# Patient Record
Sex: MALE | Race: BLACK OR AFRICAN AMERICAN | Employment: OTHER | URBAN - METROPOLITAN AREA
[De-identification: names, ages, dates, MRNs, and addresses within clinical notes are randomized per-mention and may not be internally consistent; named-entity substitution may affect disease eponyms.]

---

## 2018-11-06 ENCOUNTER — ED HISTORICAL/CONVERTED ENCOUNTER (OUTPATIENT)
Dept: OTHER | Age: 53
End: 2018-11-06

## 2019-01-16 ENCOUNTER — OP HISTORICAL/CONVERTED ENCOUNTER (OUTPATIENT)
Dept: OTHER | Age: 54
End: 2019-01-16

## 2019-07-22 ENCOUNTER — IP HISTORICAL/CONVERTED ENCOUNTER (OUTPATIENT)
Dept: OTHER | Age: 54
End: 2019-07-22

## 2019-09-30 NOTE — PERIOP NOTES
Unable to reach patient, no message machine available. Called Dr. Yogi Kuhn office - no other numbers available. Requested to let Anil [place] know that we are having issues getting hold of patient. Spoke with patient at dialysis center. Gave patient available PAT H&P times. Patient will call back when he speaks to ride for availability.

## 2019-09-30 NOTE — PERIOP NOTES
Received a return call from Adrian at Dr. Ellis Wetzel office stating that the patient is very hard to reach via the phone. The patient goes to dialysis M-W-F so the dialysis center is the best place to reach the patient. The number is [phone]. Will probably need to ask for the nurse taking care of him.   DOS: 10/10/2019

## 2019-10-03 ENCOUNTER — HOSPITAL ENCOUNTER (OUTPATIENT)
Dept: GENERAL RADIOLOGY | Age: 54
Discharge: HOME | End: 2019-10-03
Payer: MEDICARE

## 2019-10-03 ENCOUNTER — HOSPITAL ENCOUNTER (OUTPATIENT)
Dept: PREADMISSION TESTING | Age: 54
Discharge: HOME | End: 2019-10-03
Payer: MEDICARE

## 2019-10-03 VITALS
TEMPERATURE: 98.2 F | BODY MASS INDEX: 24.45 KG/M2 | HEART RATE: 84 BPM | OXYGEN SATURATION: 100 % | HEIGHT: 72 IN | SYSTOLIC BLOOD PRESSURE: 110 MMHG | DIASTOLIC BLOOD PRESSURE: 72 MMHG | RESPIRATION RATE: 18 BRPM | WEIGHT: 180.5 LBS

## 2019-10-03 LAB
ALBUMIN SERPL-MCNC: 3.5 G/DL (ref 3.5–5)
ALBUMIN/GLOB SERPL: 0.8 {RATIO} (ref 1.1–2.2)
ALP SERPL-CCNC: 80 U/L (ref 45–117)
ALT SERPL-CCNC: 8 U/L (ref 12–78)
ANION GAP SERPL CALC-SCNC: 5 MMOL/L (ref 5–15)
APTT PPP: 25.5 SEC (ref 22.1–32)
AST SERPL-CCNC: 22 U/L (ref 15–37)
BASOPHILS # BLD: 0 K/UL (ref 0–0.1)
BASOPHILS NFR BLD: 1 % (ref 0–1)
BILIRUB SERPL-MCNC: 2.4 MG/DL (ref 0.2–1)
BUN SERPL-MCNC: 21 MG/DL (ref 6–20)
BUN/CREAT SERPL: 3 (ref 12–20)
CALCIUM SERPL-MCNC: 9.1 MG/DL (ref 8.5–10.1)
CHLORIDE SERPL-SCNC: 103 MMOL/L (ref 97–108)
CO2 SERPL-SCNC: 30 MMOL/L (ref 21–32)
CREAT SERPL-MCNC: 7.01 MG/DL (ref 0.7–1.3)
DIFFERENTIAL METHOD BLD: ABNORMAL
EOSINOPHIL # BLD: 0.1 K/UL (ref 0–0.4)
EOSINOPHIL NFR BLD: 3 % (ref 0–7)
ERYTHROCYTE [DISTWIDTH] IN BLOOD BY AUTOMATED COUNT: 13.9 % (ref 11.5–14.5)
GLOBULIN SER CALC-MCNC: 4.4 G/DL (ref 2–4)
GLUCOSE SERPL-MCNC: 93 MG/DL (ref 65–100)
HCT VFR BLD AUTO: 32.9 % (ref 36.6–50.3)
HGB BLD-MCNC: 10.2 G/DL (ref 12.1–17)
IMM GRANULOCYTES # BLD AUTO: 0 K/UL (ref 0–0.04)
IMM GRANULOCYTES NFR BLD AUTO: 0 % (ref 0–0.5)
INR PPP: 1 (ref 0.9–1.1)
LYMPHOCYTES # BLD: 0.7 K/UL (ref 0.8–3.5)
LYMPHOCYTES NFR BLD: 21 % (ref 12–49)
MCH RBC QN AUTO: 30.5 PG (ref 26–34)
MCHC RBC AUTO-ENTMCNC: 31 G/DL (ref 30–36.5)
MCV RBC AUTO: 98.5 FL (ref 80–99)
MONOCYTES # BLD: 0.5 K/UL (ref 0–1)
MONOCYTES NFR BLD: 14 % (ref 5–13)
NEUTS SEG # BLD: 2.2 K/UL (ref 1.8–8)
NEUTS SEG NFR BLD: 61 % (ref 32–75)
NRBC # BLD: 0 K/UL (ref 0–0.01)
NRBC BLD-RTO: 0 PER 100 WBC
PLATELET # BLD AUTO: 139 K/UL (ref 150–400)
PMV BLD AUTO: 9.4 FL (ref 8.9–12.9)
POTASSIUM SERPL-SCNC: 4.8 MMOL/L (ref 3.5–5.1)
PROT SERPL-MCNC: 7.9 G/DL (ref 6.4–8.2)
PROTHROMBIN TIME: 10 SEC (ref 9–11.1)
RBC # BLD AUTO: 3.34 M/UL (ref 4.1–5.7)
RBC MORPH BLD: ABNORMAL
SODIUM SERPL-SCNC: 138 MMOL/L (ref 136–145)
THERAPEUTIC RANGE,PTTT: NORMAL SECS (ref 58–77)
WBC # BLD AUTO: 3.5 K/UL (ref 4.1–11.1)

## 2019-10-03 PROCEDURE — 36415 COLL VENOUS BLD VENIPUNCTURE: CPT

## 2019-10-03 PROCEDURE — 85025 COMPLETE CBC W/AUTO DIFF WBC: CPT

## 2019-10-03 PROCEDURE — 85730 THROMBOPLASTIN TIME PARTIAL: CPT

## 2019-10-03 PROCEDURE — 93005 ELECTROCARDIOGRAM TRACING: CPT

## 2019-10-03 PROCEDURE — 71046 X-RAY EXAM CHEST 2 VIEWS: CPT

## 2019-10-03 PROCEDURE — 85610 PROTHROMBIN TIME: CPT

## 2019-10-03 PROCEDURE — 80053 COMPREHEN METABOLIC PANEL: CPT

## 2019-10-03 RX ORDER — RITONAVIR 100 MG/1
100 TABLET ORAL DAILY
COMMUNITY
End: 2021-02-09 | Stop reason: SDUPTHER

## 2019-10-03 RX ORDER — ATORVASTATIN CALCIUM 40 MG/1
40 TABLET, FILM COATED ORAL DAILY
COMMUNITY
End: 2020-09-16

## 2019-10-03 RX ORDER — LEVETIRACETAM 750 MG/1
750 TABLET ORAL 2 TIMES DAILY
COMMUNITY
End: 2020-09-16 | Stop reason: SDUPTHER

## 2019-10-03 RX ORDER — ASPIRIN 81 MG/1
81 TABLET ORAL DAILY
COMMUNITY
End: 2020-09-16 | Stop reason: SDUPTHER

## 2019-10-03 RX ORDER — SEVELAMER CARBONATE 800 MG/1
1600 TABLET, FILM COATED ORAL 2 TIMES DAILY
COMMUNITY

## 2019-10-03 RX ORDER — ATAZANAVIR 300 MG/1
300 CAPSULE ORAL DAILY
COMMUNITY
End: 2020-08-10 | Stop reason: SDUPTHER

## 2019-10-03 RX ORDER — METOPROLOL TARTRATE 50 MG/1
50 TABLET ORAL 2 TIMES DAILY
COMMUNITY
End: 2022-04-18 | Stop reason: SDUPTHER

## 2019-10-03 RX ORDER — CALCIUM CARBONATE 200(500)MG
1 TABLET,CHEWABLE ORAL AS NEEDED
COMMUNITY

## 2019-10-03 RX ORDER — HYDROCODONE BITARTRATE AND ACETAMINOPHEN 7.5; 325 MG/1; MG/1
1 TABLET ORAL
COMMUNITY
End: 2019-11-14

## 2019-10-03 NOTE — PERIOP NOTES
[address] [address] [facility]   MAIN OR                                  [phone]   MAIN PRE OP                          [phone]                                                                                AMBULATORY PRE OP          [phone]  PRE-ADMISSION TESTING    [phone]   Surgery Date:   Thursday 10/10/19        Is surgery arrival time given by surgeon? NO  If [facility] staff will call you Wednesday 10/9/19 between 3 and 7pm the day before your surgery with your arrival time. (If your surgery is on a Monday, we will call you the Friday before.)    Call [phone] after 7pm Monday-Friday if you did not receive your arrival time. INSTRUCTIONS BEFORE YOUR SURGERY   When You  Arrive Arrive at the [address] [facility] on the day of your surgery  Have your insurance card, photo ID, and any copayment (if needed)   Food   and   Drink NO food or drink after midnight the night before surgery    This means NO water, gum, mints, coffee, juice, etc.  No alcohol (beer, wine, liquor) 24 hours before and after surgery   Medications to   TAKE   Morning of Surgery MEDICATIONS TO TAKE THE MORNING OF SURGERY WITH A SIP OF WATER:    Hydralazine, metoprolol, ritonovir, vemlidy, amlodipine, levetiracetam, atazanovir, percocoet if needed   Medications  To  STOP      7 days before surgery  Non-Steroidal anti-inflammatory Drugs (NSAID's): for example, Ibuprofen (Advil, Motrin), Naproxen (Aleve)   Aspirin, if taking for pain    Herbal supplements, vitamins, and fish oil     Blood  Thinners  If you take  Aspirin, Plavix, Coumadin, or any blood-thinning or anti-blood clot medicine, talk to the doctor who prescribed the medications for pre-operative instructions.    Bathing Clothing  Jewelry  Valuables       If you shower the morning of surgery, please do not apply anything to your skin (lotions, powders, deodorant, or makeup, especially lisha)   Follow all special bath instructions    Do not shave or trim anywhere 24 hours before surgery   Wear your hair loose or down; no pony-tails, buns, or metal hair clips   Wear loose, comfortable, clean clothes   Wear glasses instead of contacts   Leave money, valuables, and jewelry, including body piercings, at home   Going Home - or Spending the Night  SAME-DAY SURGERY: You must have a responsible adult drive you home and stay with you 24 hours after surgery   ADMITS: If your doctor is keeping you in the hospital after surgery, leave personal belongings/luggage in your car until you have a hospital room number. Hospital discharge time is 12 noon  Drivers must be here before 12 noon unless you are told differently   Special Instructions   Special Instructions:  · Use Chlorhexidine Care Fusion wash and sponges 3 days prior to surgery as instructed. · Incentive spirometer given with instructions to practice at home and bring back to the hospital on the day of surgery. · [facility] will contact you if your Hemoglobin A1C is greater than 7.5. · Pain pamphlet and Call Don't Fall reminder reviewed with patient. ·  parking is complimentary Monday - Friday 7 am - 5 pm  · Bring PTA Medication list day of surgery with the last doses taken documented     Follow all instructions so your surgery wont be cancelled. Please, be on time. If a situation occurs and you are delayed the day of surgery, call [phone] or [phone]. If your physical condition changes (like a fever, cold, flu, etc.) call your surgeon. Home medication(s) reviewed and verified (list) during PAT appointment. The patient was contacted  in person. The patient verbalizes understanding of all instructions and does not  need reinforcement.

## 2019-10-04 LAB
ATRIAL RATE: 78 BPM
CALCULATED P AXIS, ECG09: 19 DEGREES
CALCULATED R AXIS, ECG10: -8 DEGREES
CALCULATED T AXIS, ECG11: 7 DEGREES
DIAGNOSIS, 93000: NORMAL
P-R INTERVAL, ECG05: 172 MS
Q-T INTERVAL, ECG07: 390 MS
QRS DURATION, ECG06: 90 MS
QTC CALCULATION (BEZET), ECG08: 444 MS
VENTRICULAR RATE, ECG03: 78 BPM

## 2019-10-04 NOTE — H&P
Preoperative Evaluation                     History and Physical with Surgical Risk Stratification     10/3/2019    CC: Blocked left arm fistula   Surgery: Revision of fistuala     HPI:   Ysabel Zamorano is a 48 y.o. male referred for pre-operative evaluation by Dr. Scarlett Gonzalez for surgery on 10/10/19. Mr. 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 The patient was evaluated in the surgeon's office and it was determined that the most appropriate plan of care is to proceed with surgical intervention. Patient's PCP Other, MD Hortencia    Review of Systems     Constitutional: Negative for chills and fever  HENT: Negative for congestion and sore throat  Eyes: negative for blurred vision and double vision  Respiratory: Negative for cough, shortness of breath and wheezing  Mouth: Negative for loose, broken or chipped teeth. Negative for dentures  Cardiovascular: Negative for chest pain and palpitations  Gastrointestinal: Negative for abdominal pain, constipation, diarrhea and nausea  Genitourinary: Negative for dysuria and hematuria  Musculoskeletal: Negative for joint pain  Skin: Negative for rash, open wounds. Negative for bruises easily  Neurological: Negative for dizziness, tremors and headaches  Psychiatric: Negative for depression. The patient is not nervous/anxious.     Inherent Risk of Surgery     Surgical risk:  Low  Low:  EIntermediate:   High:    Patient Cardiac Risk Assessment     Revised Cardiac Risk Index (RCRI)    Rate if cardiac death, nonfatal MI, nonfatal cardiac arrest by number of risk factor- 0.4%    WADE/AHA 2007 Guidelines:   1) Surgery Emergency, Non-cardiac -> to surgery  2) If not, look at clinical predictors    Major Intermediate Minor     ESRD  Blood Thinner: None    METS      EQUAL TO 4 Care for self Walk indoors around house Walk 2-3 blocks on level ground (2-3 mph) Light work around house (dust, [employer])     Other Risk Factors:   Screening for ETOH use:  Done and low risk  Smoking status:   None    Personal or FH of bleeding problems:  No  Personal or FH of blood clots:  No  Personal or FH of anesthesia problems:   No    Pulmonary Risk:  Asthma or COPD:  No  Body mass index is 24.48 kg/m². Known CINTHIA:  No  Albumin normal, BUN normal    Past Medical, Surgical, Social History     Allergies: No Known Allergies    Current Outpatient Medications   Medication Sig    HYDROcodone-acetaminophen (NORCO) 7.5-325 mg per tablet Take 1 Tab by mouth every eight (8) hours as needed.  hydralazine HCl (APRESOLINE PO) Take 75 mg by mouth three (3) times daily.  sevelamer carbonate (RENVELA) 800 mg tab tab Take 1,600 mg by mouth two (2) times a day.  metoprolol tartrate (LOPRESSOR) 50 mg tablet Take 50 mg by mouth two (2) times a day.  ritonavir (NORVIR) 100 mg tablet Take 100 mg by mouth daily.  aspirin delayed-release 81 mg tablet Take 81 mg by mouth daily.  tenofovir ALAFENAMIDE FUMARATE (VEMLIDY) 25 mg tab Take 25 mg by mouth daily.  atorvastatin (LIPITOR) 40 mg tablet Take 40 mg by mouth daily.  levETIRAcetam (KEPPRA) 750 mg tablet Take 750 mg by mouth two (2) times a day.  atazanavir (REYATAZ) 300 mg capsule Take 300 mg by mouth daily.  calcium carbonate (TUMS) 200 mg calcium (500 mg) chew Take 1 Tab by mouth as needed.  amlodipine besylate (AMLODIPINE PO) Take 10 mg by mouth daily. No current facility-administered medications for this encounter. Past Medical History:   Diagnosis Date    Anemia     Chronic abdominal pain     Chronic kidney disease     dialysis pt. - M. W.F    Complication of AV dialysis fistula 2019    2 large pseudo-aneurysm    [facility]     GERD (gastroesophageal reflux disease)     Gout     History of blood transfusion     at least 8 units over the years    HIV (human immunodeficiency virus infection) ([facility] 2011  Hypertension     Other ill-defined conditions(799.89)     SMALL VEIN IN HEART BEING MONITORED    Pancreatitis     Seizure ([facility] 2012    one time and none sense    Sepsis ([facility] 2011 & 8/2019     Past Surgical History:   Procedure Laterality Date    HX ARTERIOVENOUS FISTULA Left 2012    HX CHOLECYSTECTOMY  2011     Social History     Tobacco Use    Smoking status: Never Smoker    Smokeless tobacco: Never Used   Substance Use Topics    Alcohol use: Not Currently    Drug use: No     Family History   Problem Relation Age of Onset    Cancer Brother         colon    Heart Disease Brother     Heart Disease Mother        Objective     Vitals:    10/03/19 1359   BP: 110/72   Pulse: 84   Resp: 18   Temp: 98.2 °F (36.8 °C)   SpO2: 100%   Weight: 81.9 kg (180 lb 8 oz)   Height: 6' (1.829 m)       Constitutional:  Appears well,  No Acute Distress, Vitals noted  Psychiatric:   Affect normal, Alert and Oriented to person/place/time    Eyes:   Pupils equally round and reactive, EOMI, conjunctiva clear, eyelids normal  ENT:   External ears and nose normal/lips, teeth normal, gums normal, TMs and Orophyarynx normal  Neck:   general inspection and Thyroid normal.  No abnormal cervical or supraclavicular nodes    Lungs:   clear to auscultation, good respiratory effort  Heart: Ausculation normal.  Regular rhythm. No cardiac murmurs.   No carotid bruits or palpable thrills  Chest wall normal  Musculoskeletal: Normal gait  Extremities:   without edema, good peripheral pulses  Skin:   Warm to palpation, without rashes, bruising, or suspicious lesions     Recent Results (from the past 72 hour(s))   CBC WITH AUTOMATED DIFF    Collection Time: 10/03/19  2:55 PM   Result Value Ref Range    WBC 3.5 (L) 4.1 - 11.1 K/uL    RBC 3.34 (L) 4.10 - 5.70 M/uL    HGB 10.2 (L) 12.1 - 17.0 g/dL    HCT 32.9 (L) 36.6 - 50.3 %    MCV 98.5 80.0 - 99.0 FL    MCH 30.5 26.0 - 34.0 PG    MCHC 31.0 30.0 - 36.5 g/dL    RDW 13.9 11.5 - 14.5 % PLATELET 708 (L) 794 - 400 K/uL    MPV 9.4 8.9 - 12.9 FL    NRBC 0.0 0  WBC    ABSOLUTE NRBC 0.00 0.00 - 0.01 K/uL    NEUTROPHILS 61 32 - 75 %    LYMPHOCYTES 21 12 - 49 %    MONOCYTES 14 (H) 5 - 13 %    EOSINOPHILS 3 0 - 7 %    BASOPHILS 1 0 - 1 %    IMMATURE GRANULOCYTES 0 0.0 - 0.5 %    ABS. NEUTROPHILS 2.2 1.8 - 8.0 K/UL    ABS. LYMPHOCYTES 0.7 (L) 0.8 - 3.5 K/UL    ABS. MONOCYTES 0.5 0.0 - 1.0 K/UL    ABS. EOSINOPHILS 0.1 0.0 - 0.4 K/UL    ABS. BASOPHILS 0.0 0.0 - 0.1 K/UL    ABS. IMM. GRANS. 0.0 0.00 - 0.04 K/UL    DF SMEAR SCANNED      RBC COMMENTS OVALOCYTES  PRESENT       METABOLIC PANEL, COMPREHENSIVE    Collection Time: 10/03/19  2:55 PM   Result Value Ref Range    Sodium 138 136 - 145 mmol/L    Potassium 4.8 3.5 - 5.1 mmol/L    Chloride 103 97 - 108 mmol/L    CO2 30 21 - 32 mmol/L    Anion gap 5 5 - 15 mmol/L    Glucose 93 65 - 100 mg/dL    BUN 21 (H) 6 - 20 MG/DL    Creatinine 7.01 (H) 0.70 - 1.30 MG/DL    BUN/Creatinine ratio 3 (L) 12 - 20      GFR est AA 10 (L) >60 ml/min/1.73m2    GFR est non-AA 8 (L) >60 ml/min/1.73m2    Calcium 9.1 8.5 - 10.1 MG/DL    Bilirubin, total 2.4 (H) 0.2 - 1.0 MG/DL    ALT (SGPT) 8 (L) 12 - 78 U/L    AST (SGOT) 22 15 - 37 U/L    Alk.  phosphatase 80 45 - 117 U/L    Protein, total 7.9 6.4 - 8.2 g/dL    Albumin 3.5 3.5 - 5.0 g/dL    Globulin 4.4 (H) 2.0 - 4.0 g/dL    A-G Ratio 0.8 (L) 1.1 - 2.2     PROTHROMBIN TIME + INR    Collection Time: 10/03/19  2:55 PM   Result Value Ref Range    INR 1.0 0.9 - 1.1      Prothrombin time 10.0 9.0 - 11.1 sec   PTT    Collection Time: 10/03/19  2:55 PM   Result Value Ref Range    aPTT 25.5 22.1 - 32.0 sec    aPTT, therapeutic range     58.0 - 77.0 SECS   EKG, 12 LEAD, INITIAL    Collection Time: 10/03/19  3:10 PM   Result Value Ref Range    Ventricular Rate 78 BPM    Atrial Rate 78 BPM    P-R Interval 172 ms    QRS Duration 90 ms    Q-T Interval 390 ms    QTC Calculation (Bezet) 444 ms    Calculated P Axis 19 degrees    Calculated R Axis -8 degrees    Calculated T Axis 7 degrees    Diagnosis       Normal sinus rhythm  Voltage criteria for left ventricular hypertrophy  Abnormal ECG  When compared with ECG of 22-DEC-2011 13:11,  MANUAL COMPARISON REQUIRED, DATA IS UNCONFIRMED         Assessment and Plan     Assessment/Plan:   1) Blocked fistual  2) Pre-Operative Evaluation    Labs, CXR and EKG reviewed    CBC- anemia it the setting of dialysis    Preoperative Clearance  Per RCRI, the patient has a 0.4% risk of cardiac death, nonfatal MI, nonfatal cardiac arrest based on no risk factors. Per ACC/AHA guidelines, patient is low risk for a(n) low risk surgery and may proceed to planned surgery with the above noted risk.     Dennie Liner, PRETTY

## 2019-10-09 ENCOUNTER — ANESTHESIA EVENT (OUTPATIENT)
Dept: SURGERY | Age: 54
End: 2019-10-09
Payer: MEDICARE

## 2019-10-10 ENCOUNTER — HOSPITAL ENCOUNTER (OUTPATIENT)
Age: 54
Setting detail: OUTPATIENT SURGERY
Discharge: HOME | End: 2019-10-10
Payer: MEDICARE

## 2019-10-10 ENCOUNTER — ANESTHESIA (OUTPATIENT)
Dept: SURGERY | Age: 54
End: 2019-10-10
Payer: MEDICARE

## 2019-10-10 VITALS
SYSTOLIC BLOOD PRESSURE: 106 MMHG | DIASTOLIC BLOOD PRESSURE: 74 MMHG | OXYGEN SATURATION: 95 % | TEMPERATURE: 98.2 F | HEART RATE: 72 BPM | RESPIRATION RATE: 16 BRPM | BODY MASS INDEX: 27.2 KG/M2 | HEIGHT: 72 IN | WEIGHT: 200.84 LBS

## 2019-10-10 DIAGNOSIS — Z99.2 STAGE 5 CHRONIC KIDNEY DISEASE ON CHRONIC DIALYSIS (HCC): Primary | ICD-10-CM

## 2019-10-10 DIAGNOSIS — N18.6 STAGE 5 CHRONIC KIDNEY DISEASE ON CHRONIC DIALYSIS (HCC): Primary | ICD-10-CM

## 2019-10-10 LAB
ANION GAP SERPL CALC-SCNC: 8 MMOL/L (ref 5–15)
BUN SERPL-MCNC: 25 MG/DL (ref 6–20)
BUN/CREAT SERPL: 3 (ref 12–20)
CALCIUM SERPL-MCNC: 9.6 MG/DL (ref 8.5–10.1)
CHLORIDE SERPL-SCNC: 100 MMOL/L (ref 97–108)
CO2 SERPL-SCNC: 27 MMOL/L (ref 21–32)
CREAT SERPL-MCNC: 7.58 MG/DL (ref 0.7–1.3)
GLUCOSE SERPL-MCNC: 82 MG/DL (ref 65–100)
POTASSIUM SERPL-SCNC: 5.3 MMOL/L (ref 3.5–5.1)
SODIUM SERPL-SCNC: 135 MMOL/L (ref 136–145)

## 2019-10-10 PROCEDURE — 77030002996 HC SUT SLK J&J -A

## 2019-10-10 PROCEDURE — 36415 COLL VENOUS BLD VENIPUNCTURE: CPT

## 2019-10-10 PROCEDURE — 77030002987 HC SUT PROL J&J -B

## 2019-10-10 PROCEDURE — 74011000250 HC RX REV CODE- 250

## 2019-10-10 PROCEDURE — 77030003601 HC NDL NRV BLK BBMI -A

## 2019-10-10 PROCEDURE — 77030002916 HC SUT ETHLN J&J -A

## 2019-10-10 PROCEDURE — 77030018836 HC SOL IRR NACL ICUM -A

## 2019-10-10 PROCEDURE — 77030002933 HC SUT MCRYL J&J -A

## 2019-10-10 PROCEDURE — 74011250636 HC RX REV CODE- 250/636

## 2019-10-10 PROCEDURE — 77030031139 HC SUT VCRL2 J&J -A

## 2019-10-10 PROCEDURE — 74011250637 HC RX REV CODE- 250/637: Performed by: ANESTHESIOLOGY

## 2019-10-10 PROCEDURE — 76060000033 HC ANESTHESIA 1 TO 1.5 HR

## 2019-10-10 PROCEDURE — 77030011640 HC PAD GRND REM COVD -A

## 2019-10-10 PROCEDURE — 77030040361 HC SLV COMPR DVT MDII -B

## 2019-10-10 PROCEDURE — 77030039266 HC ADH SKN EXOFIN S2SG -A

## 2019-10-10 PROCEDURE — 80048 BASIC METABOLIC PNL TOTAL CA: CPT

## 2019-10-10 PROCEDURE — 74011250636 HC RX REV CODE- 250/636: Performed by: ANESTHESIOLOGY

## 2019-10-10 PROCEDURE — C1768 GRAFT, VASCULAR: HCPCS

## 2019-10-10 PROCEDURE — 77030040922 HC BLNKT HYPOTHRM STRY -A

## 2019-10-10 PROCEDURE — 77030002924 HC SUT GORTX WLGO -B

## 2019-10-10 PROCEDURE — 64415 NJX AA&/STRD BRCH PLXS IMG: CPT

## 2019-10-10 PROCEDURE — 76010000149 HC OR TIME 1 TO 1.5 HR

## 2019-10-10 PROCEDURE — 74011000250 HC RX REV CODE- 250: Performed by: ANESTHESIOLOGY

## 2019-10-10 PROCEDURE — 76210000026 HC REC RM PH II 1 TO 1.5 HR

## 2019-10-10 DEVICE — VG THIN WALL 7MM X 40CM LINED
Type: IMPLANTABLE DEVICE | Site: ARM | Status: FUNCTIONAL
Brand: GORE-TEX   VASCULAR GRAFT

## 2019-10-10 RX ORDER — EPHEDRINE SULFATE/0.9% NACL/PF 50 MG/5 ML
SYRINGE (ML) INTRAVENOUS AS NEEDED
Status: DISCONTINUED | OUTPATIENT
Start: 2019-10-10 | End: 2019-10-10 | Stop reason: HOSPADM

## 2019-10-10 RX ORDER — MIDAZOLAM HYDROCHLORIDE 1 MG/ML
INJECTION, SOLUTION INTRAMUSCULAR; INTRAVENOUS AS NEEDED
Status: DISCONTINUED | OUTPATIENT
Start: 2019-10-10 | End: 2019-10-10 | Stop reason: HOSPADM

## 2019-10-10 RX ORDER — HYDROMORPHONE HYDROCHLORIDE 1 MG/ML
.25-1 INJECTION, SOLUTION INTRAMUSCULAR; INTRAVENOUS; SUBCUTANEOUS
Status: DISCONTINUED | OUTPATIENT
Start: 2019-10-10 | End: 2019-10-10 | Stop reason: HOSPADM

## 2019-10-10 RX ORDER — FLUMAZENIL 0.1 MG/ML
0.2 INJECTION INTRAVENOUS
Status: DISCONTINUED | OUTPATIENT
Start: 2019-10-10 | End: 2019-10-10 | Stop reason: HOSPADM

## 2019-10-10 RX ORDER — SODIUM CHLORIDE, SODIUM LACTATE, POTASSIUM CHLORIDE, CALCIUM CHLORIDE 600; 310; 30; 20 MG/100ML; MG/100ML; MG/100ML; MG/100ML
125 INJECTION, SOLUTION INTRAVENOUS CONTINUOUS
Status: DISCONTINUED | OUTPATIENT
Start: 2019-10-10 | End: 2019-10-10 | Stop reason: HOSPADM

## 2019-10-10 RX ORDER — LIDOCAINE HYDROCHLORIDE 10 MG/ML
0.1 INJECTION, SOLUTION EPIDURAL; INFILTRATION; INTRACAUDAL; PERINEURAL AS NEEDED
Status: DISCONTINUED | OUTPATIENT
Start: 2019-10-10 | End: 2019-10-10 | Stop reason: HOSPADM

## 2019-10-10 RX ORDER — NALOXONE HYDROCHLORIDE 0.4 MG/ML
0.2 INJECTION, SOLUTION INTRAMUSCULAR; INTRAVENOUS; SUBCUTANEOUS
Status: DISCONTINUED | OUTPATIENT
Start: 2019-10-10 | End: 2019-10-10 | Stop reason: HOSPADM

## 2019-10-10 RX ORDER — HYDROCODONE BITARTRATE AND ACETAMINOPHEN 5; 325 MG/1; MG/1
2 TABLET ORAL
Qty: 30 TAB | Refills: 0 | Status: SHIPPED | OUTPATIENT
Start: 2019-10-10 | End: 2019-10-13

## 2019-10-10 RX ORDER — DIPHENHYDRAMINE HYDROCHLORIDE 50 MG/ML
12.5 INJECTION, SOLUTION INTRAMUSCULAR; INTRAVENOUS AS NEEDED
Status: DISCONTINUED | OUTPATIENT
Start: 2019-10-10 | End: 2019-10-10 | Stop reason: HOSPADM

## 2019-10-10 RX ORDER — SODIUM CHLORIDE 9 MG/ML
INJECTION, SOLUTION INTRAVENOUS
Status: DISCONTINUED | OUTPATIENT
Start: 2019-10-10 | End: 2019-10-10 | Stop reason: HOSPADM

## 2019-10-10 RX ORDER — ACETAMINOPHEN 325 MG/1
650 TABLET ORAL
Status: COMPLETED | OUTPATIENT
Start: 2019-10-10 | End: 2019-10-10

## 2019-10-10 RX ORDER — ACETAMINOPHEN 325 MG/1
650 TABLET ORAL
Status: DISCONTINUED | OUTPATIENT
Start: 2019-10-10 | End: 2019-10-10

## 2019-10-10 RX ORDER — SODIUM CHLORIDE 9 MG/ML
25 INJECTION, SOLUTION INTRAVENOUS CONTINUOUS
Status: DISCONTINUED | OUTPATIENT
Start: 2019-10-10 | End: 2019-10-10 | Stop reason: HOSPADM

## 2019-10-10 RX ORDER — MIDAZOLAM HYDROCHLORIDE 1 MG/ML
2 INJECTION, SOLUTION INTRAMUSCULAR; INTRAVENOUS
Status: DISCONTINUED | OUTPATIENT
Start: 2019-10-10 | End: 2019-10-10 | Stop reason: HOSPADM

## 2019-10-10 RX ORDER — FENTANYL CITRATE 50 UG/ML
INJECTION, SOLUTION INTRAMUSCULAR; INTRAVENOUS AS NEEDED
Status: DISCONTINUED | OUTPATIENT
Start: 2019-10-10 | End: 2019-10-10 | Stop reason: HOSPADM

## 2019-10-10 RX ORDER — PROPOFOL 10 MG/ML
INJECTION, EMULSION INTRAVENOUS
Status: DISCONTINUED | OUTPATIENT
Start: 2019-10-10 | End: 2019-10-10 | Stop reason: HOSPADM

## 2019-10-10 RX ADMIN — ACETAMINOPHEN 650 MG: 325 TABLET ORAL at 14:56

## 2019-10-10 RX ADMIN — SODIUM CHLORIDE, POTASSIUM CHLORIDE, SODIUM LACTATE AND CALCIUM CHLORIDE: 600; 310; 30; 20 INJECTION, SOLUTION INTRAVENOUS at 13:04

## 2019-10-10 RX ADMIN — SODIUM CHLORIDE: 9 INJECTION, SOLUTION INTRAVENOUS at 13:04

## 2019-10-10 RX ADMIN — PROPOFOL 50 MCG/KG/MIN: 10 INJECTION, EMULSION INTRAVENOUS at 13:11

## 2019-10-10 RX ADMIN — FENTANYL CITRATE 50 MCG: 50 INJECTION, SOLUTION INTRAMUSCULAR; INTRAVENOUS at 12:44

## 2019-10-10 RX ADMIN — SODIUM CHLORIDE 25 ML/HR: 900 INJECTION, SOLUTION INTRAVENOUS at 11:58

## 2019-10-10 RX ADMIN — SODIUM CHLORIDE 100 MCG: 9 INJECTION INTRAMUSCULAR; INTRAVENOUS; SUBCUTANEOUS at 13:39

## 2019-10-10 RX ADMIN — FENTANYL CITRATE 50 MCG: 50 INJECTION, SOLUTION INTRAMUSCULAR; INTRAVENOUS at 12:41

## 2019-10-10 RX ADMIN — MIDAZOLAM 2 MG: 1 INJECTION INTRAMUSCULAR; INTRAVENOUS at 12:41

## 2019-10-10 RX ADMIN — MIDAZOLAM 2 MG: 1 INJECTION INTRAMUSCULAR; INTRAVENOUS at 13:08

## 2019-10-10 RX ADMIN — MEPIVACAINE HYDROCHLORIDE 30 ML: 20 INJECTION, SOLUTION EPIDURAL; INFILTRATION at 12:48

## 2019-10-10 RX ADMIN — Medication 10 MG: at 13:32

## 2019-10-10 RX ADMIN — MIDAZOLAM 3 MG: 1 INJECTION INTRAMUSCULAR; INTRAVENOUS at 13:11

## 2019-10-10 RX ADMIN — WATER 2 G: 1 INJECTION INTRAMUSCULAR; INTRAVENOUS; SUBCUTANEOUS at 13:08

## 2019-10-10 NOTE — DISCHARGE INSTRUCTIONS
Patient Discharge Instructions    Nate Davies / [id number] : 1965    Admitted 10/10/2019 Discharged: 10/10/2019     Take Home Medications            · It is important that you take the medication exactly as they are prescribed. · Keep your medication in the bottles provided by the pharmacist and keep a list of the medication names, dosages, and times to be taken in your wallet. · Do not take other medications without consulting your doctor. What to do at Home    Recommended diet: Renal Diet,     Recommended activity: Activity as tolerated,      Follow-up with Dr Ximena Stallings in 2 weeks at access center    Follow up at access center before next dialysis for catheter placement. Information obtained by :  I understand that if any problems occur once I am at home I am to contact my physician. I understand and acknowledge receipt of the instructions indicated above. [de-identified] or R.N.'s Signature                                                                  Date/Time                                                                                                                                              Patient or Representative Signature                                                          Date/Time    DISCHARGE SUMMARY from your Nurse    The following personal items collected during your admission are returned to you:   Dental Appliance: Dental Appliances: None  Vision: Visual Aid: Glasses  Hearing Aid:    Jewelry: Jewelry: None  Clothing: Clothing: Other (comment)(street clothes to locker with glassess)  Other Valuables:  Other Valuables: None(glassess and clothes to locker)  Valuables sent to safe:      PATIENT INSTRUCTIONS:    After general anesthesia or intravenous sedation, for 24 hours or while taking prescription Narcotics:  · Limit your activities  · Do not drive and operate hazardous machinery  · Do not make important personal or business decisions  · Do  not drink alcoholic beverages  · If you have not urinated within 8 hours after discharge, please contact your surgeon on call. Report the following to your surgeon:  · Excessive pain, swelling, redness or odor of or around the surgical area  · Temperature over 100.5  · Nausea and vomiting lasting longer than 4 hours or if unable to take medications  · Any signs of decreased circulation or nerve impairment to extremity: change in color, persistent  numbness, tingling, coldness or increase pain  · Any questions    COUGH AND DEEP BREATHE    Breathing deep and coughing are very important exercises to do after surgery. Deep breathing and coughing open the little air tubes and air sacks in your lungs. You take deep breaths every day. You may not even notice - it is just something you do when you sigh or yawn. It is a natural exercise you do to keep these air passages open. After surgery, take deep breaths and cough, on purpose. Coughing and deep breathing help prevent bronchitis and pneumonia after surgery. If you had chest or belly surgery, use a pillow as a \"hug buddy\" and hold it tightly to your chest or belly when you cough. DIRECTIONS:  6. Take 10 to 15 slow deep breaths every hour while awake. 7. Breathe in deeply, and hold it for 2 seconds. 8. Exhale slowly through puckered lips, like blowing up a balloon. 9. After every 4th or 5th deep breath, hug your pillow to your chest or belly and give a hard, deep cough. Yes, it will probably hurt. But doing this exercise is very important part of healing after surgery. Take your pain medicine to help you do this exercise without too much pain. IF YOU HAVE BEEN DIAGNOSED WITH SLEEP APNEA, PLEASE USE YOUR SLEEP APNEA DEVICE OR CPAP MACHINE WHEN YOU INTEND TO NAP AFTER TAKING PAIN MEDICATION.     Ankle Pumps    Ankle pumps increase the circulation of oxygenated blood to your lower extremities and decrease your risk for circulation problems such as blood clots. They also stretch the muscles, tendons and ligaments in your foot and ankle, and prevent joint contracture in the ankle and foot, especially after surgeries on the legs. It is important to do ankle pump exercises regularly after surgery because immobility increases your risk for developing a blood clot. Your doctor may also have you take an Aspirin for the next few days as well. If your doctor did not ask you to take an Aspirin, consult with him before starting Aspirin therapy on your own. Slowly point your foot forward, feeling the muscles on the top of your lower leg stretch, and hold this position for 5 seconds. Next, pull your foot back toward you as far as possible, stretching the calf muscles, and hold that position for 5 seconds. Repeat with the other foot. Perform 10 repetitions every hour while awake for both ankles if possible (down and then up with the foot once is one repetition). You should feel gentle stretching of the muscles in your lower leg when doing this exercise. If you feel pain, or your range of motion is limited, don't  Push too hard. Only go the limit your joint and muscles will let you go. If you have increasing pain, progressively worsening leg warmth or swelling, STOP the exercise and call your doctor. Below is information about the medications your doctor is prescribing after your visit:    Other information in your discharge envelope:  []     PRESCRIPTIONS  []     PHYSICAL THERAPY PRESCRIPTION  []     APPOINTMENT CARDS  []     Regional Anesthesia Pamphlet for block or block with On-Q Catheter from Anesthesia Service  []     Medical device information sheets/pamphlets from their    []     School/work excuse note. []     /parent work excuse note.       These are general instructions for a healthy lifestyle:    *  Please give a list of your current medications to your Primary Care Provider. *  Please update this list whenever your medications are discontinued, doses are      changed, or new medications (including over-the-counter products) are added. *  Please carry medication information at all times in case of emergency situations. About Smoking  No smoking / No tobacco products / Avoid exposure to second hand smoke    Surgeon General's Warning:  Quitting smoking now greatly reduces serious risk to your health. Obesity, smoking, and sedentary lifestyle greatly increases your risk for illness and disease. A healthy diet, regular physical exercise & weight monitoring are important for maintaining a healthy lifestyle. Congestive Heart Failure  You may be retaining fluid if you have a history of heart failure or if you experience any of the following symptoms:  Weight gain of 3 pounds or more overnight or 5 pounds in a week, increased swelling in our hands or feet or shortness of breath while lying flat in bed. Please call your doctor as soon as you notice any of these symptoms; do not wait until your next office visit. Recognize signs and symptoms of STROKE:  F - face looks uneven  A - arms unable to move or move even  S - speech slurred or non-existent  T - time-call 911 as soon as signs and symptoms begin-DO NOT go         Back to bed or wait to see if you get better-TIME IS BRAIN. Warning signs of HEART ATTACK  Call 911 if you have these symptoms    · Chest discomfort. Most heart attacks involve discomfort in the center of the chest that lasts more than a few minutes, or that goes away and comes back. It can feel like uncomfortable pressure, squeezing, fullness, or pain. · Discomfort in other areas of the upper body. Symptoms can include pain or discomfort in one or both        Arms, the back, neck, jaw, or stomach.   ·  Shortness of breath with or without chest discomfort. · Other signs may include breaking out in a cold sweat, nausea, or lightheadedness    Don't wait more than five minutes to call 911 - MINUTES MATTER! Fast action can save your life. Calling 911 is almost always the fastest way to get lifesaving treatment. Emergency Medical Services staff can begin treatment when they arrive - up to an hour sooner than if someone gets to the hospital by car. [facility] MEDICATION AND SIDE EFFECT GUIDE    The [facility] MEDICATION AND SIDE EFFECT GUIDE was provided to the PATIENT AND CARE PROVIDER.   Information provided includes instruction about drug purpose and common side effects for the following medications:     HYDROcodone-acetaminophen 5-325 mg per tablet (NORCO)

## 2019-10-10 NOTE — ANESTHESIA PREPROCEDURE EVALUATION
Relevant Problems   No relevant active problems       Anesthetic History   No history of anesthetic complications            Review of Systems / Medical History  Patient summary reviewed and nursing notes reviewed    Pulmonary                   Neuro/Psych     seizures: well controlled        Comments: Last seizure 2012 Cardiovascular    Hypertension              Exercise tolerance: <4 METS     GI/Hepatic/Renal     GERD: well controlled    Renal disease: dialysis      Comments: Pancreatitis Endo/Other        Cancer and anemia     Other Findings   Comments: +HIV           Physical Exam    Airway  Mallampati: III    Neck ROM: normal range of motion   Mouth opening: Normal     Cardiovascular    Rhythm: regular  Rate: normal         Dental         Pulmonary  Breath sounds clear to auscultation               Abdominal         Other Findings            Anesthetic Plan    ASA: 3  Anesthesia type: regional - supraclavicular block            Anesthetic plan and risks discussed with: Patient      Informed consent obtained.

## 2019-10-10 NOTE — PERIOP NOTES
FLOSEAL 10mL WAS GIVEN TO THE STERILE FIELD TO BE USED BY MD   REF: [id number]   LOT: [id number]   EXP: 2-2-2021

## 2019-10-10 NOTE — ANESTHESIA PROCEDURE NOTES
Peripheral Block    Start time: 10/10/2019 12:40 PM  End time: 10/10/2019 12:48 PM  Performed by: Chavez Gutierrez MD  Authorized by: Chavez Gutierrez MD       Pre-procedure: Indications: at surgeon's request and primary anesthetic    Preanesthetic Checklist: patient identified, risks and benefits discussed, site marked, timeout performed, anesthesia consent given and patient being monitored    Timeout Time: 12:40          Block Type:   Block Type:   Interscalene (low interscalene/high supraclavicular)  Laterality:  Left  Monitoring:  Continuous pulse ox, frequent vital sign checks, heart rate, responsive to questions and oxygen  Injection Technique:  Single shot  Procedures: ultrasound guided and nerve stimulator    Patient Position: supine  Prep: chlorhexidine    Location:  Supraclavicular  Needle Type:  Stimuplex  Needle Gauge:  22 G  Needle Localization:  Anatomical landmarks and ultrasound guidance    Assessment:  Number of attempts:  1  Injection Assessment:  Incremental injection every 5 mL, local visualized surrounding nerve on ultrasound, negative aspiration for blood, no paresthesia and no intravascular symptoms  Patient tolerance:  Patient tolerated the procedure well with no immediate complications

## 2019-10-10 NOTE — BRIEF OP NOTE
BRIEF OPERATIVE NOTE    Date of Procedure: 10/10/2019   Preoperative Diagnosis: ESRD  Postoperative Diagnosis: ESRD    Procedure(s):  LEFT ARM REVISION ARTERIO VENOUS FISTULA  Surgeon(s) and Role:     Lydia Puri MD - Primary         Surgical Assistant: 0    Surgical Staff:  Circ-1: Qing Molina RN  Scrub Tech-1: Claudine Hamilton  Surg Asst-1: Debra Alcala RN  Event Time In Time Out   Incision Start 1325    Incision Close       Anesthesia: Regional   Estimated Blood Loss: 20  Specimens: * No specimens in log *   Findings: 0   Complications: 0  Implants:   Implant Name Type Inv.  Item Serial No.  Lot No. LRB No. Used Action   (D)(D)GRAFT VASC TW [id number] -- DISC BY [facility] - [id number]  (D)(D)GRAFT VASC TW [id number] -- DISC BY [facility] [id number]  [employer] NA Left 1 Implanted

## 2019-10-10 NOTE — ANESTHESIA POSTPROCEDURE EVALUATION
Procedure(s):  LEFT ARM REVISION ARTERIO VENOUS FISTULA.    regional    Anesthesia Post Evaluation      Multimodal analgesia: multimodal analgesia not used between 6 hours prior to anesthesia start to PACU discharge  Patient location during evaluation: PACU  Patient participation: complete - patient participated  Level of consciousness: awake and alert  Pain score: 3  Pain management: adequate  Airway patency: patent  Anesthetic complications: no  Cardiovascular status: hemodynamically stable and acceptable  Respiratory status: acceptable  Hydration status: acceptable  Comments: Patient seen and evaluated; no concerns. Post anesthesia nausea and vomiting:  none      Vitals Value Taken Time   /73 10/10/2019  2:45 PM   Temp     Pulse 70 10/10/2019  2:49 PM   Resp 14 10/10/2019  2:49 PM   SpO2 100 % 10/10/2019  2:49 PM   Vitals shown include unvalidated device data.

## 2019-10-12 NOTE — OP NOTES
[facility]  OPERATIVE REPORT    Name:  Shay Lizama  MR#:  [id number]  :  1965  ACCOUNT #:  [de-identified]  DATE OF SERVICE:  10/10/2019    PREOPERATIVE DIAGNOSIS:  End-stage renal disease. POSTOPERATIVE DIAGNOSIS:  End-stage renal disease. PROCEDURE PERFORMED:  Revision of left arm AV fistula. SURGEON:  Gilmer Charles MD    ASSISTANT:  None. ANESTHESIA:  Regional anesthesia. COMPLICATIONS:  None. SPECIMENS REMOVED:  None. IMPLANTS:  Graft. ESTIMATED BLOOD LOSS:  Minimal.    INDICATIONS FOR PROCEDURE:  The patient is a middle-aged male with end-stage renal disease and AV fistula of left upper arm. He has two pseudoaneurysms with thinning skin. A decision was made to take him to the operating room for revision and placement of a new graft. TECHNICAL DETAILS:  The patient was taken to the operating room once suitable level of anesthesia was induced, prepped and draped in typical sterile fashion. A transverse incision was made proximally and distally to the affected areas and dissection carried out down to the fistula which was dissected free of its soft tissue attachments. The patient was systemically heparinized. 7-mm [place]Hany graft was sewed end-to-end to the proximal AV fistula. This was then tunneled subcutaneously lateral to the AV fistula and then sewed end-to-end to the venous limb of the fistula. Flow restored to the graft. There was excellent signals throughout. The wound was irrigated thoroughly. Hemostasis was controlled. Wounds were closed in multiple layers. He tolerated the procedure well. Sponge and instrument counts were correct x2. The patient was awakened and transferred to the recovery room in good condition.         Avinash Harmon MD      [id number]  D:  10/11/2019 9:27  T:  10/11/2019 20:54  JOB #:  [id number]

## 2019-11-13 ENCOUNTER — ANESTHESIA EVENT (OUTPATIENT)
Dept: SURGERY | Age: 54
End: 2019-11-13
Payer: MEDICARE

## 2019-11-14 ENCOUNTER — ANESTHESIA (OUTPATIENT)
Dept: SURGERY | Age: 54
End: 2019-11-14
Payer: MEDICARE

## 2019-11-14 ENCOUNTER — HOSPITAL ENCOUNTER (OUTPATIENT)
Age: 54
Setting detail: OUTPATIENT SURGERY
Discharge: HOME | End: 2019-11-14
Payer: MEDICARE

## 2019-11-14 VITALS
SYSTOLIC BLOOD PRESSURE: 114 MMHG | BODY MASS INDEX: 24.63 KG/M2 | HEART RATE: 66 BPM | DIASTOLIC BLOOD PRESSURE: 82 MMHG | TEMPERATURE: 98 F | HEIGHT: 72 IN | OXYGEN SATURATION: 97 % | WEIGHT: 181.88 LBS | RESPIRATION RATE: 12 BRPM

## 2019-11-14 DIAGNOSIS — N18.6 ESRD (END STAGE RENAL DISEASE) (HCC): Primary | ICD-10-CM

## 2019-11-14 LAB
ANION GAP BLD CALC-SCNC: 16 MMOL/L (ref 10–20)
BUN BLD-MCNC: 22 MG/DL (ref 9–20)
CA-I BLD-MCNC: 1.04 MMOL/L (ref 1.12–1.32)
CHLORIDE BLD-SCNC: 102 MMOL/L (ref 98–107)
CO2 BLD-SCNC: 25 MMOL/L (ref 21–32)
CREAT BLD-MCNC: 8.6 MG/DL (ref 0.6–1.3)
GLUCOSE BLD-MCNC: 80 MG/DL (ref 65–100)
HCT VFR BLD CALC: 35 % (ref 36.6–50.3)
POTASSIUM BLD-SCNC: 5.8 MMOL/L (ref 3.5–5.1)
SERVICE CMNT-IMP: ABNORMAL
SODIUM BLD-SCNC: 136 MMOL/L (ref 136–145)

## 2019-11-14 PROCEDURE — 76210000017 HC OR PH I REC 1.5 TO 2 HR

## 2019-11-14 PROCEDURE — 76060000032 HC ANESTHESIA 0.5 TO 1 HR

## 2019-11-14 PROCEDURE — 77030003601 HC NDL NRV BLK BBMI -A

## 2019-11-14 PROCEDURE — 77030031139 HC SUT VCRL2 J&J -A

## 2019-11-14 PROCEDURE — 77030008462 HC STPLR SKN PROX J&J -A

## 2019-11-14 PROCEDURE — 76210000021 HC REC RM PH II 0.5 TO 1 HR

## 2019-11-14 PROCEDURE — 74011250636 HC RX REV CODE- 250/636

## 2019-11-14 PROCEDURE — 74011250636 HC RX REV CODE- 250/636: Performed by: ANESTHESIOLOGY

## 2019-11-14 PROCEDURE — 77030018846 HC SOL IRR STRL H20 ICUM -A

## 2019-11-14 PROCEDURE — 74011000250 HC RX REV CODE- 250: Performed by: NURSE ANESTHETIST, CERTIFIED REGISTERED

## 2019-11-14 PROCEDURE — 74011250637 HC RX REV CODE- 250/637

## 2019-11-14 PROCEDURE — 76010000138 HC OR TIME 0.5 TO 1 HR

## 2019-11-14 PROCEDURE — A4565 SLINGS: HCPCS

## 2019-11-14 PROCEDURE — 77030002996 HC SUT SLK J&J -A

## 2019-11-14 PROCEDURE — 77030040361 HC SLV COMPR DVT MDII -B

## 2019-11-14 PROCEDURE — 77030011640 HC PAD GRND REM COVD -A

## 2019-11-14 PROCEDURE — 80047 BASIC METABLC PNL IONIZED CA: CPT

## 2019-11-14 PROCEDURE — 77030040922 HC BLNKT HYPOTHRM STRY -A

## 2019-11-14 PROCEDURE — 74011250636 HC RX REV CODE- 250/636: Performed by: NURSE ANESTHETIST, CERTIFIED REGISTERED

## 2019-11-14 PROCEDURE — 77030018836 HC SOL IRR NACL ICUM -A

## 2019-11-14 RX ORDER — HYDROCODONE BITARTRATE AND ACETAMINOPHEN 7.5; 325 MG/1; MG/1
1 TABLET ORAL
Qty: 30 TAB | Refills: 0 | Status: SHIPPED | OUTPATIENT
Start: 2019-11-14 | End: 2019-11-21

## 2019-11-14 RX ORDER — MIDAZOLAM HYDROCHLORIDE 1 MG/ML
1 INJECTION, SOLUTION INTRAMUSCULAR; INTRAVENOUS AS NEEDED
Status: COMPLETED | OUTPATIENT
Start: 2019-11-14 | End: 2019-11-14

## 2019-11-14 RX ORDER — HYDROCODONE BITARTRATE AND ACETAMINOPHEN 7.5; 325 MG/1; MG/1
1 TABLET ORAL ONCE
Status: COMPLETED | OUTPATIENT
Start: 2019-11-14 | End: 2019-11-14

## 2019-11-14 RX ORDER — SODIUM CHLORIDE 0.9 % (FLUSH) 0.9 %
5-40 SYRINGE (ML) INJECTION AS NEEDED
Status: DISCONTINUED | OUTPATIENT
Start: 2019-11-14 | End: 2019-11-14 | Stop reason: HOSPADM

## 2019-11-14 RX ORDER — CEFAZOLIN SODIUM/WATER 2 G/20 ML
2 SYRINGE (ML) INTRAVENOUS ONCE
Status: COMPLETED | OUTPATIENT
Start: 2019-11-14 | End: 2019-11-14

## 2019-11-14 RX ORDER — ONDANSETRON 2 MG/ML
4 INJECTION INTRAMUSCULAR; INTRAVENOUS AS NEEDED
Status: DISCONTINUED | OUTPATIENT
Start: 2019-11-14 | End: 2019-11-14 | Stop reason: HOSPADM

## 2019-11-14 RX ORDER — LIDOCAINE HYDROCHLORIDE 20 MG/ML
INJECTION, SOLUTION EPIDURAL; INFILTRATION; INTRACAUDAL; PERINEURAL AS NEEDED
Status: DISCONTINUED | OUTPATIENT
Start: 2019-11-14 | End: 2019-11-14 | Stop reason: HOSPADM

## 2019-11-14 RX ORDER — SODIUM CHLORIDE 0.9 % (FLUSH) 0.9 %
5-40 SYRINGE (ML) INJECTION EVERY 8 HOURS
Status: DISCONTINUED | OUTPATIENT
Start: 2019-11-14 | End: 2019-11-14 | Stop reason: HOSPADM

## 2019-11-14 RX ORDER — PHENYLEPHRINE HCL IN 0.9% NACL 0.4MG/10ML
SYRINGE (ML) INTRAVENOUS AS NEEDED
Status: DISCONTINUED | OUTPATIENT
Start: 2019-11-14 | End: 2019-11-14 | Stop reason: HOSPADM

## 2019-11-14 RX ORDER — FENTANYL CITRATE 50 UG/ML
INJECTION, SOLUTION INTRAMUSCULAR; INTRAVENOUS
Status: COMPLETED
Start: 2019-11-14 | End: 2019-11-14

## 2019-11-14 RX ORDER — LIDOCAINE HYDROCHLORIDE 10 MG/ML
0.1 INJECTION, SOLUTION EPIDURAL; INFILTRATION; INTRACAUDAL; PERINEURAL AS NEEDED
Status: DISCONTINUED | OUTPATIENT
Start: 2019-11-14 | End: 2019-11-14 | Stop reason: HOSPADM

## 2019-11-14 RX ORDER — SODIUM CHLORIDE, SODIUM LACTATE, POTASSIUM CHLORIDE, CALCIUM CHLORIDE 600; 310; 30; 20 MG/100ML; MG/100ML; MG/100ML; MG/100ML
50 INJECTION, SOLUTION INTRAVENOUS CONTINUOUS
Status: DISCONTINUED | OUTPATIENT
Start: 2019-11-14 | End: 2019-11-14 | Stop reason: HOSPADM

## 2019-11-14 RX ORDER — SODIUM CHLORIDE 9 MG/ML
INJECTION, SOLUTION INTRAVENOUS
Status: DISCONTINUED | OUTPATIENT
Start: 2019-11-14 | End: 2019-11-14 | Stop reason: HOSPADM

## 2019-11-14 RX ORDER — HYDROMORPHONE HYDROCHLORIDE 1 MG/ML
INJECTION, SOLUTION INTRAMUSCULAR; INTRAVENOUS; SUBCUTANEOUS AS NEEDED
Status: DISCONTINUED | OUTPATIENT
Start: 2019-11-14 | End: 2019-11-14 | Stop reason: HOSPADM

## 2019-11-14 RX ORDER — FENTANYL CITRATE 50 UG/ML
25 INJECTION, SOLUTION INTRAMUSCULAR; INTRAVENOUS
Status: DISCONTINUED | OUTPATIENT
Start: 2019-11-14 | End: 2019-11-14 | Stop reason: HOSPADM

## 2019-11-14 RX ORDER — HYDROMORPHONE HYDROCHLORIDE 1 MG/ML
0.2 INJECTION, SOLUTION INTRAMUSCULAR; INTRAVENOUS; SUBCUTANEOUS
Status: DISCONTINUED | OUTPATIENT
Start: 2019-11-14 | End: 2019-11-14 | Stop reason: HOSPADM

## 2019-11-14 RX ORDER — PROPOFOL 10 MG/ML
INJECTION, EMULSION INTRAVENOUS AS NEEDED
Status: DISCONTINUED | OUTPATIENT
Start: 2019-11-14 | End: 2019-11-14 | Stop reason: HOSPADM

## 2019-11-14 RX ORDER — FENTANYL CITRATE 50 UG/ML
50 INJECTION, SOLUTION INTRAMUSCULAR; INTRAVENOUS AS NEEDED
Status: DISCONTINUED | OUTPATIENT
Start: 2019-11-14 | End: 2019-11-14 | Stop reason: HOSPADM

## 2019-11-14 RX ORDER — PROPOFOL 10 MG/ML
INJECTION, EMULSION INTRAVENOUS
Status: DISCONTINUED | OUTPATIENT
Start: 2019-11-14 | End: 2019-11-14 | Stop reason: HOSPADM

## 2019-11-14 RX ADMIN — FENTANYL CITRATE 25 MCG: 50 INJECTION INTRAMUSCULAR; INTRAVENOUS at 11:42

## 2019-11-14 RX ADMIN — MIDAZOLAM 2 MG: 1 INJECTION INTRAMUSCULAR; INTRAVENOUS at 09:55

## 2019-11-14 RX ADMIN — MIDAZOLAM 2 MG: 1 INJECTION INTRAMUSCULAR; INTRAVENOUS at 09:56

## 2019-11-14 RX ADMIN — PROPOFOL 50 MCG/KG/MIN: 10 INJECTION, EMULSION INTRAVENOUS at 10:33

## 2019-11-14 RX ADMIN — MEPIVACAINE HYDROCHLORIDE 30 ML: 20 INJECTION, SOLUTION EPIDURAL; INFILTRATION at 10:17

## 2019-11-14 RX ADMIN — LIDOCAINE HYDROCHLORIDE 60 MG: 20 INJECTION, SOLUTION EPIDURAL; INFILTRATION; INTRACAUDAL; PERINEURAL at 10:32

## 2019-11-14 RX ADMIN — FENTANYL CITRATE 25 MCG: 50 INJECTION INTRAMUSCULAR; INTRAVENOUS at 11:48

## 2019-11-14 RX ADMIN — Medication 2 G: at 10:32

## 2019-11-14 RX ADMIN — PROPOFOL 50 MG: 10 INJECTION, EMULSION INTRAVENOUS at 10:32

## 2019-11-14 RX ADMIN — SODIUM CHLORIDE: 9 INJECTION, SOLUTION INTRAVENOUS at 10:29

## 2019-11-14 RX ADMIN — Medication 80 MCG: at 10:59

## 2019-11-14 RX ADMIN — HYDROCODONE BITARTRATE AND ACETAMINOPHEN 1 TABLET: 7.5; 325 TABLET ORAL at 12:08

## 2019-11-14 RX ADMIN — FENTANYL CITRATE 100 MCG: 50 INJECTION, SOLUTION INTRAMUSCULAR; INTRAVENOUS at 09:55

## 2019-11-14 RX ADMIN — HYDROMORPHONE HYDROCHLORIDE 0.5 MG: 1 INJECTION, SOLUTION INTRAMUSCULAR; INTRAVENOUS; SUBCUTANEOUS at 11:19

## 2019-11-14 NOTE — ANESTHESIA PROCEDURE NOTES
Peripheral Block    Start time: 11/14/2019 9:55 AM  End time: 11/14/2019 10:00 AM  Performed by: Pierre Marcus MD  Authorized by: Pierre Marcus MD       Pre-procedure: Indications: at surgeon's request and post-op pain management    Preanesthetic Checklist: risks and benefits discussed, site marked and timeout performed    Timeout Time: 10:00          Block Type:   Block Type:   Interscalene  Laterality:  Left  Monitoring:  Standard ASA monitoring, continuous pulse ox, frequent vital sign checks, heart rate, responsive to questions and oxygen  Injection Technique:  Single shot  Procedures: ultrasound guided and nerve stimulator    Patient Position: supine  Prep: betadine and povidone-iodine 7.5% surgical scrub    Location:  Interscalene  Needle Type:  Stimuplex  Needle Gauge:  22 G  Needle Localization:  Nerve stimulator and ultrasound guidance  Motor Response: minimal motor response >0.4 mA      Assessment:  Number of attempts:  1  Injection Assessment:  Incremental injection every 5 mL, local visualized surrounding nerve on ultrasound, negative aspiration for blood, no paresthesia, negative aspiration for CSF and ultrasound image on chart  Patient tolerance:  Patient tolerated the procedure well with no immediate complications

## 2019-11-14 NOTE — PERIOP NOTES
D/c instructions and rx reviewed with and given to pt and his family who all verbalize understanding.

## 2019-11-14 NOTE — ANESTHESIA POSTPROCEDURE EVALUATION
Procedure(s):  EXCISION DIALYSIS FISTULA ANEURYSM.    regional    Anesthesia Post Evaluation        Patient location during evaluation: PACU  Patient participation: complete - patient participated  Level of consciousness: awake and alert  Pain management: adequate  Airway patency: patent  Anesthetic complications: no  Cardiovascular status: acceptable  Respiratory status: acceptable  Hydration status: acceptable  Comments: I have seen and evaluated the patient and is ready for discharge. Rosa Smart MD    Post anesthesia nausea and vomiting:  none      Vitals Value Taken Time   /82 11/14/2019 12:00 PM   Temp 36.7 °C (98 °F) 11/14/2019 11:22 AM   Pulse 65 11/14/2019 12:10 PM   Resp 11 11/14/2019 12:10 PM   SpO2 96 % 11/14/2019 12:10 PM   Vitals shown include unvalidated device data.

## 2019-11-14 NOTE — OP NOTES
[address]  OPERATIVE REPORT    Name:  Audelia Alicia  MR#:  [id number]  :  1965  ACCOUNT #:  [de-identified]  DATE OF SERVICE:  2019      PREOPERATIVE DIAGNOSIS:  Thrombosed aneurysmal left upper arm dialysis fistula. POSTOPERATIVE DIAGNOSIS:  Thrombosed aneurysmal left upper arm dialysis fistula. PROCEDURE PERFORMED:  Excision aneurysmal left upper arm dialysis fistula. SURGEON:  Az June MD    ASSISTANT:  Meryl Pate. ANESTHESIA:  Regional block. COMPLICATIONS:  None. SPECIMENS REMOVED:  None. IMPLANTS:  None. ESTIMATED BLOOD LOSS:  25 mL. INDICATIONS:  The patient is a 61-year-old male who had a left upper arm basilic vein dialysis fistula placed 7 years ago. The fistula became aneurysmal with threatened skin and the fistula was replaced with an upper arm graft lateral to the fistula. The fistula thrombosed, but the patient has residual large aneurysms with breakdown of the overlying skin. This will be excised. PROCEDURE:  The patient's left arm was prepped and draped. There were two areas of aneurysmal dilatation. A longitudinal incision was made over the more distal of the two aneurysms. The fistula was entered and clot was evacuated. The wall of the fistula was thickened and was dissected free from the surrounding skin and subcutaneous tissue. The aneurysmal fistula measuring 3-4 cm in diameter was excised. A similar longitudinal incision was then made in the more proximal upper arm aneurysm. The fistula was entered and clot was evacuated. The wall of the fistula was then excised as was done previously. Hemostasis was obtained. The dilated, thinned skin was excised. The incisions were then closed with running Vicryl subcutaneous suture and skin staples. Dressings were applied and the patient was returned to the recovery room in stable condition.       Annie Perez MD      [id number]  D:  2019 11:15  T: 11/14/2019 13:07  JOB #:  [id number]

## 2019-11-14 NOTE — BRIEF OP NOTE
BRIEF OPERATIVE NOTE    Date of Procedure: 11/14/2019   Preoperative Diagnosis: ESRD, COMPLICATION OF DIALYSIS FISTULA  Postoperative Diagnosis: ESRD, COMPLICATION OF DIALYSIS FISTULA    Procedure(s):  EXCISION DIALYSIS FISTULA ANEURYSM  Surgeon(s) and Role:     * Kassy Randle MD - Primary         Surgical Assistant: Larissa Painter    Surgical Staff:  Circ-1: Maximino Tyler RN  Circ-2: Bonita Torrez RN  Scrub Tech-1: Yamilet Leal  Surg Asst-1: Stephani Ceron  Event Time In Time Out   Incision Start 1039    Incision Close 1108      Anesthesia: Regional   Estimated Blood Loss: 25 ml  Specimens: * No specimens in log *   Findings: none   Complications: none  Implants: * No implants in log *

## 2019-11-14 NOTE — ROUTINE PROCESS
Patient: Dunia Angelo MRN: [id number]  SSN: [id number] YOB: 1965  Age: 48 y.o. Sex: male Patient is status post Procedure(s): EXCISION DIALYSIS FISTULA ANEURYSM. Surgeon(s) and Role: 
   Eddie Mireles MD - Primary Local/Dose/Irrigation:  N/A Peripheral IV 11/14/19 Anterior;Right Hand (Active) Site Assessment Clean, dry, & intact 11/14/2019  9:37 AM  
Phlebitis Assessment 0 11/14/2019  9:37 AM  
Infiltration Assessment 0 11/14/2019  9:37 AM  
Dressing Status Clean, dry, & intact 11/14/2019  9:37 AM  
Dressing Type Transparent 11/14/2019  9:37 AM  
Hub Color/Line Status Pink; Infusing 11/14/2019  9:37 AM  
                 
 
 
 
Dressing/Packing:  Wound Arm Left 2 incision sites-Dressing Type: Sutures;4 x 4;Staples;ABD pad(ace bandage) (11/14/19 1101) Splint/Cast:  ] Other:  scd

## 2019-11-14 NOTE — DISCHARGE INSTRUCTIONS
Patient Discharge Instructions    Josse Baron / [id number] : 1965    Admitted 2019 Discharged: 2019     Take Home Medications     . It is important that you take the medication exactly as they are prescribed. · Keep your medication in the bottles provided by the pharmacist and keep a list of the medication names, dosages, and times to be taken in your wallet. · Do not take other medications without consulting your doctor. What to do at Home    Recommended diet: Renal Diet,     Recommended activity: Activity as tolerated,     Additional Instructions: remove dressing left arm at dialysis on Fri or on Sat and leave open    Follow-up with Dr Augusto Rhoades in 2 weeks, call [phone] for appt        Information obtained by :  I understand that if any problems occur once I am at home I am to contact my physician. I understand and acknowledge receipt of the instructions indicated above. Physician's or R.N.'s Signature                                                                  Date/Time                                                                                                                                              Patient or Representative Signature                                                          Date/Time    ______________________________________________________________________    Anesthesia Discharge Instructions    After general anesthesia or intervenous sedation, for 24 hours or while taking prescription Narcotics:  · Limit your activities  · Do not drive or operate hazardous machinery  · If you have not urinated within 8 hours after discharge, please contact your surgeon on call.   · Do not make important personal or business decisions  · Do not drink alcoholic beverages    Report the following to your surgeon:  · Excessive pain, swelling, redness or odor of or around the surgical area  · Temperature over 100.5 degrees  · Nausea and vomiting lasting longer than 4 hours or if unable to take medication  · Any signs of decreased circulation or nerve impairment to extremity:  Change in color, persistent numbness, tingling, coldness or increased pain.   · Any questions      **1 NORCO GIVEN AT 12:08PM

## 2020-08-10 ENCOUNTER — TELEPHONE (OUTPATIENT)
Dept: INFECTIOUS DISEASES | Age: 55
End: 2020-08-10

## 2020-08-10 DIAGNOSIS — B20 SYMPTOMATIC HIV INFECTION (HCC): Primary | ICD-10-CM

## 2020-08-10 RX ORDER — ATAZANAVIR 300 MG/1
300 CAPSULE ORAL DAILY
Qty: 90 CAP | Refills: 1 | Status: SHIPPED | OUTPATIENT
Start: 2020-08-10 | End: 2021-02-09

## 2020-08-10 NOTE — TELEPHONE ENCOUNTER
Pt called to move his appointment so  could call in bloodwork for pt he also said he needs refills on the medications  called in for him previously he will be out thursday

## 2020-08-27 ENCOUNTER — OFFICE VISIT (OUTPATIENT)
Dept: INFECTIOUS DISEASES | Age: 55
End: 2020-08-27
Payer: MEDICARE

## 2020-08-27 VITALS
HEART RATE: 78 BPM | HEIGHT: 72 IN | WEIGHT: 217 LBS | OXYGEN SATURATION: 98 % | BODY MASS INDEX: 29.39 KG/M2 | TEMPERATURE: 99.5 F | DIASTOLIC BLOOD PRESSURE: 78 MMHG | SYSTOLIC BLOOD PRESSURE: 130 MMHG

## 2020-08-27 DIAGNOSIS — B20 SYMPTOMATIC HIV INFECTION (HCC): Primary | ICD-10-CM

## 2020-08-27 PROCEDURE — 99214 OFFICE O/P EST MOD 30 MIN: CPT | Performed by: INTERNAL MEDICINE

## 2020-08-27 RX ORDER — TENOFOVIR ALAFENAMIDE 25 MG/1
25 TABLET ORAL DAILY
COMMUNITY
End: 2021-02-09 | Stop reason: SDUPTHER

## 2020-08-27 RX ORDER — HYDROCODONE BITARTRATE AND ACETAMINOPHEN 7.5; 325 MG/1; MG/1
TABLET ORAL
COMMUNITY
End: 2020-10-28 | Stop reason: ALTCHOICE

## 2020-08-27 RX ORDER — LEVETIRACETAM 500 MG/1
500 TABLET ORAL 2 TIMES DAILY
COMMUNITY
End: 2020-09-16

## 2020-08-27 RX ORDER — GUAIFENESIN 100 MG/5ML
LIQUID (ML) ORAL
COMMUNITY

## 2020-08-27 RX ORDER — LIDOCAINE HCL 4 G/100G
CREAM TOPICAL
COMMUNITY

## 2020-08-27 RX ORDER — AMLODIPINE BESYLATE 10 MG/1
10 TABLET ORAL DAILY
COMMUNITY
End: 2021-01-23

## 2020-08-27 NOTE — PROGRESS NOTES
Angélica Baron is a 48 y.o. male. HPI   Patient with ESRD on hemodialysis, with long standing HIV infection, found to have CD4 count of only 4/mm3 and VL of 51,700 while reportedly on Reyataz/Lamivudine/ Ziagen. He was switched to [facility] [place] In 1/2020, his viral load was undetectable but no CD4 was done. Last seen in 4/2020 with CD4 of 52 but detectable viral load of 40 copies/ml. He was continued on same regimen and returns for routine follow-up at this time. Interim period reportedly uneventful. States that he is tolerating anti-retrovirals well with no missed doses. Repeat staging labs were done on 8/21/20 showing viral load of <20 copies/ml and a CD4 of 90/mm3 (6.9%). Blood chemistries remarkable for chronic renal failure. Review of Systems   Constitutional: Negative. Negative for chills, diaphoresis, fever, malaise/fatigue and weight loss. Eyes: Negative for blurred vision. Respiratory: Positive for shortness of breath. Negative for cough. Cardiovascular: Negative for leg swelling. Musculoskeletal: Negative for joint pain and myalgias. Skin: Negative. Negative for rash. Neurological: Positive for tingling. Negative for dizziness and headaches. Psychiatric/Behavioral: The patient does not have insomnia. Objective  Physical Exam  Vitals signs (low grade temperature) and nursing note reviewed. Constitutional:       General: He is not in acute distress. Appearance: Normal appearance. He is normal weight. He is not ill-appearing, toxic-appearing or diaphoretic. HENT:      Head: Normocephalic. Nose: Nose normal.   Eyes:      Extraocular Movements: Extraocular movements intact. Conjunctiva/sclera: Conjunctivae normal.      Pupils: Pupils are equal, round, and reactive to light. Neck:      Musculoskeletal: Neck supple. Cardiovascular:      Rate and Rhythm: Normal rate and regular rhythm. Heart sounds: No murmur. Pulmonary:      Effort: Pulmonary effort is normal.      Breath sounds: Normal breath sounds. Abdominal:      General: There is no distension. Tenderness: There is no abdominal tenderness. There is no right CVA tenderness or left CVA tenderness. Musculoskeletal:         General: No swelling or tenderness. Lymphadenopathy:      Cervical: No cervical adenopathy. Skin:     Findings: No rash. Neurological:      General: No focal deficit present. Mental Status: He is alert and oriented to person, place, and time. Psychiatric:         Mood and Affect: Mood normal.         Behavior: Behavior normal.         Thought Content: Thought content normal.         Judgment: Judgment normal.          Assessment & Plan  1. HIV-1 infection, CDC Class A3, with CD4 count 90/mm3 and viral load of <20 copies/ml, clinically stable on Tivicay, boosted Reyataz and Vemlidy, excellent viral suppression. 2. ESRD on hemodialysis     Comment: Patient continues to do well clinically and responding very well to current regimen. Still a ways to go with his CD4 count and it may max out well below normal, but it has improved steadily. 1. Continue Tivicay 50 mg daily, Reyataz 300 mg/ritonavir 100 mg daily, and Vemlidy (Tenofovir AF) 25 mg daily  2. Routine follow-up in 6 months or sooner if needed   3. Repeat staging labs with CBC, CMP, CD4 and HIV viral load two weeks prior to next visit.    4. Alll dwayne answered    Melba Mata MD

## 2020-08-28 LAB
ALBUMIN SERPL-MCNC: 5.2 G/DL (ref 3.8–4.9)
ALBUMIN/GLOB SERPL: 1.5 {RATIO} (ref 1.2–2.2)
ALP SERPL-CCNC: 101 IU/L (ref 39–117)
ALT SERPL-CCNC: 3 IU/L (ref 0–44)
AST SERPL-CCNC: 17 IU/L (ref 0–40)
BASOPHILS # BLD AUTO: 0 X10E3/UL (ref 0–0.2)
BASOPHILS NFR BLD AUTO: 0 %
BILIRUB SERPL-MCNC: 0.9 MG/DL (ref 0–1.2)
BUN SERPL-MCNC: 17 MG/DL (ref 6–24)
BUN/CREAT SERPL: 3 (ref 9–20)
CALCIUM SERPL-MCNC: 9.3 MG/DL (ref 8.7–10.2)
CD3+CD4+ CELLS # BLD: 90 /UL (ref 359–1519)
CD3+CD4+ CELLS NFR BLD: 6.9 % (ref 30.8–58.5)
CHLORIDE SERPL-SCNC: 93 MMOL/L (ref 96–106)
CO2 SERPL-SCNC: 32 MMOL/L (ref 20–29)
CREAT SERPL-MCNC: 5.53 MG/DL (ref 0.76–1.27)
EOSINOPHIL # BLD AUTO: 0.2 X10E3/UL (ref 0–0.4)
EOSINOPHIL NFR BLD AUTO: 3 %
ERYTHROCYTE [DISTWIDTH] IN BLOOD BY AUTOMATED COUNT: 13.2 % (ref 11.6–15.4)
GLOBULIN SER CALC-MCNC: 3.4 G/DL (ref 1.5–4.5)
GLUCOSE SERPL-MCNC: 85 MG/DL (ref 65–99)
HCT VFR BLD AUTO: 33.1 % (ref 37.5–51)
HGB BLD-MCNC: 10.6 G/DL (ref 13–17.7)
HIV1 RNA # SERPL NAA+PROBE: <20 COPIES/ML
HIV1 RNA SERPL NAA+PROBE-LOG#: NORMAL LOG10COPY/ML
IMM GRANULOCYTES # BLD AUTO: 0 X10E3/UL (ref 0–0.1)
IMM GRANULOCYTES NFR BLD AUTO: 0 %
LYMPHOCYTES # BLD AUTO: 1.3 X10E3/UL (ref 0.7–3.1)
LYMPHOCYTES NFR BLD AUTO: 22 %
MCH RBC QN AUTO: 30.1 PG (ref 26.6–33)
MCHC RBC AUTO-ENTMCNC: 32 G/DL (ref 31.5–35.7)
MCV RBC AUTO: 94 FL (ref 79–97)
MONOCYTES # BLD AUTO: 0.4 X10E3/UL (ref 0.1–0.9)
MONOCYTES NFR BLD AUTO: 7 %
NEUTROPHILS # BLD AUTO: 3.9 X10E3/UL (ref 1.4–7)
NEUTROPHILS NFR BLD AUTO: 68 %
PLATELET # BLD AUTO: 200 X10E3/UL (ref 150–450)
POTASSIUM SERPL-SCNC: 3.8 MMOL/L (ref 3.5–5.2)
PROT SERPL-MCNC: 8.6 G/DL (ref 6–8.5)
RBC # BLD AUTO: 3.52 X10E6/UL (ref 4.14–5.8)
SODIUM SERPL-SCNC: 144 MMOL/L (ref 134–144)
WBC # BLD AUTO: 5.8 X10E3/UL (ref 3.4–10.8)

## 2020-09-16 PROBLEM — G40.909 SEIZURE DISORDER (HCC): Status: ACTIVE | Noted: 2017-04-27

## 2020-09-16 PROBLEM — N52.9 IMPOTENCE OF ORGANIC ORIGIN: Status: ACTIVE | Noted: 2017-04-27

## 2020-09-16 PROBLEM — M10.9 GOUT: Status: ACTIVE | Noted: 2017-04-27

## 2020-09-16 RX ORDER — LEVETIRACETAM 500 MG/1
TABLET ORAL
Qty: 180 TAB | Refills: 0 | Status: SHIPPED | OUTPATIENT
Start: 2020-09-16 | End: 2020-12-18

## 2020-09-16 RX ORDER — ATORVASTATIN CALCIUM 40 MG/1
TABLET, FILM COATED ORAL
Qty: 90 TAB | Refills: 0 | Status: SHIPPED | OUTPATIENT
Start: 2020-09-16 | End: 2020-12-18

## 2020-09-29 ENCOUNTER — TELEPHONE (OUTPATIENT)
Dept: PRIMARY CARE CLINIC | Age: 55
End: 2020-09-29

## 2020-10-28 ENCOUNTER — OFFICE VISIT (OUTPATIENT)
Dept: PRIMARY CARE CLINIC | Age: 55
End: 2020-10-28
Payer: MEDICARE

## 2020-10-28 VITALS
RESPIRATION RATE: 20 BRPM | SYSTOLIC BLOOD PRESSURE: 99 MMHG | TEMPERATURE: 98.3 F | HEIGHT: 72 IN | BODY MASS INDEX: 29.26 KG/M2 | OXYGEN SATURATION: 97 % | WEIGHT: 216 LBS | HEART RATE: 84 BPM | DIASTOLIC BLOOD PRESSURE: 77 MMHG

## 2020-10-28 DIAGNOSIS — E78.2 MIXED HYPERLIPIDEMIA: ICD-10-CM

## 2020-10-28 DIAGNOSIS — N18.6 ESRD (END STAGE RENAL DISEASE) (HCC): ICD-10-CM

## 2020-10-28 DIAGNOSIS — Z12.5 SCREENING FOR MALIGNANT NEOPLASM OF PROSTATE: ICD-10-CM

## 2020-10-28 DIAGNOSIS — R10.9 CHRONIC ABDOMINAL PAIN: ICD-10-CM

## 2020-10-28 DIAGNOSIS — Z12.11 ENCOUNTER FOR SCREENING FOR MALIGNANT NEOPLASM OF COLON: ICD-10-CM

## 2020-10-28 DIAGNOSIS — I10 ESSENTIAL HYPERTENSION: ICD-10-CM

## 2020-10-28 DIAGNOSIS — G40.909 SEIZURE DISORDER (HCC): ICD-10-CM

## 2020-10-28 DIAGNOSIS — I71.20 THORACIC AORTIC ANEURYSM WITHOUT RUPTURE: ICD-10-CM

## 2020-10-28 DIAGNOSIS — G89.29 CHRONIC ABDOMINAL PAIN: ICD-10-CM

## 2020-10-28 DIAGNOSIS — Z21 ASYMPTOMATIC HIV INFECTION (HCC): Primary | ICD-10-CM

## 2020-10-28 PROCEDURE — 99214 OFFICE O/P EST MOD 30 MIN: CPT | Performed by: NURSE PRACTITIONER

## 2020-10-29 ENCOUNTER — TRANSCRIBE ORDER (OUTPATIENT)
Dept: REGISTRATION | Age: 55
End: 2020-10-29

## 2020-10-29 ENCOUNTER — HOSPITAL ENCOUNTER (OUTPATIENT)
Dept: CT IMAGING | Age: 55
Discharge: HOME | End: 2020-10-29
Attending: NURSE PRACTITIONER
Payer: MEDICARE

## 2020-10-29 ENCOUNTER — TELEPHONE (OUTPATIENT)
Dept: PRIMARY CARE CLINIC | Age: 55
End: 2020-10-29

## 2020-10-29 ENCOUNTER — HOSPITAL ENCOUNTER (OUTPATIENT)
Dept: LAB | Age: 55
Discharge: HOME | End: 2020-10-29
Attending: NURSE PRACTITIONER
Payer: MEDICARE

## 2020-10-29 DIAGNOSIS — I71.20 THORACIC AORTIC ANEURYSM WITHOUT RUPTURE: ICD-10-CM

## 2020-10-29 DIAGNOSIS — I71.20 ANEURYSM, AORTA, THORACIC: Primary | ICD-10-CM

## 2020-10-29 DIAGNOSIS — I71.20 ANEURYSM, AORTA, THORACIC: ICD-10-CM

## 2020-10-29 PROCEDURE — 71275 CT ANGIOGRAPHY CHEST: CPT

## 2020-10-29 PROCEDURE — 74011000636 HC RX REV CODE- 636: Performed by: NURSE PRACTITIONER

## 2020-10-29 RX ADMIN — IOPAMIDOL 100 ML: 755 INJECTION, SOLUTION INTRAVENOUS at 13:56

## 2020-10-29 NOTE — PROGRESS NOTES
Joslyn Payton is a 50 y.o. male who presents to the office today for the following:    Chief Complaint   Patient presents with    Follow-up    Hypertension    Cholesterol Problem    Chronic Kidney Disease       Past Medical History:   Diagnosis Date    Anemia     Chronic abdominal pain     Chronic kidney disease     dialysis pt. - [facility]    Complication of AV dialysis fistula 2019    2 large pseudo-aneurysm    ESRD on dialysis ([address]     GERD (gastroesophageal reflux disease)     Gout     History of blood transfusion     at least 8 units over the years    HIV (human immunodeficiency virus infection) ([address] 2011    Hypercholesterolemia     Hypertension     Other ill-defined conditions(799.89)     SMALL VEIN IN HEART BEING MONITORED    Pancreatitis     Seizure ([address] 2012    one time and none sense    Seizures ([address]     Sepsis ([address] 2011 & 8/2019       Past Surgical History:   Procedure Laterality Date    HX ARTERIOVENOUS FISTULA Left 2012    HX CHOLECYSTECTOMY  2011    HX COLONOSCOPY          Family History   Problem Relation Age of Onset    Cancer Brother         colon    Heart Disease Brother     Heart Disease Mother         Social History     Tobacco Use    Smoking status: Never Smoker    Smokeless tobacco: Never Used   Substance Use Topics    Alcohol use: Not Currently    Drug use: No        HPI  Patient here for routine follow up. Has h/o HIV, ESRD, hypertension, hyperlipidemia and seizure disorder that is controlled with medication. Also history of chronic abdominal pain. States that he is no longer in pain management due to he did not got for 3 months during pandemic and was using the medication very sparingly. Has been having pain still in abdomen which is worse after he has had dialysis. Wants to still be able to get pain medication but not have to take it everyday as he was required to do by his prior pain management doctor.  Reports he has had this pain since his gallbladder was removed and told nothing could be done to relieve it. Is requesting a referral to GI as he is due for colonoscopy and also wants his yearly PSA done. Is following with his nephrologist as well as infectious disease doctor. Current Outpatient Medications on File Prior to Visit   Medication Sig    levETIRAcetam (KEPPRA) 500 mg tablet take 1 tablet by mouth twice a day    atorvastatin (LIPITOR) 40 mg tablet take 1 tablet by mouth once daily    dolutegravir (Tivicay) 50 mg tab tablet Tivicay 50 mg tablet    lidocaine (XYLOCAINE) 4 % topical cream lidocaine HCl 4 % topical cream   ONUR A SML AMT TO AFFECTED SKIN PRN P    amLODIPine (NORVASC) 10 mg tablet Take 10 mg by mouth daily.  aspirin 81 mg chewable tablet aspirin 81 mg chewable tablet   chew and swallow 1 tablet by mouth once daily    tenofovir ALAFENAMIDE FUMARATE (Vemlidy) 25 mg tab Take 25 mg by mouth daily.  atazanavir (REYATAZ) 300 mg capsule Take 1 Cap by mouth daily for 180 days.  sevelamer carbonate (RENVELA) 800 mg tab tab Take 1,600 mg by mouth two (2) times a day.  metoprolol tartrate (LOPRESSOR) 50 mg tablet Take 50 mg by mouth two (2) times a day.  ritonavir (NORVIR) 100 mg tablet Take 100 mg by mouth daily.  calcium carbonate (TUMS) 200 mg calcium (500 mg) chew Take 1 Tab by mouth as needed. No current facility-administered medications on file prior to visit. No orders of the defined types were placed in this encounter. Review of Systems   Constitutional: Negative. Respiratory: Negative. Cardiovascular: Negative. Gastrointestinal: Positive for abdominal pain. Negative for blood in stool, constipation, diarrhea, heartburn, nausea and vomiting. Genitourinary: Negative. Does not urinate   Musculoskeletal: Negative. Neurological: Negative.            Visit Vitals  BP 99/77 (BP 1 Location: Left arm, BP Patient Position: Sitting)   Pulse 84   Temp 98.3 °F (36.8 °C) (Tympanic)   Resp 20 Ht 6' (1.829 m)   Wt 216 lb (98 kg)   SpO2 97%   BMI 29.29 kg/m²       Physical Exam  Vitals signs and nursing note reviewed. Constitutional:       Appearance: Normal appearance. Cardiovascular:      Rate and Rhythm: Normal rate and regular rhythm. Pulses: Normal pulses. Heart sounds: Normal heart sounds. Arteriovenous access: left arteriovenous access is present. Pulmonary:      Effort: Pulmonary effort is normal.      Breath sounds: Normal breath sounds. Abdominal:      General: Bowel sounds are normal.      Palpations: Abdomen is soft. There is no mass. Tenderness: There is abdominal tenderness (mid-abdomen). There is no right CVA tenderness, left CVA tenderness or guarding. Musculoskeletal: Normal range of motion. Skin:     General: Skin is warm and dry. Neurological:      Mental Status: He is alert. Mental status is at baseline. 1. Asymptomatic HIV infection ([facility]  This has been stable and patient reports he follows with infectious disease (Dr. Jodi Liu)    2. Chronic abdominal pain  Patient previously on narcotic pain medication for chronic abdominal pain that began after gallbladder removed  and w/ dialysis  Made decision to leave pain management as he did not want to take daily but does feel he still needs intermittently  He will decide if he wants to be set back up with pain management     3. Essential hypertension  Blood pressure is controlled and continue medication as directed    4. Screening for malignant neoplasm of prostate  Check PSA  Does not urinate due to ESRD  - PSA W/ REFLX FREE PSA    5. Seizure disorder ([facility]  Reports no seizures in 9 years but they are not sure as he had syncopal episode in 2019  Check Keppra level as he has not seen his  recently  Continue medication as directed  - LEVETIRACETAM (KEPPRA)    6. ESRD (end stage renal disease) [facility])  He is Shae Rey, Friday dialysis   Follows with Dr. Merlyn Oh    7.  Encounter for screening for malignant neoplasm of colon  Due for repeat colonoscopy and referral sent  - REFERRAL TO GASTROENTEROLOGY    8. Thoracic aortic aneurysm without rupture ([facility]  On review of his chart, noted a documented diagnosis of thoracic aortic aneurysm   CTA from 9/26/2011 shows ascending aorta measures approximately 4.4 cm in diameter    He does not recall being told this but had a lot of tests during that time and do not have documented history seeing I have been seeing. Spoke with his nephrologist who also does not have record of this  Going to order CTA of chest to evaluate and will contact nephrologist after results available to discuss next steps. 9. Hyperlipidemia  On atorvastatin as directed        Patient verbalizes understanding of plan of care as discussed above    Follow-up and Dispositions    · Return in about 3 months (around 1/28/2021) for or sooner for worsening symptoms.

## 2020-10-29 NOTE — PROGRESS NOTES
Discussed results with patient and . Referring to Dr. Kennedy Perea for further evaluation.    If you make sure patient gets appointment before closing as we dont want delay in referral.

## 2020-10-30 DIAGNOSIS — G89.29 CHRONIC ABDOMINAL PAIN: Primary | ICD-10-CM

## 2020-10-30 DIAGNOSIS — R10.9 CHRONIC ABDOMINAL PAIN: Primary | ICD-10-CM

## 2020-10-30 LAB
LEVETIRACETAM SERPL-MCNC: 5.6 UG/ML (ref 10–40)
PSA SERPL-MCNC: 1.2 NG/ML (ref 0–4)
REFLEX CRITERIA: NORMAL

## 2020-10-30 RX ORDER — HYDROCODONE BITARTRATE AND ACETAMINOPHEN 5; 325 MG/1; MG/1
1 TABLET ORAL
Qty: 12 TAB | Refills: 0 | Status: SHIPPED | OUTPATIENT
Start: 2020-10-30 | End: 2020-11-02

## 2020-11-04 ENCOUNTER — TELEPHONE (OUTPATIENT)
Dept: PRIMARY CARE CLINIC | Age: 55
End: 2020-11-04

## 2020-12-18 RX ORDER — LEVETIRACETAM 500 MG/1
TABLET ORAL
Qty: 180 TAB | Refills: 0 | Status: SHIPPED | OUTPATIENT
Start: 2020-12-18 | End: 2021-04-01

## 2020-12-18 RX ORDER — ATORVASTATIN CALCIUM 40 MG/1
TABLET, FILM COATED ORAL
Qty: 90 TAB | Refills: 0 | Status: SHIPPED | OUTPATIENT
Start: 2020-12-18 | End: 2021-04-01

## 2021-01-23 RX ORDER — AMLODIPINE BESYLATE 10 MG/1
TABLET ORAL
Qty: 90 TAB | Refills: 0 | Status: SHIPPED | OUTPATIENT
Start: 2021-01-23 | End: 2021-04-01

## 2021-02-09 ENCOUNTER — TELEPHONE (OUTPATIENT)
Dept: INFECTIOUS DISEASES | Age: 56
End: 2021-02-09

## 2021-02-09 DIAGNOSIS — B20 HIV DISEASE (HCC): Primary | ICD-10-CM

## 2021-02-09 RX ORDER — TENOFOVIR ALAFENAMIDE 25 MG/1
25 TABLET ORAL DAILY
Qty: 90 TAB | Refills: 2 | Status: ON HOLD | OUTPATIENT
Start: 2021-02-09 | End: 2022-08-14

## 2021-02-09 RX ORDER — ATAZANAVIR 300 MG/1
300 CAPSULE ORAL
Qty: 90 CAP | Refills: 0 | Status: SHIPPED | OUTPATIENT
Start: 2021-02-09 | End: 2022-06-02 | Stop reason: SDUPTHER

## 2021-02-09 RX ORDER — RITONAVIR 100 MG/1
TABLET ORAL
Qty: 90 TAB | Refills: 2 | Status: SHIPPED | OUTPATIENT
Start: 2021-02-09 | End: 2023-04-07

## 2021-02-09 RX ORDER — TENOFOVIR ALAFENAMIDE 25 MG/1
TABLET ORAL
Qty: 90 TAB | Refills: 2 | Status: SHIPPED | OUTPATIENT
Start: 2021-02-09 | End: 2022-06-02 | Stop reason: ALTCHOICE

## 2021-02-09 RX ORDER — DOLUTEGRAVIR SODIUM 50 MG/1
TABLET, FILM COATED ORAL
Qty: 90 TAB | Refills: 2 | Status: SHIPPED | OUTPATIENT
Start: 2021-02-09 | End: 2021-05-10

## 2021-02-09 RX ORDER — ATAZANAVIR 300 MG/1
CAPSULE ORAL
Qty: 90 CAP | Refills: 1 | Status: SHIPPED | OUTPATIENT
Start: 2021-02-09 | End: 2021-08-27

## 2021-02-09 RX ORDER — RITONAVIR 100 MG/1
100 TABLET ORAL DAILY
Qty: 90 TAB | Refills: 0 | Status: SHIPPED | OUTPATIENT
Start: 2021-02-09 | End: 2022-06-02 | Stop reason: SDUPTHER

## 2021-03-01 ENCOUNTER — TELEPHONE (OUTPATIENT)
Dept: PRIMARY CARE CLINIC | Age: 56
End: 2021-03-01

## 2021-03-05 LAB
ALBUMIN SERPL-MCNC: 4.9 G/DL (ref 3.8–4.9)
ALBUMIN/GLOB SERPL: 1.3 {RATIO} (ref 1.2–2.2)
ALP SERPL-CCNC: 68 IU/L (ref 39–117)
ALT SERPL-CCNC: 7 IU/L (ref 0–44)
AST SERPL-CCNC: 16 IU/L (ref 0–40)
BASOPHILS # BLD AUTO: 0 X10E3/UL (ref 0–0.2)
BASOPHILS NFR BLD AUTO: 1 %
BILIRUB SERPL-MCNC: 1.6 MG/DL (ref 0–1.2)
BUN SERPL-MCNC: 19 MG/DL (ref 6–24)
BUN/CREAT SERPL: 3 (ref 9–20)
CALCIUM SERPL-MCNC: 9.9 MG/DL (ref 8.7–10.2)
CD3+CD4+ CELLS # BLD: 176 /UL (ref 359–1519)
CD3+CD4+ CELLS NFR BLD: 12.6 % (ref 30.8–58.5)
CHLORIDE SERPL-SCNC: 95 MMOL/L (ref 96–106)
CO2 SERPL-SCNC: 30 MMOL/L (ref 20–29)
CREAT SERPL-MCNC: 6.36 MG/DL (ref 0.76–1.27)
EOSINOPHIL # BLD AUTO: 0.1 X10E3/UL (ref 0–0.4)
EOSINOPHIL NFR BLD AUTO: 2 %
ERYTHROCYTE [DISTWIDTH] IN BLOOD BY AUTOMATED COUNT: 12.2 % (ref 11.6–15.4)
GLOBULIN SER CALC-MCNC: 3.7 G/DL (ref 1.5–4.5)
GLUCOSE SERPL-MCNC: 65 MG/DL (ref 65–99)
HCT VFR BLD AUTO: 33.4 % (ref 37.5–51)
HGB BLD-MCNC: 11.1 G/DL (ref 13–17.7)
HIV GENOSURE PRIME(SM): NORMAL
HIV1 RNA # SERPL NAA+PROBE: <20 COPIES/ML
HIV1 RNA # SERPL NAA+PROBE: <20 COPIES/ML
HIV1 RNA SERPL NAA+PROBE-LOG#: NORMAL LOG10COPY/ML
HIV1 RNA SERPL NAA+PROBE-LOG#: NORMAL LOG10COPY/ML
IMM GRANULOCYTES # BLD AUTO: 0 X10E3/UL (ref 0–0.1)
IMM GRANULOCYTES NFR BLD AUTO: 0 %
LYMPHOCYTES # BLD AUTO: 1.4 X10E3/UL (ref 0.7–3.1)
LYMPHOCYTES NFR BLD AUTO: 22 %
MCH RBC QN AUTO: 30.5 PG (ref 26.6–33)
MCHC RBC AUTO-ENTMCNC: 33.2 G/DL (ref 31.5–35.7)
MCV RBC AUTO: 92 FL (ref 79–97)
MONOCYTES # BLD AUTO: 0.5 X10E3/UL (ref 0.1–0.9)
MONOCYTES NFR BLD AUTO: 8 %
NEUTROPHILS # BLD AUTO: 4.1 X10E3/UL (ref 1.4–7)
NEUTROPHILS NFR BLD AUTO: 67 %
PLATELET # BLD AUTO: 195 X10E3/UL (ref 150–450)
POTASSIUM SERPL-SCNC: 3.9 MMOL/L (ref 3.5–5.2)
PROT SERPL-MCNC: 8.6 G/DL (ref 6–8.5)
RBC # BLD AUTO: 3.64 X10E6/UL (ref 4.14–5.8)
SODIUM SERPL-SCNC: 145 MMOL/L (ref 134–144)
WBC # BLD AUTO: 6.1 X10E3/UL (ref 3.4–10.8)

## 2021-03-11 ENCOUNTER — OFFICE VISIT (OUTPATIENT)
Dept: INFECTIOUS DISEASES | Age: 56
End: 2021-03-11
Payer: MEDICARE

## 2021-03-11 VITALS
WEIGHT: 222 LBS | OXYGEN SATURATION: 97 % | RESPIRATION RATE: 16 BRPM | HEART RATE: 72 BPM | SYSTOLIC BLOOD PRESSURE: 131 MMHG | TEMPERATURE: 98.7 F | DIASTOLIC BLOOD PRESSURE: 81 MMHG | BODY MASS INDEX: 30.07 KG/M2 | HEIGHT: 72 IN

## 2021-03-11 DIAGNOSIS — B20 HIV DISEASE (HCC): Primary | ICD-10-CM

## 2021-03-11 PROCEDURE — G8427 DOCREV CUR MEDS BY ELIG CLIN: HCPCS | Performed by: INTERNAL MEDICINE

## 2021-03-11 PROCEDURE — G9231 DOC ESRD DIA TRANS PREG: HCPCS | Performed by: INTERNAL MEDICINE

## 2021-03-11 PROCEDURE — 3017F COLORECTAL CA SCREEN DOC REV: CPT | Performed by: INTERNAL MEDICINE

## 2021-03-11 PROCEDURE — G8417 CALC BMI ABV UP PARAM F/U: HCPCS | Performed by: INTERNAL MEDICINE

## 2021-03-11 PROCEDURE — G8510 SCR DEP NEG, NO PLAN REQD: HCPCS | Performed by: INTERNAL MEDICINE

## 2021-03-11 PROCEDURE — 99214 OFFICE O/P EST MOD 30 MIN: CPT | Performed by: INTERNAL MEDICINE

## 2021-03-11 NOTE — PROGRESS NOTES
Subjective  Surendra Jain is a 50 y.o. male. HPI   Patient with ESRD on hemodialysis, with long standing HIV infection, found to have CD4 count of only 4/mm3 and VL of 51,700 while on Reyataz/Lamivudine/ Ziagen. He was switched to Aracelyelia April and [place] Φ[place]. Last seen in 8/2020 with CD4 of 90 and undetectable viral load. He returns today for routine follow-up. Interim period reported uneventful. No complaints offered at this time. Repeat staging labs were done 2/2021 and showed CD4 count of 176/mm3 (12.6%) and again undetectable viral load <20 copies/.  viral load of <20 copies/ml and a CD4 of 90/mm3 (6.9%). Blood chemistries remarkable for chronic kidney disease and mildly elevated total bilirubin with normal ALT/AST/AP. Review of Systems   Constitutional: Negative for diaphoresis, fever, malaise/fatigue and weight loss. Eyes: Negative for blurred vision. Respiratory: Negative for cough and shortness of breath. Gastrointestinal: Negative for abdominal pain and diarrhea. Skin: Negative for itching and rash. Neurological: Negative for tingling and headaches. Past Medical History:   Diagnosis Date    Anemia     Chronic abdominal pain     Chronic kidney disease     dialysis pt. - [facility]    Complication of AV dialysis fistula 2019    2 large pseudo-aneurysm    Dependence on renal dialysis ([address]     ESRD on dialysis ([address]     GERD (gastroesophageal reflux disease)     Gout     History of blood transfusion     at least 8 units over the years    HIV (human immunodeficiency virus infection) ([address] 2011    Hypercholesterolemia     Hypertension     Other ill-defined conditions(799.89)     SMALL VEIN IN HEART BEING MONITORED    Pancreatitis     Renal insufficiency     Seizure ([address] 2012    one time and none sense    Seizures ([address]     Sepsis ([address] 2011 & 8/2019     Past Surgical History:   Procedure Laterality Date    HX ARTERIOVENOUS FISTULA Left 2012    HX CHOLECYSTECTOMY 2011    HX COLONOSCOPY       Objective  Physical Exam  Vitals signs and nursing note reviewed. Constitutional:       Appearance: He is not ill-appearing. HENT:      Head: Normocephalic and atraumatic. Nose: Nose normal.      Mouth/Throat:      Pharynx: Oropharynx is clear. Eyes:      Pupils: Pupils are equal, round, and reactive to light. Neck:      Musculoskeletal: Neck supple. Cardiovascular:      Rate and Rhythm: Normal rate and regular rhythm. Heart sounds: No murmur. Pulmonary:      Breath sounds: No rhonchi or rales. Chest:      Chest wall: No tenderness. Abdominal:      General: There is no distension. Palpations: Abdomen is soft. Tenderness: There is no abdominal tenderness. Musculoskeletal:      Right lower leg: No edema. Left lower leg: No edema. Skin:     Findings: No rash. Neurological:      General: No focal deficit present. Mental Status: He is alert and oriented to person, place, and time. Psychiatric:         Mood and Affect: Mood normal.         Behavior: Behavior normal.         Thought Content: Thought content normal.         Judgment: Judgment normal.          Assessment & Plan  1. HIV-1 infection, CDC Class A2, with CD4 count 176/mm3 and viral load of <20 copies/ml, clinically stable on Tivicay, boosted Reyataz and Vemlidy, excellent viral suppression. 2. ESRD on hemodialysis   3. Isolated elevated total bilirubin, etiology unclear, other LFTs normal     Comment: Patient continues to do well clinically and responding very well to current regimen. CD4 count doubled since last visit. 1. Continue Tivicay 50 mg daily, Reyataz 300 mg/ritonavir 100 mg daily, and Vemlidy (Tenofovir AF) 25 mg daily  2. Routine follow-up in 6 months or sooner if needed   3. Repeat staging labs with CBC, CMP, CD4 and HIV viral load two weeks prior to next visit.    4. All questoins answered      Caroline Meyers MD Detail Level: Generalized Include Location In Plan?: No

## 2021-04-01 RX ORDER — AMLODIPINE BESYLATE 10 MG/1
TABLET ORAL
Qty: 90 TAB | Refills: 0 | Status: SHIPPED | OUTPATIENT
Start: 2021-04-01 | End: 2022-04-15

## 2021-04-01 RX ORDER — ATORVASTATIN CALCIUM 40 MG/1
TABLET, FILM COATED ORAL
Qty: 90 TAB | Refills: 0 | Status: SHIPPED | OUTPATIENT
Start: 2021-04-01 | End: 2021-07-12

## 2021-04-01 RX ORDER — LEVETIRACETAM 500 MG/1
TABLET ORAL
Qty: 180 TAB | Refills: 0 | Status: SHIPPED | OUTPATIENT
Start: 2021-04-01 | End: 2021-07-12

## 2021-07-12 RX ORDER — ATORVASTATIN CALCIUM 40 MG/1
TABLET, FILM COATED ORAL
Qty: 90 TABLET | Refills: 0 | Status: SHIPPED | OUTPATIENT
Start: 2021-07-12 | End: 2021-09-20

## 2021-07-12 RX ORDER — LEVETIRACETAM 500 MG/1
TABLET ORAL
Qty: 180 TABLET | Refills: 0 | Status: SHIPPED | OUTPATIENT
Start: 2021-07-12 | End: 2021-10-17

## 2021-08-26 ENCOUNTER — TELEPHONE (OUTPATIENT)
Dept: INFECTIOUS DISEASES | Age: 56
End: 2021-08-26

## 2021-08-26 DIAGNOSIS — B20 HIV INFECTION, UNSPECIFIED SYMPTOM STATUS (HCC): Primary | ICD-10-CM

## 2021-08-26 NOTE — TELEPHONE ENCOUNTER
Patient called in stated that he needs you to put in his lab orders so he can get it done before he comes in for his appointment

## 2021-08-27 RX ORDER — ATAZANAVIR 300 MG/1
CAPSULE ORAL
Qty: 90 CAPSULE | Refills: 1 | Status: SHIPPED | OUTPATIENT
Start: 2021-08-27 | End: 2022-06-02 | Stop reason: SDUPTHER

## 2021-09-20 RX ORDER — ATORVASTATIN CALCIUM 40 MG/1
TABLET, FILM COATED ORAL
Qty: 90 TABLET | Refills: 0 | Status: SHIPPED | OUTPATIENT
Start: 2021-09-20 | End: 2022-01-30

## 2021-09-22 LAB
ALBUMIN SERPL-MCNC: 5.4 G/DL (ref 3.8–4.9)
ALBUMIN/GLOB SERPL: 1.5 {RATIO} (ref 1.2–2.2)
ALP SERPL-CCNC: 113 IU/L (ref 44–121)
ALT SERPL-CCNC: 5 IU/L (ref 0–44)
AST SERPL-CCNC: 15 IU/L (ref 0–40)
BASOPHILS # BLD AUTO: 0 X10E3/UL (ref 0–0.2)
BASOPHILS NFR BLD AUTO: 1 %
BILIRUB SERPL-MCNC: 0.6 MG/DL (ref 0–1.2)
BUN SERPL-MCNC: 21 MG/DL (ref 6–24)
BUN/CREAT SERPL: 3 (ref 9–20)
CALCIUM SERPL-MCNC: 8.9 MG/DL (ref 8.7–10.2)
CD3+CD4+ CELLS # BLD: 164 /UL (ref 359–1519)
CD3+CD4+ CELLS NFR BLD: 11.7 % (ref 30.8–58.5)
CHLORIDE SERPL-SCNC: 92 MMOL/L (ref 96–106)
CO2 SERPL-SCNC: 30 MMOL/L (ref 20–29)
CREAT SERPL-MCNC: 6.84 MG/DL (ref 0.76–1.27)
EOSINOPHIL # BLD AUTO: 0.2 X10E3/UL (ref 0–0.4)
EOSINOPHIL NFR BLD AUTO: 2 %
ERYTHROCYTE [DISTWIDTH] IN BLOOD BY AUTOMATED COUNT: 13.7 % (ref 11.6–15.4)
GLOBULIN SER CALC-MCNC: 3.7 G/DL (ref 1.5–4.5)
GLUCOSE SERPL-MCNC: 65 MG/DL (ref 65–99)
HCT VFR BLD AUTO: 31 % (ref 37.5–51)
HGB BLD-MCNC: 10.5 G/DL (ref 13–17.7)
HIV1 RNA # SERPL NAA+PROBE: <20 COPIES/ML
HIV1 RNA SERPL NAA+PROBE-LOG#: NORMAL LOG10COPY/ML
IMM GRANULOCYTES # BLD AUTO: 0 X10E3/UL (ref 0–0.1)
IMM GRANULOCYTES NFR BLD AUTO: 0 %
LYMPHOCYTES # BLD AUTO: 1.4 X10E3/UL (ref 0.7–3.1)
LYMPHOCYTES NFR BLD AUTO: 22 %
MCH RBC QN AUTO: 31.1 PG (ref 26.6–33)
MCHC RBC AUTO-ENTMCNC: 33.9 G/DL (ref 31.5–35.7)
MCV RBC AUTO: 92 FL (ref 79–97)
MONOCYTES # BLD AUTO: 0.4 X10E3/UL (ref 0.1–0.9)
MONOCYTES NFR BLD AUTO: 6 %
NEUTROPHILS # BLD AUTO: 4.5 X10E3/UL (ref 1.4–7)
NEUTROPHILS NFR BLD AUTO: 69 %
PLATELET # BLD AUTO: 271 X10E3/UL (ref 150–450)
POTASSIUM SERPL-SCNC: 4 MMOL/L (ref 3.5–5.2)
PROT SERPL-MCNC: 9.1 G/DL (ref 6–8.5)
RBC # BLD AUTO: 3.38 X10E6/UL (ref 4.14–5.8)
SODIUM SERPL-SCNC: 141 MMOL/L (ref 134–144)
WBC # BLD AUTO: 6.5 X10E3/UL (ref 3.4–10.8)

## 2021-09-23 ENCOUNTER — OFFICE VISIT (OUTPATIENT)
Dept: INFECTIOUS DISEASES | Age: 56
End: 2021-09-23
Payer: MEDICARE

## 2021-09-23 VITALS
DIASTOLIC BLOOD PRESSURE: 93 MMHG | TEMPERATURE: 98.4 F | OXYGEN SATURATION: 98 % | BODY MASS INDEX: 31.6 KG/M2 | WEIGHT: 233 LBS | HEART RATE: 67 BPM | SYSTOLIC BLOOD PRESSURE: 135 MMHG | RESPIRATION RATE: 15 BRPM

## 2021-09-23 DIAGNOSIS — B20 SYMPTOMATIC HIV INFECTION (HCC): Primary | ICD-10-CM

## 2021-09-23 PROCEDURE — G8417 CALC BMI ABV UP PARAM F/U: HCPCS | Performed by: INTERNAL MEDICINE

## 2021-09-23 PROCEDURE — G9231 DOC ESRD DIA TRANS PREG: HCPCS | Performed by: INTERNAL MEDICINE

## 2021-09-23 PROCEDURE — 3017F COLORECTAL CA SCREEN DOC REV: CPT | Performed by: INTERNAL MEDICINE

## 2021-09-23 PROCEDURE — 99214 OFFICE O/P EST MOD 30 MIN: CPT | Performed by: INTERNAL MEDICINE

## 2021-09-23 PROCEDURE — G8510 SCR DEP NEG, NO PLAN REQD: HCPCS | Performed by: INTERNAL MEDICINE

## 2021-09-23 PROCEDURE — G8427 DOCREV CUR MEDS BY ELIG CLIN: HCPCS | Performed by: INTERNAL MEDICINE

## 2021-09-23 RX ORDER — RITONAVIR 100 MG/1
100 TABLET ORAL DAILY
Qty: 90 TABLET | Refills: 2 | Status: SHIPPED | OUTPATIENT
Start: 2021-09-23 | End: 2022-06-02 | Stop reason: SDUPTHER

## 2021-09-23 RX ORDER — ATAZANAVIR 300 MG/1
300 CAPSULE ORAL
Qty: 90 CAPSULE | Refills: 2 | Status: SHIPPED | OUTPATIENT
Start: 2021-09-23 | End: 2022-08-14

## 2021-09-23 NOTE — PROGRESS NOTES
Angélica Espinal is a 50 y.o. male. HPI  Patient with ESRD on hemodialysis, with long standing HIV infection, found to have CD4 count of only 4/mm3 and VL of 51,700 while on Reyataz/Lamivudine/ Ziagen. He was switched to Leeta Deis and Α[place] ΦΩ[place]ΟΣ. Last seen in March 2021 with CD4 of 176/mm3 (6.9%) and undetectable viral load. He returns today for routine follow-up. Interim period reported uneventful. No complaints offered at this time. Repeat staging labs were done 8/2021 and showed CD4 count of 164/mm3 (11.7%) and again undetectable viral load <20 copies/. Blood chemistries remarkable for chronic kidney disease and LFTs were normal this time. Patient offers no complaints at this time except for intermittent numbness and tingling in his fingers. Review of Systems   Constitutional: Negative for chills, diaphoresis, fever, malaise/fatigue and weight loss. HENT: Negative. Eyes: Negative for blurred vision. Respiratory: Negative for cough and shortness of breath. Gastrointestinal: Negative for diarrhea. Musculoskeletal: Negative for joint pain and myalgias. Skin: Negative. Neurological: Positive for tingling. Psychiatric/Behavioral: Negative. Past Medical History:   Diagnosis Date    Anemia     Chronic abdominal pain     Chronic kidney disease     dialysis pt. - [facility]    Complication of AV dialysis fistula 2019    2 large pseudo-aneurysm    Dependence on renal dialysis ([address]     ESRD on dialysis ([address]     GERD (gastroesophageal reflux disease)     Gout     History of blood transfusion     at least 8 units over the years    HIV (human immunodeficiency virus infection) ([address] 2011    Hypercholesterolemia     Hypertension     Other ill-defined conditions(799.89)     SMALL VEIN IN HEART BEING MONITORED    Pancreatitis     Renal insufficiency     Seizure ([address] 2012    one time and none sense    Seizures ([address]     Sepsis ([address] 2011 & 8/2019     Past Surgical History:   Procedure Laterality Date    HX ARTERIOVENOUS FISTULA Left 2012    HX CHOLECYSTECTOMY  2011    HX COLONOSCOPY         Objective  Physical Exam  Vitals and nursing note reviewed. Constitutional:       Appearance: He is not ill-appearing. HENT:      Head: Normocephalic and atraumatic. Right Ear: External ear normal.      Left Ear: External ear normal.      Nose: Nose normal.      Mouth/Throat:      Pharynx: Oropharynx is clear. Eyes:      Pupils: Pupils are equal, round, and reactive to light. Cardiovascular:      Rate and Rhythm: Normal rate and regular rhythm. Heart sounds: No murmur heard. Pulmonary:      Effort: Pulmonary effort is normal.      Breath sounds: Normal breath sounds. Abdominal:      General: Bowel sounds are normal.      Palpations: Abdomen is soft. Tenderness: There is no abdominal tenderness. Genitourinary:     Comments: No Nova  Musculoskeletal:      Cervical back: Neck supple. Right lower leg: No edema. Left lower leg: No edema. Skin:     Findings: No rash. Neurological:      General: No focal deficit present. Mental Status: He is alert and oriented to person, place, and time. Psychiatric:         Mood and Affect: Mood normal.         Behavior: Behavior normal.         Thought Content: Thought content normal.         Judgment: Judgment normal.          Assessment & Plan  1. HIV-1 infection, CDC Class A2, with CD4 count 164/mm3 and viral load of <20 copies/ml, clinically stable on Tivicay, boosted Reyataz and Vemlidy, excellent viral suppression. 2. ESRD on hemodialysis   3. Normal LFTs    Comment: Patient continues to do well clinically and responding very well to current regimen. CD4 count stable; may have maxed out.       1. Continue Tivicay 50 mg daily, Reyataz 300 mg/ritonavir 100 mg daily, and Vemlidy (Tenofovir AF) 25 mg daily  2.  Routine follow-up in 6 months; if still stable at next visit will discuss stretching visits out for 1 year  3. Repeat staging labs with CBC, CMP, CD4 and HIV viral load two weeks prior to next visit.       Randy Florentino MD

## 2021-10-12 ENCOUNTER — TELEPHONE (OUTPATIENT)
Dept: PRIMARY CARE CLINIC | Age: 56
End: 2021-10-12

## 2021-10-12 DIAGNOSIS — Z12.11 ENCOUNTER FOR COLONOSCOPY DUE TO HISTORY OF COLON CANCER: ICD-10-CM

## 2021-10-12 DIAGNOSIS — Z12.5 SCREENING FOR MALIGNANT NEOPLASM OF PROSTATE: Primary | ICD-10-CM

## 2021-10-12 DIAGNOSIS — Z85.038 ENCOUNTER FOR COLONOSCOPY DUE TO HISTORY OF COLON CANCER: ICD-10-CM

## 2021-10-12 NOTE — TELEPHONE ENCOUNTER
[address] can put order in and have tiffany find out who the GI is that can do procedure at one of those hospitals.

## 2021-10-14 ENCOUNTER — OFFICE VISIT (OUTPATIENT)
Dept: PRIMARY CARE CLINIC | Age: 56
End: 2021-10-14
Payer: MEDICARE

## 2021-10-14 VITALS
WEIGHT: 233 LBS | HEART RATE: 73 BPM | SYSTOLIC BLOOD PRESSURE: 121 MMHG | OXYGEN SATURATION: 98 % | RESPIRATION RATE: 18 BRPM | DIASTOLIC BLOOD PRESSURE: 84 MMHG | TEMPERATURE: 97.7 F | BODY MASS INDEX: 31.6 KG/M2

## 2021-10-14 DIAGNOSIS — Z87.898 HISTORY OF SEIZURES: ICD-10-CM

## 2021-10-14 DIAGNOSIS — Z21 ASYMPTOMATIC HIV INFECTION (HCC): Primary | ICD-10-CM

## 2021-10-14 DIAGNOSIS — E78.2 MIXED HYPERLIPIDEMIA: ICD-10-CM

## 2021-10-14 DIAGNOSIS — R10.9 CHRONIC ABDOMINAL PAIN: ICD-10-CM

## 2021-10-14 DIAGNOSIS — I10 ESSENTIAL HYPERTENSION: ICD-10-CM

## 2021-10-14 DIAGNOSIS — G89.29 CHRONIC ABDOMINAL PAIN: ICD-10-CM

## 2021-10-14 DIAGNOSIS — I71.20 THORACIC AORTIC ANEURYSM WITHOUT RUPTURE: ICD-10-CM

## 2021-10-14 DIAGNOSIS — N18.6 ESRD (END STAGE RENAL DISEASE) (HCC): ICD-10-CM

## 2021-10-14 DIAGNOSIS — Z12.5 SCREENING FOR MALIGNANT NEOPLASM OF PROSTATE: ICD-10-CM

## 2021-10-14 PROCEDURE — G8432 DEP SCR NOT DOC, RNG: HCPCS | Performed by: NURSE PRACTITIONER

## 2021-10-14 PROCEDURE — G9231 DOC ESRD DIA TRANS PREG: HCPCS | Performed by: NURSE PRACTITIONER

## 2021-10-14 PROCEDURE — 3017F COLORECTAL CA SCREEN DOC REV: CPT | Performed by: NURSE PRACTITIONER

## 2021-10-14 PROCEDURE — G8427 DOCREV CUR MEDS BY ELIG CLIN: HCPCS | Performed by: NURSE PRACTITIONER

## 2021-10-14 PROCEDURE — 99214 OFFICE O/P EST MOD 30 MIN: CPT | Performed by: NURSE PRACTITIONER

## 2021-10-14 PROCEDURE — G8417 CALC BMI ABV UP PARAM F/U: HCPCS | Performed by: NURSE PRACTITIONER

## 2021-10-14 RX ORDER — LIDOCAINE AND PRILOCAINE 25; 25 MG/G; MG/G
CREAM TOPICAL
COMMUNITY
Start: 2021-09-21

## 2021-10-14 NOTE — PROGRESS NOTES
Richar Issa is a 49 y.o. male who presents to the office today for the following:    Chief Complaint   Patient presents with    Hypertension       Past Medical History:   Diagnosis Date    Anemia     Chronic abdominal pain     Chronic kidney disease     dialysis pt. - [facility]    Complication of AV dialysis fistula 2019    2 large pseudo-aneurysm    Dependence on renal dialysis ([address]     ESRD on dialysis ([address]     GERD (gastroesophageal reflux disease)     Gout     History of blood transfusion     at least 8 units over the years    HIV (human immunodeficiency virus infection) ([address] 2011    Hypercholesterolemia     Hypertension     Other ill-defined conditions(799.89)     SMALL VEIN IN HEART BEING MONITORED    Pancreatitis     Renal insufficiency     Seizure ([address] 2012    one time and none sense    Seizures ([address]     Sepsis ([address] 2011 & 8/2019       Past Surgical History:   Procedure Laterality Date    HX ARTERIOVENOUS FISTULA Left 2012    HX CHOLECYSTECTOMY  2011    HX COLONOSCOPY          Family History   Problem Relation Age of Onset    Cancer Brother         colon    Heart Disease Brother     Heart Disease Mother         Social History     Tobacco Use    Smoking status: Never Smoker    Smokeless tobacco: Never Used   Vaping Use    Vaping Use: Never used   Substance Use Topics    Alcohol use: Not Currently    Drug use: No        HPI  Patient here for routine follow up with PMH of  HIV, ESRD, hypertension, hyperlipidemia , thoracic aneurysm , chronic abdominal pain and seizure disorder. Taking medications as directed. Follows with CT surgery for aneurysm, nephrology for ESRD and neurology for seizures. Doing well today with no specific concerns. Did request new referral to GI as he did not go to appointment last year when referred for colonoscopy.     Current Outpatient Medications on File Prior to Visit   Medication Sig    lidocaine-prilocaine (EMLA) topical cream APPLY SMALL AMOUNT TO ACCESS SITE (AVF) 1 HOUR BEFORE DIALYSIS. COVER WITH OCCLUSIVE DRESSING (SARAN WRAP)    dolutegravir (Tivicay) 50 mg tab tablet Take 1 Tablet by mouth daily for 270 days.  tenofovir ALAFENAMIDE FUMARATE (VEMLIDY) 25 mg tablet Take 1 Tablet by mouth daily for 270 days.  atazanavir (REYATAZ) 300 mg capsule Take 1 Capsule by mouth Daily (before breakfast) for 270 days.  ritonavir (NORVIR) 100 mg tablet Take 1 Tablet by mouth daily for 270 days.  atorvastatin (LIPITOR) 40 mg tablet take 1 tablet by mouth once daily    atazanavir (REYATAZ) 300 mg capsule TAKE 1 CAPSULE BY MOUTH DAILY    levETIRAcetam (KEPPRA) 500 mg tablet take 1 tablet by mouth twice a day    amLODIPine (NORVASC) 10 mg tablet take 1 tablet by mouth once daily    Vemlidy 25 mg tablet TAKE 1 TABLET BY MOUTH EVERY DAY    lidocaine (XYLOCAINE) 4 % topical cream lidocaine HCl 4 % topical cream   ONUR A SML AMT TO AFFECTED SKIN PRN P    aspirin 81 mg chewable tablet aspirin 81 mg chewable tablet   chew and swallow 1 tablet by mouth once daily    sevelamer carbonate (RENVELA) 800 mg tab tab Take 1,600 mg by mouth two (2) times a day.  metoprolol tartrate (LOPRESSOR) 50 mg tablet Take 50 mg by mouth two (2) times a day.  calcium carbonate (TUMS) 200 mg calcium (500 mg) chew Take 1 Tab by mouth as needed. No current facility-administered medications on file prior to visit. No orders of the defined types were placed in this encounter. Review of Systems   Constitutional: Negative. Respiratory: Negative. Cardiovascular: Negative. Gastrointestinal: Positive for abdominal pain. Negative for blood in stool, constipation, diarrhea, heartburn, nausea and vomiting. Genitourinary: Negative. Does not urinate   Musculoskeletal: Negative. Neurological: Negative.            Visit Vitals  /84 (BP 1 Location: Right arm, BP Patient Position: Sitting, BP Cuff Size: Adult)   Pulse 73   Temp 97.7 °F (36.5 °C) (Temporal)   Resp 18   Wt 233 lb (105.7 kg)   SpO2 98%   BMI 31.60 kg/m²       Physical Exam  Vitals and nursing note reviewed. Constitutional:       Appearance: Normal appearance. Cardiovascular:      Rate and Rhythm: Normal rate and regular rhythm. Pulses: Normal pulses. Heart sounds: Normal heart sounds. Arteriovenous access: left arteriovenous access is present. Pulmonary:      Effort: Pulmonary effort is normal.      Breath sounds: Normal breath sounds. Abdominal:      General: Bowel sounds are normal.      Palpations: Abdomen is soft. There is no mass. Tenderness: There is abdominal tenderness (mid-abdomen). There is no right CVA tenderness, left CVA tenderness or guarding. Musculoskeletal:         General: Normal range of motion. Skin:     General: Skin is warm and dry. Neurological:      Mental Status: He is alert. Mental status is at baseline. 1. Asymptomatic HIV infection ([facility]  This has been stable and patient reports he follows with infectious disease (Dr. Dwayne Barry)    2. Chronic abdominal pain  Patient previously on narcotic pain medication for chronic abdominal pain that began after gallbladder removed  and w/ dialysis  Made decision to leave pain management but seems to be doing ok at this point with managing     3. Essential hypertension  Blood pressure is controlled and continue medication as directed    4. Screening for malignant neoplasm of prostate  Check PSA when he has labs done again for infectious disease  - PSA W/ REFLX FREE PSA    5. History of Seizure disorder ([facility]  Reports no seizures in 9 years but they are not sure as he had syncopal episode in 2019  On keppra as directed  Following with nuerologist    6. ESRD (end stage renal disease) [facility])  He is Nay[facility] Patten, Friday dialysis   Follows with Dr. Clydene Osler    7.  Thoracic aortic aneurysm without rupture [facility])  Reviewed most recent notes from CT surgery on 6/3/21 and continuing monitoring every 6 months with CT and echo    8. Hyperlipidemia  On atorvastatin as directed        Patient verbalizes understanding of plan of care as discussed above    Follow-up and Dispositions    · Return in about 6 months (around 4/14/2022), or if symptoms worsen or fail to improve.

## 2021-10-17 RX ORDER — LEVETIRACETAM 500 MG/1
TABLET ORAL
Qty: 180 TABLET | Refills: 0 | Status: SHIPPED | OUTPATIENT
Start: 2021-10-17 | End: 2021-10-25

## 2021-10-25 RX ORDER — LEVETIRACETAM 500 MG/1
TABLET ORAL
Qty: 180 TABLET | Refills: 0 | Status: SHIPPED | OUTPATIENT
Start: 2021-10-25 | End: 2022-04-28

## 2021-11-30 ENCOUNTER — TELEPHONE (OUTPATIENT)
Dept: PRIMARY CARE CLINIC | Age: 56
End: 2021-11-30

## 2021-11-30 NOTE — TELEPHONE ENCOUNTER
----- Message from Gagan Grey sent at 11/30/2021 12:30 PM EST -----  Subject: Message to Provider    QUESTIONS  Information for Provider? pt needs to speak to Dr. Yordy Bolanos nurse regarding   blood work and scheduling a nurse appt.   ---------------------------------------------------------------------------  --------------  [address]  What is the best way for the office to contact you? OK to leave message on   voicemail  Preferred Call Back Phone Number? [phone]  ---------------------------------------------------------------------------  --------------  SCRIPT ANSWERS  Relationship to Patient?  Self

## 2021-12-02 ENCOUNTER — OFFICE VISIT (OUTPATIENT)
Dept: PRIMARY CARE CLINIC | Age: 56
End: 2021-12-02
Payer: MEDICARE

## 2021-12-02 VITALS
RESPIRATION RATE: 20 BRPM | OXYGEN SATURATION: 99 % | SYSTOLIC BLOOD PRESSURE: 109 MMHG | DIASTOLIC BLOOD PRESSURE: 74 MMHG | TEMPERATURE: 97.6 F | WEIGHT: 236.6 LBS | BODY MASS INDEX: 32.09 KG/M2 | HEART RATE: 66 BPM

## 2021-12-02 DIAGNOSIS — Z71.89 ACP (ADVANCE CARE PLANNING): ICD-10-CM

## 2021-12-02 DIAGNOSIS — N18.6 ESRD (END STAGE RENAL DISEASE) (HCC): ICD-10-CM

## 2021-12-02 DIAGNOSIS — Z21 ASYMPTOMATIC HIV INFECTION (HCC): ICD-10-CM

## 2021-12-02 DIAGNOSIS — G89.29 CHRONIC ABDOMINAL PAIN: ICD-10-CM

## 2021-12-02 DIAGNOSIS — E78.2 MIXED HYPERLIPIDEMIA: ICD-10-CM

## 2021-12-02 DIAGNOSIS — Z00.00 MEDICARE ANNUAL WELLNESS VISIT, SUBSEQUENT: ICD-10-CM

## 2021-12-02 DIAGNOSIS — Z12.5 SCREENING FOR MALIGNANT NEOPLASM OF PROSTATE: ICD-10-CM

## 2021-12-02 DIAGNOSIS — Z87.898 HISTORY OF SEIZURES: Primary | ICD-10-CM

## 2021-12-02 DIAGNOSIS — R10.9 CHRONIC ABDOMINAL PAIN: ICD-10-CM

## 2021-12-02 DIAGNOSIS — I10 ESSENTIAL HYPERTENSION: ICD-10-CM

## 2021-12-02 DIAGNOSIS — Z02.4 ENCOUNTER FOR DRIVER'S LICENSE HISTORY AND PHYSICAL: ICD-10-CM

## 2021-12-02 DIAGNOSIS — I71.20 THORACIC AORTIC ANEURYSM WITHOUT RUPTURE: ICD-10-CM

## 2021-12-02 PROCEDURE — G8417 CALC BMI ABV UP PARAM F/U: HCPCS | Performed by: NURSE PRACTITIONER

## 2021-12-02 PROCEDURE — G8427 DOCREV CUR MEDS BY ELIG CLIN: HCPCS | Performed by: NURSE PRACTITIONER

## 2021-12-02 PROCEDURE — 99214 OFFICE O/P EST MOD 30 MIN: CPT | Performed by: NURSE PRACTITIONER

## 2021-12-02 PROCEDURE — G8432 DEP SCR NOT DOC, RNG: HCPCS | Performed by: NURSE PRACTITIONER

## 2021-12-02 PROCEDURE — G9231 DOC ESRD DIA TRANS PREG: HCPCS | Performed by: NURSE PRACTITIONER

## 2021-12-02 PROCEDURE — 3017F COLORECTAL CA SCREEN DOC REV: CPT | Performed by: NURSE PRACTITIONER

## 2021-12-02 PROCEDURE — G0439 PPPS, SUBSEQ VISIT: HCPCS | Performed by: NURSE PRACTITIONER

## 2021-12-02 NOTE — PROGRESS NOTES
Aleksandr López is a 49 y.o. male who presents to the office today for the following:    Chief Complaint   Patient presents with    Annual Wellness Visit    Hypertension    Seizure       Past Medical History:   Diagnosis Date    Anemia     Chronic abdominal pain     Chronic kidney disease     dialysis pt. - M. [facility]    Complication of AV dialysis fistula 2019    2 large pseudo-aneurysm    Dependence on renal dialysis ([address]     ESRD on dialysis ([address]     GERD (gastroesophageal reflux disease)     Gout     History of blood transfusion     at least 8 units over the years    HIV (human immunodeficiency virus infection) ([address] 2011    Hypercholesterolemia     Hypertension     Other ill-defined conditions(799.89)     SMALL VEIN IN HEART BEING MONITORED    Pancreatitis     Renal insufficiency     Seizure ([address] 2012    one time and none sense    Seizures ([address]     Sepsis ([address] 2011 & 8/2019       Past Surgical History:   Procedure Laterality Date    HX ARTERIOVENOUS FISTULA Left 2012    HX CHOLECYSTECTOMY  2011    HX COLONOSCOPY          Family History   Problem Relation Age of Onset    Cancer Brother         colon    Heart Disease Brother     Heart Disease Mother         Social History     Tobacco Use    Smoking status: Never Smoker    Smokeless tobacco: Never Used   Vaping Use    Vaping Use: Never used   Substance Use Topics    Alcohol use: Not Currently    Drug use: No        HPI  Patient here for routine follow up and 's license certification for seizure disorder with PMH of  HIV, ESRD, hypertension, hyperlipidemia , thoracic aneurysm , chronic abdominal pain and seizure disorder. Taking medications as directed. Follows with CT surgery for aneurysm, nephrology for ESRD and neurology for seizures. Doing well today with no specific concerns other than having upper abdominal pain which is chronic. Did see GI and started on protonix since last visit.  . Is set up for his screening colonoscopy  And EGD on 12/14/21. Current Outpatient Medications on File Prior to Visit   Medication Sig    levETIRAcetam (KEPPRA) 500 mg tablet take 1 tablet by mouth twice a day    lidocaine-prilocaine (EMLA) topical cream APPLY SMALL AMOUNT TO ACCESS SITE (AVF) 1 HOUR BEFORE DIALYSIS. COVER WITH OCCLUSIVE DRESSING (SARAN WRAP)    dolutegravir (Tivicay) 50 mg tab tablet Take 1 Tablet by mouth daily for 270 days.  tenofovir ALAFENAMIDE FUMARATE (VEMLIDY) 25 mg tablet Take 1 Tablet by mouth daily for 270 days.  atazanavir (REYATAZ) 300 mg capsule Take 1 Capsule by mouth Daily (before breakfast) for 270 days.  ritonavir (NORVIR) 100 mg tablet Take 1 Tablet by mouth daily for 270 days.  atorvastatin (LIPITOR) 40 mg tablet take 1 tablet by mouth once daily    atazanavir (REYATAZ) 300 mg capsule TAKE 1 CAPSULE BY MOUTH DAILY    amLODIPine (NORVASC) 10 mg tablet take 1 tablet by mouth once daily    Vemlidy 25 mg tablet TAKE 1 TABLET BY MOUTH EVERY DAY    lidocaine (XYLOCAINE) 4 % topical cream lidocaine HCl 4 % topical cream   ONUR A SML AMT TO AFFECTED SKIN PRN P    aspirin 81 mg chewable tablet aspirin 81 mg chewable tablet   chew and swallow 1 tablet by mouth once daily    sevelamer carbonate (RENVELA) 800 mg tab tab Take 1,600 mg by mouth two (2) times a day.  metoprolol tartrate (LOPRESSOR) 50 mg tablet Take 50 mg by mouth two (2) times a day.  calcium carbonate (TUMS) 200 mg calcium (500 mg) chew Take 1 Tab by mouth as needed. No current facility-administered medications on file prior to visit. No orders of the defined types were placed in this encounter. Review of Systems   Constitutional: Negative. Respiratory: Negative. Cardiovascular: Negative. Gastrointestinal: Positive for abdominal pain. Negative for blood in stool, constipation, diarrhea, heartburn, nausea and vomiting. Genitourinary: Negative. Does not urinate   Musculoskeletal: Negative. Neurological: Negative. Visit Vitals  /74 (BP 1 Location: Left upper arm, BP Patient Position: Sitting, BP Cuff Size: Adult)   Pulse 66   Temp 97.6 °F (36.4 °C) (Temporal)   Resp 20   Wt 236 lb 9.6 oz (107.3 kg)   SpO2 99%   BMI 32.09 kg/m²       Physical Exam  Vitals and nursing note reviewed. Constitutional:       Appearance: Normal appearance. Cardiovascular:      Rate and Rhythm: Normal rate and regular rhythm. Pulses: Normal pulses. Heart sounds: Normal heart sounds. Arteriovenous access: left arteriovenous access is present. Pulmonary:      Effort: Pulmonary effort is normal.      Breath sounds: Normal breath sounds. Abdominal:      General: Bowel sounds are normal.      Palpations: Abdomen is soft. There is no mass. Tenderness: There is abdominal tenderness (mid-abdomen). There is no right CVA tenderness, left CVA tenderness or guarding. Musculoskeletal:         General: Normal range of motion. Skin:     General: Skin is warm and dry. Neurological:      Mental Status: He is alert. Mental status is at baseline. 1. Asymptomatic HIV infection ([facility]  This has been stable and patient reports he follows with infectious disease (Dr. Estrada Leader)    2. Chronic abdominal pain  Patient previously on narcotic pain medication for chronic abdominal pain that began after gallbladder removed  and w/ dialysis  Made decision to leave pain management   Did see GI recently and started on protonix 40mg daily to see if this helps symptoms  Has EGD and colonoscopy scheduled 12/14/21    3. Essential hypertension  Blood pressure is controlled and continue medication as directed    4. Screening for malignant neoplasm of prostate  Check PSA today with labs  - PSA W/ REFLX FREE PSA    5. History of Seizure disorder ([facility]  Reports no seizures in 9 years but they are not sure as he had syncopal episode in 2019  On keppra as directed  Following with nuerologist    6.  ESRD (end stage renal disease) [facility])  He is Sophy Lainez, Friday dialysis   Follows with Dr. Eddie Roland    7. Thoracic aortic aneurysm without rupture [facility])  Reviewed most recent notes from CT surgery on 6/3/21 and continuing monitoring every 6 months with CT and echo    8. Hyperlipidemia  On atorvastatin as directed    9. Encounter for 's license history and physical  Patient has not had seizures in over 9 years  but did have syncopal episode in 2019 which they are unsure of. Has not had any further syncopal episodes or again seizures  He is compliant with medications for seizures and checking keppra level today  Continues care with his neurologist  Feel he is able to safely operate standard Motor Vehicle and he has been advised to notify me if any changes occur in his medical history. Patient verbalizes understanding of plan of care as discussed above    Follow-up and Dispositions    · Return in about 3 months (around 3/2/2022) for or sooner for worsening symptoms. This is the Subsequent Medicare Annual Wellness Exam, performed 12 months or more after the Initial AWV or the last Subsequent AWV    I have reviewed the patient's medical history in detail and updated the computerized patient record. Assessment/Plan   Education and counseling provided:  Are appropriate based on today's review and evaluation  End-of-Life planning (with patient's consent)  Pneumococcal Vaccine  Influenza Vaccine  Hepatitis B Vaccine  Prostate cancer screening tests (PSA, covered annually)  Colorectal cancer screening tests  Cardiovascular screening blood test  Screening for glaucoma  Diabetes screening test  Medical nutrition therapy for individuals with diabetes or renal disease    1. History of seizures  -     LEVETIRACETAM (KEPPRA)  2. Screening for malignant neoplasm of prostate  -     PSA W/ REFLX FREE PSA  3. Mixed hyperlipidemia  -     LIPID PANEL  4. Asymptomatic HIV infection ([facility]  5. Chronic abdominal pain  6.  Essential hypertension  7. ESRD (end stage renal disease) ([facility]  8. Thoracic aortic aneurysm without rupture ([facility]  9. Encounter for 's license history and physical  10. Medicare annual wellness visit, subsequent  11. ACP (advance care planning)       Depression Risk Factor Screening     3 most recent PHQ Screens 12/2/2021   Little interest or pleasure in doing things Not at all   Feeling down, depressed, irritable, or hopeless Not at all   Total Score PHQ 2 0       Alcohol Risk Screen    Do you average more than 2 drinks per night or 14 drinks a week: No    On any one occasion in the past three months have you have had more than 4 drinks containing alcohol:  No        Functional Ability and Level of Safety    Hearing: Hearing is good. Activities of Daily Living: The home contains: handrails  Patient does total self care      Ambulation: with no difficulty     Fall Risk:  Fall Risk Assessment, last 12 mths 3/11/2021   Able to walk? Yes   Fall in past 12 months? 0   Do you feel unsteady?  0   Are you worried about falling 0      Abuse Screen:  Patient is not abused       Cognitive Screening    Has your family/caregiver stated any concerns about your memory: no     Cognitive Screening: Abnormal - Clock Drawing Test    Health Maintenance Due     Health Maintenance Due   Topic Date Due    Pneumococcal 0-64 years (1 of 4 - PCV13) Never done    DTaP/Tdap/Td series (1 - Tdap) Never done    Lipid Screen  09/20/2012    Colorectal Cancer Screening Combo  10/13/2012    Shingrix Vaccine Age 50> (1 of 2) Never done       Patient Care Team   Patient Care Team:  Yas Dickson NP as PCP - General (Physician Assistant)  Yas Dickson NP as PCP - [facility] Empaneled Provider    History     Patient Active Problem List   Diagnosis Code    Anemia of renal disease N18.9, D63.1    Elevated LFTs R79.89    Abdominal pain R10.9    HIV (human immunodeficiency virus infection) ([facility] B20    ESRD (end stage renal disease) ([facility] N18.6  Anemia of other chronic disease D63.8    HTN (hypertension) I10    Thoracic aortic aneurysm (HCC) I71.2    Syncope and collapse R55    Seizure disorder (HCC) G40.909    Gout M10.9     Past Medical History:   Diagnosis Date    Anemia     Chronic abdominal pain     Chronic kidney disease     dialysis pt. - M. W.F    Complication of AV dialysis fistula 2019    2 large pseudo-aneurysm    Dependence on renal dialysis ([address]     ESRD on dialysis ([address]     GERD (gastroesophageal reflux disease)     Gout     History of blood transfusion     at least 8 units over the years    HIV (human immunodeficiency virus infection) ([address] 2011    Hypercholesterolemia     Hypertension     Other ill-defined conditions(799.89)     SMALL VEIN IN HEART BEING MONITORED    Pancreatitis     Renal insufficiency     Seizure ([address] 2012    one time and none sense    Seizures [facility]     Sepsis ([facility] 2011 & 8/2019      Past Surgical History:   Procedure Laterality Date    HX ARTERIOVENOUS FISTULA Left 2012    HX CHOLECYSTECTOMY  2011    HX COLONOSCOPY       Current Outpatient Medications   Medication Sig Dispense Refill    levETIRAcetam (KEPPRA) 500 mg tablet take 1 tablet by mouth twice a day 180 Tablet 0    lidocaine-prilocaine (EMLA) topical cream APPLY SMALL AMOUNT TO ACCESS SITE (AVF) 1 HOUR BEFORE DIALYSIS. COVER WITH OCCLUSIVE DRESSING (SARAN WRAP)      dolutegravir (Tivicay) 50 mg tab tablet Take 1 Tablet by mouth daily for 270 days. 90 Tablet 2    tenofovir ALAFENAMIDE FUMARATE (VEMLIDY) 25 mg tablet Take 1 Tablet by mouth daily for 270 days. 90 Tablet 2    atazanavir (REYATAZ) 300 mg capsule Take 1 Capsule by mouth Daily (before breakfast) for 270 days. 90 Capsule 2    ritonavir (NORVIR) 100 mg tablet Take 1 Tablet by mouth daily for 270 days.  90 Tablet 2    atorvastatin (LIPITOR) 40 mg tablet take 1 tablet by mouth once daily 90 Tablet 0    atazanavir (REYATAZ) 300 mg capsule TAKE 1 CAPSULE BY MOUTH DAILY 90 Capsule 1    amLODIPine (NORVASC) 10 mg tablet take 1 tablet by mouth once daily 90 Tab 0    Vemlidy 25 mg tablet TAKE 1 TABLET BY MOUTH EVERY DAY 90 Tab 2    lidocaine (XYLOCAINE) 4 % topical cream lidocaine HCl 4 % topical cream   ONUR A SML AMT TO AFFECTED SKIN PRN P      aspirin 81 mg chewable tablet aspirin 81 mg chewable tablet   chew and swallow 1 tablet by mouth once daily      sevelamer carbonate (RENVELA) 800 mg tab tab Take 1,600 mg by mouth two (2) times a day.  metoprolol tartrate (LOPRESSOR) 50 mg tablet Take 50 mg by mouth two (2) times a day.  calcium carbonate (TUMS) 200 mg calcium (500 mg) chew Take 1 Tab by mouth as needed.        Allergies   Allergen Reactions    Penicillin Unknown (comments)    Percocet [Oxycodone-Acetaminophen] Hives       Family History   Problem Relation Age of Onset    Cancer Brother         colon    Heart Disease Brother     Heart Disease Mother      Social History     Tobacco Use    Smoking status: Never Smoker    Smokeless tobacco: Never Used   Substance Use Topics    Alcohol use: Not Currently         Sergio Posey NP

## 2021-12-02 NOTE — ACP (ADVANCE CARE PLANNING)
Advance Care Planning     General Advance Care Planning (ACP) Conversation      Date of Conversation: 12/2/2021  Conducted with: Patient with Decision Making Capacity    Healthcare Decision Maker:   No healthcare decision makers have been documented. Click here to complete [address] including selection of the Healthcare Decision Maker Relationship (ie \"Primary\")    Today we documented Decision Maker(s) consistent with Legal Next of Kin hierarchy.     Content/Action Overview:   Has NO ACP documents/care preferences - information provided, considering goals and options  Reviewed DNR/DNI and patient elects Full Code (Attempt Resuscitation)  Topics discussed: end of life care preferences (vegetative state/imminent death)       Length of Voluntary ACP Conversation in minutes:  <16 minutes (Non-Billable)    Marnie Moeller NP

## 2021-12-05 LAB
CHOLEST SERPL-MCNC: 153 MG/DL (ref 100–199)
HDLC SERPL-MCNC: 74 MG/DL
LDLC SERPL CALC-MCNC: 63 MG/DL (ref 0–99)
LEVETIRACETAM SERPL-MCNC: 41.7 UG/ML (ref 10–40)
PSA SERPL-MCNC: 0.9 NG/ML (ref 0–4)
REFLEX CRITERIA: NORMAL
TRIGL SERPL-MCNC: 86 MG/DL (ref 0–149)
VLDLC SERPL CALC-MCNC: 16 MG/DL (ref 5–40)

## 2021-12-06 NOTE — PROGRESS NOTES
Please let patient know that PSA and cholesterol numbers are within range. His keppra level is just slightly above normal threshold and is essentially ok. Would like him to let nephrology know to repeat his level with next labs and fax to us. Will have you fax his keppra level and dmv paperwork. If any questions, let me know.

## 2021-12-09 ENCOUNTER — HOSPITAL ENCOUNTER (OUTPATIENT)
Dept: PREADMISSION TESTING | Age: 56
Discharge: HOME | End: 2021-12-09
Payer: MEDICARE

## 2021-12-09 PROCEDURE — U0005 INFEC AGEN DETEC AMPLI PROBE: HCPCS

## 2021-12-10 LAB — SARS-COV-2, COV2: NORMAL

## 2021-12-11 LAB
SARS-COV-2, XPLCVT: NOT DETECTED
SOURCE, COVRS: NORMAL

## 2021-12-13 ENCOUNTER — TELEPHONE (OUTPATIENT)
Dept: PRIMARY CARE CLINIC | Age: 56
End: 2021-12-13

## 2021-12-14 ENCOUNTER — APPOINTMENT (OUTPATIENT)
Dept: ENDOSCOPY | Age: 56
End: 2021-12-14
Attending: INTERNAL MEDICINE
Payer: MEDICARE

## 2021-12-14 ENCOUNTER — HOSPITAL ENCOUNTER (OUTPATIENT)
Age: 56
Setting detail: OUTPATIENT SURGERY
Discharge: HOME | End: 2021-12-14
Attending: INTERNAL MEDICINE | Admitting: INTERNAL MEDICINE
Payer: MEDICARE

## 2021-12-14 ENCOUNTER — ANESTHESIA (OUTPATIENT)
Dept: ENDOSCOPY | Age: 56
End: 2021-12-14
Payer: MEDICARE

## 2021-12-14 ENCOUNTER — ANESTHESIA EVENT (OUTPATIENT)
Dept: ENDOSCOPY | Age: 56
End: 2021-12-14
Payer: MEDICARE

## 2021-12-14 VITALS
WEIGHT: 236 LBS | DIASTOLIC BLOOD PRESSURE: 52 MMHG | RESPIRATION RATE: 18 BRPM | TEMPERATURE: 97.9 F | HEIGHT: 72 IN | BODY MASS INDEX: 31.97 KG/M2 | SYSTOLIC BLOOD PRESSURE: 109 MMHG | OXYGEN SATURATION: 98 % | HEART RATE: 67 BPM

## 2021-12-14 PROCEDURE — 77030021593 HC FCPS BIOP ENDOSC BSC -A: Performed by: INTERNAL MEDICINE

## 2021-12-14 PROCEDURE — 76040000007: Performed by: INTERNAL MEDICINE

## 2021-12-14 PROCEDURE — 2709999900 HC NON-CHARGEABLE SUPPLY: Performed by: INTERNAL MEDICINE

## 2021-12-14 PROCEDURE — 88312 SPECIAL STAINS GROUP 1: CPT

## 2021-12-14 PROCEDURE — 74011250636 HC RX REV CODE- 250/636: Performed by: NURSE ANESTHETIST, CERTIFIED REGISTERED

## 2021-12-14 PROCEDURE — 74011000250 HC RX REV CODE- 250: Performed by: NURSE ANESTHETIST, CERTIFIED REGISTERED

## 2021-12-14 PROCEDURE — 88305 TISSUE EXAM BY PATHOLOGIST: CPT

## 2021-12-14 PROCEDURE — 76060000032 HC ANESTHESIA 0.5 TO 1 HR: Performed by: INTERNAL MEDICINE

## 2021-12-14 RX ORDER — PROPOFOL 10 MG/ML
INJECTION, EMULSION INTRAVENOUS AS NEEDED
Status: DISCONTINUED | OUTPATIENT
Start: 2021-12-14 | End: 2021-12-14 | Stop reason: HOSPADM

## 2021-12-14 RX ORDER — LIDOCAINE HYDROCHLORIDE 20 MG/ML
INJECTION, SOLUTION EPIDURAL; INFILTRATION; INTRACAUDAL; PERINEURAL AS NEEDED
Status: DISCONTINUED | OUTPATIENT
Start: 2021-12-14 | End: 2021-12-14 | Stop reason: HOSPADM

## 2021-12-14 RX ORDER — LIDOCAINE HYDROCHLORIDE 20 MG/ML
JELLY TOPICAL AS NEEDED
Status: DISCONTINUED | OUTPATIENT
Start: 2021-12-14 | End: 2021-12-14 | Stop reason: HOSPADM

## 2021-12-14 RX ORDER — SODIUM CHLORIDE, SODIUM LACTATE, POTASSIUM CHLORIDE, CALCIUM CHLORIDE 600; 310; 30; 20 MG/100ML; MG/100ML; MG/100ML; MG/100ML
50 INJECTION, SOLUTION INTRAVENOUS CONTINUOUS
Status: DISCONTINUED | OUTPATIENT
Start: 2021-12-14 | End: 2021-12-14 | Stop reason: HOSPADM

## 2021-12-14 RX ORDER — SODIUM CHLORIDE 9 MG/ML
INJECTION, SOLUTION INTRAVENOUS
Status: DISCONTINUED | OUTPATIENT
Start: 2021-12-14 | End: 2021-12-14 | Stop reason: HOSPADM

## 2021-12-14 RX ADMIN — PROPOFOL 70 MG: 10 INJECTION, EMULSION INTRAVENOUS at 08:59

## 2021-12-14 RX ADMIN — PHENYLEPHRINE HYDROCHLORIDE 100 MCG: 10 INJECTION INTRAVENOUS at 09:13

## 2021-12-14 RX ADMIN — PROPOFOL 50 MG: 10 INJECTION, EMULSION INTRAVENOUS at 09:01

## 2021-12-14 RX ADMIN — PROPOFOL 80 MG: 10 INJECTION, EMULSION INTRAVENOUS at 08:52

## 2021-12-14 RX ADMIN — PROPOFOL 50 MG: 10 INJECTION, EMULSION INTRAVENOUS at 09:10

## 2021-12-14 RX ADMIN — SODIUM CHLORIDE: 9 INJECTION, SOLUTION INTRAVENOUS at 08:49

## 2021-12-14 RX ADMIN — PROPOFOL 50 MG: 10 INJECTION, EMULSION INTRAVENOUS at 08:56

## 2021-12-14 RX ADMIN — PROPOFOL 50 MG: 10 INJECTION, EMULSION INTRAVENOUS at 09:15

## 2021-12-14 RX ADMIN — PROPOFOL 50 MG: 10 INJECTION, EMULSION INTRAVENOUS at 09:06

## 2021-12-14 RX ADMIN — LIDOCAINE HYDROCHLORIDE 100 MG: 20 INJECTION, SOLUTION EPIDURAL; INFILTRATION; INTRACAUDAL; PERINEURAL at 08:51

## 2021-12-14 NOTE — ANESTHESIA POSTPROCEDURE EVALUATION
Procedure(s):  COLONOSCOPY  ESOPHAGOGASTRODUODENOSCOPY (EGD)  ESOPHAGOGASTRODUODENAL (EGD) BIOPSY. total IV anesthesia, MAC    Anesthesia Post Evaluation      Multimodal analgesia: multimodal analgesia used between 6 hours prior to anesthesia start to PACU discharge  Patient location during evaluation: bedside  Patient participation: complete - patient participated  Level of consciousness: sleepy but conscious  Pain score: 0  Pain management: adequate  Airway patency: patent  Anesthetic complications: no  Cardiovascular status: acceptable, hemodynamically stable and stable  Respiratory status: acceptable, nasal cannula, spontaneous ventilation and unassisted  Hydration status: acceptable  Post anesthesia nausea and vomiting:  none  Final Post Anesthesia Temperature Assessment:  Normothermia (36.0-37.5 degrees C)      INITIAL Post-op Vital signs: No vitals data found for the desired time range.

## 2021-12-14 NOTE — DISCHARGE INSTRUCTIONS
Avoid NSAID's, avoid aspirin, anacin, bufferin, jennifer seltzer, or any medication containing aspirin for one week. If needed, you may take tylenol or datril. Follow up in office in 2 weeks. Avoid citrus juices, cigarettes, chocolate and tight fitting clothes, coffee, and soda beverages, fatty and fried foods, anticholinergic medications.

## 2021-12-14 NOTE — ANESTHESIA PREPROCEDURE EVALUATION
Relevant Problems   NEUROLOGY   (+) Seizure disorder (HCC)      CARDIOVASCULAR   (+) HTN (hypertension)      RENAL FAILURE   (+) Anemia of renal disease   (+) ESRD (end stage renal disease) (HCC)      HEMATOLOGY   (+) Anemia of other chronic disease   (+) Anemia of renal disease       Anesthetic History   No history of anesthetic complications            Review of Systems / Medical History  Patient summary reviewed, nursing notes reviewed and pertinent labs reviewed    Pulmonary                Comments: SNORING   Neuro/Psych     seizures: well controlled        Comments: Last seizure 2012. SYNCOPE  Cardiovascular    Hypertension              Exercise tolerance: <4 METS  Comments: Hx of thoracic aortic aneurysm (Being monitored)   GI/Hepatic/Renal     GERD: well controlled    Renal disease: dialysis and ESRD      Comments: Pancreatitis. Elevatged LFT's Endo/Other        Cancer and anemia (Hb/Hb 10.5/31. 0)    Comments: Hx of sepsis. HIV  GOUT Other Findings   Comments: +HIV         Physical Exam    Airway  Mallampati: III    Neck ROM: normal range of motion   Mouth opening: Normal     Cardiovascular    Rhythm: regular  Rate: normal         Dental      Comments: MISSING TEETH. Pulmonary  Breath sounds clear to auscultation               Abdominal  GI exam deferred       Other Findings            Anesthetic Plan    ASA: 3  Anesthesia type: total IV anesthesia and MAC - supraclavicular block            Anesthetic plan and risks discussed with: Patient      Informed consent obtained.

## 2021-12-14 NOTE — PROGRESS NOTES
Pt stated pain of 7 sharp, rectal pain. Cleaned site and old blood noted. Reassessed and small amount of blood wiped. Informed Dr. Meena Yuen, he stated can give tylenol and apply lidocaine 2% jelly to rectum. Orders placed, and unable to obtain lidocaine 2% jelly on SDS,  Called Pharmacy and stated can retrieve from PACU omicel. Unable to retrieve from [address] [facility], called pharmacy and pharmacist stated will tube to ICU tube station. Informed pt and he stated pain not as bad and rated at a 5 of 10. He stated he has lidocaine jelly at home. He stated he wants to go home and apply it there.

## 2021-12-14 NOTE — PROGRESS NOTES
Patient given discharge education verbally and gave back verbal understanding. Patient taken to ride's car via wheelchair.

## 2022-01-30 RX ORDER — ATORVASTATIN CALCIUM 40 MG/1
TABLET, FILM COATED ORAL
Qty: 90 TABLET | Refills: 0 | Status: SHIPPED | OUTPATIENT
Start: 2022-01-30 | End: 2022-06-02 | Stop reason: SDUPTHER

## 2022-02-28 ENCOUNTER — TELEPHONE (OUTPATIENT)
Dept: INFECTIOUS DISEASES | Age: 57
End: 2022-02-28

## 2022-02-28 DIAGNOSIS — B20 CURRENTLY ASYMPTOMATIC HIV INFECTION, WITH HISTORY OF HIV-RELATED ILLNESS (HCC): Primary | ICD-10-CM

## 2022-03-18 PROBLEM — G40.909 SEIZURE DISORDER (HCC): Status: ACTIVE | Noted: 2017-04-27

## 2022-03-19 PROBLEM — M10.9 GOUT: Status: ACTIVE | Noted: 2017-04-27

## 2022-03-25 LAB
ALBUMIN SERPL-MCNC: 5.3 G/DL (ref 3.8–4.9)
ALBUMIN/GLOB SERPL: 1.4 {RATIO} (ref 1.2–2.2)
ALP SERPL-CCNC: 125 IU/L (ref 44–121)
ALT SERPL-CCNC: 8 IU/L (ref 0–44)
AST SERPL-CCNC: 17 IU/L (ref 0–40)
BASOPHILS # BLD AUTO: 0 X10E3/UL (ref 0–0.2)
BASOPHILS NFR BLD AUTO: 1 %
BILIRUB SERPL-MCNC: 1.1 MG/DL (ref 0–1.2)
BUN SERPL-MCNC: 21 MG/DL (ref 6–24)
BUN/CREAT SERPL: 3 (ref 9–20)
CALCIUM SERPL-MCNC: 9.7 MG/DL (ref 8.7–10.2)
CD3+CD4+ CELLS # BLD: 193 /UL (ref 359–1519)
CD3+CD4+ CELLS NFR BLD: 13.8 % (ref 30.8–58.5)
CHLORIDE SERPL-SCNC: 92 MMOL/L (ref 96–106)
CO2 SERPL-SCNC: 26 MMOL/L (ref 20–29)
CREAT SERPL-MCNC: 6.47 MG/DL (ref 0.76–1.27)
EGFR: 10 ML/MIN/1.73
EOSINOPHIL # BLD AUTO: 0.1 X10E3/UL (ref 0–0.4)
EOSINOPHIL NFR BLD AUTO: 3 %
ERYTHROCYTE [DISTWIDTH] IN BLOOD BY AUTOMATED COUNT: 15.1 % (ref 11.6–15.4)
GLOBULIN SER CALC-MCNC: 3.9 G/DL (ref 1.5–4.5)
GLUCOSE SERPL-MCNC: 68 MG/DL (ref 65–99)
HCT VFR BLD AUTO: 33.8 % (ref 37.5–51)
HGB BLD-MCNC: 10.9 G/DL (ref 13–17.7)
HIV GENOSURE PRIME(SM): NORMAL
HIV1 RNA # SERPL NAA+PROBE: <20 COPIES/ML
HIV1 RNA # SERPL NAA+PROBE: <20 COPIES/ML
HIV1 RNA SERPL NAA+PROBE-LOG#: NORMAL LOG10COPY/ML
HIV1 RNA SERPL NAA+PROBE-LOG#: NORMAL LOG10COPY/ML
IMM GRANULOCYTES # BLD AUTO: 0 X10E3/UL (ref 0–0.1)
IMM GRANULOCYTES NFR BLD AUTO: 0 %
LYMPHOCYTES # BLD AUTO: 1.4 X10E3/UL (ref 0.7–3.1)
LYMPHOCYTES NFR BLD AUTO: 25 %
MCH RBC QN AUTO: 28.8 PG (ref 26.6–33)
MCHC RBC AUTO-ENTMCNC: 32.2 G/DL (ref 31.5–35.7)
MCV RBC AUTO: 89 FL (ref 79–97)
MONOCYTES # BLD AUTO: 0.4 X10E3/UL (ref 0.1–0.9)
MONOCYTES NFR BLD AUTO: 7 %
NEUTROPHILS # BLD AUTO: 3.7 X10E3/UL (ref 1.4–7)
NEUTROPHILS NFR BLD AUTO: 64 %
PLATELET # BLD AUTO: 222 X10E3/UL (ref 150–450)
POTASSIUM SERPL-SCNC: 3.9 MMOL/L (ref 3.5–5.2)
PROT SERPL-MCNC: 9.2 G/DL (ref 6–8.5)
RBC # BLD AUTO: 3.79 X10E6/UL (ref 4.14–5.8)
SODIUM SERPL-SCNC: 141 MMOL/L (ref 134–144)
WBC # BLD AUTO: 5.7 X10E3/UL (ref 3.4–10.8)

## 2022-04-07 ENCOUNTER — TELEPHONE (OUTPATIENT)
Dept: INFECTIOUS DISEASES | Age: 57
End: 2022-04-07

## 2022-04-07 DIAGNOSIS — B20 CURRENTLY ASYMPTOMATIC HIV INFECTION, WITH HISTORY OF HIV-RELATED ILLNESS (HCC): Primary | ICD-10-CM

## 2022-04-07 RX ORDER — RITONAVIR 100 MG/1
100 TABLET ORAL
Qty: 180 TABLET | Refills: 1 | Status: SHIPPED | OUTPATIENT
Start: 2022-04-07 | End: 2022-06-02 | Stop reason: SDUPTHER

## 2022-04-07 RX ORDER — ATAZANAVIR 300 MG/1
300 CAPSULE ORAL
Qty: 180 CAPSULE | Refills: 1 | Status: SHIPPED | OUTPATIENT
Start: 2022-04-07 | End: 2022-06-02 | Stop reason: SDUPTHER

## 2022-04-07 NOTE — TELEPHONE ENCOUNTER
Patient had appointment in [facility] today but called to inquire whether this could be done by telephone.  I informed him that his CD4 count was improving at 193/mm3 with increased % and that his viral load remained undectable.  F?U in 6 month, renew Rx.       By Esthela Orellana MD

## 2022-04-15 RX ORDER — AMLODIPINE BESYLATE 10 MG/1
TABLET ORAL
Qty: 90 TABLET | Refills: 0 | Status: SHIPPED | OUTPATIENT
Start: 2022-04-15 | End: 2022-06-02 | Stop reason: SDUPTHER

## 2022-04-18 RX ORDER — METOPROLOL TARTRATE 50 MG/1
50 TABLET ORAL 2 TIMES DAILY
Qty: 180 TABLET | Refills: 1 | Status: SHIPPED | OUTPATIENT
Start: 2022-04-18 | End: 2022-06-02 | Stop reason: SDUPTHER

## 2022-04-18 NOTE — TELEPHONE ENCOUNTER
Requested Prescriptions     Pending Prescriptions Disp Refills    metoprolol tartrate (LOPRESSOR) 50 mg tablet       Sig: Take 1 Tablet by mouth two (2) times a day.

## 2022-04-28 RX ORDER — LEVETIRACETAM 500 MG/1
TABLET ORAL
Qty: 180 TABLET | Refills: 0 | Status: SHIPPED | OUTPATIENT
Start: 2022-04-28 | End: 2022-06-02 | Stop reason: SDUPTHER

## 2022-06-02 ENCOUNTER — OFFICE VISIT (OUTPATIENT)
Dept: PRIMARY CARE CLINIC | Age: 57
End: 2022-06-02
Payer: MEDICARE

## 2022-06-02 VITALS
WEIGHT: 235.2 LBS | DIASTOLIC BLOOD PRESSURE: 76 MMHG | HEIGHT: 72 IN | TEMPERATURE: 97.2 F | OXYGEN SATURATION: 100 % | SYSTOLIC BLOOD PRESSURE: 120 MMHG | BODY MASS INDEX: 31.86 KG/M2 | RESPIRATION RATE: 18 BRPM | HEART RATE: 68 BPM

## 2022-06-02 DIAGNOSIS — Z21 ASYMPTOMATIC HIV INFECTION (HCC): ICD-10-CM

## 2022-06-02 DIAGNOSIS — E78.2 MIXED HYPERLIPIDEMIA: ICD-10-CM

## 2022-06-02 DIAGNOSIS — K62.5 RECTAL BLEEDING: ICD-10-CM

## 2022-06-02 DIAGNOSIS — N18.6 STAGE 5 CHRONIC KIDNEY DISEASE ON CHRONIC DIALYSIS (HCC): ICD-10-CM

## 2022-06-02 DIAGNOSIS — I71.20 THORACIC AORTIC ANEURYSM WITHOUT RUPTURE: ICD-10-CM

## 2022-06-02 DIAGNOSIS — I10 ESSENTIAL HYPERTENSION: Primary | ICD-10-CM

## 2022-06-02 DIAGNOSIS — G40.909 SEIZURE DISORDER (HCC): ICD-10-CM

## 2022-06-02 DIAGNOSIS — Z87.898 HISTORY OF SEIZURES: ICD-10-CM

## 2022-06-02 DIAGNOSIS — Z99.2 STAGE 5 CHRONIC KIDNEY DISEASE ON CHRONIC DIALYSIS (HCC): ICD-10-CM

## 2022-06-02 DIAGNOSIS — R10.9 CHRONIC ABDOMINAL PAIN: ICD-10-CM

## 2022-06-02 DIAGNOSIS — G89.29 CHRONIC ABDOMINAL PAIN: ICD-10-CM

## 2022-06-02 PROCEDURE — 3017F COLORECTAL CA SCREEN DOC REV: CPT | Performed by: NURSE PRACTITIONER

## 2022-06-02 PROCEDURE — G8432 DEP SCR NOT DOC, RNG: HCPCS | Performed by: NURSE PRACTITIONER

## 2022-06-02 PROCEDURE — G9231 DOC ESRD DIA TRANS PREG: HCPCS | Performed by: NURSE PRACTITIONER

## 2022-06-02 PROCEDURE — G8417 CALC BMI ABV UP PARAM F/U: HCPCS | Performed by: NURSE PRACTITIONER

## 2022-06-02 PROCEDURE — 99214 OFFICE O/P EST MOD 30 MIN: CPT | Performed by: NURSE PRACTITIONER

## 2022-06-02 PROCEDURE — G8427 DOCREV CUR MEDS BY ELIG CLIN: HCPCS | Performed by: NURSE PRACTITIONER

## 2022-06-02 RX ORDER — AMLODIPINE BESYLATE 10 MG/1
10 TABLET ORAL DAILY
Qty: 90 TABLET | Refills: 1 | Status: SHIPPED | OUTPATIENT
Start: 2022-06-02

## 2022-06-02 RX ORDER — ATORVASTATIN CALCIUM 40 MG/1
40 TABLET, FILM COATED ORAL DAILY
Qty: 90 TABLET | Refills: 1 | Status: SHIPPED | OUTPATIENT
Start: 2022-06-02

## 2022-06-02 RX ORDER — LEVETIRACETAM 500 MG/1
500 TABLET ORAL 2 TIMES DAILY
Qty: 180 TABLET | Refills: 1 | Status: SHIPPED | OUTPATIENT
Start: 2022-06-02

## 2022-06-02 RX ORDER — METOPROLOL TARTRATE 50 MG/1
50 TABLET ORAL 2 TIMES DAILY
Qty: 180 TABLET | Refills: 1 | Status: SHIPPED | OUTPATIENT
Start: 2022-06-02

## 2022-06-02 NOTE — PROGRESS NOTES
Mary Fleming is a 49 y.o. male who presents to the office today for the following:    Chief Complaint   Patient presents with    Hypertension    Seizure       Past Medical History:   Diagnosis Date    Anemia     Chronic abdominal pain     Chronic kidney disease     dialysis pt. - M. W.F    Complication of AV dialysis fistula 2019    2 large pseudo-aneurysm    Dependence on renal dialysis ([address]     ESRD on dialysis ([address]     GERD (gastroesophageal reflux disease)     Gout     High cholesterol     History of blood transfusion     at least 8 units over the years    HIV (human immunodeficiency virus infection) ([address] 2011    Hypertension     Other ill-defined conditions(799.89)     SMALL VEIN IN HEART BEING MONITORED    Pancreatitis     Renal insufficiency     Seizure ([address] 2012    one time and none sense    Seizures ([address]     Sepsis ([address] 2011 & 8/2019       Past Surgical History:   Procedure Laterality Date    COLONOSCOPY N/A 12/14/2021    COLONOSCOPY performed by Elizabeth Cody MD at [address] HX ARTERIOVENOUS FISTULA Left 2012    HX CHOLECYSTECTOMY  2011    HX COLONOSCOPY          Family History   Problem Relation Age of Onset    Cancer Brother         colon    Heart Disease Brother     Heart Disease Mother     Heart Disease Nephew         Social History     Tobacco Use    Smoking status: Never Smoker    Smokeless tobacco: Never Used   Vaping Use    Vaping Use: Never used   Substance Use Topics    Alcohol use: Yes     Comment: occassionally    Drug use: No        HPI  Patient here for follow up of chronic conditions with PMH of  HIV, ESRD, anemia,hypertension, hyperlipidemia , thoracic aneurysm , chronic abdominal pain and seizure disorder. Taking medications as directed. Follows with CT surgery for aneurysm, nephrology for ESRD,neurology for seizures and most recently GI due to rectal bleeding.  States that he is feeling well overall but is stitll having intermittent rectal bleeding. Had EGD and colonoscopy in 12/2021 and is following closely with Dr. Kwabena Pride. Has appointment to see him later this week. Current Outpatient Medications on File Prior to Visit   Medication Sig    [DISCONTINUED] levETIRAcetam (KEPPRA) 500 mg tablet take 1 tablet by mouth twice a day    [DISCONTINUED] metoprolol tartrate (LOPRESSOR) 50 mg tablet Take 1 Tablet by mouth two (2) times a day.  [DISCONTINUED] amLODIPine (NORVASC) 10 mg tablet take 1 tablet by mouth once daily    [DISCONTINUED] tenofovir ALAFENAMIDE FUMARATE (VEMLIDY) 25 mg tablet Take 1 Tablet by mouth daily for 360 days.  [DISCONTINUED] atazanavir (REYATAZ) 300 mg capsule Take 1 Capsule by mouth Daily (before breakfast) for 360 days. Indications: HIV    [DISCONTINUED] ritonavir (Norvir) 100 mg tablet Take 1 Tablet by mouth daily (with breakfast) for 360 days. Indications: HIV    [DISCONTINUED] dolutegravir (TIVICAY) 50 mg tab tablet Take 1 Tablet by mouth daily for 360 days. Indications: HIV    [DISCONTINUED] atorvastatin (LIPITOR) 40 mg tablet take 1 tablet by mouth once daily    lidocaine-prilocaine (EMLA) topical cream APPLY SMALL AMOUNT TO ACCESS SITE (AVF) 1 HOUR BEFORE DIALYSIS. COVER WITH OCCLUSIVE DRESSING (SARAN WRAP)    dolutegravir (Tivicay) 50 mg tab tablet Take 1 Tablet by mouth daily for 270 days.  atazanavir (REYATAZ) 300 mg capsule Take 1 Capsule by mouth Daily (before breakfast) for 270 days. (Patient not taking: Reported on 12/14/2021)    [DISCONTINUED] tenofovir ALAFENAMIDE FUMARATE (VEMLIDY) 25 mg tablet Take 1 Tablet by mouth daily for 270 days.  [DISCONTINUED] ritonavir (NORVIR) 100 mg tablet Take 1 Tablet by mouth daily for 270 days.  [DISCONTINUED] atazanavir (REYATAZ) 300 mg capsule TAKE 1 CAPSULE BY MOUTH DAILY    ritonavir (NORVIR) 100 mg tablet TAKE 1 TABLET BY MOUTH EVERY DAY WITH MEALS    tenofovir ALAFENAMIDE FUMARATE (Vemlidy) 25 mg tablet Take 1 Tab by mouth daily for 90 days.  [DISCONTINUED] Vemlidy 25 mg tablet TAKE 1 TABLET BY MOUTH EVERY DAY (Patient not taking: Reported on 12/14/2021)    [DISCONTINUED] ritonavir (NORVIR) 100 mg tablet Take 1 Tab by mouth daily for 90 days.  [DISCONTINUED] atazanavir (REYATAZ) 300 mg capsule Take 1 Cap by mouth Daily (before breakfast) for 90 days.  lidocaine (XYLOCAINE) 4 % topical cream lidocaine HCl 4 % topical cream   ONUR A SML AMT TO AFFECTED SKIN PRN P    aspirin 81 mg chewable tablet aspirin 81 mg chewable tablet   chew and swallow 1 tablet by mouth once daily    sevelamer carbonate (RENVELA) 800 mg tab tab Take 1,600 mg by mouth two (2) times a day.  calcium carbonate (TUMS) 200 mg calcium (500 mg) chew Take 1 Tab by mouth as needed. No current facility-administered medications on file prior to visit. Medications Ordered Today   Medications    metoprolol tartrate (LOPRESSOR) 50 mg tablet     Sig: Take 1 Tablet by mouth two (2) times a day. Dispense:  180 Tablet     Refill:  1    levETIRAcetam (KEPPRA) 500 mg tablet     Sig: Take 1 Tablet by mouth two (2) times a day. Dispense:  180 Tablet     Refill:  1    amLODIPine (NORVASC) 10 mg tablet     Sig: Take 1 Tablet by mouth daily. Dispense:  90 Tablet     Refill:  1    atorvastatin (LIPITOR) 40 mg tablet     Sig: Take 1 Tablet by mouth daily. Dispense:  90 Tablet     Refill:  1        Review of Systems   Constitutional: Negative. Respiratory: Negative. Cardiovascular: Negative. Gastrointestinal: Positive for abdominal pain and blood in stool. Negative constipation, diarrhea, heartburn, nausea and vomiting. Genitourinary: Negative. Does not urinate   Musculoskeletal: Negative. Neurological: Negative.            Visit Vitals  /76 (BP 1 Location: Left upper arm, BP Patient Position: Sitting, BP Cuff Size: Adult)   Pulse 68   Temp 97.2 °F (36.2 °C) (Temporal)   Resp 18   Ht 6' (1.829 m)   Wt 235 lb 3.2 oz (106.7 kg)   SpO2 100% BMI 31.90 kg/m²       Physical Exam  Vitals and nursing note reviewed. Constitutional:       Appearance: Normal appearance. Cardiovascular:      Rate and Rhythm: Normal rate and regular rhythm. Pulses: Normal pulses. Heart sounds: Normal heart sounds. Arteriovenous access: left arteriovenous access is present. Pulmonary:      Effort: Pulmonary effort is normal.      Breath sounds: Normal breath sounds. Abdominal:      General: Bowel sounds are normal.      Palpations: Abdomen is soft. There is no mass. Tenderness: There is no abdominal tenderness, right CVA tenderness, left CVA tenderness or guarding. Musculoskeletal:         General: Normal range of motion. Skin:     General: Skin is warm and dry. Neurological:      Mental Status: He is alert. Mental status is at baseline. 1. Asymptomatic HIV infection ([facility]  This has been stable and patient follows with infectious disease (Dr. Ritu Lanier)    2. Chronic abdominal pain  Patient previously on narcotic pain medication for chronic abdominal pain that began after gallbladder removed  and w/ dialysis  Made decision to leave pain management   Is being followed by Dr. Damari Casas and had EGD and colonoscopy in 12/2021  Dx with gastritis, duodenitis, mucosal inflammation and hemorrhohids  Has appointment later this week with Dr. Damari Casas to discuss given continued intermittent bleeding from rectum. Denies any large clots or excess bleeding. 3. Essential hypertension  Blood pressure is controlled and continue medication as directed  Encourage to monitor at home and notify provider if > 140/90 consistently    4. History of Seizure disorder ([facility]  Reports no seizures in 9 years but they are not sure as he had syncopal episode in 2019  On keppra as directed  Following with nuerologist    5. ESRD (end stage renal disease) [facility])  He is [facility], Friday dialysis   Follows with Dr. Ander Breen    6.  Thoracic aortic aneurysm without rupture ([facility]  Is being followed by CT surgery and has next 6 mo CT in a few weeks. 7. Hyperlipidemia  Lab Results   Component Value Date/Time    LDL, calculated 63 12/02/2021 10:47 AM    LDL, calculated 76.2 09/20/2011 03:00 AM     On atorvastatin as directed    8. Rectal bleeding  Reports weekly cbc with dialysis and has received Iron with sessions   Seeing GI who is managing as discussed above    Patient verbalizes understanding of plan of care as discussed above    Follow-up and Dispositions    · Return in about 6 months (around 12/2/2022) for or sooner for worsening symptoms.

## 2022-06-02 NOTE — PROGRESS NOTES
Chief Complaint   Patient presents with    Hypertension    Seizure     Follow up refills     1. \"Have you been to the ER, urgent care clinic since your last visit? Hospitalized since your last visit? \" No    2. \"Have you seen or consulted any other health care providers outside of the [address] [place] [address] since your last visit? \" No     3. For patients aged 39-70: Has the patient had a colonoscopy / FIT/ Cologuard? Yes - no Care Gap present      If the patient is female:    4. For patients aged 41-77: Has the patient had a mammogram within the past 2 years? NA - based on age or sex      11. For patients aged 21-65: Has the patient had a pap smear?  NA - based on age or sex

## 2022-08-02 ENCOUNTER — TELEPHONE (OUTPATIENT)
Dept: PRIMARY CARE CLINIC | Age: 57
End: 2022-08-02

## 2022-08-02 ENCOUNTER — TELEPHONE (OUTPATIENT)
Dept: INFECTIOUS DISEASES | Age: 57
End: 2022-08-02

## 2022-08-02 DIAGNOSIS — B20 CURRENTLY ASYMPTOMATIC HIV INFECTION, WITH HISTORY OF HIV-RELATED ILLNESS (HCC): Primary | ICD-10-CM

## 2022-08-02 NOTE — TELEPHONE ENCOUNTER
Patient called in stated that he usually get his lab work done before he comes to his appointment. He has an appointment scheduled for 09/08 and he would like for you to put in the orders so he can go and get them done.

## 2022-08-14 ENCOUNTER — HOSPITAL ENCOUNTER (EMERGENCY)
Age: 57
Discharge: HOME | End: 2022-08-14
Attending: EMERGENCY MEDICINE
Payer: MEDICARE

## 2022-08-14 ENCOUNTER — HOSPITAL ENCOUNTER (OUTPATIENT)
Age: 57
Setting detail: OBSERVATION
Discharge: HOME | End: 2022-08-16
Attending: EMERGENCY MEDICINE | Admitting: FAMILY MEDICINE
Payer: MEDICARE

## 2022-08-14 ENCOUNTER — APPOINTMENT (OUTPATIENT)
Dept: GENERAL RADIOLOGY | Age: 57
End: 2022-08-14
Attending: EMERGENCY MEDICINE
Payer: MEDICARE

## 2022-08-14 VITALS
BODY MASS INDEX: 32.17 KG/M2 | HEIGHT: 72 IN | WEIGHT: 237.5 LBS | TEMPERATURE: 98.1 F | HEART RATE: 69 BPM | OXYGEN SATURATION: 92 % | SYSTOLIC BLOOD PRESSURE: 129 MMHG | DIASTOLIC BLOOD PRESSURE: 80 MMHG | RESPIRATION RATE: 18 BRPM

## 2022-08-14 DIAGNOSIS — R09.02 HYPOXIA: ICD-10-CM

## 2022-08-14 DIAGNOSIS — I50.9 ACUTE CONGESTIVE HEART FAILURE, UNSPECIFIED HEART FAILURE TYPE (HCC): Primary | ICD-10-CM

## 2022-08-14 DIAGNOSIS — I50.9 CHRONIC CONGESTIVE HEART FAILURE, UNSPECIFIED HEART FAILURE TYPE (HCC): Primary | ICD-10-CM

## 2022-08-14 PROBLEM — E87.70 FLUID OVERLOAD: Status: ACTIVE | Noted: 2022-08-14

## 2022-08-14 PROBLEM — J96.91 RESPIRATORY FAILURE WITH HYPOXIA (HCC): Status: ACTIVE | Noted: 2022-08-14

## 2022-08-14 LAB
ALBUMIN SERPL-MCNC: 4 G/DL (ref 3.5–5)
ALBUMIN/GLOB SERPL: 0.9 {RATIO} (ref 1.1–2.2)
ALP SERPL-CCNC: 64 U/L (ref 45–117)
ALT SERPL-CCNC: 7 U/L (ref 12–78)
ANION GAP SERPL CALC-SCNC: 11 MMOL/L (ref 5–15)
ARTERIAL PATENCY WRIST A: YES
BASE EXCESS BLDA CALC-SCNC: 2.9 MMOL/L (ref 0–3)
BASOPHILS # BLD: 0.1 K/UL (ref 0–0.2)
BASOPHILS NFR BLD: 2 % (ref 0–2.5)
BDY SITE: ABNORMAL
BILIRUB SERPL-MCNC: 0.8 MG/DL (ref 0.2–1)
BNP SERPL-MCNC: 5587 PG/ML
BUN SERPL-MCNC: 40 MG/DL (ref 6–20)
BUN/CREAT SERPL: 3 (ref 12–20)
CA-I BLD-MCNC: 9.6 MG/DL (ref 8.5–10.1)
CHLORIDE SERPL-SCNC: 101 MMOL/L (ref 97–108)
CO2 SERPL-SCNC: 30 MMOL/L (ref 21–32)
COHGB MFR BLD: 0.7 % (ref 1–2)
CREAT SERPL-MCNC: 13.4 MG/DL (ref 0.7–1.3)
EOSINOPHIL # BLD: 0.1 K/UL (ref 0–0.7)
EOSINOPHIL NFR BLD: 2 % (ref 0.9–2.9)
ERYTHROCYTE [DISTWIDTH] IN BLOOD BY AUTOMATED COUNT: 15.5 % (ref 11.5–14.5)
FIO2 ON VENT: 21 %
GLOBULIN SER CALC-MCNC: 4.3 G/DL (ref 2–4)
GLUCOSE SERPL-MCNC: 107 MG/DL (ref 65–100)
HCO3 BLDA-SCNC: 27 MMOL/L (ref 22–26)
HCT VFR BLD AUTO: 27.3 % (ref 41–53)
HGB BLD-MCNC: 9.2 G/DL (ref 13.5–17.5)
INR PPP: 1 (ref 0.9–1.1)
LYMPHOCYTES # BLD: 0.9 K/UL (ref 1–4.8)
LYMPHOCYTES NFR BLD: 10 % (ref 20.5–51.1)
MAGNESIUM SERPL-MCNC: 2.3 MG/DL (ref 1.6–2.4)
MCH RBC QN AUTO: 29.9 PG (ref 31–34)
MCHC RBC AUTO-ENTMCNC: 33.8 G/DL (ref 31–36)
MCV RBC AUTO: 88.4 FL (ref 80–100)
METHGB MFR BLD: 0.2 % (ref 0–1.4)
MONOCYTES # BLD: 0.4 K/UL (ref 0.2–2.4)
MONOCYTES NFR BLD: 5 % (ref 1.7–9.3)
NEUTS SEG # BLD: 7.5 K/UL (ref 1.8–7.7)
NEUTS SEG NFR BLD: 81 % (ref 42–75)
NRBC # BLD: 0.02 K/UL
NRBC BLD-RTO: 0.3 PER 100 WBC
OXYHGB MFR BLD: 87.6 % (ref 95–99)
PCO2 BLDA: 37 MMHG (ref 35–45)
PERFORMED BY, TECHID: ABNORMAL
PH BLDA: 7.48 [PH] (ref 7.35–7.45)
PLATELET # BLD AUTO: 202 K/UL (ref 150–400)
PMV BLD AUTO: 8.1 FL (ref 6.5–11.5)
PO2 BLDA: 56 MMHG (ref 80–100)
POTASSIUM SERPL-SCNC: 4.7 MMOL/L (ref 3.5–5.1)
PROT SERPL-MCNC: 8.3 G/DL (ref 6.4–8.2)
PROTHROMBIN TIME: 13.2 SEC (ref 11.9–14.6)
RBC # BLD AUTO: 3.08 M/UL (ref 4.5–5.9)
SAO2% DEVICE SAO2% SENSOR NAME: ABNORMAL
SODIUM SERPL-SCNC: 142 MMOL/L (ref 136–145)
SPECIMEN SITE: ABNORMAL
TROPONIN-HIGH SENSITIVITY: 15 NG/L (ref 0–76)
TROPONIN-HIGH SENSITIVITY: 15 NG/L (ref 0–76)
TROPONIN-HIGH SENSITIVITY: 17 NG/L (ref 0–76)
WBC # BLD AUTO: 9.1 K/UL (ref 4.4–11.3)

## 2022-08-14 PROCEDURE — 83735 ASSAY OF MAGNESIUM: CPT

## 2022-08-14 PROCEDURE — 74011250637 HC RX REV CODE- 250/637: Performed by: EMERGENCY MEDICINE

## 2022-08-14 PROCEDURE — 85025 COMPLETE CBC W/AUTO DIFF WBC: CPT

## 2022-08-14 PROCEDURE — 71045 X-RAY EXAM CHEST 1 VIEW: CPT

## 2022-08-14 PROCEDURE — 96372 THER/PROPH/DIAG INJ SC/IM: CPT

## 2022-08-14 PROCEDURE — G0378 HOSPITAL OBSERVATION PER HR: HCPCS

## 2022-08-14 PROCEDURE — 83880 ASSAY OF NATRIURETIC PEPTIDE: CPT

## 2022-08-14 PROCEDURE — 74011000250 HC RX REV CODE- 250: Performed by: EMERGENCY MEDICINE

## 2022-08-14 PROCEDURE — 84484 ASSAY OF TROPONIN QUANT: CPT

## 2022-08-14 PROCEDURE — 74011000250 HC RX REV CODE- 250: Performed by: FAMILY MEDICINE

## 2022-08-14 PROCEDURE — 74011250637 HC RX REV CODE- 250/637: Performed by: HOSPITALIST

## 2022-08-14 PROCEDURE — 82803 BLOOD GASES ANY COMBINATION: CPT

## 2022-08-14 PROCEDURE — 74011250636 HC RX REV CODE- 250/636: Performed by: FAMILY MEDICINE

## 2022-08-14 PROCEDURE — 94640 AIRWAY INHALATION TREATMENT: CPT

## 2022-08-14 PROCEDURE — 96374 THER/PROPH/DIAG INJ IV PUSH: CPT

## 2022-08-14 PROCEDURE — 74011250636 HC RX REV CODE- 250/636: Performed by: EMERGENCY MEDICINE

## 2022-08-14 PROCEDURE — 99285 EMERGENCY DEPT VISIT HI MDM: CPT

## 2022-08-14 PROCEDURE — 93005 ELECTROCARDIOGRAM TRACING: CPT

## 2022-08-14 PROCEDURE — 80061 LIPID PANEL: CPT

## 2022-08-14 PROCEDURE — 80053 COMPREHEN METABOLIC PANEL: CPT

## 2022-08-14 PROCEDURE — 36600 WITHDRAWAL OF ARTERIAL BLOOD: CPT

## 2022-08-14 PROCEDURE — 85610 PROTHROMBIN TIME: CPT

## 2022-08-14 RX ORDER — SEVELAMER CARBONATE 800 MG/1
1600 TABLET, FILM COATED ORAL 2 TIMES DAILY
Status: DISCONTINUED | OUTPATIENT
Start: 2022-08-14 | End: 2022-08-15

## 2022-08-14 RX ORDER — SODIUM CHLORIDE 0.9 % (FLUSH) 0.9 %
5-40 SYRINGE (ML) INJECTION EVERY 8 HOURS
Status: DISCONTINUED | OUTPATIENT
Start: 2022-08-14 | End: 2022-08-16 | Stop reason: HOSPADM

## 2022-08-14 RX ORDER — FUROSEMIDE 40 MG/1
40 TABLET ORAL
Status: COMPLETED | OUTPATIENT
Start: 2022-08-14 | End: 2022-08-14

## 2022-08-14 RX ORDER — ONDANSETRON 2 MG/ML
4 INJECTION INTRAMUSCULAR; INTRAVENOUS
Status: DISCONTINUED | OUTPATIENT
Start: 2022-08-14 | End: 2022-08-16 | Stop reason: HOSPADM

## 2022-08-14 RX ORDER — POLYETHYLENE GLYCOL 3350 17 G/17G
17 POWDER, FOR SOLUTION ORAL DAILY PRN
Status: DISCONTINUED | OUTPATIENT
Start: 2022-08-14 | End: 2022-08-16 | Stop reason: HOSPADM

## 2022-08-14 RX ORDER — RITONAVIR 100 MG/1
100 TABLET ORAL
Status: DISCONTINUED | OUTPATIENT
Start: 2022-08-15 | End: 2022-08-16 | Stop reason: HOSPADM

## 2022-08-14 RX ORDER — METOPROLOL TARTRATE 50 MG/1
50 TABLET ORAL 2 TIMES DAILY
Status: DISCONTINUED | OUTPATIENT
Start: 2022-08-14 | End: 2022-08-15

## 2022-08-14 RX ORDER — GUAIFENESIN 100 MG/5ML
81 LIQUID (ML) ORAL DAILY
Status: DISCONTINUED | OUTPATIENT
Start: 2022-08-15 | End: 2022-08-15

## 2022-08-14 RX ORDER — IPRATROPIUM BROMIDE AND ALBUTEROL SULFATE 2.5; .5 MG/3ML; MG/3ML
3 SOLUTION RESPIRATORY (INHALATION)
Status: COMPLETED | OUTPATIENT
Start: 2022-08-14 | End: 2022-08-14

## 2022-08-14 RX ORDER — ATAZANAVIR 300 MG/1
300 CAPSULE ORAL
Status: DISCONTINUED | OUTPATIENT
Start: 2022-08-15 | End: 2022-08-16 | Stop reason: HOSPADM

## 2022-08-14 RX ORDER — IPRATROPIUM BROMIDE AND ALBUTEROL SULFATE 2.5; .5 MG/3ML; MG/3ML
3 SOLUTION RESPIRATORY (INHALATION)
Status: DISCONTINUED | OUTPATIENT
Start: 2022-08-14 | End: 2022-08-16 | Stop reason: HOSPADM

## 2022-08-14 RX ORDER — LIDOCAINE AND PRILOCAINE 25; 25 MG/G; MG/G
CREAM TOPICAL
Status: DISCONTINUED | OUTPATIENT
Start: 2022-08-15 | End: 2022-08-16 | Stop reason: HOSPADM

## 2022-08-14 RX ORDER — SODIUM CHLORIDE 0.9 % (FLUSH) 0.9 %
5-40 SYRINGE (ML) INJECTION AS NEEDED
Status: DISCONTINUED | OUTPATIENT
Start: 2022-08-14 | End: 2022-08-16 | Stop reason: HOSPADM

## 2022-08-14 RX ORDER — HEPARIN SODIUM 5000 [USP'U]/ML
5000 INJECTION, SOLUTION INTRAVENOUS; SUBCUTANEOUS EVERY 8 HOURS
Status: DISCONTINUED | OUTPATIENT
Start: 2022-08-14 | End: 2022-08-16 | Stop reason: HOSPADM

## 2022-08-14 RX ORDER — NITROGLYCERIN 0.4 MG/1
0.4 TABLET SUBLINGUAL
Status: COMPLETED | OUTPATIENT
Start: 2022-08-14 | End: 2022-08-14

## 2022-08-14 RX ORDER — AMLODIPINE BESYLATE 10 MG/1
10 TABLET ORAL DAILY
Status: DISCONTINUED | OUTPATIENT
Start: 2022-08-15 | End: 2022-08-15

## 2022-08-14 RX ORDER — LEVETIRACETAM 500 MG/1
500 TABLET ORAL 2 TIMES DAILY
Status: DISCONTINUED | OUTPATIENT
Start: 2022-08-14 | End: 2022-08-15

## 2022-08-14 RX ORDER — ONDANSETRON 4 MG/1
4 TABLET, ORALLY DISINTEGRATING ORAL
Status: DISCONTINUED | OUTPATIENT
Start: 2022-08-14 | End: 2022-08-16 | Stop reason: HOSPADM

## 2022-08-14 RX ORDER — GUAIFENESIN 100 MG/5ML
324 LIQUID (ML) ORAL
Status: COMPLETED | OUTPATIENT
Start: 2022-08-14 | End: 2022-08-14

## 2022-08-14 RX ORDER — ATORVASTATIN CALCIUM 40 MG/1
40 TABLET, FILM COATED ORAL DAILY
Status: DISCONTINUED | OUTPATIENT
Start: 2022-08-15 | End: 2022-08-15

## 2022-08-14 RX ADMIN — IPRATROPIUM BROMIDE AND ALBUTEROL SULFATE 3 ML: .5; 2.5 SOLUTION RESPIRATORY (INHALATION) at 13:36

## 2022-08-14 RX ADMIN — METOPROLOL TARTRATE 50 MG: 50 TABLET, FILM COATED ORAL at 21:14

## 2022-08-14 RX ADMIN — FUROSEMIDE 40 MG: 40 TABLET ORAL at 05:56

## 2022-08-14 RX ADMIN — HEPARIN SODIUM 5000 UNITS: 5000 INJECTION INTRAVENOUS; SUBCUTANEOUS at 18:38

## 2022-08-14 RX ADMIN — SODIUM CHLORIDE, PRESERVATIVE FREE 5 ML: 5 INJECTION INTRAVENOUS at 21:18

## 2022-08-14 RX ADMIN — SEVELAMER CARBONATE 1600 MG: 800 TABLET, FILM COATED ORAL at 21:14

## 2022-08-14 RX ADMIN — ASPIRIN 81 MG 324 MG: 81 TABLET ORAL at 13:47

## 2022-08-14 RX ADMIN — IPRATROPIUM BROMIDE AND ALBUTEROL SULFATE 3 ML: .5; 2.5 SOLUTION RESPIRATORY (INHALATION) at 06:31

## 2022-08-14 RX ADMIN — LEVETIRACETAM 500 MG: 500 TABLET, FILM COATED ORAL at 21:14

## 2022-08-14 RX ADMIN — METHYLPREDNISOLONE SODIUM SUCCINATE 125 MG: 125 INJECTION, POWDER, FOR SOLUTION INTRAMUSCULAR; INTRAVENOUS at 13:47

## 2022-08-14 RX ADMIN — SODIUM CHLORIDE, PRESERVATIVE FREE 10 ML: 5 INJECTION INTRAVENOUS at 18:38

## 2022-08-14 RX ADMIN — NITROGLYCERIN 0.4 MG: 0.4 TABLET, ORALLY DISINTEGRATING SUBLINGUAL at 13:47

## 2022-08-14 NOTE — ED PROVIDER NOTES
Patient presents with complaint of shortness of breath and wheezing. He had dialysis on Friday. No fever or chills. No other acute complaints       Past Medical History:   Diagnosis Date    Anemia     Chronic abdominal pain     Chronic kidney disease     dialysis pt. - M. W.F    Complication of AV dialysis fistula 2019    2 large pseudo-aneurysm    Dependence on renal dialysis ([address]     ESRD on dialysis [facility]     GERD (gastroesophageal reflux disease)     Gout     High cholesterol     History of blood transfusion     at least 8 units over the years    HIV (human immunodeficiency virus infection) ([address] 2011    Hypertension     Other ill-defined conditions(799.89)     SMALL VEIN IN HEART BEING MONITORED    Pancreatitis     Renal insufficiency     Seizure ([address] 2012    one time and none sense    Seizures ([address]     Sepsis ([facility] 2011 & 8/2019       Past Surgical History:   Procedure Laterality Date    COLONOSCOPY N/A 12/14/2021    COLONOSCOPY performed by Yamil Robertson MD at [facility] ENDOSCOPY    HX ARTERIOVENOUS FISTULA Left 2012    HX CHOLECYSTECTOMY  2011    HX COLONOSCOPY           Family History:   Problem Relation Age of Onset    Cancer Brother         colon    Heart Disease Brother     Heart Disease Mother     Heart Disease Nephew        Social History     Socioeconomic History    Marital status: SINGLE     Spouse name: Not on file    Number of children: Not on file    Years of education: Not on file    Highest education level: Not on file   Occupational History    Not on file   Tobacco Use    Smoking status: Never    Smokeless tobacco: Never   Vaping Use    Vaping Use: Never used   Substance and Sexual Activity    Alcohol use: Yes     Comment: occassionally    Drug use: No    Sexual activity: Not Currently   Other Topics Concern    Not on file   Social History Narrative    Not on file     Social Determinants of Health     Financial Resource Strain: Not on file   Food Insecurity: Not on file   Transportation Needs: Not on file   Physical Activity: Not on file   Stress: Not on file   Social Connections: Not on file   Intimate Partner Violence: Not on file   Housing Stability: Not on file         ALLERGIES: Penicillin and Percocet [oxycodone-acetaminophen]    Review of Systems   Constitutional: Negative. HENT: Negative. Eyes: Negative. Respiratory:  Positive for shortness of breath and wheezing. Cardiovascular: Negative. Endocrine: Negative. Genitourinary: Negative. Musculoskeletal: Negative. Skin: Negative. Allergic/Immunologic: Negative. Neurological: Negative. Hematological: Negative. Psychiatric/Behavioral: Negative. Vitals:    08/14/22 0538   BP: 136/81   Pulse: 76   Resp: 18   Temp: 98.6 °F (37 °C)   SpO2: 93%   Weight: 107.7 kg (237 lb 8 oz)   Height: 6' (1.829 m)            Physical Exam  Vitals and nursing note reviewed. Constitutional:       General: He is not in acute distress. Appearance: He is not ill-appearing. HENT:      Head: Normocephalic and atraumatic. Cardiovascular:      Rate and Rhythm: Normal rate and regular rhythm. Pulses: Normal pulses. Heart sounds: Normal heart sounds. Pulmonary:      Effort: Pulmonary effort is normal.      Breath sounds: Wheezing and rales present. Comments: Basilar rales  Abdominal:      General: Abdomen is flat. Bowel sounds are normal.      Palpations: Abdomen is soft. Musculoskeletal:         General: Normal range of motion. Cervical back: Normal range of motion and neck supple. Skin:     General: Skin is warm and dry. Neurological:      General: No focal deficit present. Mental Status: He is oriented to person, place, and time. Mental status is at baseline.    Psychiatric:         Mood and Affect: Mood normal.         Behavior: Behavior normal.        MDM         Procedures

## 2022-08-14 NOTE — ED NOTES
Pt given discharge and follow up instructions. Pt advised to follow with PCP. Pt to follow with dialysis tomorrow. No further questions at this time.

## 2022-08-14 NOTE — PROGRESS NOTES
Was called about patient and his presentation for sob and chest pressure / tightness. Work up appears that patient is fluid overload as noted by elevated BNP and cxr showoing pulmonary edema / vs opacities. Patient noted to be a M,W,F schedule and requested for nephrology to be called to set up dialysis either for later today or first thing in AM. With chest pressure initial troponin was 15 and pain resolved after one NTG so requested additional troponin 2 hours after initial and if similar range will bring for observation for acute hypoxia, fluid overload either form new onset chf vs ESRD.

## 2022-08-14 NOTE — ED TRIAGE NOTES
Pt seen in ER  and dced this AM around 0700. Returns c/o wheezing dyspnea and cough. Last dialysis Friday- compliant with meds.  Pt states he has not fell well since cutting grass yesterday

## 2022-08-14 NOTE — ED NOTES
TRANSFER - OUT REPORT:    Verbal report given to erica(name) on Talita Dumont  being transferred to ACU(unit) for routine progression of care       Report consisted of patients Situation, Background, Assessment and   Recommendations(SBAR). Information from the following report(s) SBAR was reviewed with the receiving nurse. Opportunity for questions and clarification was provided.       Patient transported with:   Monitor

## 2022-08-14 NOTE — ED PROVIDER NOTES
EMERGENCY DEPARTMENT HISTORY AND PHYSICAL EXAM      Date: 8/14/2022  Patient Name: Geraldo Dawn      History of Presenting Illness     Chief Complaint   Patient presents with    Cough    Breathing Problem    Wheezing       History Provided By: Patient    HPI: Geraldo Dawn, 49 y.o. male with a past medical history significant hypertension, hyperlipidemia, renal insufficiency, seizure, and HIV, hemodialysis  presents to the ED with cc of sudden onset of shortness of breath patient states he was supine at the time felt some pressure sensation in his chest with intensity of 4/10 when he got up and ambulated from the bedroom to the kitchen he became short of breath denies any history of CHF or COPD denies being a smoker, no dizziness, patient is up-to-date on his dialysis treatment he goes Monday Wednesday Fridays, was seen here earlier this morning for wheezing when yesterday during the daytime he was cutting grass and after cutting grass he said he started to wheeze, patient received albuterol nebulization in the ED felt better and was discharged home    There are no other complaints, changes, or physical findings at this time. PCP: Reese Brown NP    Current Facility-Administered Medications   Medication Dose Route Frequency Provider Last Rate Last Admin    aspirin chewable tablet 324 mg  324 mg Oral NOW Ember Coffey MD        methylPREDNISolone (PF) (Solu-MEDROL) injection 125 mg  125 mg IntraVENous NOW Ember Coffey MD        albuterol-ipratropium (DUO-NEB) 2.5 MG-0.5 MG/3 ML  3 mL Nebulization NOW Ember Coffey MD        nitroglycerin (NITROSTAT) tablet 0.4 mg  0.4 mg SubLINGual NOW Ember Coffey MD         Current Outpatient Medications   Medication Sig Dispense Refill    metoprolol tartrate (LOPRESSOR) 50 mg tablet Take 1 Tablet by mouth two (2) times a day. 180 Tablet 1    levETIRAcetam (KEPPRA) 500 mg tablet Take 1 Tablet by mouth two (2) times a day.  [address] [place] [address] Tablet 1    amLODIPine (NORVASC) 10 mg tablet Take 1 Tablet by mouth daily. 90 Tablet 1    atorvastatin (LIPITOR) 40 mg tablet Take 1 Tablet by mouth daily. 90 Tablet 1    lidocaine-prilocaine (EMLA) topical cream APPLY SMALL AMOUNT TO ACCESS SITE (AVF) 1 HOUR BEFORE DIALYSIS. COVER WITH OCCLUSIVE DRESSING (SARAN WRAP)      dolutegravir (Tivicay) 50 mg tab tablet Take 1 Tablet by mouth daily for 270 days. 90 Tablet 2    atazanavir (REYATAZ) 300 mg capsule Take 1 Capsule by mouth Daily (before breakfast) for 270 days. (Patient not taking: Reported on 12/14/2021) 90 Capsule 2    ritonavir (NORVIR) 100 mg tablet TAKE 1 TABLET BY MOUTH EVERY DAY WITH MEALS 90 Tab 2    tenofovir ALAFENAMIDE FUMARATE (Vemlidy) 25 mg tablet Take 1 Tab by mouth daily for 90 days. 90 Tab 2    lidocaine (XYLOCAINE) 4 % topical cream lidocaine HCl 4 % topical cream   ONUR A SML AMT TO AFFECTED SKIN PRN P      aspirin 81 mg chewable tablet aspirin 81 mg chewable tablet   chew and swallow 1 tablet by mouth once daily      sevelamer carbonate (RENVELA) 800 mg tab tab Take 1,600 mg by mouth two (2) times a day. calcium carbonate (TUMS) 200 mg calcium (500 mg) chew Take 1 Tab by mouth as needed. Past History   Past Medical History:  Past Medical History:   Diagnosis Date    Anemia     Chronic abdominal pain     Chronic kidney disease     dialysis pt. - M. W.F    Complication of AV dialysis fistula 2019    2 large pseudo-aneurysm    Dependence on renal dialysis ([address]     ESRD on dialysis ([address]     GERD (gastroesophageal reflux disease)     Gout     High cholesterol     History of blood transfusion     at least 8 units over the years    HIV (human immunodeficiency virus infection) ([address] 2011    Hypertension     Other ill-defined conditions(799.89)     SMALL VEIN IN HEART BEING MONITORED    Pancreatitis     Renal insufficiency     Seizure ([address] 2012    one time and none sense    Seizures ([address]     Sepsis ([address] 2011 & 8/2019       Past Surgical History:  Past Surgical History:   Procedure Laterality Date    COLONOSCOPY N/A 12/14/2021    COLONOSCOPY performed by Kayode Pruitt MD at [address] ARTERIOVENOUS FISTULA Left 2012    HX CHOLECYSTECTOMY  2011    HX COLONOSCOPY         Family History:  Family History   Problem Relation Age of Onset    Cancer Brother         colon    Heart Disease Brother     Heart Disease Mother     Heart Disease Nephew        Social History:  Social History     Tobacco Use    Smoking status: Never    Smokeless tobacco: Never   Vaping Use    Vaping Use: Never used   Substance Use Topics    Alcohol use: Yes     Comment: occassionally    Drug use: No       Allergies: Allergies   Allergen Reactions    Penicillin Unknown (comments)    Percocet [Oxycodone-Acetaminophen] Hives     Review of Systems   Review of Systems   Constitutional:  Negative for chills and fever. HENT:  Negative for rhinorrhea and sore throat. Eyes:  Negative for pain and visual disturbance. Respiratory:  Positive for chest tightness, shortness of breath and wheezing. Negative for cough. Cardiovascular:  Negative for chest pain and leg swelling. Gastrointestinal:  Negative for abdominal pain and vomiting. Endocrine: Negative for polydipsia and polyuria. Genitourinary:  Negative for dysuria and hematuria. Musculoskeletal:  Negative for back pain and neck pain. Skin:  Negative for color change and pallor. Neurological:  Negative for weakness and headaches. Psychiatric/Behavioral:  Negative for agitation and suicidal ideas. Physical Exam   Physical Exam  Vitals and nursing note reviewed. Constitutional:       General: He is not in acute distress. Appearance: He is not ill-appearing, toxic-appearing or diaphoretic. HENT:      Head: Normocephalic and atraumatic. Right Ear: Tympanic membrane normal.      Left Ear: Tympanic membrane normal.      Nose: Nose normal. No congestion.       Mouth/Throat:      Mouth: Mucous membranes are moist.      Pharynx: Oropharynx is clear. Eyes:      Extraocular Movements: Extraocular movements intact. Conjunctiva/sclera: Conjunctivae normal.      Pupils: Pupils are equal, round, and reactive to light. Cardiovascular:      Rate and Rhythm: Normal rate and regular rhythm. Pulses: Normal pulses. Heart sounds: Normal heart sounds. Pulmonary:      Effort: Pulmonary effort is normal. No respiratory distress. Breath sounds: Rales present. Abdominal:      General: Bowel sounds are normal.      Palpations: Abdomen is soft. Tenderness: There is no abdominal tenderness. Musculoskeletal:         General: No tenderness, deformity or signs of injury. Normal range of motion. Arms:       Cervical back: Normal range of motion and neck supple. No rigidity or tenderness. Right lower leg: No edema. Left lower leg: No edema. Lymphadenopathy:      Cervical: No cervical adenopathy. Skin:     General: Skin is warm and dry. Capillary Refill: Capillary refill takes less than 2 seconds. Findings: No rash. Neurological:      General: No focal deficit present. Mental Status: He is alert and oriented to person, place, and time. Cranial Nerves: No cranial nerve deficit. Sensory: No sensory deficit. Psychiatric:         Mood and Affect: Mood normal.         Behavior: Behavior normal.       Lab and Diagnostic Study Results   Labs -   No results found for this or any previous visit (from the past 12 hour(s)). Radiologic Studies -   [unfilled]  CT Results  (Last 48 hours)      None          CXR Results  (Last 48 hours)      None            Medical Decision Making and ED Course   - I am the first and primary provider for this patient AND AM THE PRIMARY PROVIDER OF RECORD. I reviewed the vital signs, available nursing notes, past medical history, past surgical history, family history and social history.     - Initial assessment performed. The patients presenting problems have been discussed, and the staff are in agreement with the care plan formulated and outlined with them. I have encouraged them to ask questions as they arise throughout their visit. Differential Diagnosis & Medical Decision Making Provider Note:   Shortness of breath DDx CHF, pneumonia, COPD    Vital Signs-Reviewed the patient's vital signs. Patient Vitals for the past 12 hrs:   Temp Pulse Resp BP SpO2   08/14/22 1322 -- -- -- -- 98 %   08/14/22 1315 98.7 °F (37.1 °C) 76 18 134/85 97 %   08/14/22 1313 -- -- -- -- (!) 88 %       EKG interpretation: (Preliminary): Performed at 1350. EKG Interpreted by me. Shows sinus rhythm rate 79 no acute ischemic changes    ED Course:   ED Course as of 08/14/22 [facility] Aug 14, 2022   1500 Pressure sensation chest fully relieved [SB]   1633 PO2(!): 56 [SB]   1633 Oxyhemoglobin(!): 87.6 [SB]   1633 NT pro-BNP(!): 5,587 [SB]   1634 Creatinine(!): 13.40 [SB]   1634 Potassium: 4.7 [SB]      ED Course User Index  [SB] Gregory Mendez MD           Procedures and Critical Care     Performed by: Daisha Jones MD  Procedures      Daisha Jones MD    Disposition   Disposition: Condition stable  Admitted to Observation Unit the case was discussed with the admitting physician Mele        DISCHARGE PLAN:  1. Current Discharge Medication List        CONTINUE these medications which have NOT CHANGED    Details   metoprolol tartrate (LOPRESSOR) 50 mg tablet Take 1 Tablet by mouth two (2) times a day. Qty: 180 Tablet, Refills: 1    Associated Diagnoses: Essential hypertension      levETIRAcetam (KEPPRA) 500 mg tablet Take 1 Tablet by mouth two (2) times a day. Qty: 180 Tablet, Refills: 1    Associated Diagnoses: History of seizures      amLODIPine (NORVASC) 10 mg tablet Take 1 Tablet by mouth daily.   Qty: 90 Tablet, Refills: 1    Associated Diagnoses: Essential hypertension      atorvastatin (LIPITOR) 40 mg tablet Take 1 Tablet by mouth daily. Qty: 90 Tablet, Refills: 1    Associated Diagnoses: Mixed hyperlipidemia      lidocaine-prilocaine (EMLA) topical cream APPLY SMALL AMOUNT TO ACCESS SITE (AVF) 1 HOUR BEFORE DIALYSIS. COVER WITH OCCLUSIVE DRESSING (SARAN WRAP)      dolutegravir (Tivicay) 50 mg tab tablet Take 1 Tablet by mouth daily for 270 days. Qty: 90 Tablet, Refills: 2    Associated Diagnoses: Symptomatic HIV infection (HCC)      atazanavir (REYATAZ) 300 mg capsule Take 1 Capsule by mouth Daily (before breakfast) for 270 days. Qty: 90 Capsule, Refills: 2    Associated Diagnoses: Symptomatic HIV infection (HCC)      ritonavir (NORVIR) 100 mg tablet TAKE 1 TABLET BY MOUTH EVERY DAY WITH MEALS  Qty: 90 Tab, Refills: 2      tenofovir ALAFENAMIDE FUMARATE (Vemlidy) 25 mg tablet Take 1 Tab by mouth daily for 90 days. Qty: 90 Tab, Refills: 2    Associated Diagnoses: HIV disease ([facility]      lidocaine (XYLOCAINE) 4 % topical cream lidocaine HCl 4 % topical cream   ONUR A SML AMT TO AFFECTED SKIN PRN P      aspirin 81 mg chewable tablet aspirin 81 mg chewable tablet   chew and swallow 1 tablet by mouth once daily      sevelamer carbonate (RENVELA) 800 mg tab tab Take 1,600 mg by mouth two (2) times a day. calcium carbonate (TUMS) 200 mg calcium (500 mg) chew Take 1 Tab by mouth as needed. 2.   Follow-up Information    None       3. Return to ED if worse   4. Current Discharge Medication List        Remove if not discharged    Diagnosis/Clinical Impression     Clinical Impression: No diagnosis found. Attestations: I, Jamie Mcburney, MD, am the primary clinician of record. Please note that this dictation was completed with [email or web address] the [employer] voice recognition software. Quite often unanticipated grammatical, syntax, homophones, and other interpretive errors are inadvertently transcribed by the computer software. Please disregard these errors.   Please excuse any errors that have escaped final proofreading. Thank you.

## 2022-08-14 NOTE — ED TRIAGE NOTES
Patient complains of wheezing for about 6 hours. He states he cut grass yesterday, which he thinks may be contributary. Patient states he is a hemodialysis patient with last treatment Friday. L upper arm fistula present with positive bruit and thrill. Patient 92-93% on room air.

## 2022-08-14 NOTE — ROUTINE PROCESS
SBAR report received from GUSTAVO Ridley RN on patient condition and current treatment plan per Kardex information.

## 2022-08-14 NOTE — ROUTINE PROCESS
Patient admitted, A&Ox4, NSR on telemetry, O2 at 3LPM via NC, no distress noted,no c/o pain, med rec complete.

## 2022-08-15 ENCOUNTER — APPOINTMENT (OUTPATIENT)
Dept: VASCULAR SURGERY | Age: 57
End: 2022-08-15
Attending: HOSPITALIST
Payer: MEDICARE

## 2022-08-15 LAB
ATRIAL RATE: 79 BPM
CALCULATED P AXIS, ECG09: 40 DEGREES
CALCULATED R AXIS, ECG10: 6 DEGREES
CALCULATED T AXIS, ECG11: 20 DEGREES
CHOLEST SERPL-MCNC: 153 MG/DL
DIAGNOSIS, 93000: NORMAL
ECHO AO ROOT DIAM: 4 CM
ECHO AO ROOT INDEX: 1.75 CM/M2
ECHO AV AREA PEAK VELOCITY: 2.2 CM2
ECHO AV AREA VTI: 2.6 CM2
ECHO AV AREA/BSA PEAK VELOCITY: 1 CM2/M2
ECHO AV AREA/BSA VTI: 1.1 CM2/M2
ECHO AV MEAN GRADIENT: 9 MMHG
ECHO AV MEAN VELOCITY: 1.4 M/S
ECHO AV PEAK GRADIENT: 17 MMHG
ECHO AV PEAK VELOCITY: 2.1 M/S
ECHO AV VELOCITY RATIO: 0.67
ECHO AV VTI: 41.3 CM
ECHO EST RA PRESSURE: 3 MMHG
ECHO LA DIAMETER INDEX: 1.7 CM/M2
ECHO LA DIAMETER: 3.9 CM
ECHO LA TO AORTIC ROOT RATIO: 0.98
ECHO LA VOL 2C: 34 ML (ref 18–58)
ECHO LA VOL 4C: 86 ML (ref 18–58)
ECHO LA VOL BP: 60 ML (ref 18–58)
ECHO LA VOL/BSA BIPLANE: 26 ML/M2 (ref 16–34)
ECHO LA VOLUME AREA LENGTH: 65 ML
ECHO LA VOLUME INDEX A2C: 15 ML/M2 (ref 16–34)
ECHO LA VOLUME INDEX A4C: 38 ML/M2 (ref 16–34)
ECHO LA VOLUME INDEX AREA LENGTH: 28 ML/M2 (ref 16–34)
ECHO LV E' LATERAL VELOCITY: 13 CM/S
ECHO LV E' SEPTAL VELOCITY: 9 CM/S
ECHO LV EDV A2C: 97 ML
ECHO LV EDV A4C: 129 ML
ECHO LV EDV BP: 114 ML (ref 67–155)
ECHO LV EDV INDEX A4C: 56 ML/M2
ECHO LV EDV INDEX BP: 50 ML/M2
ECHO LV EDV NDEX A2C: 42 ML/M2
ECHO LV EJECTION FRACTION A2C: 69 %
ECHO LV EJECTION FRACTION A4C: 56 %
ECHO LV EJECTION FRACTION BIPLANE: 63 % (ref 55–100)
ECHO LV ESV A2C: 30 ML
ECHO LV ESV A4C: 56 ML
ECHO LV ESV BP: 42 ML (ref 22–58)
ECHO LV ESV INDEX A2C: 13 ML/M2
ECHO LV ESV INDEX A4C: 24 ML/M2
ECHO LV ESV INDEX BP: 18 ML/M2
ECHO LV FRACTIONAL SHORTENING: 32 % (ref 28–44)
ECHO LV GLOBAL LONGITUDINAL STRAIN (GLS): -17.6 %
ECHO LV INTERNAL DIMENSION DIASTOLE INDEX: 2.05 CM/M2
ECHO LV INTERNAL DIMENSION DIASTOLIC: 4.7 CM (ref 4.2–5.9)
ECHO LV INTERNAL DIMENSION SYSTOLIC INDEX: 1.4 CM/M2
ECHO LV INTERNAL DIMENSION SYSTOLIC: 3.2 CM
ECHO LV IVSD: 1.4 CM (ref 0.6–1)
ECHO LV MASS 2D: 237.9 G (ref 88–224)
ECHO LV MASS INDEX 2D: 103.9 G/M2 (ref 49–115)
ECHO LV POSTERIOR WALL DIASTOLIC: 1.2 CM (ref 0.6–1)
ECHO LV RELATIVE WALL THICKNESS RATIO: 0.51
ECHO LVOT AREA: 3.1 CM2
ECHO LVOT AV VTI INDEX: 0.8
ECHO LVOT DIAM: 2 CM
ECHO LVOT MEAN GRADIENT: 5 MMHG
ECHO LVOT PEAK GRADIENT: 8 MMHG
ECHO LVOT PEAK VELOCITY: 1.4 M/S
ECHO LVOT STROKE VOLUME INDEX: 45.2 ML/M2
ECHO LVOT SV: 103.6 ML
ECHO LVOT VTI: 33 CM
ECHO MV A VELOCITY: 1.01 M/S
ECHO MV E DECELERATION TIME (DT): 181 MS
ECHO MV E VELOCITY: 1.06 M/S
ECHO MV E/A RATIO: 1.05
ECHO MV E/E' LATERAL: 8.15
ECHO MV E/E' RATIO (AVERAGED): 9.97
ECHO MV E/E' SEPTAL: 11.78
ECHO RA AREA 4C: 15.4 CM2
ECHO RIGHT VENTRICULAR SYSTOLIC PRESSURE (RVSP): 33 MMHG
ECHO RV FREE WALL PEAK S': 15 CM/S
ECHO RV INTERNAL DIMENSION: 3 CM
ECHO RV TAPSE: 2.9 CM (ref 1.7–?)
ECHO TV REGURGITANT MAX VELOCITY: 2.73 M/S
ECHO TV REGURGITANT PEAK GRADIENT: 30 MMHG
HDLC SERPL-MCNC: 78 MG/DL
HDLC SERPL: 2 {RATIO} (ref 0–5)
LDLC SERPL CALC-MCNC: 59.4 MG/DL (ref 0–100)
LIPID PROFILE,FLP: NORMAL
P-R INTERVAL, ECG05: 166 MS
Q-T INTERVAL, ECG07: 416 MS
QRS DURATION, ECG06: 109 MS
QTC CALCULATION (BEZET), ECG08: 477 MS
TRIGL SERPL-MCNC: 78 MG/DL (ref ?–150)
VENTRICULAR RATE, ECG03: 79 BPM
VLDLC SERPL CALC-MCNC: 15.6 MG/DL

## 2022-08-15 PROCEDURE — 74011250636 HC RX REV CODE- 250/636: Performed by: HOSPITALIST

## 2022-08-15 PROCEDURE — 74011250637 HC RX REV CODE- 250/637: Performed by: FAMILY MEDICINE

## 2022-08-15 PROCEDURE — 36415 COLL VENOUS BLD VENIPUNCTURE: CPT

## 2022-08-15 PROCEDURE — 86706 HEP B SURFACE ANTIBODY: CPT

## 2022-08-15 PROCEDURE — 90935 HEMODIALYSIS ONE EVALUATION: CPT

## 2022-08-15 PROCEDURE — 96372 THER/PROPH/DIAG INJ SC/IM: CPT

## 2022-08-15 PROCEDURE — 74011250637 HC RX REV CODE- 250/637: Performed by: HOSPITALIST

## 2022-08-15 PROCEDURE — 87340 HEPATITIS B SURFACE AG IA: CPT

## 2022-08-15 PROCEDURE — G0257 UNSCHED DIALYSIS ESRD PT HOS: HCPCS

## 2022-08-15 PROCEDURE — 74011000250 HC RX REV CODE- 250: Performed by: FAMILY MEDICINE

## 2022-08-15 PROCEDURE — 96375 TX/PRO/DX INJ NEW DRUG ADDON: CPT

## 2022-08-15 PROCEDURE — 93306 TTE W/DOPPLER COMPLETE: CPT

## 2022-08-15 PROCEDURE — 74011250636 HC RX REV CODE- 250/636: Performed by: FAMILY MEDICINE

## 2022-08-15 PROCEDURE — 74011250636 HC RX REV CODE- 250/636: Performed by: LEGAL MEDICINE

## 2022-08-15 PROCEDURE — G0378 HOSPITAL OBSERVATION PER HR: HCPCS

## 2022-08-15 RX ORDER — METOPROLOL TARTRATE 50 MG/1
50 TABLET ORAL 2 TIMES DAILY
Status: DISCONTINUED | OUTPATIENT
Start: 2022-08-15 | End: 2022-08-16 | Stop reason: HOSPADM

## 2022-08-15 RX ORDER — PROCHLORPERAZINE EDISYLATE 5 MG/ML
10 INJECTION INTRAMUSCULAR; INTRAVENOUS
Status: DISCONTINUED | OUTPATIENT
Start: 2022-08-15 | End: 2022-08-16 | Stop reason: HOSPADM

## 2022-08-15 RX ORDER — GUAIFENESIN 100 MG/5ML
81 LIQUID (ML) ORAL DAILY
Status: DISCONTINUED | OUTPATIENT
Start: 2022-08-15 | End: 2022-08-16 | Stop reason: HOSPADM

## 2022-08-15 RX ORDER — ACETAMINOPHEN 325 MG/1
650 TABLET ORAL
Status: DISCONTINUED | OUTPATIENT
Start: 2022-08-15 | End: 2022-08-16 | Stop reason: HOSPADM

## 2022-08-15 RX ORDER — LEVETIRACETAM 500 MG/1
500 TABLET ORAL 2 TIMES DAILY
Status: DISCONTINUED | OUTPATIENT
Start: 2022-08-15 | End: 2022-08-16 | Stop reason: HOSPADM

## 2022-08-15 RX ORDER — SEVELAMER CARBONATE 800 MG/1
1600 TABLET, FILM COATED ORAL 2 TIMES DAILY WITH MEALS
Status: DISCONTINUED | OUTPATIENT
Start: 2022-08-15 | End: 2022-08-16 | Stop reason: HOSPADM

## 2022-08-15 RX ORDER — ATORVASTATIN CALCIUM 40 MG/1
40 TABLET, FILM COATED ORAL DAILY
Status: DISCONTINUED | OUTPATIENT
Start: 2022-08-15 | End: 2022-08-16 | Stop reason: HOSPADM

## 2022-08-15 RX ORDER — AMLODIPINE BESYLATE 10 MG/1
10 TABLET ORAL DAILY
Status: DISCONTINUED | OUTPATIENT
Start: 2022-08-15 | End: 2022-08-16 | Stop reason: HOSPADM

## 2022-08-15 RX ORDER — ONDANSETRON 4 MG/1
4 TABLET, ORALLY DISINTEGRATING ORAL ONCE
Status: COMPLETED | OUTPATIENT
Start: 2022-08-15 | End: 2022-08-15

## 2022-08-15 RX ORDER — CALCIUM CARBONATE 200(500)MG
200 TABLET,CHEWABLE ORAL
Status: DISCONTINUED | OUTPATIENT
Start: 2022-08-15 | End: 2022-08-16 | Stop reason: HOSPADM

## 2022-08-15 RX ADMIN — ONDANSETRON 4 MG: 4 TABLET, ORALLY DISINTEGRATING ORAL at 17:48

## 2022-08-15 RX ADMIN — AMLODIPINE BESYLATE 10 MG: 10 TABLET ORAL at 17:42

## 2022-08-15 RX ADMIN — ATORVASTATIN CALCIUM 40 MG: 40 TABLET, FILM COATED ORAL at 17:43

## 2022-08-15 RX ADMIN — CALCIUM CARBONATE (ANTACID) CHEW TAB 500 MG 200 MG: 500 CHEW TAB at 20:26

## 2022-08-15 RX ADMIN — ONDANSETRON 4 MG: 4 TABLET, ORALLY DISINTEGRATING ORAL at 12:12

## 2022-08-15 RX ADMIN — SODIUM CHLORIDE, PRESERVATIVE FREE 5 ML: 5 INJECTION INTRAVENOUS at 06:09

## 2022-08-15 RX ADMIN — METOPROLOL TARTRATE 50 MG: 50 TABLET, FILM COATED ORAL at 17:44

## 2022-08-15 RX ADMIN — LEVETIRACETAM 500 MG: 500 TABLET, FILM COATED ORAL at 17:43

## 2022-08-15 RX ADMIN — ASPIRIN 81 MG 81 MG: 81 TABLET ORAL at 17:42

## 2022-08-15 RX ADMIN — EPOETIN ALFA-EPBX 4000 UNITS: 4000 INJECTION, SOLUTION INTRAVENOUS; SUBCUTANEOUS at 16:21

## 2022-08-15 RX ADMIN — ONDANSETRON 4 MG: 4 TABLET, ORALLY DISINTEGRATING ORAL at 09:10

## 2022-08-15 RX ADMIN — HEPARIN SODIUM 5000 UNITS: 5000 INJECTION INTRAVENOUS; SUBCUTANEOUS at 14:05

## 2022-08-15 RX ADMIN — SODIUM CHLORIDE, PRESERVATIVE FREE 10 ML: 5 INJECTION INTRAVENOUS at 21:19

## 2022-08-15 RX ADMIN — SODIUM CHLORIDE, PRESERVATIVE FREE 10 ML: 5 INJECTION INTRAVENOUS at 14:09

## 2022-08-15 RX ADMIN — SEVELAMER CARBONATE 1600 MG: 800 TABLET, FILM COATED ORAL at 17:44

## 2022-08-15 RX ADMIN — HEPARIN SODIUM 5000 UNITS: 5000 INJECTION INTRAVENOUS; SUBCUTANEOUS at 21:17

## 2022-08-15 RX ADMIN — PROCHLORPERAZINE EDISYLATE 10 MG: 5 INJECTION INTRAMUSCULAR; INTRAVENOUS at 20:26

## 2022-08-15 RX ADMIN — AZITHROMYCIN MONOHYDRATE 500 MG: 500 INJECTION, POWDER, LYOPHILIZED, FOR SOLUTION INTRAVENOUS at 09:33

## 2022-08-15 RX ADMIN — ACETAMINOPHEN 650 MG: 325 TABLET ORAL at 07:33

## 2022-08-15 NOTE — ROUTINE PROCESS
Bedside shift change report given to maritza (oncoming nurse) by juan pablo (offgoing nurse). Report included the following information Kardex.

## 2022-08-15 NOTE — PROCEDURES
Hemodialysis / [phone]    Vitals Pre Post Assessment Pre Post   BP BP: (!) 147/72 (08/15/22 1345)   132/56 LOC AXOX4 NO CHANGE   HR Pulse (Heart Rate): 75 (08/15/22 1345) 76 Lungs CLEAR NO CHANGE   Resp Resp Rate: 16 (08/15/22 1345) 15 Cardiac NSR NO CHANGE   Temp Temp: 97.7 °F (36.5 °C) (08/15/22 1139)  Skin WDI NO CHANGE   Weight    Edema GENERALIZED NO CHANGE   Tele status BEDSIDE BEDSIDE Pain Pain Intensity 1: 0 (08/15/22 0800) PT DENIED     Orders   Duration: Start: 3685 End: 6165 Total: 3.5   Dialyzer: Dialyzer/Set Up Inspection: Revaclear (08/15/22 1345)   K Bath: Dialysate K (mEq/L): 3 (08/15/22 1345)   Ca Bath: Dialysate CA (mEq/L): 2.5 (08/15/22 1345)   Na: Dialysate NA (mEq/L): 140 (08/15/22 1345)   Bicarb: Dialysate HCO3 (mEq/L): 35 (08/15/22 1345)   Target Fluid Removal: Goal/Amount of Fluid to Remove (mL): 4000 mL (08/15/22 1345)     Access   Type & Location: CELY AVF: skin CDI. No s/s of infection. + B/T. No issues with cannulation or hemostasis. Running well at . Comments:                                        Labs   HBsAg (Antigen) / date:       UNKNOWN                                        HBsAb (Antibody) / date: UNKNOWN   Source:    Obtained/Reviewed  Critical Results Called HGB   Date Value Ref Range Status   08/14/2022 9.2 (L) 13.5 - 17.5 g/dL Final     Potassium   Date Value Ref Range Status   08/14/2022 4.7 3.5 - 5.1 mmol/L Final     Calcium   Date Value Ref Range Status   08/14/2022 9.6 8.5 - 10.1 mg/dL Final     BUN   Date Value Ref Range Status   08/14/2022 40 (H) 6 - 20 mg/dL Final     Creatinine   Date Value Ref Range Status   08/14/2022 13.40 (H) 0.70 - 1.30 mg/dL Final        Meds Given   Name Dose Route   RETOCRIT 4000UNITS CVC               Adequacy / Fluid    Total Liters Process: 85 L   Net Fluid Removed: 3950 ML      Comments   Time Out Done:   (Time) 1330 YES   Admitting Diagnosis: SOB, CHEST PAIN   Consent obtained/signed: Informed Consent Verified:  Yes (08/15/22 1345)   Machine / RO # Machine Number: [id number] (08/15/22 5920)   Primary Nurse Rpt Pre: Georgian Kussmaul RN   Primary Nurse Rpt Post: Ricki Mercer RN   Pt Education: TX TYPES, DIET, FLUID RESTRICTIONS   Care Plan: FOLLOW MOD POC   Pts outpatient clinic:      Tx Summary   Pt A&Ox4. Consent signed & on file. SBAR received from Primary RN. 1330: Pt cannulated with 02Q needles per policy & without issue. 1345 VSS. Dialysis Tx initiated. 1530Pt resting quietly. 1715: Tx ended. VSS. All possible blood returned to patient. Hemostasis achieved without issue. Bed locked and in the lowest position, call bell and belongings in reach. SBAR given to Primary, RN. Patient is stable at time ofmy departure. All Dialysis related medications have been reviewed.       Comments:

## 2022-08-15 NOTE — PROGRESS NOTES
Attempted to visit with patient in the [address]. Patient was receiving dialysis. Rev.  Mike Molina 09, [address]

## 2022-08-15 NOTE — ACP (ADVANCE CARE PLANNING)
Advance Care Planning   Healthcare Decision Maker:       Primary Decision Maker: Juanita Bruno - [phone]    Primary Decision Maker: Goran Lowry - Sister - [phone]    Brother and sister are legal next of kin.      Click here to complete [address] including selection of the Healthcare Decision Maker Relationship (ie \"Primary\")

## 2022-08-15 NOTE — ROUTINE PROCESS
Pt continues on dialysis. No n/v at present. No resp distress. Monitor on. HR 58 dialysis nurse in at bedside.

## 2022-08-15 NOTE — ROUTINE PROCESS
Lab for troponin level drawn as ordered, Lab in earlier and unsuccessful in obtaining. Carried to lab.

## 2022-08-15 NOTE — PROGRESS NOTES
Patients case reviewed during interdisciplinary team meeting in [address] [place], [place] [address]Acute Care Unit. Rev.  Mike Abbott 68, Alison Richter

## 2022-08-15 NOTE — PROGRESS NOTES
Mr. Chan Bernal is a 49 y.o. male with ESRD on HD -m/w/f  presented to ER with shortness of breath and wheezing that started about 6 hours prior to presentation. Patient states that he has cut the grass yesterday and thinks that it may be contributing. Patient also does complain of some chest tightness. He went to his last dialysis on Friday as scheduled. On work-up in the ER, chest x-ray shows bilateral airspace disease/edema, patient with elevated BNP and fluid overload on exam as per H& P   Patient was admitted for further evaluation management. yesterday . DX - pulmonary edema - need fluid removal with HD ASAP   ESRD on HD - M/W/F   Anemia - H/H - Rx pt with SHI 4,000 units   Hyperkalemia - to be corrected with dialysis   Shpth - c/w binders - pt is on at home. Thanks .

## 2022-08-15 NOTE — ROUTINE PROCESS
Requesting Tylenol for epigastric pain, no prn pain medication ordered. Yamini Pepper reached out to Dr. Charo José for new orders and given, pharmacy called for verification, as soon as it is verified will administer.

## 2022-08-15 NOTE — PROGRESS NOTES
Problem: Heart Failure: Day 1  Goal: Activity/Safety  Outcome: Progressing Towards Goal  Note: Activity as ordered  Ambulate to bathroom  Monitor resp.  Status  Maintain o2 as ordered  Consults as ordered  Meds as ordered  Goal: Consults, if ordered  Outcome: Progressing Towards Goal

## 2022-08-15 NOTE — PROGRESS NOTES
Care Management Interventions  PCP Verified by CM: Yes Terrie Hutton NP)  Last Visit to PCP: 06/02/22  Mode of Transport at Discharge: Self  Transition of Care Consult (CM Consult): Discharge Planning  MyChart Signup: No  Discharge Durable Medical Equipment: No  Physical Therapy Consult: No  Occupational Therapy Consult: No  Speech Therapy Consult: No  Support Systems: Other Family Member(s)  Confirm Follow Up Transport: Self  The Plan for Transition of Care is Related to the Following Treatment Goals : Patient is independent with his own care. Drives himself to dialysis and other appointments. No discharge needs identified.   Discharge Location  Patient Expects to be Discharged to[de-identified] Home

## 2022-08-15 NOTE — H&P
History and Physical    Date of Service:  8/15/2022  Primary Care Provider: Reese Brown NP  Source of information: The patient and Chart review    Chief Complaint: Cough, Breathing Problem, and Wheezing      History of Presenting Illness:   Geraldo Dawn is a 49 y.o. male who presents with shortness of breath and wheezing that started about 6 hours prior to presentation. Patient states that he has cut the grass yesterday and thinks that it may be contributing. Patient also does complain of some chest tightness. Patient has end-stage renal disease on hemodialysis Monday Wednesday Friday. He went to his last dialysis on Friday as scheduled. On work-up in the ER, chest x-ray shows bilateral airspace disease/edema, patient with elevated BNP and fluid overload on exam.  Patient was admitted for further evaluation management. The patient denies any headache, blurry vision, sore throat, trouble swallowing, trouble with speech, chest pain, SOB, cough, fever, chills, N/V/D, abd pain, urinary symptoms, constipation, recent travels, sick contacts, focal or generalized neurological symptoms, falls, injuries, rashes, contact with COVID-19 diagnosed patients, hematemesis, melena, hemoptysis, hematuria, rashes, denies starting any new medications and denies any other concerns or problems besides as mentioned above. REVIEW OF SYSTEMS:  A comprehensive review of systems was negative except for that written in the History of Present Illness. Past Medical History:   Diagnosis Date    Anemia     Chronic abdominal pain     Chronic kidney disease     dialysis pt. - M. W.F    Complication of AV dialysis fistula 2019    2 large pseudo-aneurysm    Dependence on renal dialysis ([facility]     ESRD on dialysis ([facility]     GERD (gastroesophageal reflux disease)     Gout     High cholesterol     History of blood transfusion     at least 8 units over the years    HIV (human immunodeficiency virus infection) ([facility] 2011 Hypertension     Other ill-defined conditions(799.89)     SMALL VEIN IN HEART BEING MONITORED    Pancreatitis     Renal insufficiency     Seizure ([facility] 2012    one time and none sense    Seizures ([facility]     Sepsis ([facility] 2011 & 8/2019      Past Surgical History:   Procedure Laterality Date    COLONOSCOPY N/A 12/14/2021    COLONOSCOPY performed by Boyd Hamilton MD at [address] ARTERIOVENOUS FISTULA Left 2012    HX CHOLECYSTECTOMY  2011    HX COLONOSCOPY       Prior to Admission medications    Medication Sig Start Date End Date Taking? Authorizing Provider   metoprolol tartrate (LOPRESSOR) 50 mg tablet Take 1 Tablet by mouth two (2) times a day. 6/2/22  Yes Lisa Line, NP   levETIRAcetam (KEPPRA) 500 mg tablet Take 1 Tablet by mouth two (2) times a day. 6/2/22  Yes Lisa Line, NP   amLODIPine (NORVASC) 10 mg tablet Take 1 Tablet by mouth daily. 6/2/22  Yes Lisa Line, NP   atorvastatin (LIPITOR) 40 mg tablet Take 1 Tablet by mouth daily. 6/2/22  Yes Lisa Ferguson, PRETTY   lidocaine-prilocaine (EMLA) topical cream APPLY SMALL AMOUNT TO ACCESS SITE (AVF) 1 HOUR BEFORE DIALYSIS. COVER WITH OCCLUSIVE DRESSING (SARAN WRAP) 9/21/21  Yes Provider, Historical   dolutegravir (Tivicay) 50 mg tab tablet Take 1 Tablet by mouth daily for 270 days. 9/23/21 8/14/22 Yes Aj Bassett MD   atazanavir (REYATAZ) 300 mg capsule Take 1 Capsule by mouth Daily (before breakfast) for 270 days. 9/23/21 8/14/22 Yes Aj Bassett MD   ritonavir (NORVIR) 100 mg tablet TAKE 1 TABLET BY MOUTH EVERY DAY WITH MEALS 2/9/21 4/7/23 Yes Aj Bassett MD   lidocaine (XYLOCAINE) 4 % topical cream lidocaine HCl 4 % topical cream   ONUR A SML AMT TO AFFECTED SKIN PRN P   Yes Provider, Historical   aspirin 81 mg chewable tablet aspirin 81 mg chewable tablet   chew and swallow 1 tablet by mouth once daily   Yes Provider, Historical   sevelamer carbonate (RENVELA) 800 mg tab tab Take 1,600 mg by mouth two (2) times a day.    Yes Provider, Historical   calcium carbonate (TUMS) 200 mg calcium (500 mg) chew Take 1 Tab by mouth as needed. Yes Provider, Historical     Allergies   Allergen Reactions    Penicillin Unknown (comments)    Percocet [Oxycodone-Acetaminophen] Hives      Family History   Problem Relation Age of Onset    Cancer Brother         colon    Heart Disease Brother     Heart Disease Mother     Heart Disease Nephew       Social History:  reports that he has never smoked. He has never used smokeless tobacco. He reports current alcohol use. He reports that he does not use drugs. Family and social history were personally reviewed, all pertinent and relevant details are outlined as above. Objective:   Visit Vitals  /82 (BP 1 Location: Right upper arm)   Pulse 72   Temp 98.6 °F (37 °C)   Resp 20   Ht 6' (1.829 m)   Wt 107.7 kg (237 lb 8 oz)   SpO2 96%   BMI 32.21 kg/m²    O2 Flow Rate (L/min): 3 l/min O2 Device: Nasal cannula    PHYSICAL EXAM:   General: Alert x oriented x 3, awake, no acute distress, resting in bed, pleasant male, appears to be stated age  HEENT: PEERL, EOMI, moist mucus membranes  Neck: Supple, no JVD, no meningeal signs  Chest: Clear to auscultation bilaterally   CVS: RRR, S1 S2 heard, no murmurs/rubs/gallops  Abd: Soft, non-tender, non-distended, +bowel sounds   Ext: No clubbing, no cyanosis, no edema  Neuro/Psych: Pleasant mood and affect, CN 2-12 grossly intact, sensory grossly within normal limit, Strength 5/5 in all extremities, DTR 1+ x 4  Cap refill: Brisk, less than 3 seconds  Pulses: 2+, symmetric in all extremities  Skin: Warm, dry, without rashes or lesions    Data Review: All diagnostic labs and studies have been reviewed. Abnormal Labs Reviewed   CBC WITH AUTOMATED DIFF - Abnormal; Notable for the following components:       Result Value    RBC 3.08 (*)     HGB 9.2 (*)     HCT 27.3 (*)     MCH 29.9 (*)     RDW 15.5 (*)     NEUTROPHILS 81 (*)     LYMPHOCYTES 10 (*)     ABS. LYMPHOCYTES 0.9 (*)     All other components within normal limits   NT-PRO BNP - Abnormal; Notable for the following components:    NT pro-BNP 5,587 (*)     All other components within normal limits   METABOLIC PANEL, COMPREHENSIVE - Abnormal; Notable for the following components:    Glucose 107 (*)     BUN 40 (*)     Creatinine 13.40 (*)     BUN/Creatinine ratio 3 (*)     GFR est AA 5 (*)     GFR est non-AA 4 (*)     ALT (SGPT) 7 (*)     Protein, total 8.3 (*)     Globulin 4.3 (*)     A-G Ratio 0.9 (*)     All other components within normal limits   BLOOD GAS, ARTERIAL - Abnormal; Notable for the following components:    pH 7.48 (*)     PO2 56 (*)     BICARBONATE 27 (*)     Carboxy-Hgb 0.7 (*)     Oxyhemoglobin 87.6 (*)     All other components within normal limits       All Micro Results       None            IMAGING:   XR CHEST PORT   Final Result   Bilateral airspace disease/edema. ECG/ECHO:    Results for orders placed or performed during the hospital encounter of 08/14/22   EKG, 12 LEAD, INITIAL   Result Value Ref Range    Ventricular Rate 79 BPM    Atrial Rate 79 BPM    P-R Interval 166 ms    QRS Duration 109 ms    Q-T Interval 416 ms    QTC Calculation (Bezet) 477 ms    Calculated P Axis 40 degrees    Calculated R Axis 6 degrees    Calculated T Axis 20 degrees    Diagnosis Sinus rhythm  Borderline prolonged QT interval           Assessment:   Given the patient's current clinical presentation, there is a high level of concern for decompensation if discharged from the emergency department. Complex decision making was performed, which includes reviewing the patient's available past medical records, laboratory results, and imaging studies.     Active Problems:    ESRD (end stage renal disease) ([facility] (11/29/2011)      Seizure disorder ([facility] (4/27/2017)      Respiratory failure with hypoxia ([facility] (8/14/2022)      Fluid overload (8/14/2022)      Plan:     Pulmonary edema  -Due to fluid overload from end-stage renal disease  -Consult nephrology for hemodialysis  -Currently hypoxic requiring about 2 to 3 L of oxygen, wean as tolerated    Bronchitis  -Patient with symptoms of wheezing and coughing  -Will cover with Zithromax  -Continue as needed breathing treatment    Chest pain  -Likely related to pulmonary pathology  -No clinical signs or symptoms of acute coronary syndrome  -Cardiology has been consulted    End-stage renal disease  -Patient dependent on hemodialysis Monday Wednesday Friday  -Nephrology consulted for hemodialysis here in the hospital    Hypertension  -Continue amlodipine  -Monitor blood pressure    Dyslipidemia  -Continue statin    Seizure disorder  -Continue Keppra    Estimated length of stay is 2 midnights. DIET: ADULT DIET Regular; Low Fat/Low Chol/High Fiber/YUDELKA; Low Sodium (2 gm); Low Potassium (Less than 3000 mg/day); Low Phosphorus (Less than 1000 mg); 60 to 80 gm; GI Mellette (GERD/Peptic Ulcer); 1200 ml   ISOLATION PRECAUTIONS: There are currently no Active Isolations  CODE STATUS: Full Code   DVT PROPHYLAXIS: SCDs  FUNCTIONAL STATUS PRIOR TO HOSPITALIZATION: Fully active and ambulatory; able to carry on all self-care without restriction. EARLY MOBILITY ASSESSMENT: Recommend routine ambulation while hospitalized with the assistance of nursing staff  ANTICIPATED DISCHARGE: 24-48 hours. EMERGENCY CONTACT/SURROGATE DECISION MAKER:     CRITICAL CARE WAS PERFORMED FOR THIS ENCOUNTER: NO.      Signed By: Massiel Mike MD     August 15, 2022         Please note that this dictation may have been completed with Dragon, the computer voice recognition software. Quite often unanticipated grammatical, syntax, homophones, and other interpretive errors are inadvertently transcribed by the computer software. Please disregard these errors. Please excuse any errors that have escaped final proofreading.

## 2022-08-15 NOTE — CONSULTS
CONSULTATION    REASON FOR CONSULT:  Chest Pain    REQUESTING PROVIDER:  Dr. Ashish Abarca:    Chief Complaint   Patient presents with    Cough    Breathing Problem    Wheezing         HISTORY OF PRESENT ILLNESS:  Madai Hendrix is a 49y.o. year-old male with past medical history significant for Anemia, CKD with HD Dependence MWF, Chronic Abdominal Pain, GERD, Gout, HLD, HIV, Pancreatitis, Seizure (2012), and Ascending Aortic Aneurysm being monitored by Emir Wang who presented to ED for evaluation of shortness of breath, wheezing, chest pressure. Reports SOB started after mowing grass day before and progressively worsened and then had some chest pressure day of admission that resolved and he has been sx free since that time. Workup in ED revealed CXR + edema, HS Trop 15-15, BNP 5587, Hgb 9.2, Creat 13.4, K 4.7. EKG showed Sr with borderline QT interval but no ischemia. Last NST 2011 was WNL. He was admitted for further workup/treatment. This am he reports that he is feeling better and has no new complaints. He is awaiting Dialysis. Records from hospital admission course thus far reviewed. Telemetry Review: SR with occ PVC. PAST MEDICAL HISTORY:    Past Medical History:   Diagnosis Date    Anemia     Chronic abdominal pain     Chronic kidney disease     dialysis pt. - M. W.F    Complication of AV dialysis fistula 2019    2 large pseudo-aneurysm    Dependence on renal dialysis ([address]     ESRD on dialysis ([address]     GERD (gastroesophageal reflux disease)     Gout     High cholesterol     History of blood transfusion     at least 8 units over the years    HIV (human immunodeficiency virus infection) ([address] 2011    Hypertension     Other ill-defined conditions(799.89)     SMALL VEIN IN HEART BEING MONITORED    Pancreatitis     Renal insufficiency     Seizure ([address] 2012    one time and none sense    Seizures ([address]     Sepsis ([address] 2011 & 8/2019       PAST SURGICAL HISTORY: Past Surgical History:   Procedure Laterality Date    COLONOSCOPY N/A 12/14/2021    COLONOSCOPY performed by Mercedez Garcia MD at [facility]    HX ARTERIOVENOUS FISTULA Left 2012    HX CHOLECYSTECTOMY  2011    HX COLONOSCOPY         ALLERGIES:    Allergies   Allergen Reactions    Penicillin Unknown (comments)    Percocet [Oxycodone-Acetaminophen] Hives       FAMILY HISTORY:    Family History   Problem Relation Age of Onset    Cancer Brother         colon    Heart Disease Brother     Heart Disease Mother     Heart Disease Nephew        SOCIAL HISTORY:    Social History     Tobacco Use    Smoking status: Never    Smokeless tobacco: Never   Vaping Use    Vaping Use: Never used   Substance Use Topics    Alcohol use: Yes     Comment: occassionally    Drug use: No         HOME MEDICATIONS:    Prior to Admission Medications   Prescriptions Last Dose Informant Patient Reported? Taking? amLODIPine (NORVASC) 10 mg tablet   No Yes   Sig: Take 1 Tablet by mouth daily. aspirin 81 mg chewable tablet   Yes Yes   Sig: aspirin 81 mg chewable tablet   chew and swallow 1 tablet by mouth once daily   atazanavir (REYATAZ) 300 mg capsule   No Yes   Sig: Take 1 Capsule by mouth Daily (before breakfast) for 270 days. atorvastatin (LIPITOR) 40 mg tablet   No Yes   Sig: Take 1 Tablet by mouth daily. calcium carbonate (TUMS) 200 mg calcium (500 mg) chew   Yes Yes   Sig: Take 1 Tab by mouth as needed. dolutegravir (Tivicay) 50 mg tab tablet   No Yes   Sig: Take 1 Tablet by mouth daily for 270 days. levETIRAcetam (KEPPRA) 500 mg tablet   No Yes   Sig: Take 1 Tablet by mouth two (2) times a day. lidocaine (XYLOCAINE) 4 % topical cream   Yes Yes   Sig: lidocaine HCl 4 % topical cream   ONUR A SML AMT TO AFFECTED SKIN PRN P   lidocaine-prilocaine (EMLA) topical cream   Yes Yes   Sig: APPLY SMALL AMOUNT TO ACCESS SITE (AVF) 1 HOUR BEFORE DIALYSIS.  COVER WITH OCCLUSIVE DRESSING (SARAN WRAP)   metoprolol tartrate (LOPRESSOR) 50 mg tablet   No Yes   Sig: Take 1 Tablet by mouth two (2) times a day. ritonavir (NORVIR) 100 mg tablet   No Yes   Sig: TAKE 1 TABLET BY MOUTH EVERY DAY WITH MEALS   sevelamer carbonate (RENVELA) 800 mg tab tab   Yes Yes   Sig: Take 1,600 mg by mouth two (2) times a day. Facility-Administered Medications: None       REVIEW OF SYSTEMS:  Complete review of systems performed, pertinents noted above, all other systems are negative. Patient Vitals for the past 24 hrs:   Temp Pulse Resp BP SpO2   08/15/22 1600 -- 60 16 123/79 --   08/15/22 1545 -- 65 16 118/79 --   08/15/22 1530 -- (!) 57 16 128/78 --   08/15/22 1515 -- (!) 58 16 117/77 --   08/15/22 1500 -- 63 16 122/76 --   08/15/22 1445 -- 62 12 121/73 --   08/15/22 1430 -- (!) 59 16 123/77 --   08/15/22 1427 -- -- -- -- 95 %   08/15/22 1415 -- (!) 57 16 117/75 --   08/15/22 1400 -- 65 16 126/77 --   08/15/22 1345 -- 75 16 (!) 147/72 --   08/15/22 1200 -- 70 -- -- --   08/15/22 1139 97.7 °F (36.5 °C) 70 -- 137/81 94 %   08/15/22 1012 -- -- -- -- 95 %   08/15/22 0815 98.6 °F (37 °C) 72 18 132/82 --   08/15/22 0800 -- 72 -- -- --   08/15/22 0740 -- -- -- -- 96 %   08/15/22 0704 98.6 °F (37 °C) 72 -- 132/82 96 %   08/15/22 0400 -- 69 -- -- --   08/15/22 0336 97.8 °F (36.6 °C) 69 -- (!) 143/77 --   08/15/22 0000 -- 69 -- -- --   08/14/22 2321 98 °F (36.7 °C) 69 -- 134/73 --   08/14/22 2113 -- 75 -- 128/62 --   08/14/22 2000 -- 77 -- -- --   08/14/22 1939 98 °F (36.7 °C) 77 20 (!) 152/79 95 %   08/14/22 1752 97.8 °F (36.6 °C) 70 18 (!) 150/89 95 %       PHYSICAL EXAMINATION:    General: Well nourished male lying inbed, NAD, A&O  HEENT: Normocephalic, PERRL, no drainage  Neck: Supple, Trachea midline, No JVD  RESP: CTA bases, faint crackles/wheeze upper lobes bilaterally. + Symmetrical chest movement. No SOB or distress. On O2 via NC.     Cardiovascular: RRR no MRG  PVS: No rubor, cyanosis, no edema, Radial, DP, PT pulses equal bilaterally  ABD: obese, soft, NT, Normoactive BS  Derm: Warm/Dry/Intact with no lesions, normal turgor  Neuro: A&O PPTS, cranial nerves II- XII grossly intact via interaction with patient. No focal deficits  PSYCH: No anxiety or agitation      Electrocardiogram performed earlier reviewed, it shows SR, borderline QT interval, negative for ischemia    Recent labs results and imaging reviewed.       Recent Results (from the past 24 hour(s))   LIPID PANEL    Collection Time: 08/14/22  9:35 PM   Result Value Ref Range    LIPID PROFILE        Cholesterol, total 153 <200 mg/dL    Triglyceride 78 <150 mg/dL    HDL Cholesterol 78 mg/dL    LDL, calculated 59.4 0 - 100 mg/dL    VLDL, calculated 15.6 mg/dL    CHOL/HDL Ratio 2.0 0.0 - 5.0     TROPONIN-HIGH SENSITIVITY    Collection Time: 08/14/22  9:35 PM   Result Value Ref Range    Troponin-High Sensitivity 15 0 - 76 ng/L   ECHO ADULT COMPLETE    Collection Time: 08/15/22  9:18 AM   Result Value Ref Range    IVSd 1.4 (A) 0.6 - 1.0 cm    LVIDd 4.7 4.2 - 5.9 cm    LVIDs 3.2 cm    LVOT Diameter 2.0 cm    LVPWd 1.2 (A) 0.6 - 1.0 cm    Global Longitudinal Strain -17.6 %    EF BP 63 55 - 100 %    LV Ejection Fraction A2C 69 %    LV Ejection Fraction A4C 56 %    LV EDV A2C 97 mL    LV EDV A4C 129 mL    LV EDV  67 - 155 mL    LV ESV A2C 30 mL    LV ESV A4C 56 mL    LV ESV BP 42 22 - 58 mL    LVOT Peak Gradient 8 mmHg    LVOT Mean Gradient 5 mmHg    LVOT .6 ml    LVOT Peak Velocity 1.4 m/s    LVOT VTI 33.0 cm    RVIDd 3.0 cm    RV Free Wall Peak S' 15 cm/s    LA Diameter 3.9 cm    LA Volume A/L 65 mL    LA Volume 2C 34 18 - 58 mL    LA Volume 4C 86 (A) 18 - 58 mL    LA Volume BP 60 (A) 18 - 58 mL    AV Area by Peak Velocity 2.2 cm2    AV Area by VTI 2.6 cm2    AV Peak Gradient 17 mmHg    AV Mean Gradient 9 mmHg    AV Peak Velocity 2.1 m/s    AV Mean Velocity 1.4 m/s    AV VTI 41.3 cm    MV A Velocity 1.01 m/s    MV E Wave Deceleration Time 181.0 ms    MV E Velocity 1.06 m/s    LV E' Lateral Velocity 13 cm/s LV E' Septal Velocity 9 cm/s    TAPSE 2.9 1.7 cm    TR Peak Gradient 30 mmHg    TR Max Velocity 2.73 m/s    Aortic Root 4.0 cm    Fractional Shortening 2D 32 28 - 44 %    LV ESV Index BP 18 mL/m2    LV EDV Index BP 50 mL/m2    LV ESV Index A4C 24 mL/m2    LV EDV Index A4C 56 mL/m2    LV ESV Index A2C 13 mL/m2    LV EDV Index A2C 42 mL/m2    LVIDd Index 2.05 cm/m2    LVIDs Index 1.40 cm/m2    LV RWT Ratio 0.51     LV Mass 2D 237.9 (A) 88 - 224 g    LV Mass 2D Index 103.9 49 - 115 g/m2    MV E/A 1.05     E/E' Ratio (Averaged) 9.97     E/E' Lateral 8.15     E/E' Septal 11.78     LA Volume Index BP 26 16 - 34 ml/m2    LA Volume Index A/L 28 16 - 34 mL/m2    LVOT Stroke Volume Index 45.2 mL/m2    LVOT Area 3.1 cm2    LA Volume Index 2C 15 (A) 16 - 34 mL/m2    LA Volume Index 4C 38 (A) 16 - 34 mL/m2    LA Size Index 1.70 cm/m2    LA/AO Root Ratio 0.98     Ao Root Index 1.75 cm/m2    AV Velocity Ratio 0.67     LVOT:AV VTI Index 0.80     ANSELMO/BSA VTI 1.1 cm2/m2    ANSELMO/BSA Peak Velocity 1.0 cm2/m2    RA Area 4C 15.4 cm2    Est. RA Pressure 3 mmHg    RVSP 33 mmHg       XR Results (maximum last 3): Results from [place] encounter on 08/14/22    XR CHEST PORT    Impression  Bilateral airspace disease/edema. Results from [place] encounter on 10/03/19    XR CHEST PA LAT    Impression  IMPRESSION:  1. No acute process      Results from Hospital Encounter encounter on 11/20/14    XR ABD ACUTE W 1 V CHEST    Impression  : Nonspecific fecal stasis. Signing/Reading Doctor: Oksana Brock ([id number])  Approved: Oksana Brock ([id number])  Nov 20 2014  1:07PM      CT Results (maximum last 3):   Results from [place] encounter on 10/29/20    CTA CHEST W OR W WO CONT    Impression  IMPRESSION: 4.9 cm ascending aortic aneurysm with accelerated arterial  calcification noted      Results from Hospital Encounter encounter on 12/19/11    CT HEAD WITHOUT CONTRAST      Results from [facility] Encounter encounter on 11/29/11    CTA ABDOMEN PELVIS WITH CONTRAST      MRI Results (maximum last 3): No results found for this or any previous visit. Nuclear Medicine Results (maximum last 3): No results found for this or any previous visit. US Results (maximum last 3): No results found for this or any previous visit. VAS/US Results (maximum last 3): No results found for this or any previous visit.         Current Facility-Administered Medications:     acetaminophen (TYLENOL) tablet 650 mg, 650 mg, Oral, Q6H PRN, Bouchra Ahumada MD, 650 mg at 08/15/22 0733    azithromycin (ZITHROMAX) 500 mg in 0.9% sodium chloride 250 mL (VIAL-MATE), 500 mg, IntraVENous, Q24H, Wilson Shabazz MD, Last Rate: 250 mL/hr at 08/15/22 0933, 500 mg at 08/15/22 0933    amLODIPine (NORVASC) tablet 10 mg, 10 mg, Oral, DAILY, Ed Perez MD    aspirin chewable tablet 81 mg, 81 mg, Oral, DAILY, Ed Perez MD    atorvastatin (LIPITOR) tablet 40 mg, 40 mg, Oral, DAILY, Ed Perez MD    levETIRAcetam (KEPPRA) tablet 500 mg, 500 mg, Oral, BID, Ed Perez MD    metoprolol tartrate (LOPRESSOR) tablet 50 mg, 50 mg, Oral, BID, Ed Perez MD    sevelamer carbonate (RENVELA) tab 1,600 mg, 1,600 mg, Oral, BID WITH MEALS, Ed Perez MD    sodium chloride (NS) flush 5-40 mL, 5-40 mL, IntraVENous, Q8H, Wilson Shabazz MD, 10 mL at 08/15/22 1409    sodium chloride (NS) flush 5-40 mL, 5-40 mL, IntraVENous, PRN, Gus Murray MD    polyethylene glycol (MIRALAX) packet 17 g, 17 g, Oral, DAILY PRN, Gus Murray MD    ondansetron (ZOFRAN ODT) tablet 4 mg, 4 mg, Oral, Q8H PRN, 4 mg at 08/15/22 0910 **OR** ondansetron (ZOFRAN) injection 4 mg, 4 mg, IntraVENous, Q6H PRN, Gus Murray MD    heparin (porcine) injection 5,000 Units, 5,000 Units, SubCUTAneous, Q8H, Mele, Rubin Spatz, MD, 5,000 Units at 08/15/22 1405    [Held by provider] atazanavir (REYATAZ) capsule 300 mg (Patient Supplied), 300 mg, Oral, ACB, Dee oBo MD    [Held by provider] ritonavir (NORVIR) tablet 100 mg (Patient Supplied), 100 mg, Oral, DAILY WITH BREAKFAST, Ed Perez MD    [Held by provider] lidocaine-prilocaine (EMLA) 2.5-2.5 % cream (Patient Supplied), , Topical, Q MON, WED & FRI, Ed Perez MD    albuterol-ipratropium (DUO-NEB) 2.5 MG-0.5 MG/3 ML, 3 mL, Nebulization, Q4H PRN, Micaela Perez MD          Case discussed with collaborating physician Dr. Lanita Schwab and our impression and recommendations are as follows:   Chest Pain: ACS ruled out with EKG and HS Trop criteria. Sx free this am. No workup of recent. Obtain TTE this am. If no gross abnormalities can have OP ischemic workup. It is likely CP this admission was related to hypervolemia. Obtain risk factor modification labs and continue cardioprotective medications. Elevated BNP - 5587 upon admission. no prior hx HF. Obtain TTE. Volume Management via Nephrology with HD. Appreciate their help. Further input after TTE resulted. Should have OP follow-up. HTN:  BP at goal. Continue current medication regimen. HLD:  LDL @ goal @ 59.4 and triglycerides 78. Hepatic Function stable. Continue current home statin dosing. Chronic Anemia: Hgb stable 9.2. Goal is >8. No acute bleeding. Ascending Aortic Aneurysm:  4.9cm on CTA 2020. Reports he follows with Surgeon at [place] and has an appointment later this week. Encouraged he keep this appt. If he has not had recent imaging of this would consider CTA Chest to re-evaluate. Thank you for involving us in the care of this patient. Please do not hesitate to call if additional questions arise.  If after hours please call [phone]

## 2022-08-15 NOTE — ROUTINE PROCESS
Dr. Anselmo Milton contacted regarding the troponin level of 15, no need to order any more troponin's per MD.

## 2022-08-15 NOTE — PROGRESS NOTES
Problem: Patient Education: Go to Patient Education Activity  Goal: Patient/Family Education  Outcome: Progressing Towards Goal     Problem: Heart Failure: Day 1  Goal: Off Pathway (Use only if patient is Off Pathway)  Outcome: Progressing Towards Goal     Problem: Heart Failure: Day 1  Goal: Activity/Safety  Outcome: Progressing Towards Goal     Problem: Heart Failure: Day 1  Goal: Diagnostic Test/Procedures  Outcome: Progressing Towards Goal     Problem: Heart Failure: Day 1  Goal: Nutrition/Diet  Outcome: Progressing Towards Goal     Problem: Heart Failure: Day 1  Goal: Medications  Outcome: Progressing Towards Goal     Problem: Heart Failure: Day 1  Goal: Treatments/Interventions/Procedures  Outcome: Progressing Towards Goal     Problem: Heart Failure: Day 1  Goal: *Hemodynamically stable  Outcome: Progressing Towards Goal     Problem: Heart Failure: Day 1  Goal: *Optimal pain control at patient's stated goal  Outcome: Progressing Towards Goal     Problem: Heart Failure: Day 1  Goal: *Anxiety reduced or absent  Outcome: Progressing Towards Goal

## 2022-08-16 VITALS
OXYGEN SATURATION: 95 % | DIASTOLIC BLOOD PRESSURE: 88 MMHG | HEIGHT: 72 IN | HEART RATE: 71 BPM | BODY MASS INDEX: 31.08 KG/M2 | TEMPERATURE: 98.4 F | RESPIRATION RATE: 18 BRPM | WEIGHT: 229.5 LBS | SYSTOLIC BLOOD PRESSURE: 138 MMHG

## 2022-08-16 LAB
HBV SURFACE AB SER QL: REACTIVE
HBV SURFACE AB SER-ACNC: 61.11 MIU/ML
HBV SURFACE AG SER QL: 0.1 INDEX
HBV SURFACE AG SER QL: NEGATIVE

## 2022-08-16 PROCEDURE — 74011250637 HC RX REV CODE- 250/637: Performed by: HOSPITALIST

## 2022-08-16 PROCEDURE — 74011250636 HC RX REV CODE- 250/636: Performed by: FAMILY MEDICINE

## 2022-08-16 PROCEDURE — 94760 N-INVAS EAR/PLS OXIMETRY 1: CPT

## 2022-08-16 PROCEDURE — 96372 THER/PROPH/DIAG INJ SC/IM: CPT

## 2022-08-16 PROCEDURE — G0378 HOSPITAL OBSERVATION PER HR: HCPCS

## 2022-08-16 PROCEDURE — 96376 TX/PRO/DX INJ SAME DRUG ADON: CPT

## 2022-08-16 PROCEDURE — 74011000250 HC RX REV CODE- 250: Performed by: FAMILY MEDICINE

## 2022-08-16 PROCEDURE — 77010033678 HC OXYGEN DAILY

## 2022-08-16 RX ORDER — AZITHROMYCIN 250 MG/1
250 TABLET, FILM COATED ORAL DAILY
Qty: 3 TABLET | Refills: 0 | Status: SHIPPED | OUTPATIENT
Start: 2022-08-17

## 2022-08-16 RX ADMIN — SODIUM CHLORIDE, PRESERVATIVE FREE 10 ML: 5 INJECTION INTRAVENOUS at 05:39

## 2022-08-16 RX ADMIN — AZITHROMYCIN MONOHYDRATE 500 MG: 500 INJECTION, POWDER, LYOPHILIZED, FOR SOLUTION INTRAVENOUS at 08:03

## 2022-08-16 RX ADMIN — LEVETIRACETAM 500 MG: 500 TABLET, FILM COATED ORAL at 08:02

## 2022-08-16 RX ADMIN — ATORVASTATIN CALCIUM 40 MG: 40 TABLET, FILM COATED ORAL at 08:03

## 2022-08-16 RX ADMIN — CALCIUM CARBONATE (ANTACID) CHEW TAB 500 MG 200 MG: 500 CHEW TAB at 03:53

## 2022-08-16 RX ADMIN — HEPARIN SODIUM 5000 UNITS: 5000 INJECTION INTRAVENOUS; SUBCUTANEOUS at 06:12

## 2022-08-16 RX ADMIN — AMLODIPINE BESYLATE 10 MG: 10 TABLET ORAL at 08:02

## 2022-08-16 RX ADMIN — ASPIRIN 81 MG 81 MG: 81 TABLET ORAL at 08:02

## 2022-08-16 RX ADMIN — SEVELAMER CARBONATE 1600 MG: 800 TABLET, FILM COATED ORAL at 07:58

## 2022-08-16 RX ADMIN — METOPROLOL TARTRATE 50 MG: 50 TABLET, FILM COATED ORAL at 08:02

## 2022-08-16 NOTE — PROGRESS NOTES
Progress Note    Patient: Dano Baumann MRN: [id number]  SSN: [id number]    YOB: 1965  Age: 49 y.o. Sex: male      Admit Date: 8/14/2022    LOS: 0 days     Subjective:     Patient seen and examined. Dano Baumann is a 49y.o. year-old male with past medical history significant for Anemia, CKD with HD Dependence MWF, Chronic Abdominal Pain, GERD, Gout, HLD, HIV, Pancreatitis, Seizure (2012), and Ascending Aortic Aneurysm being monitored by [facility] who is followed for chest pain. Patient reports he has been sx free during admission. Received HD yesterday without complications. He is feeling his baseline this am and is ready for discharge with no new complaints. Telemetry Review: SR    Review of Symptoms:   Review of Systems   Constitutional: Negative. HENT: Negative. Eyes: Negative. Cardiovascular: Negative. Respiratory: Negative. Endocrine: Negative. Gastrointestinal: Negative. Genitourinary: Negative. Neurological: Negative. Objective:     Vitals:    08/16/22 0335 08/16/22 0346 08/16/22 0747 08/16/22 0844   BP: 106/64  138/88    Pulse: 64 63 71    Resp: 18  18    Temp: 98.7 °F (37.1 °C)  98.4 °F (36.9 °C)    SpO2: 92%  95% 95%   Weight:  104.1 kg (229 lb 8 oz)     Height:            Intake and Output:  Current Shift: No intake/output data recorded. Last three shifts: 08/14 1901 - 08/16 0700  In: 3187 [P.O.:1112; I.V.:270]  Out: 3950     Physical Exam:   General: Well nourished male lying inbed, NAD, A&O  HEENT: Normocephalic, PERRL, no drainage  Neck: Supple, Trachea midline, No JVD  RESP: CTA throughout.  + Symmetrical chest movement. No SOB or distress. On RA   Cardiovascular: RRR no MRG  PVS: No rubor, cyanosis, no edema, Radial, DP, PT pulses equal bilaterally  ABD: obese, soft, NT, Normoactive BS  Derm: Warm/Dry/Intact with no lesions, normal turgor  Neuro: A&O PPTS, cranial nerves II- XII grossly intact via interaction with patient. No focal deficits  PSYCH: No anxiety or agitation      Lab/Data Review:  BMP: No results found for: NA, K, CL, CO2, AGAP, GLU, BUN, CREA, GFRAA, GFRNA  CBC: No results found for: WBC, HGB, HGBEXT, HCT, HCTEXT, PLT, PLTEXT, HGBEXT, HCTEXT, PLTEXT         Current Facility-Administered Medications:     acetaminophen (TYLENOL) tablet 650 mg, 650 mg, Oral, Q6H PRN, Marylou Patricia MD, 650 mg at 08/15/22 0733    azithromycin (ZITHROMAX) 500 mg in 0.9% sodium chloride 250 mL (VIAL-MATE), 500 mg, IntraVENous, Q24H, Jaylene BHATIA MD, Last Rate: 250 mL/hr at 08/16/22 0803, 500 mg at 08/16/22 0803    amLODIPine (NORVASC) tablet 10 mg, 10 mg, Oral, DAILY, Ed Perez MD, 10 mg at 08/16/22 0802    aspirin chewable tablet 81 mg, 81 mg, Oral, DAILY, Ed Perez MD, 81 mg at 08/16/22 0802    atorvastatin (LIPITOR) tablet 40 mg, 40 mg, Oral, DAILY, Ed Perez MD, 40 mg at 08/16/22 0803    levETIRAcetam (KEPPRA) tablet 500 mg, 500 mg, Oral, BID, Ed Perez MD, 500 mg at 08/16/22 0802    metoprolol tartrate (LOPRESSOR) tablet 50 mg, 50 mg, Oral, BID, Ed Perez MD, 50 mg at 08/16/22 0802    sevelamer carbonate (RENVELA) tab 1,600 mg, 1,600 mg, Oral, BID WITH MEALS, Ed Perez MD, 1,600 mg at 08/16/22 0758    calcium carbonate (TUMS) chewable tablet 200 mg [elemental], 200 mg, Oral, Q4H PRN, Marylou Patricia MD, 200 mg at 08/16/22 0353    prochlorperazine (COMPAZINE) injection 10 mg, 10 mg, IntraVENous, Q6H PRN, Marylou Patricia MD, 10 mg at 08/15/22 2026    sodium chloride (NS) flush 5-40 mL, 5-40 mL, IntraVENous, Q8H, Wilson Shabazz MD, 10 mL at 08/16/22 0539    sodium chloride (NS) flush 5-40 mL, 5-40 mL, IntraVENous, PRN, Rose Pradhan MD    polyethylene glycol (MIRALAX) packet 17 g, 17 g, Oral, DAILY PRN, Wilson Shabazz MD    ondansetron (ZOFRAN ODT) tablet 4 mg, 4 mg, Oral, Q8H PRN, 4 mg at 08/15/22 8913 **OR** ondansetron (ZOFRAN) injection 4 mg, 4 mg, IntraVENous, Q6H PRN, Akbar Sin MD    heparin (porcine) injection 5,000 Units, 5,000 Units, SubCUTAneous, Q8H, Mele, Celso BHATIA MD, 5,000 Units at 08/16/22 0612    [Held by provider] atazanavir (REYATAZ) capsule 300 mg (Patient Supplied), 300 mg, Oral, ACB, Evens Perez MD    [Held by provider] ritonavir (NORVIR) tablet 100 mg (Patient Supplied), 100 mg, Oral, DAILY WITH BREAKFAST, Evens Perez MD    [Held by provider] lidocaine-prilocaine (EMLA) 2.5-2.5 % cream (Patient Supplied), , Topical, Q MON, WED & FRI, Ed Perez MD    albuterol-ipratropium (DUO-NEB) 2.5 MG-0.5 MG/3 ML, 3 mL, Nebulization, Q4H PRN, Evens Perez MD    Current Outpatient Medications:     [START ON 8/17/2022] azithromycin (Zithromax) 250 mg tablet, Take 1 Tablet by mouth in the morning., Disp: 3 Tablet, Rfl: 0    metoprolol tartrate (LOPRESSOR) 50 mg tablet, Take 1 Tablet by mouth two (2) times a day., Disp: 180 Tablet, Rfl: 1    levETIRAcetam (KEPPRA) 500 mg tablet, Take 1 Tablet by mouth two (2) times a day., Disp: 180 Tablet, Rfl: 1    amLODIPine (NORVASC) 10 mg tablet, Take 1 Tablet by mouth daily. , Disp: 90 Tablet, Rfl: 1    atorvastatin (LIPITOR) 40 mg tablet, Take 1 Tablet by mouth daily. , Disp: 90 Tablet, Rfl: 1    lidocaine-prilocaine (EMLA) topical cream, APPLY SMALL AMOUNT TO ACCESS SITE (AVF) 1 HOUR BEFORE DIALYSIS. COVER WITH OCCLUSIVE DRESSING (SARAN WRAP), Disp: , Rfl:     dolutegravir (Tivicay) 50 mg tab tablet, Take 1 Tablet by mouth daily for 270 days. , Disp: 90 Tablet, Rfl: 2    atazanavir (REYATAZ) 300 mg capsule, Take 1 Capsule by mouth Daily (before breakfast) for 270 days. , Disp: 90 Capsule, Rfl: 2    ritonavir (NORVIR) 100 mg tablet, TAKE 1 TABLET BY MOUTH EVERY DAY WITH MEALS, Disp: 90 Tab, Rfl: 2    lidocaine (XYLOCAINE) 4 % topical cream, lidocaine HCl 4 % topical cream  ONUR A SML AMT TO AFFECTED SKIN PRN P, Disp: , Rfl:     aspirin 81 mg chewable tablet, aspirin 81 mg chewable tablet  chew and swallow 1 tablet by mouth once daily, Disp: , Rfl:     sevelamer carbonate (RENVELA) 800 mg tab tab, Take 1,600 mg by mouth two (2) times a day., Disp: , Rfl:     calcium carbonate (TUMS) 200 mg calcium (500 mg) chew, Take 1 Tab by mouth as needed. , Disp: , Rfl:       Assessment:     Active Problems:    ESRD (end stage renal disease) ([facility] (11/29/2011)      Seizure disorder ([facility] (4/27/2017)      Respiratory failure with hypoxia ([facility] (8/14/2022)      Fluid overload (8/14/2022)        Plan:     Case discussed with Collaborating physician Dr. René Kincaid and our recommendations are as follows:     Chest Pain: ACS ruled out with EKG and HS Trop criteria. Remains Sx free this am. No workup of recent. TTE 8/15 am showing mild LVH, EF 63% with no wall motion abnormalities. Recommend given risk factors that patient have OP ischemic workup. It is likely CP this admission was related to hypervolemia. Continue risk factor modification and continue cardioprotective medications. Elevated BNP - 5587 upon admission. no prior hx HF. TTE negative for systolic/diastolic dysfunction. Volume Management via Nephrology with HD. Appreciate their help. Continue current medication regimen. HTN:  BP at goal. Continue current medication regimen. HLD:  LDL @ goal @ 59.4 and triglycerides 78. Hepatic Function stable. Continue current home statin dosing. Chronic Anemia: Hgb stable 9.2. Goal is >8. No acute bleeding. Continue to monitor. Ascending Aortic Aneurysm:  4.9cm on CTA 2020. Reports he follows with Surgeon at [address] [place] [address] and has an appointment later this week. Encouraged he keep this appt. If he has not had recent imaging of this would consider CTA Chest to re-evaluate. Thank you for involving us in the care of this patient. Please do not hesitate to call if additional questions arise.  If after hours please call [phone]    Signed By: Pacheco Huff NP     August 16, 2022

## 2022-08-16 NOTE — PROGRESS NOTES
Patients case reviewed during interdisciplinary team meeting in [address] [place], [place] [address]Acute Care Unit. Rev.  Leatha Chaparro, Mike 68, Wilfredo

## 2022-08-16 NOTE — ROUTINE PROCESS
Report from Daren Balderrama. Patient in bed resting, no complaints, no distress, HOB elevated and call light within reach.

## 2022-08-16 NOTE — PROGRESS NOTES
Problem: Patient Education: Go to Patient Education Activity  Goal: Patient/Family Education  8/16/2022 1008 by Barbie Morgan, RN  Outcome: Resolved/Met  8/16/2022 0837 by Barbie Morgan, RN  Outcome: Progressing Towards Goal

## 2022-08-16 NOTE — PROGRESS NOTES
Problem: Heart Failure: Day 1  Goal: Medications  Outcome: Progressing Towards Goal  Goal: Respiratory  Outcome: Progressing Towards Goal  Goal: Treatments/Interventions/Procedures  Outcome: Progressing Towards Goal  Goal: Psychosocial  Outcome: Progressing Towards Goal  Goal: *Oxygen saturation within defined limits  Outcome: Progressing Towards Goal  Goal: *Hemodynamically stable  Outcome: Progressing Towards Goal  Goal: *Optimal pain control at patient's stated goal  Outcome: Progressing Towards Goal  Goal: *Anxiety reduced or absent  Outcome: Progressing Towards Goal     Problem: Falls - Risk of  Goal: *Absence of Falls  Description: Document Leann Fall Risk and appropriate interventions in the flowsheet.   Outcome: Progressing Towards Goal  Note: Fall Risk Interventions:            Medication Interventions: Teach patient to arise slowly

## 2022-08-16 NOTE — DISCHARGE SUMMARY
Discharge Summary       PATIENT ID: Fraser Canavan  MRN: [id number]   YOB: 1965    DATE OF ADMISSION: 8/14/2022  1:17 PM    DATE OF DISCHARGE: 8/16/2022   PRIMARY CARE PROVIDER: Tatiana Marquez NP     ATTENDING PHYSICIAN: Jose Cunha  DISCHARGING PROVIDER: Demetrice Bush MD    To contact this individual call [phone] and ask the  to page. If unavailable ask to be transferred the Adult Hospitalist Department. CONSULTATIONS: IP CONSULT TO NEPHROLOGY  IP CONSULT TO CARDIOLOGY    PROCEDURES/SURGERIES: * No surgery found *    DISCHARGE DIAGNOSES:     Pulmonary edema  Bronchitis  Chest pain  End-stage renal disease  Hypertension  Dyslipidemia  Seizure disorder    ADMISSION SUMMARY AND HOSPITAL COURSE:     HPI  Fraser Canavan is a 49 y.o. male who presents with shortness of breath and wheezing that started about 6 hours prior to presentation. Patient states that he has cut the grass yesterday and thinks that it may be contributing. Patient also does complain of some chest tightness. Patient has end-stage renal disease on hemodialysis Monday Wednesday Friday. He went to his last dialysis on Friday as scheduled. On work-up in the ER, chest x-ray shows bilateral airspace disease/edema, patient with elevated BNP and fluid overload on exam.  Patient was admitted for further evaluation management. Hospital Course  Patient was admitted for further management of pulmonary edema. Pulmonary edema is mainly due to fluid overload from end-stage renal disease. Echocardiogram shows normal EF and no other acute abnormality. Patient was evaluated by nephrology, received scheduled hemodialysis treatment during this hospitalization with improvement in pulmonary edema and hypoxia. Patient was also evaluated by cardiology for chest tightness, recommended outpatient follow-up for ischemic work-up. No clinical signs and symptoms of acute coronary syndrome this admission.   Patient also with symptoms of bronchitis including wheezing and cough. Patient was started on Zithromax with improvement in symptoms. Overall patient is stable for discharge, patient to follow-up with cardiologist as scheduled as outpatient    DISCHARGE DIAGNOSES / PLAN:          BMI: Body mass index is 31.13 kg/m². . This patient: Meets criteria for obesity given BMI >/= 30 and < 40 due to excess calories/nutritional. Weight loss and lifestyle modifications should be encouraged as an outpatient. PENDING TEST RESULTS:   At the time of discharge the following test results are still pending: None     ADDITIONAL CARE RECOMMENDATIONS:     Follow-up with cardiology as scheduled. NOTIFY YOUR PHYSICIAN FOR ANY OF THE FOLLOWING:   Fever over 101 degrees for 24 hours. Chest pain, shortness of breath, fever, chills, nausea, vomiting, diarrhea, change in mentation, falling, weakness, bleeding. Severe pain or pain not relieved by medications, as well as any other signs or symptoms that you may have questions about. FOLLOW UP APPOINTMENTS:    Follow-up Information       Follow up With Specialties Details Why Contact Info    Mian Estrada NP Family Nurse Practitioner Follow up on 8/29/2022 @ [facility]  [phone]                 DIET: Cardiac Diet and Renal Diet    ACTIVITY: Activity as tolerated    EQUIPMENT needed: None    DISCHARGE MEDICATIONS:  Current Discharge Medication List        START taking these medications    Details   azithromycin (Zithromax) 250 mg tablet Take 1 Tablet by mouth in the morning. Qty: 3 Tablet, Refills: 0  Start date: 8/17/2022           CONTINUE these medications which have NOT CHANGED    Details   metoprolol tartrate (LOPRESSOR) 50 mg tablet Take 1 Tablet by mouth two (2) times a day. Qty: 180 Tablet, Refills: 1    Associated Diagnoses: Essential hypertension      levETIRAcetam (KEPPRA) 500 mg tablet Take 1 Tablet by mouth two (2) times a day.   Qty: 180 Tablet, Refills: 1    Associated Diagnoses: History of seizures      amLODIPine (NORVASC) 10 mg tablet Take 1 Tablet by mouth daily. Qty: 90 Tablet, Refills: 1    Associated Diagnoses: Essential hypertension      atorvastatin (LIPITOR) 40 mg tablet Take 1 Tablet by mouth daily. Qty: 90 Tablet, Refills: 1    Associated Diagnoses: Mixed hyperlipidemia      lidocaine-prilocaine (EMLA) topical cream APPLY SMALL AMOUNT TO ACCESS SITE (AVF) 1 HOUR BEFORE DIALYSIS. COVER WITH OCCLUSIVE DRESSING (SARAN WRAP)      dolutegravir (Tivicay) 50 mg tab tablet Take 1 Tablet by mouth daily for 270 days. Qty: 90 Tablet, Refills: 2    Associated Diagnoses: Symptomatic HIV infection (HCC)      atazanavir (REYATAZ) 300 mg capsule Take 1 Capsule by mouth Daily (before breakfast) for 270 days. Qty: 90 Capsule, Refills: 2    Associated Diagnoses: Symptomatic HIV infection (HCC)      ritonavir (NORVIR) 100 mg tablet TAKE 1 TABLET BY MOUTH EVERY DAY WITH MEALS  Qty: 90 Tab, Refills: 2      lidocaine (XYLOCAINE) 4 % topical cream lidocaine HCl 4 % topical cream   ONUR A SML AMT TO AFFECTED SKIN PRN P      aspirin 81 mg chewable tablet aspirin 81 mg chewable tablet   chew and swallow 1 tablet by mouth once daily      sevelamer carbonate (RENVELA) 800 mg tab tab Take 1,600 mg by mouth two (2) times a day. calcium carbonate (TUMS) 200 mg calcium (500 mg) chew Take 1 Tab by mouth as needed. DISPOSITION:  *  Home With:   OT  PT  HH  RN       Long term SNF/Inpatient Rehab    Independent/assisted living    Hospice    Other:       PATIENT CONDITION AT DISCHARGE:     Functional status    Poor     Deconditioned    * Independent      Cognition   *  Lucid     Forgetful     Dementia      Catheters/lines (plus indication)    Nova     PICC     PEG    * None      Code status   *  Full code     DNR      PHYSICAL EXAMINATION AT DISCHARGE:  General:          Alert, cooperative, no distress, appears stated age.      HEENT:           Atraumatic, anicteric sclerae, pink conjunctivae                          No oral ulcers, mucosa moist, throat clear, dentition fair  Neck:               Supple, symmetrical  Lungs:             Clear to auscultation bilaterally. No Wheezing or Rhonchi. No rales. Chest wall:      No tenderness  No Accessory muscle use. Heart:              Regular  rhythm,  No  murmur   No edema  Abdomen:        Soft, non-tender. Not distended. Bowel sounds normal  Extremities:     No cyanosis. No clubbing,                            Skin turgor normal, Capillary refill normal  Skin:                Not pale. Not Jaundiced  No rashes   Psych:             Not anxious or agitated.   Neurologic:      Alert, moves all extremities, answers questions appropriately and responds to commands       CHRONIC MEDICAL DIAGNOSES:  Problem List as of 8/16/2022 Date Reviewed: 6/2/2022            Codes Class Noted - Resolved    Respiratory failure with hypoxia ([facility] ICD-10-CM: J96.91  ICD-9-CM: 518.81  8/14/2022 - Present        Fluid overload ICD-10-CM: E87.70  ICD-9-CM: 276.69  8/14/2022 - Present        Seizure disorder ([facility] ICD-10-CM: G40.909  ICD-9-CM: 345.90  4/27/2017 - Present        Gout ICD-10-CM: M10.9  ICD-9-CM: 274.9  4/27/2017 - Present        Syncope and collapse ICD-10-CM: R55  ICD-9-CM: 780.2  12/19/2011 - Present        Thoracic aortic aneurysm ([facility] ICD-10-CM: I71.2  ICD-9-CM: 441.2  12/2/2011 - Present    Overview Signed 12/2/2011  7:23 AM by Robert Pascal MD     CTA 11/30/11 Ascending aorta 4.4 cm             Abdominal pain ICD-10-CM: R10.9  ICD-9-CM: 789.00  11/29/2011 - Present        HIV (human immunodeficiency virus infection) ([facility] ICD-10-CM: B20  ICD-9-CM: V08  11/29/2011 - Present        ESRD (end stage renal disease) ([facility] ICD-10-CM: N18.6  ICD-9-CM: 585.6  11/29/2011 - Present        Anemia of other chronic disease ICD-10-CM: D63.8  ICD-9-CM: 285.29  11/29/2011 - Present        HTN (hypertension) ICD-10-CM: I10  ICD-9-CM: 401.9  11/29/2011 - Present        Anemia of renal disease ICD-10-CM: N18.9, D63.1  ICD-9-CM: 285.21  9/19/2011 - Present        Elevated LFTs ICD-10-CM: R79.89  ICD-9-CM: 790.6  9/19/2011 - Present        RESOLVED: Acute exacerbation of CHF (congestive heart failure) ([facility] ICD-10-CM: I50.9  ICD-9-CM: 428.0  8/14/2022 - 8/14/2022           Greater than 60 minutes were spent with the patient on counseling and coordination of care    Signed:   Colonel Luis MD  8/16/2022  9:28 AM

## 2022-08-17 ENCOUNTER — PATIENT OUTREACH (OUTPATIENT)
Dept: CASE MANAGEMENT | Age: 57
End: 2022-08-17

## 2022-08-18 ENCOUNTER — TELEPHONE (OUTPATIENT)
Dept: PRIMARY CARE CLINIC | Age: 57
End: 2022-08-18

## 2022-08-18 NOTE — PROGRESS NOTES
Care Transitions Outreach Attempt    Call within 2 business days of discharge: Yes   Attempted to reach patient for transitions of care follow up. Unable to reach patient. Patient: Eva Gambino Patient : 1965 MRN: [id number]    Last Discharge  Street       Date Complaint Diagnosis Description Type Department Provider    22 Cough; Breathing Problem; Wheezing Acute congestive heart failure, unspecified heart failure type ([facility] . .. ED to Hosp-Admission (Discharged) (ADMIT) Justice Bradley MD; Terri Talavera S... 22 Wheezing Chronic congestive heart failure, unspecified heart failure type [facility]) ED (DISCHARGE) Jerrell Conner MD              Was this an external facility discharge?  No Discharge Facility: [facility]      Noted following upcoming appointments from discharge chart review:   [facility] follow up appointment(s):   Future Appointments   Date Time Provider Luis Holliday   2022  4:15 PM PRETTY DePE BS AMB   2022  1:15 PM MD KELSEA Silver BS AMB   2022 10:00 AM PRETTY DePE BS AMB     Non-[facility] follow up appointment(s): none

## 2022-08-18 NOTE — TELEPHONE ENCOUNTER
Care Transitions Initial Follow Up Call    Outreach made within 2 business days of discharge: Yes    Patient: Christ Telles Patient : 1965   MRN: [id number]  Reason for Admission: Trouble Breathing per patient. Discharge Date: 22       Spoke with: Patient. Discharge department/facility: [facility]    TCM Interactive Patient Contact:  Was patient able to fill all prescriptions: Yes  Was patient instructed to bring all medications to the follow-up visit: Yes  Is patient taking all medications as directed in the discharge summary? Yes  Does patient understand their discharge instructions: Yes  Does patient have questions or concerns that need addressed prior to 7-14 day follow up office visit: No    Scheduled appointment with PCP within 7-14 days.  YES    Follow Up  Future Appointments   Date Time Provider Luis Holliday   2022  4:15 PM PRETTY Ocampo [facility]   2022  1:15 PM MD KELSEA Crystal [facility]   2022 10:00 AM Martha Lindquist NP SPCPE [facility]       Mary Lou Shine LPN

## 2022-08-29 ENCOUNTER — OFFICE VISIT (OUTPATIENT)
Dept: PRIMARY CARE CLINIC | Age: 57
End: 2022-08-29
Payer: MEDICARE

## 2022-08-29 VITALS
HEART RATE: 72 BPM | RESPIRATION RATE: 18 BRPM | TEMPERATURE: 97.6 F | WEIGHT: 229.4 LBS | DIASTOLIC BLOOD PRESSURE: 79 MMHG | BODY MASS INDEX: 31.07 KG/M2 | SYSTOLIC BLOOD PRESSURE: 129 MMHG | OXYGEN SATURATION: 98 % | HEIGHT: 72 IN

## 2022-08-29 DIAGNOSIS — Z21 ASYMPTOMATIC HIV INFECTION (HCC): Primary | ICD-10-CM

## 2022-08-29 DIAGNOSIS — I10 ESSENTIAL HYPERTENSION: ICD-10-CM

## 2022-08-29 DIAGNOSIS — R10.9 CHRONIC ABDOMINAL PAIN: ICD-10-CM

## 2022-08-29 DIAGNOSIS — G89.29 CHRONIC ABDOMINAL PAIN: ICD-10-CM

## 2022-08-29 DIAGNOSIS — J81.0 ACUTE PULMONARY EDEMA (HCC): ICD-10-CM

## 2022-08-29 DIAGNOSIS — Z09 HOSPITAL DISCHARGE FOLLOW-UP: ICD-10-CM

## 2022-08-29 DIAGNOSIS — N18.6 STAGE 5 CHRONIC KIDNEY DISEASE ON CHRONIC DIALYSIS (HCC): ICD-10-CM

## 2022-08-29 DIAGNOSIS — Z99.2 STAGE 5 CHRONIC KIDNEY DISEASE ON CHRONIC DIALYSIS (HCC): ICD-10-CM

## 2022-08-29 DIAGNOSIS — Z87.898 HISTORY OF SEIZURES: ICD-10-CM

## 2022-08-29 DIAGNOSIS — I71.20 THORACIC AORTIC ANEURYSM WITHOUT RUPTURE: ICD-10-CM

## 2022-08-29 DIAGNOSIS — J96.01 ACUTE RESPIRATORY FAILURE WITH HYPOXIA (HCC): ICD-10-CM

## 2022-08-29 DIAGNOSIS — E78.2 MIXED HYPERLIPIDEMIA: ICD-10-CM

## 2022-08-29 PROCEDURE — G8427 DOCREV CUR MEDS BY ELIG CLIN: HCPCS | Performed by: NURSE PRACTITIONER

## 2022-08-29 PROCEDURE — 99495 TRANSJ CARE MGMT MOD F2F 14D: CPT | Performed by: NURSE PRACTITIONER

## 2022-08-29 PROCEDURE — 1111F DSCHRG MED/CURRENT MED MERGE: CPT | Performed by: NURSE PRACTITIONER

## 2022-08-29 RX ORDER — ALBUTEROL SULFATE 90 UG/1
1 AEROSOL, METERED RESPIRATORY (INHALATION)
Qty: 18 G | Refills: 1 | Status: SHIPPED | OUTPATIENT
Start: 2022-08-29

## 2022-08-29 RX ORDER — PANTOPRAZOLE SODIUM 40 MG/1
40 TABLET, DELAYED RELEASE ORAL DAILY
COMMUNITY
Start: 2022-06-18

## 2022-08-29 NOTE — PROGRESS NOTES
Chief Complaint   Patient presents with    Hospital Follow Up     Follow up     1. \"Have you been to the ER, urgent care clinic since your last visit? Hospitalized since your last visit? \" No    2. \"Have you seen or consulted any other health care providers outside of the [address] [place] [address] since your last visit? \" No     3. For patients aged 39-70: Has the patient had a colonoscopy / FIT/ Cologuard? No      If the patient is female:    4. For patients aged 41-77: Has the patient had a mammogram within the past 2 years? NA - based on age or sex      11. For patients aged 21-65: Has the patient had a pap smear?  NA - based on age or sex

## 2022-08-30 NOTE — PROGRESS NOTES
Transitional Care Management Progress Note    Patient: Richar Issa  : 1965  PCP: Renea Downing NP    Date of office visit: 2022   Date of admission: 22  Date of discharge: 22  Hospital: [facility]    Call initiated w/i 2 business dates of discharge: Yes   Date of the most recent call to the patient: 2022  1:56 PM      Assessment/Plan:   Diagnoses and all orders for this visit:    1. Asymptomatic HIV infection ([address]    2. Acute respiratory failure with hypoxia (HCC)  -     albuterol (PROVENTIL HFA, VENTOLIN HFA, PROAIR HFA) 90 mcg/actuation inhaler; Take 1 Puff by inhalation every four (4) hours as needed for Wheezing. 3. Acute pulmonary edema (HCC)  XR Results (most recent):  Results from Hospital Encounter encounter on 22    XR CHEST PORT    Narrative  EXAM: Portable CXR. 1330 hours. INDICATION: Shortness of breath    FINDINGS:  The lungs show bilateral airspace disease/edema. Heart is normal in size. There  is no evident pneumothorax or pleural effusion. Impression  Bilateral airspace disease/edema. 22    ECHO ADULT COMPLETE 08/15/2022 8/15/2022    Interpretation Summary  Formatting of this result is different from the original.      Left Ventricle: Normal left ventricular systolic function. EF by 2D Simpsons Biplane is 63%. Left ventricle size is normal. Mildly increased wall thickness. Normal wall motion. Aortic Valve: Mildly calcified cusp. Tricuspid Valve: The estimated RVSP is 33 mmHg. Aorta: Mildly dilated sinus of Valsalva. Signed by: Radha Issa MD on 8/15/2022 10:56 AM     Lab Results   Component Value Date/Time    NT pro-BNP 5,587 (H) 2022 01:38 PM      4. Essential hypertension    5. Stage 5 chronic kidney disease on chronic dialysis [facility])  Lab Results   Component Value Date/Time    Creatinine (POC) 8.6 (H) 2019 09:42 AM    Creatinine 13.40 (H) 2022 01:38 PM      6.  History of seizures    7. Mixed hyperlipidemia  Lab Results   Component Value Date/Time    LDL, calculated 59.4 08/14/2022 09:35 PM        8. Thoracic aortic aneurysm without rupture ([facility] [address]      9. Chronic abdominal pain    Condition is improved and appears likely due to volume overload  New dry weight 104.2 kg and reports breathing back to baseline after dialysis today  Echo showed normal left ventricular systolic function with EF 63% and no significant valve disease  Is scheduled for a stress test at [facility]  Chronic conditions otherwise have been stable  He will continue medications as directed  Continue to encourage following renal diet and getting regular exercise 3-5 times weekly (as permitted by cardiology)  Remains on mon, wed, fri dialysis schedule    Continue care with specialist including cardiology, nephrology and infectious disease      The complexity of medical decision making for this patient's transitional care is moderate        Subjective:   Christ Telles is a 49 y.o. male presenting today for follow-up after hospital discharge. This encounter and supporting documentation was reviewed if available. Medication reconciliation was performed today. The main problem requiring admission was respiratory failure. Complications during admission: none. Other contributory conditions include ESRD, HIV, hypertension, seizures, thoracic aneurysm,chronic abdominal pain and obesity. Reprots feeling better now but was experiencing a lot of cough and wheezing last night. Went to dialysis this am and oxygen was 89%. Received new dry weight and following dialysis, has felt better. Denies any history of asthma or copd. Non-smoker and does not report exposure to second hand. Is scheduled for follow up with cardiology to have stress test. Denies any current chest pain, shortness of breath or other symptoms. Appetite and activity ok.        Interval history/Current status: Stable    Admitting symptoms have: improved      Medications marked \"taking\" at this time:  Prior to Admission medications    Medication Sig Start Date End Date Taking? Authorizing Provider   albuterol (PROVENTIL HFA, VENTOLIN HFA, PROAIR HFA) 90 mcg/actuation inhaler Take 1 Puff by inhalation every four (4) hours as needed for Wheezing. 8/29/22  Yes Isaias Sarmiento NP   pantoprazole (PROTONIX) 40 mg tablet Take 40 mg by mouth daily. 6/18/22   Provider, Historical   azithromycin (Zithromax) 250 mg tablet Take 1 Tablet by mouth in the morning. 8/17/22   Wyatt Beavers MD   metoprolol tartrate (LOPRESSOR) 50 mg tablet Take 1 Tablet by mouth two (2) times a day. 6/2/22   Isaias Sarmiento NP   levETIRAcetam (KEPPRA) 500 mg tablet Take 1 Tablet by mouth two (2) times a day. 6/2/22   Isaias Sarmiento NP   amLODIPine (NORVASC) 10 mg tablet Take 1 Tablet by mouth daily. 6/2/22   Isaias Sarmiento NP   atorvastatin (LIPITOR) 40 mg tablet Take 1 Tablet by mouth daily. 6/2/22   Isaias Sarmiento NP   lidocaine-prilocaine (EMLA) topical cream APPLY SMALL AMOUNT TO ACCESS SITE (AVF) 1 HOUR BEFORE DIALYSIS. COVER WITH OCCLUSIVE DRESSING (SARAN WRAP) 9/21/21   Provider, Historical   dolutegravir (Tivicay) 50 mg tab tablet Take 1 Tablet by mouth daily for 270 days. 9/23/21 8/14/22  Deepthi Horvath MD   atazanavir (REYATAZ) 300 mg capsule Take 1 Capsule by mouth Daily (before breakfast) for 270 days. 9/23/21 8/14/22  Deepthi Horvath MD   ritonavir (NORVIR) 100 mg tablet TAKE 1 TABLET BY MOUTH EVERY DAY WITH MEALS 2/9/21 4/7/23  Deepthi Horvath MD   lidocaine (XYLOCAINE) 4 % topical cream lidocaine HCl 4 % topical cream   ONUR A SML AMT TO AFFECTED SKIN PRN P    Provider, Historical   aspirin 81 mg chewable tablet aspirin 81 mg chewable tablet   chew and swallow 1 tablet by mouth once daily    Provider, Historical   sevelamer carbonate (RENVELA) 800 mg tab tab Take 1,600 mg by mouth two (2) times a day.     Provider, Historical   calcium carbonate (TUMS) 200 mg calcium (500 mg) chew Take 1 Tab by mouth as needed. Provider, Historical        Review of Systems   Constitutional: Negative. HENT: Negative. Eyes:  Negative for pain, discharge and redness. Respiratory:  Positive for cough, shortness of breath and wheezing. Negative for hemoptysis and sputum production. Cardiovascular: Negative. Gastrointestinal:  Negative for blood in stool, constipation, diarrhea, heartburn, nausea and vomiting. Genitourinary: Negative. Musculoskeletal:  Positive for myalgias. Skin: Negative. Neurological: Negative. Patient Active Problem List   Diagnosis Code    Anemia of renal disease N18.9, D63.1    Elevated LFTs R79.89    Abdominal pain R10.9    HIV (human immunodeficiency virus infection) ([facility] [address] B20    ESRD (end stage renal disease) ([facility] [address] N18.6    Anemia of other chronic disease D63.8    HTN (hypertension) I10    Thoracic aortic aneurysm ([facility] [address] I71.2    Syncope and collapse R55    Seizure disorder ([facility] [address] G40.909    Gout M10.9    Respiratory failure with hypoxia ([facility] [address] J96.91    Fluid overload E87.70     Patient Active Problem List    Diagnosis Date Noted    Respiratory failure with hypoxia ([facility] [address] 08/14/2022    Fluid overload 08/14/2022    Seizure disorder ([facility] [address] 04/27/2017    Gout 04/27/2017    Syncope and collapse 12/19/2011    Thoracic aortic aneurysm ([facility] [address] 12/02/2011    Abdominal pain 11/29/2011    HIV (human immunodeficiency virus infection) ([facility] [address] 11/29/2011    ESRD (end stage renal disease) ([facility] [address] 11/29/2011    Anemia of other chronic disease 11/29/2011    HTN (hypertension) 11/29/2011    Anemia of renal disease 09/19/2011    Elevated LFTs 09/19/2011     Current Outpatient Medications   Medication Sig Dispense Refill    albuterol (PROVENTIL HFA, VENTOLIN HFA, PROAIR HFA) 90 mcg/actuation inhaler Take 1 Puff by inhalation every four (4) hours as needed for Wheezing. 18 g 1    pantoprazole (PROTONIX) 40 mg tablet Take 40 mg by mouth daily.       azithromycin (Zithromax) 250 mg tablet Take 1 Tablet by mouth in the morning. 3 Tablet 0    metoprolol tartrate (LOPRESSOR) 50 mg tablet Take 1 Tablet by mouth two (2) times a day. 180 Tablet 1    levETIRAcetam (KEPPRA) 500 mg tablet Take 1 Tablet by mouth two (2) times a day. 180 Tablet 1    amLODIPine (NORVASC) 10 mg tablet Take 1 Tablet by mouth daily. 90 Tablet 1    atorvastatin (LIPITOR) 40 mg tablet Take 1 Tablet by mouth daily. 90 Tablet 1    lidocaine-prilocaine (EMLA) topical cream APPLY SMALL AMOUNT TO ACCESS SITE (AVF) 1 HOUR BEFORE DIALYSIS. COVER WITH OCCLUSIVE DRESSING (SARAN WRAP)      dolutegravir (Tivicay) 50 mg tab tablet Take 1 Tablet by mouth daily for 270 days. 90 Tablet 2    atazanavir (REYATAZ) 300 mg capsule Take 1 Capsule by mouth Daily (before breakfast) for 270 days. 90 Capsule 2    ritonavir (NORVIR) 100 mg tablet TAKE 1 TABLET BY MOUTH EVERY DAY WITH MEALS 90 Tab 2    lidocaine (XYLOCAINE) 4 % topical cream lidocaine HCl 4 % topical cream   ONUR A SML AMT TO AFFECTED SKIN PRN P      aspirin 81 mg chewable tablet aspirin 81 mg chewable tablet   chew and swallow 1 tablet by mouth once daily      sevelamer carbonate (RENVELA) 800 mg tab tab Take 1,600 mg by mouth two (2) times a day. calcium carbonate (TUMS) 200 mg calcium (500 mg) chew Take 1 Tab by mouth as needed. Allergies   Allergen Reactions    Penicillin Unknown (comments)    Percocet [Oxycodone-Acetaminophen] Hives     Past Medical History:   Diagnosis Date    Anemia     Chronic abdominal pain     Chronic kidney disease     dialysis pt. - [facility]    Complication of AV dialysis fistula 2019    2 large pseudo-aneurysm    Dependence on renal dialysis ([facility] [address]     ESRD on dialysis ([facility] [address]     GERD (gastroesophageal reflux disease)     Gout     High cholesterol     History of blood transfusion     at least 8 units over the years    HIV (human immunodeficiency virus infection) ([facility] [address] 2011    Hypertension     Other ill-defined conditions(799.89)     SMALL VEIN IN HEART BEING MONITORED    Pancreatitis     Renal insufficiency     Seizure ([facility] [address] 2012    one time and none sense    Seizures ([facility] [address]     Sepsis ([facility] [address] 2011 & 8/2019     Past Surgical History:   Procedure Laterality Date    COLONOSCOPY N/A 12/14/2021    COLONOSCOPY performed by Odilia Landry MD at [address] ARTERIOVENOUS FISTULA Left 2012    HX CHOLECYSTECTOMY  2011    HX COLONOSCOPY       Family History   Problem Relation Age of Onset    Cancer Brother         colon    Heart Disease Brother     Heart Disease Mother     Heart Disease Nephew      Social History     Tobacco Use    Smoking status: Never    Smokeless tobacco: Never   Substance Use Topics    Alcohol use: Yes     Comment: occassionally          Objective:   Visit Vitals  /79 (BP 1 Location: Left upper arm, BP Patient Position: Sitting, BP Cuff Size: Large adult)   Pulse 72   Temp 97.6 °F (36.4 °C) (Temporal)   Resp 18   Ht 6' (1.829 m)   Wt 229 lb 6.4 oz (104.1 kg)   SpO2 98%   BMI 31.11 kg/m²      Physical Exam  Vitals and nursing note reviewed. Constitutional:       Appearance: Normal appearance. He is obese. Cardiovascular:      Rate and Rhythm: Normal rate and regular rhythm. Pulses: Normal pulses. Heart sounds: Normal heart sounds. Arteriovenous access: Left arteriovenous access is present. Pulmonary:      Effort: Pulmonary effort is normal.      Breath sounds: Normal breath sounds. Abdominal:      General: Bowel sounds are normal.      Palpations: Abdomen is soft. There is no mass. Tenderness: There is no right CVA tenderness, left CVA tenderness or guarding. Musculoskeletal:         General: Normal range of motion. Right lower leg: No edema. Left lower leg: No edema. Skin:     General: Skin is warm and dry. Neurological:      Mental Status: He is alert. Mental status is at baseline.    Psychiatric:         Mood and Affect: Mood normal.         Behavior: Behavior normal.          We discussed the expected course, resolution and complications of the diagnosis(es) in detail. Medication risks, benefits, costs, interactions, and alternatives were discussed as indicated. I advised him to contact the office if his condition worsens, changes or fails to improve as anticipated. He expressed understanding with the diagnosis(es) and plan.      Severo Nims, PRETTY

## 2022-09-02 LAB
ALBUMIN SERPL-MCNC: 5.1 G/DL (ref 3.8–4.9)
ALBUMIN/GLOB SERPL: 1.8 {RATIO} (ref 1.2–2.2)
ALP SERPL-CCNC: 63 IU/L (ref 44–121)
ALT SERPL-CCNC: 5 IU/L (ref 0–44)
AST SERPL-CCNC: 12 IU/L (ref 0–40)
BASOPHILS # BLD AUTO: 0 X10E3/UL (ref 0–0.2)
BASOPHILS NFR BLD AUTO: 1 %
BILIRUB SERPL-MCNC: 1.1 MG/DL (ref 0–1.2)
BUN SERPL-MCNC: 10 MG/DL (ref 6–24)
BUN/CREAT SERPL: 2 (ref 9–20)
CALCIUM SERPL-MCNC: 10 MG/DL (ref 8.7–10.2)
CD3+CD4+ CELLS # BLD: 146 /UL (ref 359–1519)
CD3+CD4+ CELLS NFR BLD: 24.3 % (ref 30.8–58.5)
CHLORIDE SERPL-SCNC: 96 MMOL/L (ref 96–106)
CO2 SERPL-SCNC: 33 MMOL/L (ref 20–29)
CREAT SERPL-MCNC: 4.63 MG/DL (ref 0.76–1.27)
EGFR: 14 ML/MIN/1.73
EOSINOPHIL # BLD AUTO: 0.1 X10E3/UL (ref 0–0.4)
EOSINOPHIL NFR BLD AUTO: 2 %
ERYTHROCYTE [DISTWIDTH] IN BLOOD BY AUTOMATED COUNT: 13.7 % (ref 11.6–15.4)
GLOBULIN SER CALC-MCNC: 2.9 G/DL (ref 1.5–4.5)
GLUCOSE SERPL-MCNC: 78 MG/DL (ref 65–99)
HCT VFR BLD AUTO: 26.4 % (ref 37.5–51)
HGB BLD-MCNC: 8.8 G/DL (ref 13–17.7)
HIV GENOSURE PRIME(SM): NORMAL
HIV1 RNA # SERPL NAA+PROBE: <20 COPIES/ML
HIV1 RNA # SERPL NAA+PROBE: <20 COPIES/ML
HIV1 RNA SERPL NAA+PROBE-LOG#: NORMAL LOG10COPY/ML
HIV1 RNA SERPL NAA+PROBE-LOG#: NORMAL LOG10COPY/ML
IMM GRANULOCYTES # BLD AUTO: 0 X10E3/UL (ref 0–0.1)
IMM GRANULOCYTES NFR BLD AUTO: 0 %
LYMPHOCYTES # BLD AUTO: 0.6 X10E3/UL (ref 0.7–3.1)
LYMPHOCYTES NFR BLD AUTO: 11 %
MCH RBC QN AUTO: 28.9 PG (ref 26.6–33)
MCHC RBC AUTO-ENTMCNC: 33.3 G/DL (ref 31.5–35.7)
MCV RBC AUTO: 87 FL (ref 79–97)
MONOCYTES # BLD AUTO: 0.4 X10E3/UL (ref 0.1–0.9)
MONOCYTES NFR BLD AUTO: 7 %
NEUTROPHILS # BLD AUTO: 3.9 X10E3/UL (ref 1.4–7)
NEUTROPHILS NFR BLD AUTO: 79 %
PLATELET # BLD AUTO: 189 X10E3/UL (ref 150–450)
POTASSIUM SERPL-SCNC: 3.6 MMOL/L (ref 3.5–5.2)
PROT SERPL-MCNC: 8 G/DL (ref 6–8.5)
RBC # BLD AUTO: 3.04 X10E6/UL (ref 4.14–5.8)
SODIUM SERPL-SCNC: 145 MMOL/L (ref 134–144)
WBC # BLD AUTO: 4.9 X10E3/UL (ref 3.4–10.8)

## 2022-09-08 ENCOUNTER — OFFICE VISIT (OUTPATIENT)
Dept: INFECTIOUS DISEASES | Age: 57
End: 2022-09-08
Payer: MEDICARE

## 2022-09-08 VITALS
SYSTOLIC BLOOD PRESSURE: 147 MMHG | TEMPERATURE: 98.7 F | BODY MASS INDEX: 31.67 KG/M2 | HEIGHT: 72 IN | DIASTOLIC BLOOD PRESSURE: 88 MMHG | HEART RATE: 61 BPM | WEIGHT: 233.8 LBS | OXYGEN SATURATION: 94 %

## 2022-09-08 DIAGNOSIS — B20 HIV INFECTION, UNSPECIFIED SYMPTOM STATUS (HCC): Primary | ICD-10-CM

## 2022-09-08 PROCEDURE — G8417 CALC BMI ABV UP PARAM F/U: HCPCS | Performed by: INTERNAL MEDICINE

## 2022-09-08 PROCEDURE — 3017F COLORECTAL CA SCREEN DOC REV: CPT | Performed by: INTERNAL MEDICINE

## 2022-09-08 PROCEDURE — G9231 DOC ESRD DIA TRANS PREG: HCPCS | Performed by: INTERNAL MEDICINE

## 2022-09-08 PROCEDURE — G8510 SCR DEP NEG, NO PLAN REQD: HCPCS | Performed by: INTERNAL MEDICINE

## 2022-09-08 PROCEDURE — 99214 OFFICE O/P EST MOD 30 MIN: CPT | Performed by: INTERNAL MEDICINE

## 2022-09-08 PROCEDURE — G8427 DOCREV CUR MEDS BY ELIG CLIN: HCPCS | Performed by: INTERNAL MEDICINE

## 2022-09-10 RX ORDER — TENOFOVIR ALAFENAMIDE 25 MG/1
25 TABLET ORAL DAILY
Qty: 180 TABLET | Refills: 1 | Status: SHIPPED | OUTPATIENT
Start: 2022-09-10 | End: 2023-03-09

## 2022-09-10 NOTE — PROGRESS NOTES
Baseline SCr ~1-1.2. Of note patient with another MRN in EMR.   SCr on admission 1.7  Likely due to sepsis or hypovolemia from sepsis  - trend SCr   - dose meds per gfr  - avoid nephrotoxins  - urine lytes and physical exam evident of dehydration/poor oral intake. Oral intake encouraged with patient. Subjective  Eva Gambino is a 49 y.o. male. HPI Patient with ESRD on hemodialysis, with long standing HIV infection, found to have CD4 count of only 4/mm3 and VL of 51,700 while on Reyataz/Lamivudine/ Ziagen. He was switched to Rita Jinny and ΑΓΙΟΣ ΦΩΤΙΟΣ. Last CD4 in March 2022 was 193 (13.8%) with VL <20 copies/ml. Repeat CD4 last month was 146/mm3 (24.3%) again with VL <20 copies/ml. He is geetha today for routine follow-up. Interim uneventful except his HIV regimen was somehow altered to include only Tivicay and Reyataz/ritonavir. ΑΓΙΟΣ ΦΩΤΙΟΣ was discontinued in June 2022 by someone named Eb Rodriguez RN. Reasons are unclear. Review of Systems   Constitutional:  Negative for chills, diaphoresis, fever, malaise/fatigue and weight loss. HENT: Negative. Eyes: Negative. Respiratory:  Negative for cough and shortness of breath. Cardiovascular: Negative. Gastrointestinal: Negative. Genitourinary: Negative. Musculoskeletal: Negative. Skin:  Negative for itching and rash. Neurological: Negative. Psychiatric/Behavioral: Negative. Past Medical History:   Diagnosis Date    Anemia     Chronic abdominal pain     Chronic kidney disease     dialysis pt. - [facility]    Complication of AV dialysis fistula 2019    2 large pseudo-aneurysm    Dependence on renal dialysis ([address]     ESRD on dialysis ([address]     GERD (gastroesophageal reflux disease)     Gout     High cholesterol     History of blood transfusion     at least 8 units over the years    HIV (human immunodeficiency virus infection) ([address] 2011    Hypertension     Other ill-defined conditions(799.89)     SMALL VEIN IN HEART BEING MONITORED    Pancreatitis     Renal insufficiency     Seizure ([address] 2012    one time and none sense    Seizures ([address]     Sepsis ([address] 2011 & 8/2019     Past Surgical History:   Procedure Laterality Date    COLONOSCOPY N/A 12/14/2021    COLONOSCOPY performed by Anshul Altman MD at [address] [place] [address] HX ARTERIOVENOUS FISTULA Left 2012    HX CHOLECYSTECTOMY  2011    HX COLONOSCOPY       Family History   Problem Relation Age of Onset    Cancer Brother         colon    Heart Disease Brother     Heart Disease Mother     Heart Disease Nephew      Social History     Socioeconomic History    Marital status: SINGLE     Spouse name: Not on file    Number of children: Not on file    Years of education: Not on file    Highest education level: Not on file   Occupational History    Not on file   Tobacco Use    Smoking status: Never    Smokeless tobacco: Never   Vaping Use    Vaping Use: Never used   Substance and Sexual Activity    Alcohol use: Yes     Comment: occassionally    Drug use: No    Sexual activity: Not Currently   Other Topics Concern    Not on file   Social History Narrative    Not on file     Social Determinants of Health     Financial Resource Strain: Not on file   Food Insecurity: Not on file   Transportation Needs: Not on file   Physical Activity: Not on file   Stress: Not on file   Social Connections: Not on file   Intimate Partner Violence: Not on file   Housing Stability: Not on file       Objective  Physical Exam  Vitals and nursing note reviewed. Constitutional:       Appearance: He is not ill-appearing. HENT:      Head: Normocephalic and atraumatic. Right Ear: External ear normal.      Left Ear: External ear normal.      Nose: Nose normal.      Mouth/Throat:      Pharynx: Oropharynx is clear. Eyes:      Pupils: Pupils are equal, round, and reactive to light. Cardiovascular:      Rate and Rhythm: Normal rate and regular rhythm. Heart sounds: No murmur heard. Pulmonary:      Effort: Pulmonary effort is normal.      Breath sounds: Normal breath sounds. Abdominal:      General: Bowel sounds are normal. There is no distension. Palpations: Abdomen is soft. Tenderness: There is no abdominal tenderness. Musculoskeletal:      Cervical back: Neck supple.       Right lower leg: No edema. Left lower leg: No edema. Skin:     Findings: No rash. Neurological:      General: No focal deficit present. Mental Status: He is alert and oriented to person, place, and time. Psychiatric:         Mood and Affect: Mood normal.         Behavior: Behavior normal.         Thought Content: Thought content normal.         Judgment: Judgment normal.        Assessment & Plan  1. HIV-1 infection, CDC Class A2, with CD4 count 146/mm3 and viral load of <20 copies/ml, clinically stable on Tivicay, boosted Reyataz but Vemlidy was incorrectly discontinued, still with excellent viral suppression. 2. ESRD on hemodialysis      Comment:  Unclear why Vemlidy was discontinued. I contacted his Pharmacy was told that I electronically discontinued it but I see no evidence in his record to that effect. 1. Continue Tivicay 50 mg daily, Reyataz 300 mg/ritonavir 100 mg daily, Restart Vemlidy (Tenofovir AF) 25 mg daily  2. Routine follow-up in 6 months; if still stable at next visit will discuss stretching visits out for 1 year  3. Repeat staging labs with CBC, CMP, CD4 and HIV viral load two weeks prior to next visit.    4. Will try to reach out to Nu Tejada RN regarding discontinuation of Elizabeth Hensley MD

## 2022-09-12 ENCOUNTER — TELEPHONE (OUTPATIENT)
Dept: INTERNAL MEDICINE | Age: 57
End: 2022-09-12

## 2022-09-12 NOTE — TELEPHONE ENCOUNTER
Follow-up with patient's recent hospitalization from 8/14-8/16 and patient has had regular follow-ups with his primary provider Shaylee Cheney as well as recently had follow-up with Dr. Kianna La nephrologist on 8/19.   States that everything is going well no questions or concerns at this time and recommended to continue close follow-up with his primary providers as well as nephrologist.

## 2022-09-25 DIAGNOSIS — B20 SYMPTOMATIC HIV INFECTION (HCC): ICD-10-CM

## 2022-09-26 RX ORDER — DOLUTEGRAVIR SODIUM 50 MG/1
TABLET, FILM COATED ORAL
Qty: 90 TABLET | Refills: 2 | Status: SHIPPED | OUTPATIENT
Start: 2022-09-26

## 2022-11-10 ENCOUNTER — TELEPHONE (OUTPATIENT)
Dept: PRIMARY CARE CLINIC | Age: 57
End: 2022-11-10

## 2022-11-11 DIAGNOSIS — R11.0 NAUSEA: Primary | ICD-10-CM

## 2022-11-11 RX ORDER — ONDANSETRON 8 MG/1
8 TABLET, ORALLY DISINTEGRATING ORAL
Qty: 15 TABLET | Refills: 1 | Status: SHIPPED | OUTPATIENT
Start: 2022-11-11

## 2022-11-15 ENCOUNTER — APPOINTMENT (OUTPATIENT)
Dept: ENDOSCOPY | Age: 57
End: 2022-11-15
Attending: INTERNAL MEDICINE
Payer: MEDICARE

## 2022-11-15 ENCOUNTER — HOSPITAL ENCOUNTER (OUTPATIENT)
Age: 57
Setting detail: OUTPATIENT SURGERY
Discharge: HOME | End: 2022-11-15
Attending: INTERNAL MEDICINE | Admitting: INTERNAL MEDICINE
Payer: MEDICARE

## 2022-11-15 ENCOUNTER — ANESTHESIA EVENT (OUTPATIENT)
Dept: ENDOSCOPY | Age: 57
End: 2022-11-15
Payer: MEDICARE

## 2022-11-15 ENCOUNTER — ANESTHESIA (OUTPATIENT)
Dept: ENDOSCOPY | Age: 57
End: 2022-11-15
Payer: MEDICARE

## 2022-11-15 VITALS
HEART RATE: 67 BPM | DIASTOLIC BLOOD PRESSURE: 64 MMHG | SYSTOLIC BLOOD PRESSURE: 124 MMHG | TEMPERATURE: 97.5 F | OXYGEN SATURATION: 99 % | RESPIRATION RATE: 16 BRPM

## 2022-11-15 PROCEDURE — 77030021593 HC FCPS BIOP ENDOSC BSC -A: Performed by: INTERNAL MEDICINE

## 2022-11-15 PROCEDURE — 77030009426 HC FCPS BIOP ENDOSC BSC -B: Performed by: INTERNAL MEDICINE

## 2022-11-15 PROCEDURE — 76040000008: Performed by: INTERNAL MEDICINE

## 2022-11-15 PROCEDURE — 88305 TISSUE EXAM BY PATHOLOGIST: CPT

## 2022-11-15 PROCEDURE — 74011250636 HC RX REV CODE- 250/636: Performed by: ANESTHESIOLOGY

## 2022-11-15 PROCEDURE — 76060000033 HC ANESTHESIA 1 TO 1.5 HR: Performed by: INTERNAL MEDICINE

## 2022-11-15 PROCEDURE — 74011250636 HC RX REV CODE- 250/636: Performed by: INTERNAL MEDICINE

## 2022-11-15 PROCEDURE — 74011000250 HC RX REV CODE- 250: Performed by: ANESTHESIOLOGY

## 2022-11-15 PROCEDURE — 2709999900 HC NON-CHARGEABLE SUPPLY: Performed by: INTERNAL MEDICINE

## 2022-11-15 RX ORDER — SODIUM CHLORIDE, SODIUM LACTATE, POTASSIUM CHLORIDE, CALCIUM CHLORIDE 600; 310; 30; 20 MG/100ML; MG/100ML; MG/100ML; MG/100ML
INJECTION, SOLUTION INTRAVENOUS
Status: DISCONTINUED | OUTPATIENT
Start: 2022-11-15 | End: 2022-11-15 | Stop reason: HOSPADM

## 2022-11-15 RX ORDER — PROPOFOL 10 MG/ML
INJECTION, EMULSION INTRAVENOUS AS NEEDED
Status: DISCONTINUED | OUTPATIENT
Start: 2022-11-15 | End: 2022-11-15 | Stop reason: HOSPADM

## 2022-11-15 RX ORDER — LIDOCAINE HYDROCHLORIDE 20 MG/ML
INJECTION, SOLUTION EPIDURAL; INFILTRATION; INTRACAUDAL; PERINEURAL AS NEEDED
Status: DISCONTINUED | OUTPATIENT
Start: 2022-11-15 | End: 2022-11-15 | Stop reason: HOSPADM

## 2022-11-15 RX ORDER — MESALAMINE 0.38 G/1
1.5 CAPSULE, EXTENDED RELEASE ORAL 3 TIMES DAILY
COMMUNITY

## 2022-11-15 RX ORDER — SODIUM CHLORIDE 9 MG/ML
20 INJECTION, SOLUTION INTRAVENOUS CONTINUOUS
Status: DISCONTINUED | OUTPATIENT
Start: 2022-11-15 | End: 2022-11-15 | Stop reason: HOSPADM

## 2022-11-15 RX ADMIN — SODIUM CHLORIDE, POTASSIUM CHLORIDE, SODIUM LACTATE AND CALCIUM CHLORIDE: 600; 310; 30; 20 INJECTION, SOLUTION INTRAVENOUS at 10:44

## 2022-11-15 RX ADMIN — LIDOCAINE HYDROCHLORIDE 100 MG: 20 INJECTION, SOLUTION EPIDURAL; INFILTRATION; INTRACAUDAL; PERINEURAL at 11:02

## 2022-11-15 RX ADMIN — PROPOFOL 50 MG: 10 INJECTION, EMULSION INTRAVENOUS at 11:17

## 2022-11-15 RX ADMIN — PROPOFOL 100 MG: 10 INJECTION, EMULSION INTRAVENOUS at 11:02

## 2022-11-15 RX ADMIN — SODIUM CHLORIDE 20 ML/HR: 9 INJECTION, SOLUTION INTRAVENOUS at 09:49

## 2022-11-15 RX ADMIN — PROPOFOL 50 MG: 10 INJECTION, EMULSION INTRAVENOUS at 11:22

## 2022-11-15 RX ADMIN — PROPOFOL 50 MG: 10 INJECTION, EMULSION INTRAVENOUS at 11:12

## 2022-11-15 RX ADMIN — PROPOFOL 50 MG: 10 INJECTION, EMULSION INTRAVENOUS at 11:07

## 2022-11-15 NOTE — ANESTHESIA POSTPROCEDURE EVALUATION
Procedure(s):  COLONOSCOPY/EGD  ESOPHAGOGASTRODUODENOSCOPY (EGD)  ESOPHAGOGASTRODUODENAL (EGD) BIOPSY  COLON BIOPSY. total IV anesthesia, MAC    Anesthesia Post Evaluation      Multimodal analgesia: multimodal analgesia not used between 6 hours prior to anesthesia start to PACU discharge  Patient location during evaluation: bedside (Endoscopy suite)  Patient participation: complete - patient cannot participate  Level of consciousness: sleepy but conscious  Pain score: 0  Pain management: adequate  Airway patency: patent  Anesthetic complications: no  Cardiovascular status: acceptable  Respiratory status: acceptable and nasal cannula  Hydration status: acceptable  Comments: This patient remained on the stretcher. The patient was handed off to the endoscopy nursing team.  All questions regarding pre-, intra-, and postoperative care were answered. Post anesthesia nausea and vomiting:  none      INITIAL Post-op Vital signs: No vitals data found for the desired time range.

## 2022-11-15 NOTE — PERIOP NOTES
Patient alert and oriented x4, VS stable, no complaints of pain at this time. No one at bedside. Discharge instructions/education provided to patient, he didn't want me to discuss them with sister,he verbalized understanding and had no questions. Patient was discharged in wheelchair to main entrance of hospital with sister to home via private vehicle.

## 2022-11-15 NOTE — ANESTHESIA PREPROCEDURE EVALUATION
Relevant Problems   NEUROLOGY   (+) Seizure disorder (HCC)      CARDIOVASCULAR   (+) HTN (hypertension)      RENAL FAILURE   (+) Anemia of renal disease   (+) ESRD (end stage renal disease) (HCC)      HEMATOLOGY   (+) Anemia of other chronic disease   (+) Anemia of renal disease     EGD/CLN 2021 with 400mg propofol    Anesthetic History   No history of anesthetic complications            Review of Systems / Medical History  Patient summary reviewed and pertinent labs reviewed    Pulmonary                Comments: SNORING   Neuro/Psych     seizures: well controlled        Comments: Last seizure 2012. SYNCOPE  Cardiovascular    Hypertension              Exercise tolerance: <4 METS  Comments: Hx of thoracic aortic aneurysm (Being monitored)    TTE 2022:    Left Ventricle: Normal left ventricular systolic function. EF by 2D [employer] Biplane is 63%. Left ventricle size is normal. Mildly increased wall thickness. Normal wall motion.   Aortic Valve: Mildly calcified cusp.   Tricuspid Valve: The estimated RVSP is 33 mmHg.   Aorta: Mildly dilated sinus of Valsalva. GI/Hepatic/Renal     GERD: well controlled    Renal disease: dialysis and ESRD      Comments: Pancreatitis. Elevatged LFT's Endo/Other        Cancer and anemia (Hb/Hb 10.5/31. 0)    Comments: Hx of sepsis. HIV  GOUT Other Findings   Comments: +HIV         Past Medical History:   Diagnosis Date    Anemia     Chronic abdominal pain     Chronic kidney disease     dialysis pt. - M. W.[facility]    Complication of AV dialysis fistula 2019    2 large pseudo-aneurysm    Dependence on renal dialysis ([address]     Esophagitis     ESRD on dialysis ([address]     Gastritis     GERD (gastroesophageal reflux disease)     Gout     High cholesterol     History of blood transfusion     at least 8 units over the years    HIV (human immunodeficiency virus infection) ([address] 2011    Hypertension     Other ill-defined conditions(799.89)     SMALL VEIN IN HEART BEING MONITORED  Pancreatitis     Renal insufficiency     Seizure ([facility] 2012    one time and none sense    Seizures ([facility]     Sepsis ([facility] 2011 & 8/2019       Past Surgical History:   Procedure Laterality Date    COLONOSCOPY N/A 12/14/2021    COLONOSCOPY performed by Lamont Spring MD at [facility] HX ARTERIOVENOUS FISTULA Left 2012    HX CHOLECYSTECTOMY  2011    HX COLONOSCOPY         Current Outpatient Medications   Medication Instructions    albuterol (PROVENTIL HFA, VENTOLIN HFA, PROAIR HFA) 90 mcg/actuation inhaler 1 Puff, Inhalation, EVERY 4 HOURS AS NEEDED    amLODIPine (NORVASC) 10 mg, Oral, DAILY    aspirin 81 mg chewable tablet aspirin 81 mg chewable tablet   chew and swallow 1 tablet by mouth once daily    atazanavir (REYATAZ) 300 mg, Oral, DAILY BEFORE BREAKFAST    atorvastatin (LIPITOR) 40 mg, Oral, DAILY    azithromycin (ZITHROMAX) 250 mg, Oral, DAILY    calcium carbonate (TUMS) 200 mg calcium (500 mg) chew 1 Tablet, Oral, AS NEEDED    levETIRAcetam (KEPPRA) 500 mg, Oral, 2 TIMES DAILY    lidocaine (XYLOCAINE) 4 % topical cream lidocaine HCl 4 % topical cream   ONUR A SML AMT TO AFFECTED SKIN PRN P    lidocaine-prilocaine (EMLA) topical cream APPLY SMALL AMOUNT TO ACCESS SITE (AVF) 1 HOUR BEFORE DIALYSIS. COVER WITH OCCLUSIVE DRESSING (SARAN WRAP)    metoprolol tartrate (LOPRESSOR) 50 mg, Oral, 2 TIMES DAILY    ondansetron (ZOFRAN ODT) 8 mg, Oral, EVERY 8 HOURS AS NEEDED    pantoprazole (PROTONIX) 40 mg, Oral, DAILY    ritonavir (NORVIR) 100 mg tablet TAKE 1 TABLET BY MOUTH EVERY DAY WITH MEALS    sevelamer carbonate (RENVELA) 1,600 mg, Oral, 2 TIMES DAILY    Tivicay 50 mg tab tablet TAKE 1 TABLET BY MOUTH DAILY    Vemlidy 25 mg, Oral, DAILY       No current facility-administered medications for this encounter.      Current Outpatient Medications   Medication Sig    ondansetron (ZOFRAN ODT) 8 mg disintegrating tablet Take 1 Tablet by mouth every eight (8) hours as needed for Nausea or Vomiting.  Tivicay 50 mg tab tablet TAKE 1 TABLET BY MOUTH DAILY    tenofovir ALAFENAMIDE FUMARATE (Vemlidy) 25 mg tablet Take 1 Tablet by mouth daily for 180 days.  pantoprazole (PROTONIX) 40 mg tablet Take 40 mg by mouth daily.  albuterol (PROVENTIL HFA, VENTOLIN HFA, PROAIR HFA) 90 mcg/actuation inhaler Take 1 Puff by inhalation every four (4) hours as needed for Wheezing.  azithromycin (Zithromax) 250 mg tablet Take 1 Tablet by mouth in the morning.  metoprolol tartrate (LOPRESSOR) 50 mg tablet Take 1 Tablet by mouth two (2) times a day.  levETIRAcetam (KEPPRA) 500 mg tablet Take 1 Tablet by mouth two (2) times a day.  amLODIPine (NORVASC) 10 mg tablet Take 1 Tablet by mouth daily.  atorvastatin (LIPITOR) 40 mg tablet Take 1 Tablet by mouth daily.  lidocaine-prilocaine (EMLA) topical cream APPLY SMALL AMOUNT TO ACCESS SITE (AVF) 1 HOUR BEFORE DIALYSIS. COVER WITH OCCLUSIVE DRESSING (SARAN WRAP)    atazanavir (REYATAZ) 300 mg capsule Take 1 Capsule by mouth Daily (before breakfast) for 270 days.  ritonavir (NORVIR) 100 mg tablet TAKE 1 TABLET BY MOUTH EVERY DAY WITH MEALS    lidocaine (XYLOCAINE) 4 % topical cream lidocaine HCl 4 % topical cream   ONUR A SML AMT TO AFFECTED SKIN PRN P    aspirin 81 mg chewable tablet aspirin 81 mg chewable tablet   chew and swallow 1 tablet by mouth once daily    sevelamer carbonate (RENVELA) 800 mg tab tab Take 1,600 mg by mouth two (2) times a day.  calcium carbonate (TUMS) 200 mg calcium (500 mg) chew Take 1 Tab by mouth as needed. No data found.     Lab Results   Component Value Date/Time    WBC 4.9 08/31/2022 09:52 AM    Hemoglobin (POC) 9.9 (L) 03/13/2012 09:24 AM    HGB 8.8 (L) 08/31/2022 09:52 AM    Hematocrit (POC) 35 (L) 11/14/2019 09:42 AM    HCT 26.4 (L) 08/31/2022 09:52 AM    PLATELET 824 71/08/6210 09:52 AM    MCV 87 08/31/2022 09:52 AM     Lab Results   Component Value Date/Time    Sodium 145 (H) 08/31/2022 09:52 AM Potassium 3.6 08/31/2022 09:52 AM    Chloride 96 08/31/2022 09:52 AM    CO2 33 (H) 08/31/2022 09:52 AM    Anion gap 11 08/14/2022 01:38 PM    Glucose 78 08/31/2022 09:52 AM    BUN 10 08/31/2022 09:52 AM    Creatinine 4.63 (H) 08/31/2022 09:52 AM    BUN/Creatinine ratio 2 (L) 08/31/2022 09:52 AM    GFR est AA 5 (L) 08/14/2022 01:38 PM    GFR est non-AA 4 (L) 08/14/2022 01:38 PM    Calcium 10.0 08/31/2022 09:52 AM     No results found for: APTT, PTP, INR, INREXT  Lab Results   Component Value Date/Time    Glucose 78 08/31/2022 09:52 AM    Glucose (POC) 80 11/14/2019 09:42 AM    Glucose (POC) 105 12/21/2011 05:33 PM         Physical Exam    Airway  Mallampati: III  TM Distance: 4 - 6 cm  Neck ROM: normal range of motion   Mouth opening: Normal     Cardiovascular    Rhythm: regular  Rate: normal         Dental      Comments: MISSING TEETH. Pulmonary  Breath sounds clear to auscultation               Abdominal  GI exam deferred       Other Findings            Anesthetic Plan    ASA: 3  Anesthesia type: total IV anesthesia and MAC - supraclavicular block    Monitoring Plan: Continuous noninvasive hemodynamic monitoring      Induction: Intravenous  Anesthetic plan and risks discussed with: Patient      Informed consent obtained.

## 2022-12-01 NOTE — PROGRESS NOTES
HISTORY OF PRESENT ILLNESS  HPI  Merriam Burkitt is a 56 y.o. male who presents for evaluation of anal warts, diverticulitis, and hemorrhoids which were found on his last colonoscopy/EGD with Dr. Flip Chong on 11/15/22. Patient Active Problem List    Diagnosis Date Noted    Respiratory failure with hypoxia ([facility] 08/14/2022    Fluid overload 08/14/2022    Seizure disorder ([facility] 04/27/2017    Gout 04/27/2017    Syncope and collapse 12/19/2011    Thoracic aortic aneurysm 12/02/2011    Abdominal pain 11/29/2011    HIV (human immunodeficiency virus infection) ([facility] 11/29/2011    ESRD (end stage renal disease) ([facility] 11/29/2011    Anemia of other chronic disease 11/29/2011    HTN (hypertension) 11/29/2011    Anemia of renal disease 09/19/2011    Elevated LFTs 09/19/2011     Current Outpatient Medications   Medication Sig Dispense Refill    mesalamine ER (APRISO) 0.375 gram 24 hour capsule Take 1.5 g by mouth three (3) times daily. ondansetron (ZOFRAN ODT) 8 mg disintegrating tablet Take 1 Tablet by mouth every eight (8) hours as needed for Nausea or Vomiting. 15 Tablet 1    Tivicay 50 mg tab tablet TAKE 1 TABLET BY MOUTH DAILY 90 Tablet 2    tenofovir ALAFENAMIDE FUMARATE (Vemlidy) 25 mg tablet Take 1 Tablet by mouth daily for 180 days. 180 Tablet 1    pantoprazole (PROTONIX) 40 mg tablet Take 40 mg by mouth daily. albuterol (PROVENTIL HFA, VENTOLIN HFA, PROAIR HFA) 90 mcg/actuation inhaler Take 1 Puff by inhalation every four (4) hours as needed for Wheezing. 18 g 1    azithromycin (Zithromax) 250 mg tablet Take 1 Tablet by mouth in the morning. 3 Tablet 0    metoprolol tartrate (LOPRESSOR) 50 mg tablet Take 1 Tablet by mouth two (2) times a day. 180 Tablet 1    levETIRAcetam (KEPPRA) 500 mg tablet Take 1 Tablet by mouth two (2) times a day. 180 Tablet 1    amLODIPine (NORVASC) 10 mg tablet Take 1 Tablet by mouth daily. 90 Tablet 1    atorvastatin (LIPITOR) 40 mg tablet Take 1 Tablet by mouth daily.  90 Tablet 1    lidocaine-prilocaine (EMLA) topical cream APPLY SMALL AMOUNT TO ACCESS SITE (AVF) 1 HOUR BEFORE DIALYSIS. COVER WITH OCCLUSIVE DRESSING (SARAN WRAP)      atazanavir (REYATAZ) 300 mg capsule Take 1 Capsule by mouth Daily (before breakfast) for 270 days. 90 Capsule 2    ritonavir (NORVIR) 100 mg tablet TAKE 1 TABLET BY MOUTH EVERY DAY WITH MEALS 90 Tab 2    lidocaine (XYLOCAINE) 4 % topical cream lidocaine HCl 4 % topical cream   ONUR A SML AMT TO AFFECTED SKIN PRN P      aspirin 81 mg chewable tablet aspirin 81 mg chewable tablet   chew and swallow 1 tablet by mouth once daily      sevelamer carbonate (RENVELA) 800 mg tab tab Take 1,600 mg by mouth two (2) times a day. calcium carbonate (TUMS) 200 mg calcium (500 mg) chew Take 1 Tab by mouth as needed.        Past Surgical History:   Procedure Laterality Date    COLONOSCOPY N/A 12/14/2021    COLONOSCOPY performed by Yi Aiken MD at [facility] N/A 11/15/2022    COLONOSCOPY/EGD performed by Yi Aiken MD at [address] [place] [address] Av Left 2012    HX CHOLECYSTECTOMY  2011    HX COLONOSCOPY        Lab Results   Component Value Date/Time    WBC 4.9 08/31/2022 09:52 AM    HGB 8.8 (L) 08/31/2022 09:52 AM    Hemoglobin (POC) 9.9 (L) 03/13/2012 09:24 AM    HCT 26.4 (L) 08/31/2022 09:52 AM    Hematocrit (POC) 35 (L) 11/14/2019 09:42 AM    PLATELET 310 98/23/4513 09:52 AM    MCV 87 08/31/2022 09:52 AM     Lab Results   Component Value Date/Time    Cholesterol, total 153 08/14/2022 09:35 PM    HDL Cholesterol 78 08/14/2022 09:35 PM    LDL, calculated 59.4 08/14/2022 09:35 PM    Triglyceride 78 08/14/2022 09:35 PM    CHOL/HDL Ratio 2.0 08/14/2022 09:35 PM     Lab Results   Component Value Date/Time    GFR est non-AA 4 (L) 08/14/2022 01:38 PM    GFRNA, POC 7 (L) 11/14/2019 09:42 AM    GFR est AA 5 (L) 08/14/2022 01:38 PM    GFRAA, POC 8 (L) 11/14/2019 09:42 AM    Creatinine 4.63 (H) 08/31/2022 09:52 AM    Creatinine (POC) 8.6 (H) 11/14/2019 09:42 AM    BUN 10 08/31/2022 09:52 AM    BUN (POC) 22 (H) 11/14/2019 09:42 AM    Sodium 145 (H) 08/31/2022 09:52 AM    Sodium (POC) 136 11/14/2019 09:42 AM    Potassium 3.6 08/31/2022 09:52 AM    Potassium (POC) 5.8 (H) 11/14/2019 09:42 AM    Chloride 96 08/31/2022 09:52 AM    Chloride (POC) 102 11/14/2019 09:42 AM    CO2 33 (H) 08/31/2022 09:52 AM    Magnesium 2.3 08/14/2022 01:38 PM    Phosphorus 5.7 (H) 12/02/2011 03:40 AM        Review of Systems   All other systems reviewed and are negative. Physical Exam  Constitutional:       General: He is not in acute distress. Appearance: Normal appearance. He is not ill-appearing, toxic-appearing or diaphoretic. HENT:      Head: Normocephalic and atraumatic. Right Ear: External ear normal.      Left Ear: External ear normal.   Eyes:      General:         Right eye: No discharge. Left eye: No discharge. Conjunctiva/sclera: Conjunctivae normal.      Pupils: Pupils are equal, round, and reactive to light. Cardiovascular:      Rate and Rhythm: Normal rate and regular rhythm. Heart sounds: No murmur heard. No friction rub. No gallop. Pulmonary:      Effort: No respiratory distress. Breath sounds: Normal breath sounds. No wheezing or rales. Chest:      Chest wall: No tenderness. Musculoskeletal:         General: Normal range of motion. Cervical back: Normal.   Skin:     General: Skin is warm and dry. Neurological:      General: No focal deficit present. Mental Status: He is oriented to person, place, and time. Gait: Gait normal.   Psychiatric:         Mood and Affect: Mood normal.         Behavior: Behavior normal.       ASSESSMENT and PLAN  {No Diagnosis Found}  Scribed by Romana Priest of [facility], as dictated by Dr. Debroah Mcardle. Current diagnosis and concerns discussed with pt at length.  Pt understands risks and benefits or current treatment plan and medications, and accepts the treatment and medication with any possible risks. Pt asks appropriate questions, which were answered. Pt was instructed to call with any concerns or problems. I have reviewed the note documented by the scribe. The services provided are my own. The documentation is accurate.

## 2022-12-07 ENCOUNTER — OFFICE VISIT (OUTPATIENT)
Dept: SURGERY | Age: 57
End: 2022-12-07
Payer: MEDICARE

## 2022-12-07 VITALS
BODY MASS INDEX: 31.69 KG/M2 | OXYGEN SATURATION: 97 % | DIASTOLIC BLOOD PRESSURE: 79 MMHG | SYSTOLIC BLOOD PRESSURE: 122 MMHG | HEART RATE: 75 BPM | HEIGHT: 72 IN | RESPIRATION RATE: 16 BRPM | WEIGHT: 234 LBS | TEMPERATURE: 98 F

## 2022-12-07 DIAGNOSIS — A63.0 ANAL CONDYLOMA: Primary | ICD-10-CM

## 2022-12-07 PROCEDURE — 99204 OFFICE O/P NEW MOD 45 MIN: CPT | Performed by: COLON & RECTAL SURGERY

## 2022-12-07 PROCEDURE — 3074F SYST BP LT 130 MM HG: CPT | Performed by: COLON & RECTAL SURGERY

## 2022-12-07 PROCEDURE — G8510 SCR DEP NEG, NO PLAN REQD: HCPCS | Performed by: COLON & RECTAL SURGERY

## 2022-12-07 PROCEDURE — G8427 DOCREV CUR MEDS BY ELIG CLIN: HCPCS | Performed by: COLON & RECTAL SURGERY

## 2022-12-07 PROCEDURE — 3017F COLORECTAL CA SCREEN DOC REV: CPT | Performed by: COLON & RECTAL SURGERY

## 2022-12-07 PROCEDURE — G8417 CALC BMI ABV UP PARAM F/U: HCPCS | Performed by: COLON & RECTAL SURGERY

## 2022-12-07 PROCEDURE — 3078F DIAST BP <80 MM HG: CPT | Performed by: COLON & RECTAL SURGERY

## 2022-12-07 NOTE — LETTER
1/11/2023    Patient: Mahi Browne   YOB: 1965   Date of Visit: 12/7/2022     Shae Hood NP  [id number] [facility]  Via In Basket    Dear Shae Hood NP,      Thank you for referring Mr. Alexsandra Villanueva to [address] [place] [address] for evaluation. My notes for this consultation are attached. If you have questions, please do not hesitate to call me. I look forward to following your patient along with you.       Sincerely,    Jose Anaya MD

## 2022-12-07 NOTE — PROGRESS NOTES
Identified pt with two pt identifiers (name and ). Reviewed chart in preparation for visit and have obtained necessary documentation.     Jennifer Alatorre is a 56 y.o. male  Chief Complaint   Patient presents with    New Patient     Ref by      Hemorrhoids     Visit Vitals  /79 (BP 1 Location: Right arm, BP Patient Position: Sitting)   Pulse 75   Temp 98 °F (36.7 °C) (Temporal)   Resp 16   Ht 6' (1.829 m)   Wt 234 lb (106.1 kg)   SpO2 97%   BMI 31.74 kg/m²

## 2022-12-23 DIAGNOSIS — E78.2 MIXED HYPERLIPIDEMIA: ICD-10-CM

## 2022-12-26 RX ORDER — ATORVASTATIN CALCIUM 40 MG/1
TABLET, FILM COATED ORAL
Qty: 90 TABLET | Refills: 1 | Status: SHIPPED | OUTPATIENT
Start: 2022-12-26

## 2023-01-03 ENCOUNTER — OFFICE VISIT (OUTPATIENT)
Dept: PRIMARY CARE CLINIC | Age: 58
End: 2023-01-03
Payer: MEDICARE

## 2023-01-03 VITALS
RESPIRATION RATE: 18 BRPM | HEART RATE: 76 BPM | DIASTOLIC BLOOD PRESSURE: 84 MMHG | SYSTOLIC BLOOD PRESSURE: 132 MMHG | HEIGHT: 72 IN | OXYGEN SATURATION: 98 % | WEIGHT: 232.8 LBS | TEMPERATURE: 98 F | BODY MASS INDEX: 31.53 KG/M2

## 2023-01-03 DIAGNOSIS — Z99.2 STAGE 5 CHRONIC KIDNEY DISEASE ON CHRONIC DIALYSIS (HCC): ICD-10-CM

## 2023-01-03 DIAGNOSIS — I10 ESSENTIAL HYPERTENSION: ICD-10-CM

## 2023-01-03 DIAGNOSIS — K21.9 GASTROESOPHAGEAL REFLUX DISEASE WITHOUT ESOPHAGITIS: Primary | ICD-10-CM

## 2023-01-03 DIAGNOSIS — I71.20 THORACIC AORTIC ANEURYSM WITHOUT RUPTURE, UNSPECIFIED PART: ICD-10-CM

## 2023-01-03 DIAGNOSIS — E78.2 MIXED HYPERLIPIDEMIA: ICD-10-CM

## 2023-01-03 DIAGNOSIS — R10.9 CHRONIC ABDOMINAL PAIN: ICD-10-CM

## 2023-01-03 DIAGNOSIS — Z21 ASYMPTOMATIC HIV INFECTION (HCC): ICD-10-CM

## 2023-01-03 DIAGNOSIS — G40.909 SEIZURE DISORDER (HCC): ICD-10-CM

## 2023-01-03 DIAGNOSIS — Z87.898 HISTORY OF SEIZURES: ICD-10-CM

## 2023-01-03 DIAGNOSIS — N18.6 STAGE 5 CHRONIC KIDNEY DISEASE ON CHRONIC DIALYSIS (HCC): ICD-10-CM

## 2023-01-03 DIAGNOSIS — G89.29 CHRONIC ABDOMINAL PAIN: ICD-10-CM

## 2023-01-03 PROCEDURE — G8427 DOCREV CUR MEDS BY ELIG CLIN: HCPCS | Performed by: NURSE PRACTITIONER

## 2023-01-03 PROCEDURE — G8432 DEP SCR NOT DOC, RNG: HCPCS | Performed by: NURSE PRACTITIONER

## 2023-01-03 PROCEDURE — 99214 OFFICE O/P EST MOD 30 MIN: CPT | Performed by: NURSE PRACTITIONER

## 2023-01-03 PROCEDURE — G8417 CALC BMI ABV UP PARAM F/U: HCPCS | Performed by: NURSE PRACTITIONER

## 2023-01-03 PROCEDURE — 3017F COLORECTAL CA SCREEN DOC REV: CPT | Performed by: NURSE PRACTITIONER

## 2023-01-03 PROCEDURE — 3074F SYST BP LT 130 MM HG: CPT | Performed by: NURSE PRACTITIONER

## 2023-01-03 PROCEDURE — 3078F DIAST BP <80 MM HG: CPT | Performed by: NURSE PRACTITIONER

## 2023-01-03 RX ORDER — AMLODIPINE BESYLATE 10 MG/1
10 TABLET ORAL DAILY
Qty: 90 TABLET | Refills: 1 | Status: SHIPPED | OUTPATIENT
Start: 2023-01-03

## 2023-01-03 RX ORDER — METOPROLOL TARTRATE 50 MG/1
50 TABLET ORAL 2 TIMES DAILY
Qty: 180 TABLET | Refills: 1 | Status: SHIPPED | OUTPATIENT
Start: 2023-01-03

## 2023-01-03 RX ORDER — LEVETIRACETAM 500 MG/1
500 TABLET ORAL 2 TIMES DAILY
Qty: 180 TABLET | Refills: 1 | Status: SHIPPED | OUTPATIENT
Start: 2023-01-03

## 2023-01-03 RX ORDER — FAMOTIDINE 40 MG/1
40 TABLET, FILM COATED ORAL 2 TIMES DAILY
Qty: 180 TABLET | Refills: 1 | Status: SHIPPED | OUTPATIENT
Start: 2023-01-03

## 2023-01-03 NOTE — PROGRESS NOTES
Progress Note    Patient: Pato Stewart  : 1965  PCP: Luis Daniel Covarrubias NP    Assessment/Plan:   Diagnoses and all orders for this visit:    1. Asymptomatic HIV infection ([facility] [address]  Continues care with infectious disease    2. Essential hypertension  Blood pressure is at goal   Continue amlodipine, metoprolol as directed  Monitor at home and notify provider if > 140/90    3. Stage 5 chronic kidney disease on chronic dialysis [facility])  Lab Results   Component Value Date/Time    Creatinine (POC) 8.6 (H) 2019 09:42 AM    Creatinine 4.63 (H) 2022 09:52 AM   Remains on mon, wed, fri dialysis schedule  Continue to encourage following renal diet and getting regular exercise 3-5 times weekly     4. History of seizures  Continues on keppra as directed    5. Mixed hyperlipidemia  Lab Results   Component Value Date/Time    LDL, calculated 59.4 2022 09:35 PM   Continues on atorvastatin as directed    6. Thoracic aortic aneurysm without rupture ([facility] [address]  Continues care with CT surgery    7. Chronic abdominal pain  Stable    8. GERD  Was started on PPI therapy by GI  This is improved and going to switch to famotidine 20mg twice daily as PPI therapy interacts with Reyataz  Follow-up and Dispositions    Return in about 6 months (around 7/3/2023) for or sooner for worsening symptoms. Subjective:   Pato Stewart is a 49 y.o. male presenting today for follow-up of chronic conditions with PMH of  ESRD, HIV, hypertension, seizures, thoracic aneurysm,chronic abdominal pain and obesity. Continues care with nephrology, cardiology, CT surgery and infectious disease. Reports taking medications as directed with no recent change. Has started working a part time job to help keep himself busy. Has had some intermittent pains in his legs after dialysis but not daily or prolonged. No current pain.        Medications marked \"taking\" at this time:  Prior to Admission medications    Medication Sig Start Date End Date Taking? Authorizing Provider   albuterol (PROVENTIL HFA, VENTOLIN HFA, PROAIR HFA) 90 mcg/actuation inhaler Take 1 Puff by inhalation every four (4) hours as needed for Wheezing. 8/29/22  Yes Alex Phan NP   pantoprazole (PROTONIX) 40 mg tablet Take 40 mg by mouth daily. 6/18/22   Provider, Historical   azithromycin (Zithromax) 250 mg tablet Take 1 Tablet by mouth in the morning. 8/17/22   Balta Stallings MD   metoprolol tartrate (LOPRESSOR) 50 mg tablet Take 1 Tablet by mouth two (2) times a day. 6/2/22   Alex Phan NP   levETIRAcetam (KEPPRA) 500 mg tablet Take 1 Tablet by mouth two (2) times a day. 6/2/22   Alex Phan NP   amLODIPine (NORVASC) 10 mg tablet Take 1 Tablet by mouth daily. 6/2/22   Alex Phan NP   atorvastatin (LIPITOR) 40 mg tablet Take 1 Tablet by mouth daily. 6/2/22   Alex Phan NP   lidocaine-prilocaine (EMLA) topical cream APPLY SMALL AMOUNT TO ACCESS SITE (AVF) 1 HOUR BEFORE DIALYSIS. COVER WITH OCCLUSIVE DRESSING (SARAN WRAP) 9/21/21   Provider, Historical   dolutegravir (Tivicay) 50 mg tab tablet Take 1 Tablet by mouth daily for 270 days. 9/23/21 8/14/22  Aminta Harris MD   atazanavir (REYATAZ) 300 mg capsule Take 1 Capsule by mouth Daily (before breakfast) for 270 days. 9/23/21 8/14/22  Aminta Harris MD   ritonavir (NORVIR) 100 mg tablet TAKE 1 TABLET BY MOUTH EVERY DAY WITH MEALS 2/9/21 4/7/23  Aminta Harris MD   lidocaine (XYLOCAINE) 4 % topical cream lidocaine HCl 4 % topical cream   ONUR A SML AMT TO AFFECTED SKIN PRN P    Provider, Historical   aspirin 81 mg chewable tablet aspirin 81 mg chewable tablet   chew and swallow 1 tablet by mouth once daily    Provider, Historical   sevelamer carbonate (RENVELA) 800 mg tab tab Take 1,600 mg by mouth two (2) times a day. Provider, Historical   calcium carbonate (TUMS) 200 mg calcium (500 mg) chew Take 1 Tab by mouth as needed. Provider, Historical        Review of Systems   Constitutional: Negative.     HENT: Negative. Eyes:  Negative for pain, discharge and redness. Respiratory:  Negative for cough, hemoptysis, sputum production, shortness of breath and wheezing. Cardiovascular: Negative. Gastrointestinal:  Negative for blood in stool, constipation, diarrhea, heartburn, nausea and vomiting. Genitourinary: Negative. Musculoskeletal:  Positive for myalgias. Skin: Negative. Neurological: Negative. Patient Active Problem List   Diagnosis Code    Anemia of renal disease N18.9, D63.1    Elevated LFTs R79.89    Abdominal pain R10.9    HIV (human immunodeficiency virus infection) ([facility] [address] B20    ESRD (end stage renal disease) ([facility] [address] N18.6    Anemia of other chronic disease D63.8    HTN (hypertension) I10    Thoracic aortic aneurysm I71.20    Syncope and collapse R55    Seizure disorder ([facility] [address] G40.909    Gout M10.9    Respiratory failure with hypoxia ([facility] [address] J96.91    Fluid overload E87.70     Patient Active Problem List    Diagnosis Date Noted    Respiratory failure with hypoxia ([facility] 08/14/2022    Fluid overload 08/14/2022    Seizure disorder ([facility] [address] 04/27/2017    Gout 04/27/2017    Syncope and collapse 12/19/2011    Thoracic aortic aneurysm 12/02/2011    Abdominal pain 11/29/2011    HIV (human immunodeficiency virus infection) ([facility] [address] 11/29/2011    ESRD (end stage renal disease) ([facility] [address] 11/29/2011    Anemia of other chronic disease 11/29/2011    HTN (hypertension) 11/29/2011    Anemia of renal disease 09/19/2011    Elevated LFTs 09/19/2011     Current Outpatient Medications   Medication Sig Dispense Refill    levETIRAcetam (KEPPRA) 500 mg tablet Take 1 Tablet by mouth two (2) times a day. 180 Tablet 1    amLODIPine (NORVASC) 10 mg tablet Take 1 Tablet by mouth daily. 90 Tablet 1    metoprolol tartrate (LOPRESSOR) 50 mg tablet Take 1 Tablet by mouth two (2) times a day. 180 Tablet 1    famotidine (PEPCID) 40 mg tablet Take 1 Tablet by mouth two (2) times a day.  180 Tablet 1    atorvastatin (LIPITOR) 40 mg tablet take 1 tablet by mouth once daily 90 Tablet 1    ondansetron (ZOFRAN ODT) 8 mg disintegrating tablet Take 1 Tablet by mouth every eight (8) hours as needed for Nausea or Vomiting. 15 Tablet 1    Tivicay 50 mg tab tablet TAKE 1 TABLET BY MOUTH DAILY 90 Tablet 2    tenofovir ALAFENAMIDE FUMARATE (Vemlidy) 25 mg tablet Take 1 Tablet by mouth daily for 180 days. 180 Tablet 1    albuterol (PROVENTIL HFA, VENTOLIN HFA, PROAIR HFA) 90 mcg/actuation inhaler Take 1 Puff by inhalation every four (4) hours as needed for Wheezing. 18 g 1    lidocaine-prilocaine (EMLA) topical cream APPLY SMALL AMOUNT TO ACCESS SITE (AVF) 1 HOUR BEFORE DIALYSIS. COVER WITH OCCLUSIVE DRESSING (SARAN WRAP)      atazanavir (REYATAZ) 300 mg capsule Take 1 Capsule by mouth Daily (before breakfast) for 270 days. 90 Capsule 2    ritonavir (NORVIR) 100 mg tablet TAKE 1 TABLET BY MOUTH EVERY DAY WITH MEALS 90 Tab 2    lidocaine (XYLOCAINE) 4 % topical cream lidocaine HCl 4 % topical cream   ONUR A SML AMT TO AFFECTED SKIN PRN P      aspirin 81 mg chewable tablet aspirin 81 mg chewable tablet   chew and swallow 1 tablet by mouth once daily      sevelamer carbonate (RENVELA) 800 mg tab tab Take 1,600 mg by mouth two (2) times a day. calcium carbonate (TUMS) 200 mg calcium (500 mg) chew Take 1 Tab by mouth as needed. Allergies   Allergen Reactions    Penicillin Itching    Percocet [Oxycodone-Acetaminophen] Hives     Past Medical History:   Diagnosis Date    Anemia     Chronic abdominal pain     Chronic kidney disease     dialysis pt. - M. [facility]    Complication of AV dialysis fistula 2019    2 large pseudo-aneurysm    Dependence on renal dialysis ([facility] [address]     Esophagitis     ESRD on dialysis ([facility] [address]     Gastritis     GERD (gastroesophageal reflux disease)     Gout     High cholesterol     History of blood transfusion     at least 8 units over the years    HIV (human immunodeficiency virus infection) ([facility] [address] 2011    Hypertension     Other ill-defined conditions(799.89)     SMALL VEIN IN HEART BEING MONITORED    Pancreatitis     Renal insufficiency     Seizure ([address] 2012    one time and none sense    Seizures ([address]     Sepsis ([facility] [address] 2011 & 8/2019     Past Surgical History:   Procedure Laterality Date    COLONOSCOPY N/A 12/14/2021    COLONOSCOPY performed by Racquel Mclean MD at [facility] ENDOSCOPY    COLONOSCOPY N/A 11/15/2022    COLONOSCOPY/EGD performed by Racquel Mclean MD at [address] [place] [address] Way ARTERIOVENOUS FISTULA Left 2012    HX CHOLECYSTECTOMY  2011    HX COLONOSCOPY       Family History   Problem Relation Age of Onset    Cancer Brother         colon    Heart Disease Brother     Heart Disease Mother     Heart Disease Nephew      Social History     Tobacco Use    Smoking status: Never    Smokeless tobacco: Never   Substance Use Topics    Alcohol use: Yes     Comment: occassionally          Objective: Wt Readings from Last 3 Encounters:   01/03/23 232 lb 12.8 oz (105.6 kg)   12/07/22 234 lb (106.1 kg)   09/08/22 233 lb 12.8 oz (106.1 kg)     Temp Readings from Last 3 Encounters:   01/03/23 98 °F (36.7 °C) (Temporal)   12/07/22 98 °F (36.7 °C) (Temporal)   11/15/22 97.5 °F (36.4 °C)     BP Readings from Last 3 Encounters:   01/03/23 132/84   12/07/22 122/79   11/15/22 124/64     Pulse Readings from Last 3 Encounters:   01/03/23 76   12/07/22 75   11/15/22 67      Physical Exam  Vitals and nursing note reviewed. Constitutional:       Appearance: Normal appearance. He is obese. Cardiovascular:      Rate and Rhythm: Normal rate and regular rhythm. Pulses: Normal pulses. Heart sounds: Normal heart sounds. Arteriovenous access: Left arteriovenous access is present. Pulmonary:      Effort: Pulmonary effort is normal.      Breath sounds: Normal breath sounds. Abdominal:      General: Bowel sounds are normal.      Palpations: Abdomen is soft. There is no mass. Tenderness: There is no right CVA tenderness, left CVA tenderness or guarding. Musculoskeletal:         General: Normal range of motion. Right lower leg: No edema. Left lower leg: No edema. Skin:     General: Skin is warm and dry. Neurological:      Mental Status: He is alert. Mental status is at baseline. Psychiatric:         Mood and Affect: Mood normal.         Behavior: Behavior normal.          We discussed the expected course, resolution and complications of the diagnosis(es) in detail. Medication risks, benefits, costs, interactions, and alternatives were discussed as indicated. I advised him to contact the office if his condition worsens, changes or fails to improve as anticipated. He expressed understanding with the diagnosis(es) and plan.      Sergio Posey NP

## 2023-01-04 NOTE — PERIOP NOTES
Pt instructed to hold aspirin for 5 days per Dr. Jean Botello office. Pt verbalized understanding. Pt states he buys aspirin OTC no physician prescribes medication.

## 2023-01-10 ENCOUNTER — ANESTHESIA (OUTPATIENT)
Dept: SURGERY | Age: 58
End: 2023-01-10
Payer: MEDICARE

## 2023-01-10 ENCOUNTER — HOSPITAL ENCOUNTER (OUTPATIENT)
Age: 58
Discharge: HOME | End: 2023-01-10
Attending: COLON & RECTAL SURGERY | Admitting: COLON & RECTAL SURGERY
Payer: MEDICARE

## 2023-01-10 ENCOUNTER — ANESTHESIA EVENT (OUTPATIENT)
Dept: SURGERY | Age: 58
End: 2023-01-10
Payer: MEDICARE

## 2023-01-10 VITALS
HEIGHT: 72 IN | BODY MASS INDEX: 31.02 KG/M2 | WEIGHT: 229 LBS | OXYGEN SATURATION: 100 % | DIASTOLIC BLOOD PRESSURE: 89 MMHG | RESPIRATION RATE: 18 BRPM | HEART RATE: 61 BPM | TEMPERATURE: 97.8 F | SYSTOLIC BLOOD PRESSURE: 131 MMHG

## 2023-01-10 DIAGNOSIS — A63.0 ANAL CONDYLOMA: Primary | ICD-10-CM

## 2023-01-10 LAB
ANION GAP SERPL CALC-SCNC: 6 MMOL/L (ref 5–15)
BUN SERPL-MCNC: 35 MG/DL (ref 6–20)
BUN/CREAT SERPL: 4 (ref 12–20)
CA-I BLD-MCNC: 9.6 MG/DL (ref 8.5–10.1)
CHLORIDE SERPL-SCNC: 102 MMOL/L (ref 97–108)
CO2 SERPL-SCNC: 35 MMOL/L (ref 21–32)
CREAT SERPL-MCNC: 9.78 MG/DL (ref 0.7–1.3)
ERYTHROCYTE [DISTWIDTH] IN BLOOD BY AUTOMATED COUNT: 14.6 % (ref 11.5–14.5)
GLUCOSE SERPL-MCNC: 85 MG/DL (ref 65–100)
HCT VFR BLD AUTO: 34.8 % (ref 36.6–50.3)
HGB BLD-MCNC: 11.2 G/DL (ref 12.1–17)
MCH RBC QN AUTO: 29.5 PG (ref 26–34)
MCHC RBC AUTO-ENTMCNC: 32.2 G/DL (ref 30–36.5)
MCV RBC AUTO: 91.6 FL (ref 80–99)
NRBC # BLD: 0 K/UL (ref 0–0.01)
NRBC BLD-RTO: 0 PER 100 WBC
PLATELET # BLD AUTO: 195 K/UL (ref 150–400)
PMV BLD AUTO: 10.4 FL (ref 8.9–12.9)
POTASSIUM SERPL-SCNC: 5.2 MMOL/L (ref 3.5–5.1)
RBC # BLD AUTO: 3.8 M/UL (ref 4.1–5.7)
SODIUM SERPL-SCNC: 143 MMOL/L (ref 136–145)
WBC # BLD AUTO: 6.8 K/UL (ref 4.1–11.1)

## 2023-01-10 PROCEDURE — 76010000138 HC OR TIME 0.5 TO 1 HR: Performed by: COLON & RECTAL SURGERY

## 2023-01-10 PROCEDURE — 74011250636 HC RX REV CODE- 250/636: Performed by: NURSE ANESTHETIST, CERTIFIED REGISTERED

## 2023-01-10 PROCEDURE — 36415 COLL VENOUS BLD VENIPUNCTURE: CPT

## 2023-01-10 PROCEDURE — 74011000250 HC RX REV CODE- 250: Performed by: COLON & RECTAL SURGERY

## 2023-01-10 PROCEDURE — 74011000250 HC RX REV CODE- 250: Performed by: NURSE ANESTHETIST, CERTIFIED REGISTERED

## 2023-01-10 PROCEDURE — 76060000032 HC ANESTHESIA 0.5 TO 1 HR: Performed by: COLON & RECTAL SURGERY

## 2023-01-10 PROCEDURE — 80048 BASIC METABOLIC PNL TOTAL CA: CPT

## 2023-01-10 PROCEDURE — 2709999900 HC NON-CHARGEABLE SUPPLY: Performed by: COLON & RECTAL SURGERY

## 2023-01-10 PROCEDURE — 77030040361 HC SLV COMPR DVT MDII -B: Performed by: COLON & RECTAL SURGERY

## 2023-01-10 PROCEDURE — 76210000063 HC OR PH I REC FIRST 0.5 HR: Performed by: COLON & RECTAL SURGERY

## 2023-01-10 PROCEDURE — 76210000026 HC REC RM PH II 1 TO 1.5 HR: Performed by: COLON & RECTAL SURGERY

## 2023-01-10 PROCEDURE — 74011250637 HC RX REV CODE- 250/637: Performed by: COLON & RECTAL SURGERY

## 2023-01-10 PROCEDURE — 85027 COMPLETE CBC AUTOMATED: CPT

## 2023-01-10 PROCEDURE — 77030041445 HC PENCL ELECT SMK EVAC STRY -B: Performed by: COLON & RECTAL SURGERY

## 2023-01-10 PROCEDURE — 74011250636 HC RX REV CODE- 250/636: Performed by: COLON & RECTAL SURGERY

## 2023-01-10 PROCEDURE — 88304 TISSUE EXAM BY PATHOLOGIST: CPT

## 2023-01-10 RX ORDER — FENTANYL CITRATE 50 UG/ML
50 INJECTION, SOLUTION INTRAMUSCULAR; INTRAVENOUS
Status: DISCONTINUED | OUTPATIENT
Start: 2023-01-10 | End: 2023-01-10 | Stop reason: HOSPADM

## 2023-01-10 RX ORDER — ONDANSETRON 2 MG/ML
INJECTION INTRAMUSCULAR; INTRAVENOUS AS NEEDED
Status: DISCONTINUED | OUTPATIENT
Start: 2023-01-10 | End: 2023-01-10 | Stop reason: HOSPADM

## 2023-01-10 RX ORDER — ALBUTEROL SULFATE 0.83 MG/ML
2.5 SOLUTION RESPIRATORY (INHALATION) AS NEEDED
Status: DISCONTINUED | OUTPATIENT
Start: 2023-01-10 | End: 2023-01-10 | Stop reason: HOSPADM

## 2023-01-10 RX ORDER — BUPIVACAINE HYDROCHLORIDE AND EPINEPHRINE 5; 5 MG/ML; UG/ML
INJECTION, SOLUTION EPIDURAL; INTRACAUDAL; PERINEURAL AS NEEDED
Status: DISCONTINUED | OUTPATIENT
Start: 2023-01-10 | End: 2023-01-10 | Stop reason: HOSPADM

## 2023-01-10 RX ORDER — ROCURONIUM BROMIDE 10 MG/ML
INJECTION, SOLUTION INTRAVENOUS AS NEEDED
Status: DISCONTINUED | OUTPATIENT
Start: 2023-01-10 | End: 2023-01-10 | Stop reason: HOSPADM

## 2023-01-10 RX ORDER — METRONIDAZOLE 500 MG/100ML
500 INJECTION, SOLUTION INTRAVENOUS ONCE
Status: DISCONTINUED | OUTPATIENT
Start: 2023-01-10 | End: 2023-01-10 | Stop reason: HOSPADM

## 2023-01-10 RX ORDER — ONDANSETRON 2 MG/ML
4 INJECTION INTRAMUSCULAR; INTRAVENOUS AS NEEDED
Status: DISCONTINUED | OUTPATIENT
Start: 2023-01-10 | End: 2023-01-10 | Stop reason: HOSPADM

## 2023-01-10 RX ORDER — SODIUM CHLORIDE, SODIUM LACTATE, POTASSIUM CHLORIDE, CALCIUM CHLORIDE 600; 310; 30; 20 MG/100ML; MG/100ML; MG/100ML; MG/100ML
INJECTION, SOLUTION INTRAVENOUS
Status: DISCONTINUED | OUTPATIENT
Start: 2023-01-10 | End: 2023-01-10 | Stop reason: HOSPADM

## 2023-01-10 RX ORDER — SODIUM CHLORIDE 9 MG/ML
20 INJECTION, SOLUTION INTRAVENOUS CONTINUOUS
Status: DISCONTINUED | OUTPATIENT
Start: 2023-01-10 | End: 2023-01-10 | Stop reason: HOSPADM

## 2023-01-10 RX ORDER — MIDAZOLAM HYDROCHLORIDE 1 MG/ML
0.5 INJECTION, SOLUTION INTRAMUSCULAR; INTRAVENOUS
Status: DISCONTINUED | OUTPATIENT
Start: 2023-01-10 | End: 2023-01-10 | Stop reason: HOSPADM

## 2023-01-10 RX ORDER — SODIUM CHLORIDE 0.9 % (FLUSH) 0.9 %
5-40 SYRINGE (ML) INJECTION EVERY 8 HOURS
Status: DISCONTINUED | OUTPATIENT
Start: 2023-01-10 | End: 2023-01-10 | Stop reason: HOSPADM

## 2023-01-10 RX ORDER — CEFAZOLIN SODIUM 1 G/3ML
INJECTION, POWDER, FOR SOLUTION INTRAMUSCULAR; INTRAVENOUS AS NEEDED
Status: DISCONTINUED | OUTPATIENT
Start: 2023-01-10 | End: 2023-01-10 | Stop reason: HOSPADM

## 2023-01-10 RX ORDER — HYDROCODONE BITARTRATE AND ACETAMINOPHEN 5; 325 MG/1; MG/1
2 TABLET ORAL
Status: DISCONTINUED | OUTPATIENT
Start: 2023-01-10 | End: 2023-01-10 | Stop reason: HOSPADM

## 2023-01-10 RX ORDER — FENTANYL CITRATE 50 UG/ML
INJECTION, SOLUTION INTRAMUSCULAR; INTRAVENOUS AS NEEDED
Status: DISCONTINUED | OUTPATIENT
Start: 2023-01-10 | End: 2023-01-10 | Stop reason: HOSPADM

## 2023-01-10 RX ORDER — HYDROCODONE BITARTRATE AND ACETAMINOPHEN 5; 325 MG/1; MG/1
2 TABLET ORAL
Qty: 20 TABLET | Refills: 0 | Status: SHIPPED | OUTPATIENT
Start: 2023-01-10 | End: 2023-01-15

## 2023-01-10 RX ORDER — SODIUM CHLORIDE 0.9 % (FLUSH) 0.9 %
5-40 SYRINGE (ML) INJECTION AS NEEDED
Status: DISCONTINUED | OUTPATIENT
Start: 2023-01-10 | End: 2023-01-10 | Stop reason: HOSPADM

## 2023-01-10 RX ORDER — METRONIDAZOLE 500 MG/100ML
INJECTION, SOLUTION INTRAVENOUS AS NEEDED
Status: DISCONTINUED | OUTPATIENT
Start: 2023-01-10 | End: 2023-01-10 | Stop reason: HOSPADM

## 2023-01-10 RX ORDER — HYDROMORPHONE HYDROCHLORIDE 1 MG/ML
0.5 INJECTION, SOLUTION INTRAMUSCULAR; INTRAVENOUS; SUBCUTANEOUS
Status: DISCONTINUED | OUTPATIENT
Start: 2023-01-10 | End: 2023-01-10 | Stop reason: HOSPADM

## 2023-01-10 RX ORDER — OXYCODONE AND ACETAMINOPHEN 5; 325 MG/1; MG/1
2 TABLET ORAL
Status: DISCONTINUED | OUTPATIENT
Start: 2023-01-10 | End: 2023-01-10

## 2023-01-10 RX ORDER — LIDOCAINE HYDROCHLORIDE 20 MG/ML
INJECTION, SOLUTION EPIDURAL; INFILTRATION; INTRACAUDAL; PERINEURAL AS NEEDED
Status: DISCONTINUED | OUTPATIENT
Start: 2023-01-10 | End: 2023-01-10 | Stop reason: HOSPADM

## 2023-01-10 RX ORDER — NORETHINDRONE AND ETHINYL ESTRADIOL 0.5-0.035
KIT ORAL AS NEEDED
Status: DISCONTINUED | OUTPATIENT
Start: 2023-01-10 | End: 2023-01-10 | Stop reason: HOSPADM

## 2023-01-10 RX ORDER — DEXAMETHASONE SODIUM PHOSPHATE 4 MG/ML
INJECTION, SOLUTION INTRA-ARTICULAR; INTRALESIONAL; INTRAMUSCULAR; INTRAVENOUS; SOFT TISSUE AS NEEDED
Status: DISCONTINUED | OUTPATIENT
Start: 2023-01-10 | End: 2023-01-10 | Stop reason: HOSPADM

## 2023-01-10 RX ORDER — HYDROMORPHONE HYDROCHLORIDE 2 MG/1
2 TABLET ORAL
Status: DISCONTINUED | OUTPATIENT
Start: 2023-01-10 | End: 2023-01-10 | Stop reason: HOSPADM

## 2023-01-10 RX ORDER — PROPOFOL 10 MG/ML
INJECTION, EMULSION INTRAVENOUS AS NEEDED
Status: DISCONTINUED | OUTPATIENT
Start: 2023-01-10 | End: 2023-01-10 | Stop reason: HOSPADM

## 2023-01-10 RX ORDER — DIPHENHYDRAMINE HYDROCHLORIDE 50 MG/ML
12.5 INJECTION, SOLUTION INTRAMUSCULAR; INTRAVENOUS AS NEEDED
Status: DISCONTINUED | OUTPATIENT
Start: 2023-01-10 | End: 2023-01-10 | Stop reason: HOSPADM

## 2023-01-10 RX ADMIN — HYDROCODONE BITARTRATE AND ACETAMINOPHEN 2 TABLET: 5; 325 TABLET ORAL at 09:06

## 2023-01-10 RX ADMIN — SODIUM CHLORIDE, POTASSIUM CHLORIDE, SODIUM LACTATE AND CALCIUM CHLORIDE: 600; 310; 30; 20 INJECTION, SOLUTION INTRAVENOUS at 07:45

## 2023-01-10 RX ADMIN — SUGAMMADEX 200 MG: 100 INJECTION, SOLUTION INTRAVENOUS at 08:22

## 2023-01-10 RX ADMIN — DEXAMETHASONE SODIUM PHOSPHATE 4 MG: 4 INJECTION, SOLUTION INTRA-ARTICULAR; INTRALESIONAL; INTRAMUSCULAR; INTRAVENOUS; SOFT TISSUE at 08:08

## 2023-01-10 RX ADMIN — EPHEDRINE SULFATE 1500 MCG: 50 INJECTION INTRAVENOUS at 08:20

## 2023-01-10 RX ADMIN — FENTANYL CITRATE 50 MCG: 0.05 INJECTION, SOLUTION INTRAMUSCULAR; INTRAVENOUS at 07:49

## 2023-01-10 RX ADMIN — PROPOFOL 150 MG: 10 INJECTION, EMULSION INTRAVENOUS at 07:49

## 2023-01-10 RX ADMIN — ONDANSETRON 4 MG: 2 INJECTION INTRAMUSCULAR; INTRAVENOUS at 08:08

## 2023-01-10 RX ADMIN — LIDOCAINE HYDROCHLORIDE 60 MG: 20 INJECTION, SOLUTION EPIDURAL; INFILTRATION; INTRACAUDAL; PERINEURAL at 07:49

## 2023-01-10 RX ADMIN — CEFAZOLIN SODIUM 2 G: 1 INJECTION, POWDER, FOR SOLUTION INTRAMUSCULAR; INTRAVENOUS at 07:54

## 2023-01-10 RX ADMIN — METRONIDAZOLE 500 MG: 500 INJECTION, SOLUTION INTRAVENOUS at 07:54

## 2023-01-10 RX ADMIN — SODIUM CHLORIDE 20 ML/HR: 9 INJECTION, SOLUTION INTRAVENOUS at 07:07

## 2023-01-10 RX ADMIN — ROCURONIUM BROMIDE 30 MG: 10 INJECTION, SOLUTION INTRAVENOUS at 07:49

## 2023-01-10 NOTE — ANESTHESIA PREPROCEDURE EVALUATION
Relevant Problems   NEUROLOGY   (+) Seizure disorder (HCC)      CARDIOVASCULAR   (+) HTN (hypertension)      RENAL FAILURE   (+) Anemia of renal disease   (+) ESRD (end stage renal disease) (HCC)      HEMATOLOGY   (+) Anemia of other chronic disease   (+) Anemia of renal disease     EGD/CLN 2021 with 400mg propofol    Anesthetic History   No history of anesthetic complications            Review of Systems / Medical History  Patient summary reviewed, nursing notes reviewed and pertinent labs reviewed    Pulmonary                Comments: SNORING   Neuro/Psych     seizures: well controlled        Comments: Last seizure 2012. SYNCOPE  Cardiovascular    Hypertension          Hyperlipidemia    Exercise tolerance: <4 METS  Comments: Hx of thoracic aortic aneurysm (Being monitored)    TTE 2022:    Left Ventricle: Normal left ventricular systolic function. EF by 2D Simpsons Biplane is 63%. Left ventricle size is normal. Mildly increased wall thickness. Normal wall motion.   Aortic Valve: Mildly calcified cusp.   Tricuspid Valve: The estimated RVSP is 33 mmHg.   Aorta: Mildly dilated sinus of Valsalva. GI/Hepatic/Renal     GERD: well controlled    Renal disease: dialysis and ESRD      Comments: Pancreatitis. Elevatged LFT's Endo/Other        Cancer and anemia (Hb/Hb 10.5/31. 0)    Comments: Hx of sepsis. HIV  GOUT Other Findings   Comments: Procedure Information    Case: [id number] Date/Time: 01/10/23 0730  Procedure: EXCISION ANAL CONDYLOMA  Anesthesia type: General  Pre-op diagnosis: ANAL CONDYLOMA  [facility]: [facility] / [facility]  Surgeons: Talya Hendrix MD           Past Medical History:   Diagnosis Date    Anemia     Chronic abdominal pain     Chronic kidney disease     dialysis pt. - M. W.F    Complication of AV dialysis fistula 2019    2 large pseudo-aneurysm    Dependence on renal dialysis ([address]     Esophagitis     ESRD on dialysis (HCC)     Gastritis     GERD (gastroesophageal reflux disease)     Gout     High cholesterol     History of blood transfusion     at least 8 units over the years    HIV (human immunodeficiency virus infection) ([facility] [address] 2011    Hypertension     Other ill-defined conditions(799.89)     SMALL VEIN IN HEART BEING MONITORED    Pancreatitis     Renal insufficiency     Seizure ([facility] [address] 2012    one time and none sense    Seizures ([facility] [address]     Sepsis ([facility] [address] 2011 & 8/2019       Past Surgical History:   Procedure Laterality Date    COLONOSCOPY N/A 12/14/2021    COLONOSCOPY performed by Alona Hartman MD at [address] 11/15/2022    COLONOSCOPY/EGD performed by Alona Hartman MD at [facility] HX ARTERIOVENOUS FISTULA Left 2012    & 2019    HX CHOLECYSTECTOMY  2011    HX COLONOSCOPY         Current Outpatient Medications   Medication Instructions    amLODIPine (NORVASC) 10 mg, Oral, DAILY    aspirin 81 mg chewable tablet aspirin 81 mg chewable tablet   chew and swallow 1 tablet by mouth once daily    atazanavir (REYATAZ) 300 mg, Oral, DAILY BEFORE BREAKFAST    atorvastatin (LIPITOR) 40 mg tablet take 1 tablet by mouth once daily    calcium carbonate (TUMS) 200 mg calcium (500 mg) chew 1 Tablet, Oral, AS NEEDED    famotidine (PEPCID) 40 mg, Oral, 2 TIMES DAILY    levETIRAcetam (KEPPRA) 500 mg, Oral, 2 TIMES DAILY    lidocaine (XYLOCAINE) 4 % topical cream lidocaine HCl 4 % topical cream   ONUR A SML AMT TO AFFECTED SKIN PRN P    lidocaine-prilocaine (EMLA) topical cream APPLY SMALL AMOUNT TO ACCESS SITE (AVF) 1 HOUR BEFORE DIALYSIS.  COVER WITH OCCLUSIVE DRESSING (SARAN WRAP)    metoprolol tartrate (LOPRESSOR) 50 mg, Oral, 2 TIMES DAILY    ondansetron (ZOFRAN ODT) 8 mg, Oral, EVERY 8 HOURS AS NEEDED    ritonavir (NORVIR) 100 mg tablet TAKE 1 TABLET BY MOUTH EVERY DAY WITH MEALS    sevelamer carbonate (RENVELA) 1,600 mg, Oral, 2 TIMES DAILY    Tivicay 50 mg tab tablet TAKE 1 TABLET BY MOUTH DAILY    Vemlidy 25 mg, Oral, DAILY       No current facility-administered medications for this visit. No current outpatient medications on file. Facility-Administered Medications Ordered in Other Visits   Medication Dose Route Frequency    0.9% sodium chloride infusion  20 mL/hr IntraVENous CONTINUOUS    ceFAZolin (ANCEF) 2 g in sterile water (preservative free) 20 mL IV syringe  2 g IntraVENous ONCE    metroNIDAZOLE (FLAGYL) IVPB premix 500 mg  500 mg IntraVENous ONCE       No data found. Lab Results   Component Value Date/Time    WBC 4.9 08/31/2022 09:52 AM    Hemoglobin (POC) 9.9 (L) 03/13/2012 09:24 AM    HGB 8.8 (L) 08/31/2022 09:52 AM    Hematocrit (POC) 35 (L) 11/14/2019 09:42 AM    HCT 26.4 (L) 08/31/2022 09:52 AM    PLATELET 952 02/36/3929 09:52 AM    MCV 87 08/31/2022 09:52 AM     Lab Results   Component Value Date/Time    Sodium 145 (H) 08/31/2022 09:52 AM    Potassium 3.6 08/31/2022 09:52 AM    Chloride 96 08/31/2022 09:52 AM    CO2 33 (H) 08/31/2022 09:52 AM    Anion gap 11 08/14/2022 01:38 PM    Glucose 78 08/31/2022 09:52 AM    BUN 10 08/31/2022 09:52 AM    Creatinine 4.63 (H) 08/31/2022 09:52 AM    BUN/Creatinine ratio 2 (L) 08/31/2022 09:52 AM    GFR est AA 5 (L) 08/14/2022 01:38 PM    GFR est non-AA 4 (L) 08/14/2022 01:38 PM    Calcium 10.0 08/31/2022 09:52 AM     No results found for: APTT, PTP, INR, INREXT, INREXT  Lab Results   Component Value Date/Time    Glucose 78 08/31/2022 09:52 AM    Glucose (POC) 80 11/14/2019 09:42 AM    Glucose (POC) 105 12/21/2011 05:33 PM         Physical Exam    Airway  Mallampati: III  TM Distance: 4 - 6 cm  Neck ROM: normal range of motion   Mouth opening: Normal     Cardiovascular    Rhythm: regular  Rate: normal         Dental    Dentition: Poor dentition  Comments: MISSING TEETH.     Pulmonary  Breath sounds clear to auscultation               Abdominal  GI exam deferred       Other Findings            Anesthetic Plan    ASA: 4  Anesthesia type: general - supraclavicular block    Monitoring Plan: Continuous noninvasive hemodynamic monitoring      Induction: Intravenous  Anesthetic plan and risks discussed with: Patient      Informed consent obtained.

## 2023-01-10 NOTE — INTERVAL H&P NOTE
Update History & Physical    The Patient's History and Physical of January 10, 2023 was reviewed with the patient and I examined the patient. There was no change. The surgical site was confirmed by the patient and me. Plan:  The risk, benefits, expected outcome, and alternative to the recommended procedure have been discussed with the patient. Patient understands and wants to proceed with the procedure.     Electronically signed by Medina Maldonado MD on 1/10/2023 at 7:33 AM

## 2023-01-10 NOTE — ANESTHESIA POSTPROCEDURE EVALUATION
Procedure(s):  EXCISION PERIANAL CONDYLOMA; FULGARATION OF ANAL CONDYLOMA.    general    Anesthesia Post Evaluation      Multimodal analgesia: multimodal analgesia used between 6 hours prior to anesthesia start to PACU discharge  Patient location during evaluation: PACU  Patient participation: complete - patient participated  Level of consciousness: awake  Pain score: 0  Pain management: adequate  Airway patency: patent  Anesthetic complications: no  Cardiovascular status: acceptable  Respiratory status: acceptable  Hydration status: acceptable  Post anesthesia nausea and vomiting:  controlled  Final Post Anesthesia Temperature Assessment:  Normothermia (36.0-37.5 degrees C)      INITIAL Post-op Vital signs:   Vitals Value Taken Time   /87 01/10/23 0845   Temp 36.6 °C (97.8 °F) 01/10/23 0845   Pulse 69 01/10/23 0848   Resp 18 01/10/23 0848   SpO2 100 % 01/10/23 0848   Vitals shown include unvalidated device data.

## 2023-01-10 NOTE — OP NOTES
Operative Note    Patient: Patricia Booker  YOB: 1965  MRN: [id number]    Date of Procedure: 1/10/2023     Pre-Op Diagnosis: ANAL CONDYLOMA    Post-Op Diagnosis: Same as preoperative diagnosis. Procedure(s):  EXCISION PERIANAL CONDYLOMA; FULGARATION OF ANAL CONDYLOMA    Surgeon(s):  Lenita Favre, MD    Surgical Assistant: Surg Asst-1: Sonido Madrid    Anesthesia: General     Estimated Blood Loss (mL):  Minimal    Complications: None    Specimens:   ID Type Source Tests Collected by Time Destination   1 : perianal conyloma Preservative Anal  Lenita Favre, MD 1/10/2023 9939 Pathology        Implants: * No implants in log *    Drains: * No LDAs found *    Findings: HPV associated changes consistent with condyloma, possible dysplasia at the anal verge extending into the anal canal and the anterior left anterior and right anterior quadrants. Large condyloma lesion in the right perianal skin approximately 2 cm from the anal verge extending out approximately 3.5 cm. Detailed Description of Procedure: The patient was brought to the operating room and, following the induction of general anesthesia, was positioned on the OR table in the prone position. After ensuring that all pressure points were adequately padded the table was jackknifed and the buttocks taped apart and the perianal region prepped and draped out in the standard sterile fashion. A surgical timeout was then performed at which time the patient's identity and surgical procedure were once again confirmed. Was noted the patient had a large condylomatous lesion approximately 2 cm from the anal verge in the left lateral position. This extended out about 3 to 3.5 cm.     The medium Hill-Sebastian retractor was positioned in the anal canal and it was noted that at the anal verge extending into the canal at the anterior right anterior and left anterior positions there was HPV associated changes consistent with early condyloma. These also had appearance possibly of AIN. These were all fulgurated using electrocautery. After ensuring there is no further lesions at the verge or internally 0.5% Marcaine with epinephrine was infiltrated in the operative field. We then turned our attention to the external condyloma. This was excised in elliptical fashion using the scalpel and electrocautery. Care was taken to avoid any injury to the external sphincter as we approach the anal verge. Once the specimen was excised elliptically was passed off the table. The resulting defect was measured approximately 5 cm was then closed with interrupted 3-0 Vicryl sutures. Additional 0.5% Marcaine with epinephrine was infiltrated at the site. At this point the procedure was terminated. Bacitracin dressings were applied and the patient returned to the supine position, awakened, extubated, taken to recovery in stable condition. All counts were correct at the close of the case.       Electronically Signed by Byron Sierra MD on 1/10/2023 at 8:23 AM

## 2023-01-10 NOTE — H&P
History and Physical    Subjective:     Zahira Woodard is a 56 y.o.  male who presents today for surgical management of his perianal condyloma. I saw him for this originally little over 1 month ago. At that time he gave his 6 to 7-month history of perianal condyloma. At time my examination in the office he had extensive condyloma and I was concerned that this may actually represent an invasive anal carcinoma. He comes in today for surgery for his anal condyloma. His past medical history seen for history of HIV. His last CD4 count at the end of August was 146. He is currently on [place] [facility] and [facility] [place]. Patient HIV he does have a history of chronic kidney disease and he is on dialysis. In addition he has hypercholesterolemia, hypertension, GERD, gout. His last colonoscopy was November 2022. He did demonstrate his perianal condyloma and internal hemorrhoids. There is a small polyp in the descending colon which was removed only demonstrated colonic mucosa with hyperplastic lymphoid aggregate. Past Medical History:   Diagnosis Date    Anemia     Chronic abdominal pain     Chronic kidney disease     dialysis pt. - M. W.F    Complication of AV dialysis fistula 2019    2 large pseudo-aneurysm    Dependence on renal dialysis ([address]     Esophagitis     ESRD on dialysis ([address]     Gastritis     GERD (gastroesophageal reflux disease)     Gout     High cholesterol     History of blood transfusion     at least 8 units over the years    HIV (human immunodeficiency virus infection) ([address] 2011    Hypertension     Other ill-defined conditions(799.89)     SMALL VEIN IN HEART BEING MONITORED    Pancreatitis     Renal insufficiency     Seizure ([address] 2012    one time and none sense    Seizures ([address]     Sepsis ([address] 2011 & 8/2019      Past Surgical History:   Procedure Laterality Date    COLONOSCOPY N/A 12/14/2021    COLONOSCOPY performed by Siobhan Suazo MD at [facility] 2 COLONOSCOPY N/A 11/15/2022    COLONOSCOPY/EGD performed by Siobhan Suazo MD at [address] ARTERIOVENOUS FISTULA Left 2012    & 2019    HX CHOLECYSTECTOMY  2011    HX COLONOSCOPY       Family History   Problem Relation Age of Onset    Cancer Brother         colon    Heart Disease Brother     Heart Disease Mother     Heart Disease Nephew       Social History     Tobacco Use    Smoking status: Never    Smokeless tobacco: Never   Substance Use Topics    Alcohol use: Yes     Comment: occassionally once a week       Prior to Admission medications    Medication Sig Start Date End Date Taking? Authorizing Provider   levETIRAcetam (KEPPRA) 500 mg tablet Take 1 Tablet by mouth two (2) times a day. 1/3/23  Yes Keila Coombs NP   amLODIPine (NORVASC) 10 mg tablet Take 1 Tablet by mouth daily. 1/3/23  Yes Keila Coombs NP   metoprolol tartrate (LOPRESSOR) 50 mg tablet Take 1 Tablet by mouth two (2) times a day. 1/3/23  Yes Keila Coombs NP   famotidine (PEPCID) 40 mg tablet Take 1 Tablet by mouth two (2) times a day. 1/3/23  Yes Keila Coombs NP   atorvastatin (LIPITOR) 40 mg tablet take 1 tablet by mouth once daily 12/26/22  Yes Keila Coombs NP   ondansetron (ZOFRAN ODT) 8 mg disintegrating tablet Take 1 Tablet by mouth every eight (8) hours as needed for Nausea or Vomiting. 11/11/22  Yes Keila Coombs NP   Tivicay 50 mg tab tablet TAKE 1 TABLET BY MOUTH DAILY 9/26/22  Yes Rashaad Mayberry MD   tenofovir ALAFENAMIDE FUMARATE (Vemlidy) 25 mg tablet Take 1 Tablet by mouth daily for 180 days. 9/10/22 3/9/23 Yes Rashaad Mayberry MD   lidocaine-prilocaine (EMLA) topical cream APPLY SMALL AMOUNT TO ACCESS SITE (AVF) 1 HOUR BEFORE DIALYSIS. COVER WITH OCCLUSIVE DRESSING (SARAN WRAP) 9/21/21  Yes Provider, Historical   atazanavir (REYATAZ) 300 mg capsule Take 1 Capsule by mouth Daily (before breakfast) for 270 days.  9/23/21 1/10/23 Yes Rashaad Mayberry MD   ritonavir (NORVIR) 100 mg tablet TAKE 1 TABLET BY MOUTH EVERY DAY WITH MEALS 2/9/21 4/7/23 Yes Adelaida Goldstein MD   lidocaine (XYLOCAINE) 4 % topical cream lidocaine HCl 4 % topical cream   ONUR A SML AMT TO AFFECTED SKIN PRN P   Yes Provider, Historical   aspirin 81 mg chewable tablet aspirin 81 mg chewable tablet   chew and swallow 1 tablet by mouth once daily   Yes Provider, Historical   sevelamer carbonate (RENVELA) 800 mg tab tab Take 1,600 mg by mouth two (2) times a day. Yes Provider, Historical   calcium carbonate (TUMS) 200 mg calcium (500 mg) chew Take 1 Tab by mouth as needed. Yes Provider, Historical     Allergies   Allergen Reactions    Penicillin Itching    Percocet [Oxycodone-Acetaminophen] Hives        Review of Systems:  A comprehensive review of systems was negative except for that written in the History of Present Illness. Objective:   Visit Vitals  /87 (BP 1 Location: Left upper arm, BP Patient Position: At rest)   Pulse (!) 59   Temp 97.9 °F (36.6 °C)   Resp 18   Ht 6' (1.829 m)   Wt 103.9 kg (229 lb)   SpO2 99%   BMI 31.06 kg/m²         Physical Exam:   Physical Exam  Constitutional:       General: He is not in acute distress. Appearance: Normal appearance. He is not ill-appearing. HENT:      Head: Normocephalic and atraumatic. Cardiovascular:      Rate and Rhythm: Normal rate and regular rhythm. Pulmonary:      Effort: Pulmonary effort is normal.   Abdominal:      General: There is no distension. Palpations: Abdomen is soft. There is no mass. Tenderness: There is no abdominal tenderness. Genitourinary:     Comments: Extensive perianal condyloma  Musculoskeletal:         General: Normal range of motion. Cervical back: Normal range of motion and neck supple. Skin:     General: Skin is warm and dry. Neurological:      General: No focal deficit present. Mental Status: He is alert and oriented to person, place, and time.    Psychiatric:         Mood and Affect: Mood normal.         Thought Content: Thought content normal.         Judgment: Judgment normal.          Assessment:     Active Problems:    Anal condyloma (1/10/2023)        Plan:   I once again reviewed surgery with . Helen Spencer is a had in the office. I told him that I will proceed with an excisional fulguration of his perianal condyloma. If it looks suspicious for invasive malignancy I will biopsy it for frozen pathology. If it comes back positive then I would not do any further procedure and refer him to oncology and radiation oncology. In addition I told him that even if it is just condyloma he would likely need to have a staged procedure given the extent of his condyloma and will likely require 2 maybe 3 surgeries to get rid of everything. I reviewed the risk and benefits of surgery from the risk of infection, bleeding, injury to sphincter apparatus, recurrent or persistent condyloma. He wishes to proceed and surgery scheduled for this a.m. Consent was obtained and on the chart.

## 2023-01-11 NOTE — PROGRESS NOTES
OFFICE VISIT NOTE    Patricia Booker is a 56 y.o. male who presents to the office today for:    Chief Complaint   Patient presents with    New Patient     Ref by Carlitos Casillas     Hemorrhoids       Patricia Booker is a 56 y.o.  male who is referred by Dr. Aria Perea for management of perianal condyloma. He recently had a colonoscopy in November which demonstrated perianal condyloma and internal hemorrhoids. A small polyp in the descending colon was removed and pathology demonstrated hyperplastic lymphoid aggregate. The patient himself states that this has been present for about 6 to 7 months. He does find it uncomfortable. He occasionally has some bleeding with wiping. His past medical history seen for history of HIV. His last CD4 count at the end of August was 146. He is currently on [place] and [facility]. Patient HIV he does have a history of chronic kidney disease and he is on dialysis. In addition he has hypercholesterolemia, hypertension, GERD, gout. Past Medical History:   Diagnosis Date    Anemia     Chronic abdominal pain     Chronic kidney disease     dialysis pt. - M. W.F    Complication of AV dialysis fistula 2019    2 large pseudo-aneurysm    Dependence on renal dialysis ([address]     Esophagitis     ESRD on dialysis ([address]     Gastritis     GERD (gastroesophageal reflux disease)     Gout     High cholesterol     History of blood transfusion     at least 8 units over the years    HIV (human immunodeficiency virus infection) ([address] 2011    Hypertension     Other ill-defined conditions(799.89)     SMALL VEIN IN HEART BEING MONITORED    Pancreatitis     Renal insufficiency     Seizure ([address] 2012    one time and none sense    Seizures ([address]     Sepsis ([address] 2011 & 8/2019       Past Surgical History:   Procedure Laterality Date    COLONOSCOPY N/A 12/14/2021    COLONOSCOPY performed by Alona Hartman MD at [facility] N/A 11/15/2022    COLONOSCOPY/EGD performed by Alona Hartman MD at [address] ARTERIOVENOUS FISTULA Left 2012    & 2019    HX CHOLECYSTECTOMY  2011    HX COLONOSCOPY         Family History   Problem Relation Age of Onset    Cancer Brother         colon    Heart Disease Brother     Heart Disease Mother     Heart Disease Nephew        Social History     Socioeconomic History    Marital status: SINGLE     Spouse name: Not on file    Number of children: Not on file    Years of education: Not on file    Highest education level: Not on file   Occupational History    Not on file   Tobacco Use    Smoking status: Never    Smokeless tobacco: Never   Vaping Use    Vaping Use: Never used   Substance and Sexual Activity    Alcohol use: Yes     Comment: occassionally once a week    Drug use: No    Sexual activity: Not Currently   Other Topics Concern    Not on file   Social History Narrative    Not on file     Social Determinants of Health     Financial Resource Strain: Not on file   Food Insecurity: Not on file   Transportation Needs: Not on file   Physical Activity: Not on file   Stress: Not on file   Social Connections: Not on file   Intimate Partner Violence: Not on file   Housing Stability: Not on file       Allergies   Allergen Reactions    Penicillin Itching    Percocet [Oxycodone-Acetaminophen] Hives       Current Outpatient Medications   Medication Sig    ondansetron (ZOFRAN ODT) 8 mg disintegrating tablet Take 1 Tablet by mouth every eight (8) hours as needed for Nausea or Vomiting. Tivicay 50 mg tab tablet TAKE 1 TABLET BY MOUTH DAILY    tenofovir ALAFENAMIDE FUMARATE (Vemlidy) 25 mg tablet Take 1 Tablet by mouth daily for 180 days. lidocaine-prilocaine (EMLA) topical cream APPLY SMALL AMOUNT TO ACCESS SITE (AVF) 1 HOUR BEFORE DIALYSIS.  COVER WITH OCCLUSIVE DRESSING (SARAN WRAP)    ritonavir (NORVIR) 100 mg tablet TAKE 1 TABLET BY MOUTH EVERY DAY WITH MEALS    lidocaine (XYLOCAINE) 4 % topical cream lidocaine HCl 4 % topical cream   ONUR A SML AMT TO AFFECTED SKIN PRN P    aspirin 81 mg chewable tablet aspirin 81 mg chewable tablet   chew and swallow 1 tablet by mouth once daily    sevelamer carbonate (RENVELA) 800 mg tab tab Take 1,600 mg by mouth two (2) times a day. calcium carbonate (TUMS) 200 mg calcium (500 mg) chew Take 1 Tab by mouth as needed. HYDROcodone-acetaminophen (NORCO) 5-325 mg per tablet Take 2 Tablets by mouth every four (4) hours as needed for Pain for up to 5 days. Max Daily Amount: 12 Tablets. levETIRAcetam (KEPPRA) 500 mg tablet Take 1 Tablet by mouth two (2) times a day. amLODIPine (NORVASC) 10 mg tablet Take 1 Tablet by mouth daily. metoprolol tartrate (LOPRESSOR) 50 mg tablet Take 1 Tablet by mouth two (2) times a day. famotidine (PEPCID) 40 mg tablet Take 1 Tablet by mouth two (2) times a day. atorvastatin (LIPITOR) 40 mg tablet take 1 tablet by mouth once daily    atazanavir (REYATAZ) 300 mg capsule Take 1 Capsule by mouth Daily (before breakfast) for 270 days. No current facility-administered medications for this visit. Review of Systems   All other systems reviewed and are negative. /79 (BP 1 Location: Right arm, BP Patient Position: Sitting)   Pulse 75   Temp 98 °F (36.7 °C) (Temporal)   Resp 16   Ht 6' (1.829 m)   Wt 106.1 kg (234 lb)   SpO2 97%   BMI 31.74 kg/m²     Physical Exam  Constitutional:       General: He is not in acute distress. Appearance: Normal appearance. He is not ill-appearing. HENT:      Head: Normocephalic and atraumatic. Cardiovascular:      Rate and Rhythm: Normal rate and regular rhythm. Pulses: Normal pulses. Pulmonary:      Effort: Pulmonary effort is normal.   Abdominal:      General: There is no distension. Palpations: Abdomen is soft. Tenderness: There is no abdominal tenderness. Genitourinary:     Comments: Large condylomatous lesion left lateral position, patient not tolerate digital rectal exam  Musculoskeletal:         General: Normal range of motion. Cervical back: Normal range of motion and neck supple. Skin:     General: Skin is warm and dry. Neurological:      General: No focal deficit present. Mental Status: He is alert and oriented to person, place, and time. Psychiatric:         Mood and Affect: Mood normal.         Thought Content: Thought content normal.         Judgment: Judgment normal.       Problem List Items Addressed This Visit          Digestive    Anal condyloma - Primary       Assessment and Plan:  I told Mr. Leon Alvarado to recommend excision of his perianal condyloma and exam to make sure is no internal condyloma. I did tell him that if the appearance is suggestive of invasive malignancy a biopsy of the mass for frozen pathology. I reviewed the risk and benefits of the surgery including the risk of infection, bleeding, wound complications, injury to the sphincter apparatus, recurrence. He wishes to proceed and surgery scheduled for January 10, 2023.             Nuha Lucero MD

## 2023-01-23 ENCOUNTER — OFFICE VISIT (OUTPATIENT)
Dept: SURGERY | Age: 58
End: 2023-01-23
Payer: MEDICARE

## 2023-01-23 VITALS
SYSTOLIC BLOOD PRESSURE: 131 MMHG | HEART RATE: 64 BPM | HEIGHT: 72 IN | OXYGEN SATURATION: 98 % | RESPIRATION RATE: 20 BRPM | BODY MASS INDEX: 31.17 KG/M2 | TEMPERATURE: 98.4 F | DIASTOLIC BLOOD PRESSURE: 80 MMHG | WEIGHT: 230.13 LBS

## 2023-01-23 DIAGNOSIS — C44.520 PRIMARY SQUAMOUS CELL CARCINOMA OF PERIANAL SKIN: ICD-10-CM

## 2023-01-23 DIAGNOSIS — A63.0 ANAL CONDYLOMA: Primary | ICD-10-CM

## 2023-01-23 PROCEDURE — 99024 POSTOP FOLLOW-UP VISIT: CPT | Performed by: COLON & RECTAL SURGERY

## 2023-01-23 NOTE — LETTER
1/23/2023    Patient: Judithann Gosselin   YOB: 1965   Date of Visit: 1/23/2023     Ros Torres NP  [address] [address]  [address] [place]  [address]  [phone]  Via Fax: [phone]    Dear PRETTY Nieves MD,      Thank you for referring Mr. Yo Venegas to [address] [place] [address] for evaluation. My notes for this consultation are attached. If you have questions, please do not hesitate to call me. I look forward to following your patient along with you.       Sincerely,    She Weldon MD

## 2023-01-23 NOTE — PROGRESS NOTES
OFFICE VISIT NOTE    Charles Rich is a 56 y.o. male who presents to the office today for:    Chief Complaint   Patient presents with    Surgical Follow-up     Post op surgical excision and fulgaration anal condyloma 01/10/2023. Patient offers no complaints. Liliya Hill comes in today for follow-up status post excision of perianal condyloma and fulguration of anal condyloma. 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 These were all fulgurated with electrocautery. He had a large condyloma in the left lateral position which was excised in its entirety and passed the specimen. His ultimate pathology demonstrated:    TUMOR       Tumor Site: Perianal region       Histologic Type: Squamous cell carcinoma           Histologic Type Comments: Keratinizing       Histologic Grade: G1: Well differentiated       Tumor Size: 3.5 cm       Tumor Extension: Tumor invades perianal skin       Treatment Effect: No known presurgical therapy       Lymphovascular Invasion: Not identified       Perineural Invasion: Not identified      MARGINS       Deep Margin: Negative for invasive carcinoma and high-grade dysplasia           Distance of Invasive Tumor from Closest Deep Margin: 3 mm   Peripheral (skin) Margin: Negative for invasive carcinoma and high-grade   dysplasia           Distance of Invasive Carcinoma from Closest Mucosal Margin: 3 mm      PATHOLOGIC STAGE CLASSIFICATION (pTNM, AJCC 8th Edition)       Primary Tumor (pT): pT2      ADDITIONAL FINDINGS       Additional Findings: Condyloma acuminatum    He has no complaints. He denies any problems at his surgical sites. Past Medical History:   Diagnosis Date    Anemia     Chronic abdominal pain     Chronic kidney disease     dialysis pt. - M. W.F    Complication of AV dialysis fistula 2019    2 large pseudo-aneurysm    Dependence on renal dialysis ([address]     Esophagitis     ESRD on dialysis ([address]     Gastritis     GERD (gastroesophageal reflux disease)     Gout     High cholesterol     History of blood transfusion     at least 8 units over the years    HIV (human immunodeficiency virus infection) ([address] 2011    Hypertension     Other ill-defined conditions(799.89)     SMALL VEIN IN HEART BEING MONITORED    Pancreatitis     Renal insufficiency     Seizure ([address] 2012    one time and none sense    Seizures ([address]     Sepsis ([address] 2011 & 8/2019       Past Surgical History:   Procedure Laterality Date    COLONOSCOPY N/A 12/14/2021    COLONOSCOPY performed by Elvira Stephenson MD at [facility] ENDOSCOPY    COLONOSCOPY N/A 11/15/2022    COLONOSCOPY/EGD performed by Elvira Stephenson MD at [address] [place] [address] Way ARTERIOVENOUS FISTULA Left 2012    & 2019    HX CHOLECYSTECTOMY  2011    HX COLONOSCOPY         Family History   Problem Relation Age of Onset    Cancer Brother         colon    Heart Disease Brother     Heart Disease Mother     Heart Disease Nephew        Social History     Socioeconomic History    Marital status: SINGLE     Spouse name: Not on file    Number of children: Not on file    Years of education: Not on file    Highest education level: Not on file   Occupational History    Not on file   Tobacco Use    Smoking status: Never    Smokeless tobacco: Never   Vaping Use    Vaping Use: Never used   Substance and Sexual Activity    Alcohol use: Yes     Comment: occassionally once a week    Drug use: No    Sexual activity: Not Currently   Other Topics Concern    Not on file   Social History Narrative    Not on file     Social Determinants of Health     Financial Resource Strain: Not on file   Food Insecurity: Not on file   Transportation Needs: Not on file   Physical Activity: Not on file   Stress: Not on file   Social Connections: Not on file   Intimate Partner Violence: Not on file   Housing Stability: Not on file       Allergies   Allergen Reactions    Penicillin Itching    Percocet [Oxycodone-Acetaminophen] Hives       Current Outpatient Medications   Medication Sig    levETIRAcetam (KEPPRA) 500 mg tablet Take 1 Tablet by mouth two (2) times a day. amLODIPine (NORVASC) 10 mg tablet Take 1 Tablet by mouth daily. metoprolol tartrate (LOPRESSOR) 50 mg tablet Take 1 Tablet by mouth two (2) times a day. famotidine (PEPCID) 40 mg tablet Take 1 Tablet by mouth two (2) times a day. atorvastatin (LIPITOR) 40 mg tablet take 1 tablet by mouth once daily    ondansetron (ZOFRAN ODT) 8 mg disintegrating tablet Take 1 Tablet by mouth every eight (8) hours as needed for Nausea or Vomiting. Tivicay 50 mg tab tablet TAKE 1 TABLET BY MOUTH DAILY    tenofovir ALAFENAMIDE FUMARATE (Vemlidy) 25 mg tablet Take 1 Tablet by mouth daily for 180 days. lidocaine-prilocaine (EMLA) topical cream APPLY SMALL AMOUNT TO ACCESS SITE (AVF) 1 HOUR BEFORE DIALYSIS. COVER WITH OCCLUSIVE DRESSING (SARAN WRAP)    ritonavir (NORVIR) 100 mg tablet TAKE 1 TABLET BY MOUTH EVERY DAY WITH MEALS    aspirin 81 mg chewable tablet aspirin 81 mg chewable tablet   chew and swallow 1 tablet by mouth once daily    sevelamer carbonate (RENVELA) 800 mg tab tab Take 1,600 mg by mouth two (2) times a day. calcium carbonate (TUMS) 200 mg calcium (500 mg) chew Take 1 Tab by mouth as needed. atazanavir (REYATAZ) 300 mg capsule Take 1 Capsule by mouth Daily (before breakfast) for 270 days. lidocaine (XYLOCAINE) 4 % topical cream lidocaine HCl 4 % topical cream   ONUR A SML AMT TO AFFECTED SKIN PRN P (Patient not taking: Reported on 1/23/2023)     No current facility-administered medications for this visit. Review of Systems   All other systems reviewed and are negative.     /80 (BP 1 Location: Right upper arm, BP Patient Position: Sitting, BP Cuff Size: Adult)   Pulse 64   Temp 98.4 °F (36.9 °C) (Oral)   Resp 20   Ht 6' (1.829 m)   Wt 104.4 kg (230 lb 2 oz)   SpO2 98%   BMI 31.21 kg/m²     Physical Exam  Genitourinary:         Comments: Incision in the left lateral position has some mild skin dehiscence. It is healing nicely. His fulguration sites at the anal verge of all healed. Problem List Items Addressed This Visit          Digestive    Anal condyloma - Primary       Assessment and Plan:    I reviewed his pathology with the patient and told her I like to refer him to medical oncology. I did tell him his margins were negative and all likelihood he will not require any further treatment however I will refer him to Dr. Marina Villanueva and look forward to hearing her thoughts on this patient. He will come back and see me in 1 month.           Deneen Elliott MD

## 2023-01-23 NOTE — PROGRESS NOTES
Chief Complaint   Patient presents with    Surgical Follow-up     Post op surgical excision and fulgaration anal condyloma 01/10/2023. Patient offers no complaints. 1. Have you been to the ER, urgent care clinic since your last visit? Hospitalized since your last visit? No    2. Have you seen or consulted any other health care providers outside of the [address] [place] [address] since your last visit? Include any pap smears or colon screening.  No

## 2023-02-06 ENCOUNTER — TRANSCRIBE ORDER (OUTPATIENT)
Dept: SCHEDULING | Age: 58
End: 2023-02-06

## 2023-02-06 DIAGNOSIS — C44.520 SQUAMOUS CELL CARCINOMA OF PERIANAL REGION: Primary | ICD-10-CM

## 2023-02-07 ENCOUNTER — TELEPHONE (OUTPATIENT)
Dept: PRIMARY CARE CLINIC | Age: 58
End: 2023-02-07

## 2023-02-07 NOTE — TELEPHONE ENCOUNTER
Spoke with patient. He stated he would like to ask you a few questions regarding the surgery he had 3 weeks ago. He would not elaborate. He stated you could call him back on [phone] that he wanted to speak with you.

## 2023-02-20 ENCOUNTER — OFFICE VISIT (OUTPATIENT)
Dept: SURGERY | Age: 58
End: 2023-02-20
Payer: MEDICARE

## 2023-02-20 VITALS
RESPIRATION RATE: 16 BRPM | SYSTOLIC BLOOD PRESSURE: 126 MMHG | HEIGHT: 72 IN | HEART RATE: 86 BPM | DIASTOLIC BLOOD PRESSURE: 68 MMHG | BODY MASS INDEX: 31.07 KG/M2 | WEIGHT: 229.4 LBS | TEMPERATURE: 97.8 F | OXYGEN SATURATION: 98 %

## 2023-02-20 DIAGNOSIS — C44.520 SQUAMOUS CELL CARCINOMA OF ANAL MARGIN: ICD-10-CM

## 2023-02-20 PROCEDURE — 99024 POSTOP FOLLOW-UP VISIT: CPT | Performed by: COLON & RECTAL SURGERY

## 2023-02-20 NOTE — PROGRESS NOTES
OFFICE VISIT NOTE    Jessica Kraft is a 56 y.o. male who presents to the office today for:    Chief Complaint   Patient presents with    Post OP Follow Up     status post excision of perianal condyloma and fulguration of anal condyloma       Patient comes in today for follow-up status post excision of anal margin condyloma which turned out to be invasive squamous cell carcinoma T2. Margins were all clear with 3 mm margins. He has been seen by oncology and is scheduled for an MRI of the pelvis tomorrow and a PET/CT on February 2 7. He has no complaints regarding his surgical site today. He states is healed up. He did have some separation of the skin when he took out his sutures at his last visit. Past Medical History:   Diagnosis Date    Anemia     Chronic abdominal pain     Chronic kidney disease     dialysis pt. - M. W.F    Complication of AV dialysis fistula 2019    2 large pseudo-aneurysm    Dependence on renal dialysis ([address]     Esophagitis     ESRD on dialysis ([address]     Gastritis     GERD (gastroesophageal reflux disease)     Gout     High cholesterol     History of blood transfusion     at least 8 units over the years    HIV (human immunodeficiency virus infection) ([address] 2011    Hypertension     Other ill-defined conditions(799.89)     SMALL VEIN IN HEART BEING MONITORED    Pancreatitis     Renal insufficiency     Seizure ([address] 2012    one time and none sense    Seizures ([address]     Sepsis ([address] 2011 & 8/2019       Past Surgical History:   Procedure Laterality Date    COLONOSCOPY N/A 12/14/2021    COLONOSCOPY performed by Selene Fields MD at [address] [place] [address] N/A 11/15/2022    COLONOSCOPY/EGD performed by Selene Fields MD at [address] [place] [address] Way ARTERIOVENOUS FISTULA Left 2012    & 2019    HX CHOLECYSTECTOMY  2011    HX COLONOSCOPY         Family History   Problem Relation Age of Onset    Cancer Brother         colon    Heart Disease Brother     Heart Disease Mother     Heart Disease Nephew        Social History     Socioeconomic History    Marital status: SINGLE     Spouse name: Not on file    Number of children: Not on file    Years of education: Not on file    Highest education level: Not on file   Occupational History    Not on file   Tobacco Use    Smoking status: Never    Smokeless tobacco: Never   Vaping Use    Vaping Use: Never used   Substance and Sexual Activity    Alcohol use: Yes     Comment: occassionally once a week    Drug use: No    Sexual activity: Not Currently   Other Topics Concern    Not on file   Social History Narrative    Not on file     Social Determinants of Health     Financial Resource Strain: Not on file   Food Insecurity: Not on file   Transportation Needs: Not on file   Physical Activity: Not on file   Stress: Not on file   Social Connections: Not on file   Intimate Partner Violence: Not on file   Housing Stability: Not on file       Allergies   Allergen Reactions    Penicillin Itching    Percocet [Oxycodone-Acetaminophen] Hives       Current Outpatient Medications   Medication Sig    levETIRAcetam (KEPPRA) 500 mg tablet Take 1 Tablet by mouth two (2) times a day. amLODIPine (NORVASC) 10 mg tablet Take 1 Tablet by mouth daily. metoprolol tartrate (LOPRESSOR) 50 mg tablet Take 1 Tablet by mouth two (2) times a day. famotidine (PEPCID) 40 mg tablet Take 1 Tablet by mouth two (2) times a day. atorvastatin (LIPITOR) 40 mg tablet take 1 tablet by mouth once daily    ondansetron (ZOFRAN ODT) 8 mg disintegrating tablet Take 1 Tablet by mouth every eight (8) hours as needed for Nausea or Vomiting. Tivicay 50 mg tab tablet TAKE 1 TABLET BY MOUTH DAILY    tenofovir ALAFENAMIDE FUMARATE (Vemlidy) 25 mg tablet Take 1 Tablet by mouth daily for 180 days.     lidocaine-prilocaine (EMLA) topical cream APPLY SMALL AMOUNT TO ACCESS SITE (AVF) 1 HOUR BEFORE DIALYSIS. COVER WITH OCCLUSIVE DRESSING (SARAN WRAP)    ritonavir (NORVIR) 100 mg tablet TAKE 1 TABLET BY MOUTH EVERY DAY WITH MEALS    aspirin 81 mg chewable tablet aspirin 81 mg chewable tablet   chew and swallow 1 tablet by mouth once daily    sevelamer carbonate (RENVELA) 800 mg tab tab Take 1,600 mg by mouth two (2) times a day. calcium carbonate (TUMS) 200 mg calcium (500 mg) chew Take 1 Tab by mouth as needed. atazanavir (REYATAZ) 300 mg capsule Take 1 Capsule by mouth Daily (before breakfast) for 270 days. lidocaine (XYLOCAINE) 4 % topical cream lidocaine HCl 4 % topical cream   ONUR A SML AMT TO AFFECTED SKIN PRN P (Patient not taking: No sig reported)     No current facility-administered medications for this visit. Review of Systems   All other systems reviewed and are negative. /68 (BP 1 Location: Left upper arm, BP Patient Position: Sitting)   Pulse 86   Temp 97.8 °F (36.6 °C) (Temporal)   Resp 16   Ht 6' (1.829 m)   Wt 104.1 kg (229 lb 6.4 oz)   SpO2 98%   BMI 31.11 kg/m²     Physical Exam  Genitourinary:     Comments: Physical site in the left lateral position has healed nicely. No evidence of recurrent malignancy. Problem List Items Addressed This Visit          Digestive    Squamous cell carcinoma of anal margin       Assessment and Plan:    Mr. Jane Frank has an appointment oncology on February 28 to go over his radiographic results. He will come back and see me on March 13 to discuss further therapy.           Heidi Jacome MD

## 2023-02-20 NOTE — LETTER
2/20/2023    Patient: Billy Chacko   YOB: 1965   Date of Visit: 2/20/2023     [place]  [address] [facility]  Via In Basket    Dear Ashok Leyden, NP,      Thank you for referring Mr. Ashley Navarro to [address] [place] [address] for evaluation. My notes for this consultation are attached. If you have questions, please do not hesitate to call me. I look forward to following your patient along with you.       Sincerely,    Fernando Dave MD

## 2023-02-20 NOTE — PROGRESS NOTES
Identified pt with two pt identifiers (name and ). Reviewed chart in preparation for visit and have obtained necessary documentation. Kathy Pérez is a 56 y.o. male  Chief Complaint   Patient presents with    Post OP Follow Up     status post excision of perianal condyloma and fulguration of anal condyloma     Visit Vitals  /68 (BP 1 Location: Left upper arm, BP Patient Position: Sitting)   Pulse 86   Temp 97.8 °F (36.6 °C) (Temporal)   Resp 16   Ht 6' (1.829 m)   Wt 229 lb 6.4 oz (104.1 kg)   SpO2 98%   BMI 31.11 kg/m²       1. Have you been to the ER, urgent care clinic since your last visit? Hospitalized since your last visit? No    2. Have you seen or consulted any other health care providers outside of the [facility] since your last visit? Include any pap smears or colon screening.  No

## 2023-02-21 ENCOUNTER — HOSPITAL ENCOUNTER (OUTPATIENT)
Dept: MRI IMAGING | Age: 58
Discharge: HOME | End: 2023-02-21
Payer: MEDICARE

## 2023-02-21 DIAGNOSIS — C44.520 SQUAMOUS CELL CARCINOMA OF PERIANAL REGION: ICD-10-CM

## 2023-02-21 PROCEDURE — 72195 MRI PELVIS W/O DYE: CPT

## 2023-02-27 ENCOUNTER — HOSPITAL ENCOUNTER (OUTPATIENT)
Dept: PET IMAGING | Age: 58
Discharge: HOME | End: 2023-02-27
Attending: INTERNAL MEDICINE
Payer: MEDICARE

## 2023-02-27 ENCOUNTER — TELEPHONE (OUTPATIENT)
Dept: INFECTIOUS DISEASES | Age: 58
End: 2023-02-27

## 2023-02-27 DIAGNOSIS — B20 HIV INFECTION, UNSPECIFIED SYMPTOM STATUS (HCC): Primary | ICD-10-CM

## 2023-02-27 DIAGNOSIS — C44.520 SQUAMOUS CELL CARCINOMA OF PERIANAL REGION: ICD-10-CM

## 2023-02-27 PROCEDURE — A9552 F18 FDG: HCPCS

## 2023-02-27 RX ORDER — FLUDEOXYGLUCOSE F-18 200 MCI/ML
10 INJECTION INTRAVENOUS ONCE
Status: COMPLETED | OUTPATIENT
Start: 2023-02-27 | End: 2023-02-27

## 2023-02-27 RX ADMIN — FLUDEOXYGLUCOSE F-18 10 MILLICURIE: 200 INJECTION INTRAVENOUS at 11:08

## 2023-02-27 NOTE — TELEPHONE ENCOUNTER
Patient called in wanting you to orders in for him to get his labs done before his appointment in March

## 2023-03-07 ENCOUNTER — HOSPITAL ENCOUNTER (OUTPATIENT)
Dept: RADIATION THERAPY | Age: 58
Discharge: HOME | End: 2023-03-07

## 2023-03-09 ENCOUNTER — OFFICE VISIT (OUTPATIENT)
Dept: INFECTIOUS DISEASES | Age: 58
End: 2023-03-09

## 2023-03-09 VITALS
RESPIRATION RATE: 16 BRPM | HEART RATE: 60 BPM | DIASTOLIC BLOOD PRESSURE: 85 MMHG | OXYGEN SATURATION: 96 % | BODY MASS INDEX: 31.02 KG/M2 | SYSTOLIC BLOOD PRESSURE: 139 MMHG | TEMPERATURE: 98.5 F | HEIGHT: 72 IN | WEIGHT: 229 LBS

## 2023-03-09 DIAGNOSIS — A63.0 ANAL CONDYLOMA: ICD-10-CM

## 2023-03-09 DIAGNOSIS — B20 SYMPTOMATIC HIV INFECTION (HCC): Primary | ICD-10-CM

## 2023-03-09 RX ORDER — LORAZEPAM 1 MG/1
TABLET ORAL
COMMUNITY
Start: 2023-02-16

## 2023-03-09 NOTE — PROGRESS NOTES
1. \"Have you been to the ER, urgent care clinic since your last visit? Hospitalized since your last visit? \" Yes-1/10/23 pt had surgery at [facility]    2. \"Have you seen or consulted any other health care providers outside of the [address] [place] [address] since your last visit? \" No     3. For patients aged 39-70: Has the patient had a colonoscopy / FIT/ Cologuard? Yes - Care Gap present. Most recent result on file      If the patient is female:    4. For patients aged 41-77: Has the patient had a mammogram within the past 2 years? NA - based on age or sex      11. For patients aged 21-65: Has the patient had a pap smear? NA - based on age or sex  .   Chief Complaint   Patient presents with    Follow-up    HIV     Visit Vitals  /85 (BP 1 Location: Left upper arm, BP Patient Position: Sitting, BP Cuff Size: Adult)   Pulse 60   Temp 98.5 °F (36.9 °C) (Oral)   Resp 16   Ht 6' (1.829 m)   Wt 229 lb (103.9 kg)   SpO2 96%   BMI 31.06 kg/m²

## 2023-03-09 NOTE — PROGRESS NOTES
Angélica Hubbard is a 56 y.o. male. HPI  Patient with ESRD on hemodialysis, with long standing HIV infection, found to have CD4 count of only 4/mm3 and VL of 51,700 while on Reyataz/Lamivudine/ Ziagen. He was switched to Dub Saras and [place]. Last CD4 in March 2022 was 193 (13.8%) with VL <20 copies/ml. Repeat CD4 last week was 260/mm3 with VL <20 copies/ml. He is here today for routine follow-up. Interim uneventful for diagnosis of squamous cell carcinoma of the anus with history of anal condyloma. PET scan and MRI scan were unremarkable. He has been evaluated by Radiation Oncologist but reportedly the decision was to defer treatment at this time because of dialysis. No other symptoms reported. .     Review of Systems   Constitutional:  Negative for chills, diaphoresis, fever, malaise/fatigue and weight loss. HENT: Negative. Eyes: Negative. Respiratory: Negative. Cardiovascular: Negative. Gastrointestinal: Negative. Genitourinary: Negative. Musculoskeletal: Negative. Skin:  Negative for itching and rash. Neurological: Negative. Endo/Heme/Allergies: Negative. Psychiatric/Behavioral: Negative. Past Medical History:   Diagnosis Date    Anemia     Chronic abdominal pain     Chronic kidney disease     dialysis pt. - M. W.F    Complication of AV dialysis fistula 2019    2 large pseudo-aneurysm    Dependence on renal dialysis ([address]     Esophagitis     ESRD on dialysis ([address]     Gastritis     GERD (gastroesophageal reflux disease)     Gout     High cholesterol     History of blood transfusion     at least 8 units over the years    HIV (human immunodeficiency virus infection) ([address] 2011    Hypertension     Other ill-defined conditions(799.89)     SMALL VEIN IN HEART BEING MONITORED    Pancreatitis     Renal insufficiency     Seizure ([address] 2012    one time and none sense    Seizures ([address]     Sepsis ([address] 2011 & 8/2019     Past Surgical History: Procedure Laterality Date    COLONOSCOPY N/A 12/14/2021    COLONOSCOPY performed by Herminia Hooks MD at [address] N/A 11/15/2022    COLONOSCOPY/EGD performed by Herminia Hooks MD at [address] Left 2012    & 2019    HX CHOLECYSTECTOMY  2011    HX COLONOSCOPY       Objective  Physical Exam  Vitals and nursing note reviewed. Constitutional:       Appearance: He is not ill-appearing. HENT:      Head: Normocephalic and atraumatic. Right Ear: External ear normal.      Left Ear: External ear normal.      Nose: Nose normal.      Mouth/Throat:      Pharynx: Oropharynx is clear. Cardiovascular:      Rate and Rhythm: Normal rate and regular rhythm. Heart sounds: No murmur heard. Pulmonary:      Effort: Pulmonary effort is normal.      Breath sounds: Normal breath sounds. Abdominal:      General: Bowel sounds are normal. There is no distension. Palpations: Abdomen is soft. Musculoskeletal:      Cervical back: Neck supple. Right lower leg: No edema. Left lower leg: No edema. Skin:     Findings: No rash. Neurological:      General: No focal deficit present. Mental Status: He is alert and oriented to person, place, and time. Psychiatric:         Mood and Affect: Mood normal.         Behavior: Behavior normal.         Thought Content:  Thought content normal.         Judgment: Judgment normal.       Contains abnormal data LYMPHOCYTES, CD4 PERCENT AND ABSOLUTE  Order: [id number]  Collected 3/7/2023 15:15    Status: Final result    Next appt: 03/13/2023 at 02:15 PM in Surgery Kate Del Valle MD)    Dx: HIV infection, unspecified symptom st...    0 Result Notes             Component Ref Range & Units 3/7/23 1515 1/10/23 0659 8/31/22 0952 8/14/22 1338 3/23/22 0915 9/20/21 0902 3/3/21 0942   Abs CD4 [place] 359 - 1,519 /uL 260 Low    146 Low    193 Low   164 Low   176 Low     % CD 4 Pos Lymph 30.8 - 58.5 % 18.6 Low    24.3 Low    13.8 Low 11.7 Low   12.6 Low          Component Ref Range & Units 3/7/23 1515 8/31/22 0952 3/23/22 0915 9/20/21 0902 3/3/21 0942 8/21/20 0921   HIV-1 RNA by PCR copies/mL <20  <20 CM  <20 CM  <20 CM  <20 CM  <20 CM      Assessment & Plan  1. HIV-1 infection, CDC Class A2, with CD4 count 260/mm3 and viral load of <20 copies/ml, clinically stable on Tivicay, boosted Reyataz and  Vemlidy suppression. 2. ESRD on hemodialysis   3. Perianal squamous cell carcinoma    Comment:  SCC likely a result of anal condyloma, and has no impact on his HIV infection which is well controlled. 1. Continue Tivicay 50 mg daily, Reyataz 300 mg/ritonavir 100 mg daily, Restart Vemlidy (Tenofovir AF) 25 mg daily  2. Routine follow-up in 6 months (patient prefers biannual visits)  3. Repeat staging labs with CBC, CMP, CD4 and HIV viral load two weeks prior to next visit.       Dorina Diza MD

## 2023-03-13 ENCOUNTER — OFFICE VISIT (OUTPATIENT)
Dept: SURGERY | Age: 58
End: 2023-03-13
Payer: MEDICARE

## 2023-03-13 VITALS
HEART RATE: 70 BPM | OXYGEN SATURATION: 94 % | WEIGHT: 230 LBS | BODY MASS INDEX: 31.15 KG/M2 | RESPIRATION RATE: 18 BRPM | HEIGHT: 72 IN | TEMPERATURE: 97.6 F | SYSTOLIC BLOOD PRESSURE: 150 MMHG | DIASTOLIC BLOOD PRESSURE: 75 MMHG

## 2023-03-13 DIAGNOSIS — C44.520 SQUAMOUS CELL CARCINOMA OF ANAL MARGIN: Primary | ICD-10-CM

## 2023-03-13 PROCEDURE — 99024 POSTOP FOLLOW-UP VISIT: CPT | Performed by: COLON & RECTAL SURGERY

## 2023-03-21 ENCOUNTER — TELEPHONE (OUTPATIENT)
Dept: SURGERY | Age: 58
End: 2023-03-21

## 2023-03-21 NOTE — TELEPHONE ENCOUNTER
Kisha Giles would like to report that he is having anal bleeding and wanted a nurse to call him. Please call him when available.  [phone]

## 2023-03-21 NOTE — TELEPHONE ENCOUNTER
Patient states that he has been having soft stools. Just small amounts of rectal bleeding. He states that he has been doing warm baths after bowel movements. I told him I would discuss this with [facility] tomorrow and make him aware.

## 2023-03-22 DIAGNOSIS — B20 SYMPTOMATIC HIV INFECTION (HCC): ICD-10-CM

## 2023-03-22 RX ORDER — DOLUTEGRAVIR SODIUM 50 MG/1
TABLET, FILM COATED ORAL
Qty: 90 TABLET | Refills: 2 | Status: SHIPPED | OUTPATIENT
Start: 2023-03-22

## 2023-03-22 NOTE — TELEPHONE ENCOUNTER
Spoke with  he said to continue tub baths, make sure stools are soft. I spoke with Ester Garrido and made him aware. He said they decided not to do radiation just yet .

## 2023-03-28 ENCOUNTER — TRANSCRIBE ORDER (OUTPATIENT)
Dept: SCHEDULING | Age: 58
End: 2023-03-28

## 2023-03-28 ENCOUNTER — TELEPHONE (OUTPATIENT)
Dept: SURGERY | Age: 58
End: 2023-03-28

## 2023-03-28 DIAGNOSIS — C44.520 SQUAMOUS CELL CARCINOMA OF PERIANAL REGION: Primary | ICD-10-CM

## 2023-03-28 NOTE — TELEPHONE ENCOUNTER
Patient called today regarding  FMLA paper work. He said that he gave it to the nurse on Wednesday and she told him she would have signed and fax it on Thursday. I do not see any paper work scanned into chart on in fill folder.

## 2023-04-25 ENCOUNTER — TELEPHONE (OUTPATIENT)
Dept: SURGERY | Age: 58
End: 2023-04-25

## 2023-05-03 ENCOUNTER — TELEPHONE (OUTPATIENT)
Dept: SURGERY | Age: 58
End: 2023-05-03

## 2023-05-22 RX ORDER — TENOFOVIR ALAFENAMIDE 25 MG/1
1 TABLET ORAL DAILY
COMMUNITY
Start: 2023-05-01

## 2023-05-22 RX ORDER — PANTOPRAZOLE SODIUM 40 MG/1
40 TABLET, DELAYED RELEASE ORAL 2 TIMES DAILY
COMMUNITY
Start: 2023-03-22

## 2023-05-22 RX ORDER — AMLODIPINE BESYLATE 10 MG/1
10 TABLET ORAL DAILY
Status: ON HOLD | COMMUNITY
End: 2023-06-21 | Stop reason: HOSPADM

## 2023-05-22 RX ORDER — ALBUTEROL SULFATE 90 UG/1
AEROSOL, METERED RESPIRATORY (INHALATION)
COMMUNITY
Start: 2023-04-19

## 2023-05-22 RX ORDER — FAMOTIDINE 40 MG/1
40 TABLET, FILM COATED ORAL 2 TIMES DAILY
COMMUNITY
End: 2023-07-13

## 2023-05-22 RX ORDER — MESALAMINE 800 MG/1
800 TABLET, DELAYED RELEASE ORAL 3 TIMES DAILY
COMMUNITY
Start: 2023-03-22

## 2023-05-23 RX ORDER — ATAZANAVIR 300 MG/1
CAPSULE ORAL
Qty: 180 CAPSULE | Refills: 0 | Status: SHIPPED | OUTPATIENT
Start: 2023-05-23 | End: 2023-11-19

## 2023-05-31 ENCOUNTER — TELEPHONE (OUTPATIENT)
Age: 58
End: 2023-05-31

## 2023-05-31 NOTE — TELEPHONE ENCOUNTER
Pt spoke to [facility] yesterday about a letter that Dr Maddison Maxwell signed. He would like her to call him back in regards to that letter. He stated he will give more information once he calls her. Please call when available.  Phone: [phone]

## 2023-06-02 NOTE — TELEPHONE ENCOUNTER
Spoke with Opal Miramontes and made him aware that he will need to discuss the paperwork with  at his follow up appointment. He states that he needs someone to explain his medical issues to him.

## 2023-06-08 ENCOUNTER — HOSPITAL ENCOUNTER (OUTPATIENT)
Facility: HOSPITAL | Age: 58
Discharge: HOME | End: 2023-06-10
Payer: MEDICARE

## 2023-06-08 DIAGNOSIS — M79.89 RIGHT LEG SWELLING: ICD-10-CM

## 2023-06-08 PROCEDURE — 93971 EXTREMITY STUDY: CPT

## 2023-06-12 ENCOUNTER — OFFICE VISIT (OUTPATIENT)
Age: 58
End: 2023-06-12
Payer: MEDICARE

## 2023-06-12 ENCOUNTER — APPOINTMENT (OUTPATIENT)
Facility: HOSPITAL | Age: 58
End: 2023-06-12
Payer: MEDICARE

## 2023-06-12 ENCOUNTER — HOSPITAL ENCOUNTER (EMERGENCY)
Facility: HOSPITAL | Age: 58
Discharge: ANOTHER HOSPITAL | End: 2023-06-13
Attending: EMERGENCY MEDICINE
Payer: MEDICARE

## 2023-06-12 VITALS
TEMPERATURE: 98 F | SYSTOLIC BLOOD PRESSURE: 130 MMHG | HEART RATE: 81 BPM | OXYGEN SATURATION: 92 % | WEIGHT: 229.6 LBS | DIASTOLIC BLOOD PRESSURE: 88 MMHG | BODY MASS INDEX: 31.1 KG/M2 | RESPIRATION RATE: 18 BRPM | HEIGHT: 72 IN

## 2023-06-12 DIAGNOSIS — R79.89 ELEVATED TROPONIN: Primary | ICD-10-CM

## 2023-06-12 DIAGNOSIS — I24.9 ACS (ACUTE CORONARY SYNDROME) (HCC): ICD-10-CM

## 2023-06-12 DIAGNOSIS — C44.520 SQUAMOUS CELL CARCINOMA OF ANAL MARGIN: Primary | ICD-10-CM

## 2023-06-12 DIAGNOSIS — N18.6 ESRD (END STAGE RENAL DISEASE) (HCC): ICD-10-CM

## 2023-06-12 LAB
ALBUMIN SERPL-MCNC: 4.2 G/DL (ref 3.5–5)
ALBUMIN/GLOB SERPL: 1.1 (ref 1.1–2.2)
ALP SERPL-CCNC: 57 U/L (ref 45–117)
ALT SERPL-CCNC: 11 U/L (ref 12–78)
ANION GAP SERPL CALC-SCNC: 8 MMOL/L (ref 5–15)
AST SERPL W P-5'-P-CCNC: 35 U/L (ref 15–37)
BASOPHILS # BLD: 0 K/UL (ref 0–0.1)
BASOPHILS NFR BLD: 0 % (ref 0–1)
BILIRUB SERPL-MCNC: 0.6 MG/DL (ref 0.2–1)
BNP SERPL-MCNC: ABNORMAL PG/ML
BUN SERPL-MCNC: 30 MG/DL (ref 6–20)
BUN/CREAT SERPL: 3 (ref 12–20)
CA-I BLD-MCNC: 10 MG/DL (ref 8.5–10.1)
CHLORIDE SERPL-SCNC: 97 MMOL/L (ref 97–108)
CO2 SERPL-SCNC: 35 MMOL/L (ref 21–32)
CREAT SERPL-MCNC: 8.86 MG/DL (ref 0.7–1.3)
DIFFERENTIAL METHOD BLD: ABNORMAL
EOSINOPHIL # BLD: 0.2 K/UL (ref 0–0.4)
EOSINOPHIL NFR BLD: 2 % (ref 0–7)
ERYTHROCYTE [DISTWIDTH] IN BLOOD BY AUTOMATED COUNT: 14.6 % (ref 11.5–14.5)
FLUAV RNA SPEC QL NAA+PROBE: NOT DETECTED
FLUBV RNA SPEC QL NAA+PROBE: NOT DETECTED
GLOBULIN SER CALC-MCNC: 4 G/DL (ref 2–4)
GLUCOSE SERPL-MCNC: 97 MG/DL (ref 65–100)
HCT VFR BLD AUTO: 25.5 % (ref 36.6–50.3)
HGB BLD-MCNC: 8.1 G/DL (ref 12.1–17)
IMM GRANULOCYTES # BLD AUTO: 0 K/UL (ref 0–0.04)
IMM GRANULOCYTES NFR BLD AUTO: 0 % (ref 0–0.5)
LACTATE SERPL-SCNC: 0.7 MMOL/L (ref 0.4–2)
LACTATE SERPL-SCNC: 0.8 MMOL/L (ref 0.4–2)
LYMPHOCYTES # BLD: 0.7 K/UL (ref 0.8–3.5)
LYMPHOCYTES NFR BLD: 8 % (ref 12–49)
MCH RBC QN AUTO: 28.5 PG (ref 26–34)
MCHC RBC AUTO-ENTMCNC: 31.8 G/DL (ref 30–36.5)
MCV RBC AUTO: 89.8 FL (ref 80–99)
MONOCYTES # BLD: 0.4 K/UL (ref 0–1)
MONOCYTES NFR BLD: 4 % (ref 5–13)
NEUTS SEG # BLD: 7.3 K/UL (ref 1.8–8)
NEUTS SEG NFR BLD: 86 % (ref 32–75)
NRBC # BLD: 0 K/UL (ref 0–0.01)
NRBC BLD-RTO: 0 PER 100 WBC
PLATELET # BLD AUTO: 195 K/UL (ref 150–400)
PMV BLD AUTO: 10.1 FL (ref 8.9–12.9)
POTASSIUM SERPL-SCNC: 4 MMOL/L (ref 3.5–5.1)
PROT SERPL-MCNC: 8.2 G/DL (ref 6.4–8.2)
RBC # BLD AUTO: 2.84 M/UL (ref 4.1–5.7)
SARS-COV-2 RNA RESP QL NAA+PROBE: NOT DETECTED
SODIUM SERPL-SCNC: 140 MMOL/L (ref 136–145)
TROPONIN I SERPL HS-MCNC: 4702 NG/L (ref 0–76)
TROPONIN I SERPL HS-MCNC: 7143 NG/L (ref 0–76)
WBC # BLD AUTO: 8.5 K/UL (ref 4.1–11.1)

## 2023-06-12 PROCEDURE — 87040 BLOOD CULTURE FOR BACTERIA: CPT

## 2023-06-12 PROCEDURE — 83880 ASSAY OF NATRIURETIC PEPTIDE: CPT

## 2023-06-12 PROCEDURE — 3074F SYST BP LT 130 MM HG: CPT | Performed by: COLON & RECTAL SURGERY

## 2023-06-12 PROCEDURE — 71045 X-RAY EXAM CHEST 1 VIEW: CPT

## 2023-06-12 PROCEDURE — 93005 ELECTROCARDIOGRAM TRACING: CPT | Performed by: EMERGENCY MEDICINE

## 2023-06-12 PROCEDURE — 85025 COMPLETE CBC W/AUTO DIFF WBC: CPT

## 2023-06-12 PROCEDURE — 80053 COMPREHEN METABOLIC PANEL: CPT

## 2023-06-12 PROCEDURE — 36415 COLL VENOUS BLD VENIPUNCTURE: CPT

## 2023-06-12 PROCEDURE — 96374 THER/PROPH/DIAG INJ IV PUSH: CPT

## 2023-06-12 PROCEDURE — 87636 SARSCOV2 & INF A&B AMP PRB: CPT

## 2023-06-12 PROCEDURE — 84484 ASSAY OF TROPONIN QUANT: CPT

## 2023-06-12 PROCEDURE — 83605 ASSAY OF LACTIC ACID: CPT

## 2023-06-12 PROCEDURE — 99285 EMERGENCY DEPT VISIT HI MDM: CPT

## 2023-06-12 PROCEDURE — 3078F DIAST BP <80 MM HG: CPT | Performed by: COLON & RECTAL SURGERY

## 2023-06-12 PROCEDURE — 99213 OFFICE O/P EST LOW 20 MIN: CPT | Performed by: COLON & RECTAL SURGERY

## 2023-06-12 ASSESSMENT — PATIENT HEALTH QUESTIONNAIRE - PHQ9
SUM OF ALL RESPONSES TO PHQ QUESTIONS 1-9: 0
SUM OF ALL RESPONSES TO PHQ9 QUESTIONS 1 & 2: 0
2. FEELING DOWN, DEPRESSED OR HOPELESS: 0
1. LITTLE INTEREST OR PLEASURE IN DOING THINGS: 0

## 2023-06-12 ASSESSMENT — PAIN - FUNCTIONAL ASSESSMENT: PAIN_FUNCTIONAL_ASSESSMENT: NONE - DENIES PAIN

## 2023-06-12 NOTE — ED TRIAGE NOTES
Arrival by EMS, shortness of breath - dialysis this morning - 60% back up to the 90's%. EMS temp 100. BG 89, GCS 15, SR 89, /89.      O2 88-97% (2L - 4L)     Recently dx w/ rectal cancer

## 2023-06-12 NOTE — ED PROVIDER NOTES
DIAGNOSIS/MDM   CC/HPI Summary, DDx, ED Course, and Reassessment: Patient presents to the emergency department no apparent respiratory distress with sats in the low 80s and reportedly hypoxic throughout dialysis. He was not as improved as was expected and so he was deferred to the emergency department after dialysis. EKG is nonischemic. Work-up is pending doubt infectious etiology but may be related to his renal disease or cardiac function       Records Reviewed (source and summary of external notes): Prior medical records and Nursing notes    Vitals:    Vitals:    06/12/23 1900 06/12/23 1908   BP: (!) 145/78    Pulse: 83 80   Resp: 28 24   Temp: 98.7 °F (37.1 °C)    TempSrc: Oral    SpO2: (!) 84% 95%   Weight: 99.8 kg (220 lb)    Height: 1.803 m (5' 11\")         ED COURSE  ED Course as of 06/13/23 0753   Mon Jun 12, 2023   2100 Patient care transferred at shift change. Patient was sent for evaluation because he was short of breath following dialysis. Labs are pending. [RA]      ED Course User Index  [RA] Sheldon Black MD       Disposition Considerations (Tests not done, Shared Decision Making, Pt Expectation of Test or Treatment.):  Patient signed out pending results of work-up.   Admission versus transfer     Patient was given the following medications:  Medications - No data to display    CONSULTS: (Who and What was discussed)  None     Social Determinants affecting Dx or Tx: None    Smoking Cessation: Not Applicable    PROCEDURES   Unless otherwise noted above, none  Procedures      CRITICAL CARE TIME   CRITICAL CARE NOTE :    7:55 AM    IMPENDING DETERIORATION -Respiratory  ASSOCIATED RISK FACTORS - Hypoxia  MANAGEMENT- Bedside Assessment and Supervision of Care  INTERPRETATION -  Xrays, ECG, Blood Pressure, and Cardiac Output Measures   INTERVENTIONS - hemodynamic mngmt and Metobolic interventions  CASE REVIEW - Nursing  TREATMENT RESPONSE -Improved and Stable  PERFORMED BY - Self    NOTES   :  I have spent

## 2023-06-13 ENCOUNTER — APPOINTMENT (OUTPATIENT)
Facility: HOSPITAL | Age: 58
End: 2023-06-13
Payer: MEDICARE

## 2023-06-13 VITALS
TEMPERATURE: 98.7 F | HEART RATE: 75 BPM | RESPIRATION RATE: 22 BRPM | DIASTOLIC BLOOD PRESSURE: 85 MMHG | SYSTOLIC BLOOD PRESSURE: 136 MMHG | OXYGEN SATURATION: 98 % | WEIGHT: 220 LBS | HEIGHT: 71 IN | BODY MASS INDEX: 30.8 KG/M2

## 2023-06-13 PROBLEM — I21.4 NSTEMI (NON-ST ELEVATED MYOCARDIAL INFARCTION) (HCC): Status: ACTIVE | Noted: 2023-06-13

## 2023-06-13 LAB
APTT PPP: 36 SEC (ref 21.2–34.1)
EKG ATRIAL RATE: 77 BPM
EKG DIAGNOSIS: NORMAL
EKG P AXIS: 3 DEGREES
EKG P-R INTERVAL: 149 MS
EKG Q-T INTERVAL: 396 MS
EKG QRS DURATION: 92 MS
EKG QTC CALCULATION (BAZETT): 449 MS
EKG R AXIS: -4 DEGREES
EKG T AXIS: 23 DEGREES
EKG VENTRICULAR RATE: 77 BPM
ERYTHROCYTE [DISTWIDTH] IN BLOOD BY AUTOMATED COUNT: 14.6 % (ref 11.5–14.5)
HCT VFR BLD AUTO: 24.3 % (ref 36.6–50.3)
HGB BLD-MCNC: 7.6 G/DL (ref 12.1–17)
INR PPP: 1.2 (ref 0.9–1.1)
MCH RBC QN AUTO: 28.5 PG (ref 26–34)
MCHC RBC AUTO-ENTMCNC: 31.3 G/DL (ref 30–36.5)
MCV RBC AUTO: 91 FL (ref 80–99)
NRBC # BLD: 0 K/UL (ref 0–0.01)
NRBC BLD-RTO: 0 PER 100 WBC
PLATELET # BLD AUTO: 173 K/UL (ref 150–400)
PMV BLD AUTO: 9.6 FL (ref 8.9–12.9)
PROTHROMBIN TIME: 14.7 SEC (ref 11.9–14.6)
RBC # BLD AUTO: 2.67 M/UL (ref 4.1–5.7)
THERAPEUTIC RANGE: ABNORMAL SEC (ref 82–109)
UFH PPP CHRO-ACNC: <0.1 IU/ML
WBC # BLD AUTO: 7 K/UL (ref 4.1–11.1)

## 2023-06-13 PROCEDURE — 85520 HEPARIN ASSAY: CPT

## 2023-06-13 PROCEDURE — 6360000004 HC RX CONTRAST MEDICATION: Performed by: EMERGENCY MEDICINE

## 2023-06-13 PROCEDURE — 6360000002 HC RX W HCPCS: Performed by: EMERGENCY MEDICINE

## 2023-06-13 PROCEDURE — 85730 THROMBOPLASTIN TIME PARTIAL: CPT

## 2023-06-13 PROCEDURE — 85027 COMPLETE CBC AUTOMATED: CPT

## 2023-06-13 PROCEDURE — 71275 CT ANGIOGRAPHY CHEST: CPT

## 2023-06-13 PROCEDURE — 36415 COLL VENOUS BLD VENIPUNCTURE: CPT

## 2023-06-13 PROCEDURE — 85610 PROTHROMBIN TIME: CPT

## 2023-06-13 RX ORDER — HEPARIN SODIUM 1000 [USP'U]/ML
4000 INJECTION, SOLUTION INTRAVENOUS; SUBCUTANEOUS PRN
Status: DISCONTINUED | OUTPATIENT
Start: 2023-06-13 | End: 2023-06-13 | Stop reason: HOSPADM

## 2023-06-13 RX ORDER — HEPARIN SODIUM 1000 [USP'U]/ML
4000 INJECTION, SOLUTION INTRAVENOUS; SUBCUTANEOUS
Status: COMPLETED | OUTPATIENT
Start: 2023-06-13 | End: 2023-06-13

## 2023-06-13 RX ORDER — HEPARIN SODIUM 1000 [USP'U]/ML
2000 INJECTION, SOLUTION INTRAVENOUS; SUBCUTANEOUS PRN
Status: DISCONTINUED | OUTPATIENT
Start: 2023-06-13 | End: 2023-06-13 | Stop reason: HOSPADM

## 2023-06-13 RX ORDER — HEPARIN SODIUM 10000 [USP'U]/100ML
5-30 INJECTION, SOLUTION INTRAVENOUS CONTINUOUS
Status: DISCONTINUED | OUTPATIENT
Start: 2023-06-13 | End: 2023-06-13 | Stop reason: HOSPADM

## 2023-06-13 RX ADMIN — IOPAMIDOL 100 ML: 755 INJECTION, SOLUTION INTRAVENOUS at 02:44

## 2023-06-13 RX ADMIN — HEPARIN SODIUM 4000 UNITS: 1000 INJECTION INTRAVENOUS; SUBCUTANEOUS at 04:10

## 2023-06-13 RX ADMIN — HEPARIN SODIUM 10 UNITS/KG/HR: 10000 INJECTION, SOLUTION INTRAVENOUS at 04:12

## 2023-06-13 ASSESSMENT — PAIN - FUNCTIONAL ASSESSMENT: PAIN_FUNCTIONAL_ASSESSMENT: NONE - DENIES PAIN

## 2023-06-13 NOTE — ED NOTES
Patient transferred to [facility] via [address] [facility]. Pt's vitals are stable at this time. Heparin gtt continued upon transfer, infusing at 10 units/kg/hr; 10ml/hr. All of pt's belongings w/ patient. Pt in no acute distress at this time.      Bong Martínez RN  06/13/23 6857

## 2023-06-14 ENCOUNTER — HOSPITAL ENCOUNTER (INPATIENT)
Facility: HOSPITAL | Age: 58
LOS: 7 days | Discharge: HOME HEALTH | DRG: 228 | End: 2023-06-21
Attending: FAMILY MEDICINE | Admitting: INTERNAL MEDICINE
Payer: MEDICARE

## 2023-06-14 DIAGNOSIS — I21.4 NSTEMI (NON-ST ELEVATED MYOCARDIAL INFARCTION) (HCC): Primary | ICD-10-CM

## 2023-06-14 DIAGNOSIS — I25.83 CORONARY ARTERY DISEASE DUE TO LIPID RICH PLAQUE: ICD-10-CM

## 2023-06-14 DIAGNOSIS — I25.10 CORONARY ARTERY DISEASE DUE TO LIPID RICH PLAQUE: ICD-10-CM

## 2023-06-14 PROCEDURE — 1100000003 HC PRIVATE W/ TELEMETRY

## 2023-06-14 PROCEDURE — 2060000000 HC ICU INTERMEDIATE R&B

## 2023-06-14 PROCEDURE — 6370000000 HC RX 637 (ALT 250 FOR IP): Performed by: INTERNAL MEDICINE

## 2023-06-14 PROCEDURE — 2580000003 HC RX 258: Performed by: INTERNAL MEDICINE

## 2023-06-14 PROCEDURE — 6360000002 HC RX W HCPCS: Performed by: INTERNAL MEDICINE

## 2023-06-14 RX ORDER — SODIUM CHLORIDE 9 MG/ML
INJECTION, SOLUTION INTRAVENOUS PRN
Status: DISCONTINUED | OUTPATIENT
Start: 2023-06-14 | End: 2023-06-21 | Stop reason: HOSPADM

## 2023-06-14 RX ORDER — ONDANSETRON 2 MG/ML
4 INJECTION INTRAMUSCULAR; INTRAVENOUS EVERY 6 HOURS PRN
Status: DISCONTINUED | OUTPATIENT
Start: 2023-06-14 | End: 2023-06-21 | Stop reason: HOSPADM

## 2023-06-14 RX ORDER — SODIUM CHLORIDE 0.9 % (FLUSH) 0.9 %
5-40 SYRINGE (ML) INJECTION EVERY 12 HOURS SCHEDULED
Status: DISCONTINUED | OUTPATIENT
Start: 2023-06-14 | End: 2023-06-21 | Stop reason: HOSPADM

## 2023-06-14 RX ORDER — PANTOPRAZOLE SODIUM 40 MG/1
40 TABLET, DELAYED RELEASE ORAL 2 TIMES DAILY
Status: DISCONTINUED | OUTPATIENT
Start: 2023-06-14 | End: 2023-06-21 | Stop reason: HOSPADM

## 2023-06-14 RX ORDER — NITROGLYCERIN 0.4 MG/1
0.4 TABLET SUBLINGUAL EVERY 5 MIN PRN
Status: DISCONTINUED | OUTPATIENT
Start: 2023-06-14 | End: 2023-06-21 | Stop reason: HOSPADM

## 2023-06-14 RX ORDER — SEVELAMER CARBONATE 800 MG/1
1600 TABLET, FILM COATED ORAL
Status: DISCONTINUED | OUTPATIENT
Start: 2023-06-15 | End: 2023-06-21 | Stop reason: HOSPADM

## 2023-06-14 RX ORDER — AMLODIPINE BESYLATE 5 MG/1
10 TABLET ORAL DAILY
Status: DISCONTINUED | OUTPATIENT
Start: 2023-06-15 | End: 2023-06-16

## 2023-06-14 RX ORDER — ALBUTEROL SULFATE 90 UG/1
1 AEROSOL, METERED RESPIRATORY (INHALATION) EVERY 4 HOURS PRN
Status: DISCONTINUED | OUTPATIENT
Start: 2023-06-14 | End: 2023-06-21 | Stop reason: HOSPADM

## 2023-06-14 RX ORDER — ASPIRIN 81 MG/1
81 TABLET, CHEWABLE ORAL DAILY
Status: DISCONTINUED | OUTPATIENT
Start: 2023-06-15 | End: 2023-06-21 | Stop reason: HOSPADM

## 2023-06-14 RX ORDER — ATORVASTATIN CALCIUM 40 MG/1
40 TABLET, FILM COATED ORAL DAILY
Status: DISCONTINUED | OUTPATIENT
Start: 2023-06-15 | End: 2023-06-21 | Stop reason: HOSPADM

## 2023-06-14 RX ORDER — ONDANSETRON 4 MG/1
4 TABLET, ORALLY DISINTEGRATING ORAL EVERY 8 HOURS PRN
Status: DISCONTINUED | OUTPATIENT
Start: 2023-06-14 | End: 2023-06-21 | Stop reason: HOSPADM

## 2023-06-14 RX ORDER — ACETAMINOPHEN 325 MG/1
650 TABLET ORAL EVERY 6 HOURS PRN
Status: DISCONTINUED | OUTPATIENT
Start: 2023-06-14 | End: 2023-06-20

## 2023-06-14 RX ORDER — ATAZANAVIR 300 MG/1
300 CAPSULE ORAL DAILY
Status: DISCONTINUED | OUTPATIENT
Start: 2023-06-15 | End: 2023-06-21 | Stop reason: HOSPADM

## 2023-06-14 RX ORDER — HEPARIN SODIUM 1000 [USP'U]/ML
4000 INJECTION, SOLUTION INTRAVENOUS; SUBCUTANEOUS PRN
Status: DISCONTINUED | OUTPATIENT
Start: 2023-06-14 | End: 2023-06-20

## 2023-06-14 RX ORDER — HEPARIN SODIUM 10000 [USP'U]/100ML
5-30 INJECTION, SOLUTION INTRAVENOUS CONTINUOUS
Status: DISCONTINUED | OUTPATIENT
Start: 2023-06-14 | End: 2023-06-20

## 2023-06-14 RX ORDER — POLYETHYLENE GLYCOL 3350 17 G/17G
17 POWDER, FOR SOLUTION ORAL DAILY PRN
Status: DISCONTINUED | OUTPATIENT
Start: 2023-06-14 | End: 2023-06-21 | Stop reason: HOSPADM

## 2023-06-14 RX ORDER — HYDROMORPHONE HYDROCHLORIDE 1 MG/ML
1 INJECTION, SOLUTION INTRAMUSCULAR; INTRAVENOUS; SUBCUTANEOUS EVERY 4 HOURS PRN
Status: DISCONTINUED | OUTPATIENT
Start: 2023-06-14 | End: 2023-06-21 | Stop reason: HOSPADM

## 2023-06-14 RX ORDER — ACETAMINOPHEN 650 MG/1
650 SUPPOSITORY RECTAL EVERY 6 HOURS PRN
Status: DISCONTINUED | OUTPATIENT
Start: 2023-06-14 | End: 2023-06-21 | Stop reason: HOSPADM

## 2023-06-14 RX ORDER — GUAIFENESIN 600 MG/1
600 TABLET, EXTENDED RELEASE ORAL 2 TIMES DAILY
Status: DISCONTINUED | OUTPATIENT
Start: 2023-06-14 | End: 2023-06-21 | Stop reason: HOSPADM

## 2023-06-14 RX ORDER — MESALAMINE 400 MG/1
800 CAPSULE, DELAYED RELEASE ORAL 3 TIMES DAILY
Status: DISCONTINUED | OUTPATIENT
Start: 2023-06-14 | End: 2023-06-14 | Stop reason: CLARIF

## 2023-06-14 RX ORDER — FAMOTIDINE 20 MG/1
40 TABLET, FILM COATED ORAL DAILY
Status: DISCONTINUED | OUTPATIENT
Start: 2023-06-15 | End: 2023-06-15

## 2023-06-14 RX ORDER — HEPARIN SODIUM 1000 [USP'U]/ML
2000 INJECTION, SOLUTION INTRAVENOUS; SUBCUTANEOUS PRN
Status: DISCONTINUED | OUTPATIENT
Start: 2023-06-14 | End: 2023-06-20

## 2023-06-14 RX ORDER — SODIUM CHLORIDE 0.9 % (FLUSH) 0.9 %
5-40 SYRINGE (ML) INJECTION PRN
Status: DISCONTINUED | OUTPATIENT
Start: 2023-06-14 | End: 2023-06-21 | Stop reason: HOSPADM

## 2023-06-14 RX ORDER — MESALAMINE 800 MG/1
800 TABLET, DELAYED RELEASE ORAL 3 TIMES DAILY
Status: DISCONTINUED | OUTPATIENT
Start: 2023-06-14 | End: 2023-06-21 | Stop reason: HOSPADM

## 2023-06-14 RX ORDER — LEVETIRACETAM 500 MG/1
500 TABLET ORAL 2 TIMES DAILY
Status: DISCONTINUED | OUTPATIENT
Start: 2023-06-14 | End: 2023-06-21 | Stop reason: HOSPADM

## 2023-06-14 RX ORDER — ONDANSETRON 2 MG/ML
4 INJECTION INTRAMUSCULAR; INTRAVENOUS EVERY 6 HOURS PRN
Status: DISCONTINUED | OUTPATIENT
Start: 2023-06-14 | End: 2023-06-15 | Stop reason: SDUPTHER

## 2023-06-14 RX ORDER — METOPROLOL TARTRATE 50 MG/1
50 TABLET, FILM COATED ORAL 2 TIMES DAILY
Status: DISCONTINUED | OUTPATIENT
Start: 2023-06-14 | End: 2023-06-20

## 2023-06-14 RX ADMIN — SODIUM CHLORIDE, PRESERVATIVE FREE 10 ML: 5 INJECTION INTRAVENOUS at 23:50

## 2023-06-14 RX ADMIN — ACETAMINOPHEN 650 MG: 325 TABLET ORAL at 23:18

## 2023-06-14 RX ADMIN — NYSTATIN 500000 UNITS: 100000 SUSPENSION ORAL at 23:49

## 2023-06-14 RX ADMIN — PANTOPRAZOLE SODIUM 40 MG: 40 TABLET, DELAYED RELEASE ORAL at 23:25

## 2023-06-14 RX ADMIN — GUAIFENESIN 600 MG: 600 TABLET, EXTENDED RELEASE ORAL at 23:26

## 2023-06-14 RX ADMIN — HEPARIN SODIUM 16 UNITS/KG/HR: 10000 INJECTION, SOLUTION INTRAVENOUS at 23:27

## 2023-06-14 RX ADMIN — MESALAMINE 800 MG: 800 TABLET, DELAYED RELEASE ORAL at 23:48

## 2023-06-14 RX ADMIN — METOPROLOL TARTRATE 50 MG: 50 TABLET, FILM COATED ORAL at 23:26

## 2023-06-14 RX ADMIN — LEVETIRACETAM 500 MG: 500 TABLET, FILM COATED ORAL at 23:25

## 2023-06-14 ASSESSMENT — PAIN SCALES - GENERAL
PAINLEVEL_OUTOF10: 0
PAINLEVEL_OUTOF10: 7
PAINLEVEL_OUTOF10: 7

## 2023-06-14 ASSESSMENT — PAIN DESCRIPTION - LOCATION: LOCATION: SHOULDER

## 2023-06-15 ENCOUNTER — APPOINTMENT (OUTPATIENT)
Facility: HOSPITAL | Age: 58
DRG: 228 | End: 2023-06-15
Attending: FAMILY MEDICINE
Payer: MEDICARE

## 2023-06-15 PROBLEM — J96.91 RESPIRATORY FAILURE WITH HYPOXIA (HCC): Status: ACTIVE | Noted: 2022-08-14

## 2023-06-15 PROBLEM — C44.520 SQUAMOUS CELL CARCINOMA OF ANAL MARGIN: Status: ACTIVE | Noted: 2023-02-20

## 2023-06-15 PROBLEM — I25.83 CORONARY ARTERY DISEASE DUE TO LIPID RICH PLAQUE: Status: ACTIVE | Noted: 2023-06-15

## 2023-06-15 PROBLEM — I25.10 CORONARY ARTERY DISEASE DUE TO LIPID RICH PLAQUE: Status: ACTIVE | Noted: 2023-06-15

## 2023-06-15 LAB
ALBUMIN SERPL-MCNC: 3.3 G/DL (ref 3.5–5)
ALBUMIN SERPL-MCNC: 3.5 G/DL (ref 3.5–5)
ALBUMIN/GLOB SERPL: 1.1 (ref 1.1–2.2)
ALP SERPL-CCNC: 45 U/L (ref 45–117)
ALT SERPL-CCNC: 9 U/L (ref 12–78)
ANION GAP SERPL CALC-SCNC: 10 MMOL/L (ref 5–15)
ANION GAP SERPL CALC-SCNC: 11 MMOL/L (ref 5–15)
AST SERPL-CCNC: 23 U/L (ref 15–37)
BASOPHILS # BLD: 0 K/UL (ref 0–0.1)
BASOPHILS NFR BLD: 0 % (ref 0–1)
BILIRUB SERPL-MCNC: 0.5 MG/DL (ref 0.2–1)
BUN SERPL-MCNC: 48 MG/DL (ref 6–20)
BUN SERPL-MCNC: 55 MG/DL (ref 6–20)
BUN/CREAT SERPL: 4 (ref 12–20)
BUN/CREAT SERPL: 4 (ref 12–20)
CALCIUM SERPL-MCNC: 9.1 MG/DL (ref 8.5–10.1)
CALCIUM SERPL-MCNC: 9.5 MG/DL (ref 8.5–10.1)
CHLORIDE SERPL-SCNC: 97 MMOL/L (ref 97–108)
CHLORIDE SERPL-SCNC: 98 MMOL/L (ref 97–108)
CO2 SERPL-SCNC: 26 MMOL/L (ref 21–32)
CO2 SERPL-SCNC: 27 MMOL/L (ref 21–32)
COMMENT:: NORMAL
CREAT SERPL-MCNC: 11.8 MG/DL (ref 0.7–1.3)
CREAT SERPL-MCNC: 12.8 MG/DL (ref 0.7–1.3)
DIFFERENTIAL METHOD BLD: ABNORMAL
EOSINOPHIL # BLD: 0.3 K/UL (ref 0–0.4)
EOSINOPHIL NFR BLD: 5 % (ref 0–7)
ERYTHROCYTE [DISTWIDTH] IN BLOOD BY AUTOMATED COUNT: 14.5 % (ref 11.5–14.5)
EST. AVERAGE GLUCOSE BLD GHB EST-MCNC: 80 MG/DL
FERRITIN SERPL-MCNC: 1518 NG/ML (ref 26–388)
FOLATE SERPL-MCNC: 4.2 NG/ML (ref 5–21)
GLOBULIN SER CALC-MCNC: 3.3 G/DL (ref 2–4)
GLUCOSE SERPL-MCNC: 91 MG/DL (ref 65–100)
GLUCOSE SERPL-MCNC: 96 MG/DL (ref 65–100)
HBA1C MFR BLD: 4.4 % (ref 4–5.6)
HCT VFR BLD AUTO: 25.7 % (ref 36.6–50.3)
HGB BLD-MCNC: 8.2 G/DL (ref 12.1–17)
IMM GRANULOCYTES # BLD AUTO: 0 K/UL (ref 0–0.04)
IMM GRANULOCYTES NFR BLD AUTO: 1 % (ref 0–0.5)
IRON SATN MFR SERPL: 11 % (ref 20–50)
IRON SERPL-MCNC: 26 UG/DL (ref 35–150)
LYMPHOCYTES # BLD: 0.9 K/UL (ref 0.8–3.5)
LYMPHOCYTES NFR BLD: 16 % (ref 12–49)
MAGNESIUM SERPL-MCNC: 2.5 MG/DL (ref 1.6–2.4)
MCH RBC QN AUTO: 28.3 PG (ref 26–34)
MCHC RBC AUTO-ENTMCNC: 31.9 G/DL (ref 30–36.5)
MCV RBC AUTO: 88.6 FL (ref 80–99)
MONOCYTES # BLD: 0.5 K/UL (ref 0–1)
MONOCYTES NFR BLD: 8 % (ref 5–13)
NEUTS SEG # BLD: 3.7 K/UL (ref 1.8–8)
NEUTS SEG NFR BLD: 70 % (ref 32–75)
NRBC # BLD: 0 K/UL (ref 0–0.01)
NRBC BLD-RTO: 0 PER 100 WBC
NT PRO BNP: ABNORMAL PG/ML
PHOSPHATE SERPL-MCNC: 5.4 MG/DL (ref 2.6–4.7)
PLATELET # BLD AUTO: 180 K/UL (ref 150–400)
PMV BLD AUTO: 10.1 FL (ref 8.9–12.9)
POTASSIUM SERPL-SCNC: 4.2 MMOL/L (ref 3.5–5.1)
POTASSIUM SERPL-SCNC: 4.5 MMOL/L (ref 3.5–5.1)
PROT SERPL-MCNC: 6.8 G/DL (ref 6.4–8.2)
RBC # BLD AUTO: 2.9 M/UL (ref 4.1–5.7)
SODIUM SERPL-SCNC: 134 MMOL/L (ref 136–145)
SODIUM SERPL-SCNC: 135 MMOL/L (ref 136–145)
SPECIMEN HOLD: NORMAL
TIBC SERPL-MCNC: 232 UG/DL (ref 250–450)
TSH SERPL DL<=0.05 MIU/L-ACNC: 1.22 UIU/ML (ref 0.36–3.74)
UFH PPP CHRO-ACNC: 0.24 IU/ML
UFH PPP CHRO-ACNC: 0.3 IU/ML
UFH PPP CHRO-ACNC: 0.41 IU/ML
UFH PPP CHRO-ACNC: 0.43 IU/ML
UFH PPP CHRO-ACNC: 0.47 IU/ML
VIT B12 SERPL-MCNC: 294 PG/ML (ref 193–986)
WBC # BLD AUTO: 5.4 K/UL (ref 4.1–11.1)

## 2023-06-15 PROCEDURE — 6370000000 HC RX 637 (ALT 250 FOR IP): Performed by: INTERNAL MEDICINE

## 2023-06-15 PROCEDURE — 83036 HEMOGLOBIN GLYCOSYLATED A1C: CPT

## 2023-06-15 PROCEDURE — 83550 IRON BINDING TEST: CPT

## 2023-06-15 PROCEDURE — 5A1D70Z PERFORMANCE OF URINARY FILTRATION, INTERMITTENT, LESS THAN 6 HOURS PER DAY: ICD-10-PCS | Performed by: INTERNAL MEDICINE

## 2023-06-15 PROCEDURE — 99223 1ST HOSP IP/OBS HIGH 75: CPT | Performed by: INTERNAL MEDICINE

## 2023-06-15 PROCEDURE — 83735 ASSAY OF MAGNESIUM: CPT

## 2023-06-15 PROCEDURE — 85520 HEPARIN ASSAY: CPT

## 2023-06-15 PROCEDURE — 82607 VITAMIN B-12: CPT

## 2023-06-15 PROCEDURE — 71045 X-RAY EXAM CHEST 1 VIEW: CPT

## 2023-06-15 PROCEDURE — 2580000003 HC RX 258: Performed by: INTERNAL MEDICINE

## 2023-06-15 PROCEDURE — 80053 COMPREHEN METABOLIC PANEL: CPT

## 2023-06-15 PROCEDURE — 85025 COMPLETE CBC W/AUTO DIFF WBC: CPT

## 2023-06-15 PROCEDURE — 83540 ASSAY OF IRON: CPT

## 2023-06-15 PROCEDURE — 2500000003 HC RX 250 WO HCPCS: Performed by: INTERNAL MEDICINE

## 2023-06-15 PROCEDURE — 94010 BREATHING CAPACITY TEST: CPT

## 2023-06-15 PROCEDURE — 1100000003 HC PRIVATE W/ TELEMETRY

## 2023-06-15 PROCEDURE — 83880 ASSAY OF NATRIURETIC PEPTIDE: CPT

## 2023-06-15 PROCEDURE — 82728 ASSAY OF FERRITIN: CPT

## 2023-06-15 PROCEDURE — 84443 ASSAY THYROID STIM HORMONE: CPT

## 2023-06-15 PROCEDURE — 80069 RENAL FUNCTION PANEL: CPT

## 2023-06-15 PROCEDURE — 36415 COLL VENOUS BLD VENIPUNCTURE: CPT

## 2023-06-15 PROCEDURE — 2060000000 HC ICU INTERMEDIATE R&B

## 2023-06-15 PROCEDURE — 82746 ASSAY OF FOLIC ACID SERUM: CPT

## 2023-06-15 PROCEDURE — 6360000002 HC RX W HCPCS: Performed by: INTERNAL MEDICINE

## 2023-06-15 RX ORDER — FAMOTIDINE 20 MG/1
20 TABLET, FILM COATED ORAL DAILY
Status: DISCONTINUED | OUTPATIENT
Start: 2023-06-16 | End: 2023-06-21 | Stop reason: HOSPADM

## 2023-06-15 RX ADMIN — SEVELAMER CARBONATE 1600 MG: 800 TABLET, FILM COATED ORAL at 17:04

## 2023-06-15 RX ADMIN — HEPARIN SODIUM 2000 UNITS: 1000 INJECTION INTRAVENOUS; SUBCUTANEOUS at 07:15

## 2023-06-15 RX ADMIN — METOPROLOL TARTRATE 50 MG: 50 TABLET, FILM COATED ORAL at 09:08

## 2023-06-15 RX ADMIN — GUAIFENESIN 600 MG: 600 TABLET, EXTENDED RELEASE ORAL at 09:08

## 2023-06-15 RX ADMIN — ATAZANAVIR 300 MG: 300 CAPSULE ORAL at 09:09

## 2023-06-15 RX ADMIN — MESALAMINE 800 MG: 800 TABLET, DELAYED RELEASE ORAL at 09:09

## 2023-06-15 RX ADMIN — HYDROMORPHONE HYDROCHLORIDE 1 MG: 1 INJECTION, SOLUTION INTRAMUSCULAR; INTRAVENOUS; SUBCUTANEOUS at 17:04

## 2023-06-15 RX ADMIN — ACETAMINOPHEN 650 MG: 325 TABLET ORAL at 05:25

## 2023-06-15 RX ADMIN — SODIUM CHLORIDE, PRESERVATIVE FREE 10 ML: 5 INJECTION INTRAVENOUS at 21:36

## 2023-06-15 RX ADMIN — SEVELAMER CARBONATE 1600 MG: 800 TABLET, FILM COATED ORAL at 09:08

## 2023-06-15 RX ADMIN — PANTOPRAZOLE SODIUM 40 MG: 40 TABLET, DELAYED RELEASE ORAL at 09:08

## 2023-06-15 RX ADMIN — ASPIRIN 81 MG CHEWABLE TABLET 81 MG: 81 TABLET CHEWABLE at 09:09

## 2023-06-15 RX ADMIN — LEVETIRACETAM 500 MG: 500 TABLET, FILM COATED ORAL at 21:17

## 2023-06-15 RX ADMIN — MESALAMINE 800 MG: 800 TABLET, DELAYED RELEASE ORAL at 17:04

## 2023-06-15 RX ADMIN — AMLODIPINE BESYLATE 10 MG: 5 TABLET ORAL at 09:08

## 2023-06-15 RX ADMIN — AZITHROMYCIN MONOHYDRATE 500 MG: 500 INJECTION, POWDER, LYOPHILIZED, FOR SOLUTION INTRAVENOUS at 06:23

## 2023-06-15 RX ADMIN — DOLUTEGRAVIR SODIUM 50 MG: 50 TABLET, FILM COATED ORAL at 09:09

## 2023-06-15 RX ADMIN — CEFTRIAXONE SODIUM 1000 MG: 1 INJECTION, POWDER, FOR SOLUTION INTRAMUSCULAR; INTRAVENOUS at 06:15

## 2023-06-15 RX ADMIN — NYSTATIN 500000 UNITS: 100000 SUSPENSION ORAL at 09:08

## 2023-06-15 RX ADMIN — SODIUM CHLORIDE, PRESERVATIVE FREE 10 ML: 5 INJECTION INTRAVENOUS at 09:10

## 2023-06-15 RX ADMIN — ACETAMINOPHEN 650 MG: 325 TABLET ORAL at 21:33

## 2023-06-15 RX ADMIN — ATORVASTATIN CALCIUM 40 MG: 40 TABLET, FILM COATED ORAL at 09:08

## 2023-06-15 RX ADMIN — MESALAMINE 800 MG: 800 TABLET, DELAYED RELEASE ORAL at 21:23

## 2023-06-15 RX ADMIN — SODIUM CHLORIDE, PRESERVATIVE FREE 10 ML: 5 INJECTION INTRAVENOUS at 21:34

## 2023-06-15 RX ADMIN — LEVETIRACETAM 500 MG: 500 TABLET, FILM COATED ORAL at 09:08

## 2023-06-15 RX ADMIN — PANTOPRAZOLE SODIUM 40 MG: 40 TABLET, DELAYED RELEASE ORAL at 21:16

## 2023-06-15 RX ADMIN — FAMOTIDINE 40 MG: 20 TABLET ORAL at 09:09

## 2023-06-15 RX ADMIN — METOPROLOL TARTRATE 50 MG: 50 TABLET, FILM COATED ORAL at 21:17

## 2023-06-15 RX ADMIN — NYSTATIN 500000 UNITS: 100000 SUSPENSION ORAL at 21:16

## 2023-06-15 RX ADMIN — HEPARIN SODIUM 18 UNITS/KG/HR: 10000 INJECTION, SOLUTION INTRAVENOUS at 11:25

## 2023-06-15 RX ADMIN — NYSTATIN 500000 UNITS: 100000 SUSPENSION ORAL at 17:03

## 2023-06-15 RX ADMIN — GUAIFENESIN 600 MG: 600 TABLET, EXTENDED RELEASE ORAL at 21:17

## 2023-06-15 ASSESSMENT — PAIN DESCRIPTION - ORIENTATION
ORIENTATION: RIGHT

## 2023-06-15 ASSESSMENT — PAIN SCALES - GENERAL
PAINLEVEL_OUTOF10: 0
PAINLEVEL_OUTOF10: 7
PAINLEVEL_OUTOF10: 8
PAINLEVEL_OUTOF10: 0
PAINLEVEL_OUTOF10: 8
PAINLEVEL_OUTOF10: 7

## 2023-06-15 ASSESSMENT — PAIN DESCRIPTION - PAIN TYPE
TYPE: CHRONIC PAIN
TYPE: CHRONIC PAIN

## 2023-06-15 ASSESSMENT — PAIN DESCRIPTION - LOCATION
LOCATION: SHOULDER
LOCATION: SHOULDER
LOCATION: SHOULDER;LEG
LOCATION: SHOULDER
LOCATION: SHOULDER

## 2023-06-15 ASSESSMENT — PAIN DESCRIPTION - DESCRIPTORS
DESCRIPTORS: ACHING
DESCRIPTORS: ACHING
DESCRIPTORS: SHARP
DESCRIPTORS: ACHING;SHARP

## 2023-06-16 ENCOUNTER — APPOINTMENT (OUTPATIENT)
Facility: HOSPITAL | Age: 58
DRG: 228 | End: 2023-06-16
Attending: FAMILY MEDICINE
Payer: MEDICARE

## 2023-06-16 LAB
ALBUMIN SERPL-MCNC: 3.6 G/DL (ref 3.5–5)
ANION GAP SERPL CALC-SCNC: 6 MMOL/L (ref 5–15)
ANION GAP SERPL CALC-SCNC: 7 MMOL/L (ref 5–15)
BASOPHILS # BLD: 0 K/UL (ref 0–0.1)
BASOPHILS NFR BLD: 0 % (ref 0–1)
BUN SERPL-MCNC: 27 MG/DL (ref 6–20)
BUN SERPL-MCNC: 30 MG/DL (ref 6–20)
BUN/CREAT SERPL: 3 (ref 12–20)
BUN/CREAT SERPL: 3 (ref 12–20)
CALCIUM SERPL-MCNC: 9.2 MG/DL (ref 8.5–10.1)
CALCIUM SERPL-MCNC: 9.4 MG/DL (ref 8.5–10.1)
CHLORIDE SERPL-SCNC: 100 MMOL/L (ref 97–108)
CHLORIDE SERPL-SCNC: 100 MMOL/L (ref 97–108)
CO2 SERPL-SCNC: 29 MMOL/L (ref 21–32)
CO2 SERPL-SCNC: 29 MMOL/L (ref 21–32)
CREAT SERPL-MCNC: 8.66 MG/DL (ref 0.7–1.3)
CREAT SERPL-MCNC: 8.69 MG/DL (ref 0.7–1.3)
DIFFERENTIAL METHOD BLD: ABNORMAL
EOSINOPHIL # BLD: 0.3 K/UL (ref 0–0.4)
EOSINOPHIL NFR BLD: 6 % (ref 0–7)
ERYTHROCYTE [DISTWIDTH] IN BLOOD BY AUTOMATED COUNT: 14.4 % (ref 11.5–14.5)
GLUCOSE SERPL-MCNC: 90 MG/DL (ref 65–100)
GLUCOSE SERPL-MCNC: 90 MG/DL (ref 65–100)
HCT VFR BLD AUTO: 27 % (ref 36.6–50.3)
HGB BLD-MCNC: 8.5 G/DL (ref 12.1–17)
IMM GRANULOCYTES # BLD AUTO: 0 K/UL (ref 0–0.04)
IMM GRANULOCYTES NFR BLD AUTO: 1 % (ref 0–0.5)
LYMPHOCYTES # BLD: 0.8 K/UL (ref 0.8–3.5)
LYMPHOCYTES NFR BLD: 16 % (ref 12–49)
MAGNESIUM SERPL-MCNC: 2.2 MG/DL (ref 1.6–2.4)
MCH RBC QN AUTO: 27.6 PG (ref 26–34)
MCHC RBC AUTO-ENTMCNC: 31.5 G/DL (ref 30–36.5)
MCV RBC AUTO: 87.7 FL (ref 80–99)
MONOCYTES # BLD: 0.4 K/UL (ref 0–1)
MONOCYTES NFR BLD: 8 % (ref 5–13)
NEUTS SEG # BLD: 3.4 K/UL (ref 1.8–8)
NEUTS SEG NFR BLD: 69 % (ref 32–75)
NRBC # BLD: 0 K/UL (ref 0–0.01)
NRBC BLD-RTO: 0 PER 100 WBC
PHOSPHATE SERPL-MCNC: 4.4 MG/DL (ref 2.6–4.7)
PHOSPHATE SERPL-MCNC: 4.8 MG/DL (ref 2.6–4.7)
PLATELET # BLD AUTO: 205 K/UL (ref 150–400)
PMV BLD AUTO: 10.2 FL (ref 8.9–12.9)
POTASSIUM SERPL-SCNC: 4.1 MMOL/L (ref 3.5–5.1)
POTASSIUM SERPL-SCNC: 4.1 MMOL/L (ref 3.5–5.1)
PROCALCITONIN SERPL-MCNC: 1.2 NG/ML
RBC # BLD AUTO: 3.08 M/UL (ref 4.1–5.7)
SODIUM SERPL-SCNC: 135 MMOL/L (ref 136–145)
SODIUM SERPL-SCNC: 136 MMOL/L (ref 136–145)
UFH PPP CHRO-ACNC: 0.35 IU/ML
WBC # BLD AUTO: 4.9 K/UL (ref 4.1–11.1)

## 2023-06-16 PROCEDURE — 84100 ASSAY OF PHOSPHORUS: CPT

## 2023-06-16 PROCEDURE — 36415 COLL VENOUS BLD VENIPUNCTURE: CPT

## 2023-06-16 PROCEDURE — 84145 PROCALCITONIN (PCT): CPT

## 2023-06-16 PROCEDURE — 2500000003 HC RX 250 WO HCPCS: Performed by: INTERNAL MEDICINE

## 2023-06-16 PROCEDURE — 1100000003 HC PRIVATE W/ TELEMETRY

## 2023-06-16 PROCEDURE — 85025 COMPLETE CBC W/AUTO DIFF WBC: CPT

## 2023-06-16 PROCEDURE — 86361 T CELL ABSOLUTE COUNT: CPT

## 2023-06-16 PROCEDURE — 6370000000 HC RX 637 (ALT 250 FOR IP): Performed by: INTERNAL MEDICINE

## 2023-06-16 PROCEDURE — 71045 X-RAY EXAM CHEST 1 VIEW: CPT

## 2023-06-16 PROCEDURE — 85520 HEPARIN ASSAY: CPT

## 2023-06-16 PROCEDURE — 80048 BASIC METABOLIC PNL TOTAL CA: CPT

## 2023-06-16 PROCEDURE — 83735 ASSAY OF MAGNESIUM: CPT

## 2023-06-16 PROCEDURE — 2580000003 HC RX 258: Performed by: INTERNAL MEDICINE

## 2023-06-16 PROCEDURE — 6360000002 HC RX W HCPCS: Performed by: INTERNAL MEDICINE

## 2023-06-16 PROCEDURE — 2060000000 HC ICU INTERMEDIATE R&B

## 2023-06-16 PROCEDURE — 80069 RENAL FUNCTION PANEL: CPT

## 2023-06-16 PROCEDURE — 99233 SBSQ HOSP IP/OBS HIGH 50: CPT | Performed by: INTERNAL MEDICINE

## 2023-06-16 RX ORDER — LOSARTAN POTASSIUM 50 MG/1
100 TABLET ORAL DAILY
Status: DISCONTINUED | OUTPATIENT
Start: 2023-06-16 | End: 2023-06-21 | Stop reason: HOSPADM

## 2023-06-16 RX ADMIN — METOPROLOL TARTRATE 50 MG: 50 TABLET, FILM COATED ORAL at 20:39

## 2023-06-16 RX ADMIN — HEPARIN SODIUM 18 UNITS/KG/HR: 10000 INJECTION, SOLUTION INTRAVENOUS at 01:44

## 2023-06-16 RX ADMIN — NYSTATIN 500000 UNITS: 100000 SUSPENSION ORAL at 12:12

## 2023-06-16 RX ADMIN — GUAIFENESIN 600 MG: 600 TABLET, EXTENDED RELEASE ORAL at 08:31

## 2023-06-16 RX ADMIN — PANTOPRAZOLE SODIUM 40 MG: 40 TABLET, DELAYED RELEASE ORAL at 08:31

## 2023-06-16 RX ADMIN — MESALAMINE 800 MG: 800 TABLET, DELAYED RELEASE ORAL at 15:00

## 2023-06-16 RX ADMIN — HEPARIN SODIUM 18 UNITS/KG/HR: 10000 INJECTION, SOLUTION INTRAVENOUS at 14:55

## 2023-06-16 RX ADMIN — PANTOPRAZOLE SODIUM 40 MG: 40 TABLET, DELAYED RELEASE ORAL at 20:39

## 2023-06-16 RX ADMIN — FAMOTIDINE 20 MG: 20 TABLET ORAL at 08:31

## 2023-06-16 RX ADMIN — HYDROMORPHONE HYDROCHLORIDE 1 MG: 1 INJECTION, SOLUTION INTRAMUSCULAR; INTRAVENOUS; SUBCUTANEOUS at 12:12

## 2023-06-16 RX ADMIN — SODIUM CHLORIDE, PRESERVATIVE FREE 10 ML: 5 INJECTION INTRAVENOUS at 08:33

## 2023-06-16 RX ADMIN — SEVELAMER CARBONATE 1600 MG: 800 TABLET, FILM COATED ORAL at 12:12

## 2023-06-16 RX ADMIN — SEVELAMER CARBONATE 1600 MG: 800 TABLET, FILM COATED ORAL at 08:31

## 2023-06-16 RX ADMIN — LEVETIRACETAM 500 MG: 500 TABLET, FILM COATED ORAL at 20:39

## 2023-06-16 RX ADMIN — DOLUTEGRAVIR SODIUM 50 MG: 50 TABLET, FILM COATED ORAL at 08:31

## 2023-06-16 RX ADMIN — SODIUM CHLORIDE, PRESERVATIVE FREE 10 ML: 5 INJECTION INTRAVENOUS at 08:32

## 2023-06-16 RX ADMIN — EPOETIN ALFA-EPBX 10000 UNITS: 10000 INJECTION, SOLUTION INTRAVENOUS; SUBCUTANEOUS at 08:56

## 2023-06-16 RX ADMIN — MESALAMINE 800 MG: 800 TABLET, DELAYED RELEASE ORAL at 08:31

## 2023-06-16 RX ADMIN — SEVELAMER CARBONATE 1600 MG: 800 TABLET, FILM COATED ORAL at 15:00

## 2023-06-16 RX ADMIN — HYDROMORPHONE HYDROCHLORIDE 1 MG: 1 INJECTION, SOLUTION INTRAMUSCULAR; INTRAVENOUS; SUBCUTANEOUS at 02:05

## 2023-06-16 RX ADMIN — MESALAMINE 800 MG: 800 TABLET, DELAYED RELEASE ORAL at 20:44

## 2023-06-16 RX ADMIN — SODIUM CHLORIDE, PRESERVATIVE FREE 10 ML: 5 INJECTION INTRAVENOUS at 20:42

## 2023-06-16 RX ADMIN — NYSTATIN 500000 UNITS: 100000 SUSPENSION ORAL at 15:00

## 2023-06-16 RX ADMIN — GUAIFENESIN 600 MG: 600 TABLET, EXTENDED RELEASE ORAL at 20:39

## 2023-06-16 RX ADMIN — AZITHROMYCIN MONOHYDRATE 500 MG: 500 INJECTION, POWDER, LYOPHILIZED, FOR SOLUTION INTRAVENOUS at 06:35

## 2023-06-16 RX ADMIN — ATAZANAVIR 300 MG: 300 CAPSULE ORAL at 08:31

## 2023-06-16 RX ADMIN — LOSARTAN POTASSIUM 100 MG: 50 TABLET, FILM COATED ORAL at 08:55

## 2023-06-16 RX ADMIN — NYSTATIN 500000 UNITS: 100000 SUSPENSION ORAL at 20:39

## 2023-06-16 RX ADMIN — CEFTRIAXONE SODIUM 1000 MG: 1 INJECTION, POWDER, FOR SOLUTION INTRAMUSCULAR; INTRAVENOUS at 06:35

## 2023-06-16 RX ADMIN — ASPIRIN 81 MG CHEWABLE TABLET 81 MG: 81 TABLET CHEWABLE at 08:31

## 2023-06-16 RX ADMIN — NYSTATIN 500000 UNITS: 100000 SUSPENSION ORAL at 08:31

## 2023-06-16 RX ADMIN — ATORVASTATIN CALCIUM 40 MG: 40 TABLET, FILM COATED ORAL at 08:32

## 2023-06-16 RX ADMIN — METOPROLOL TARTRATE 50 MG: 50 TABLET, FILM COATED ORAL at 08:31

## 2023-06-16 RX ADMIN — LEVETIRACETAM 500 MG: 500 TABLET, FILM COATED ORAL at 08:31

## 2023-06-16 ASSESSMENT — PAIN DESCRIPTION - LOCATION
LOCATION: SHOULDER
LOCATION: SHOULDER

## 2023-06-16 ASSESSMENT — PAIN SCALES - GENERAL
PAINLEVEL_OUTOF10: 8
PAINLEVEL_OUTOF10: 0
PAINLEVEL_OUTOF10: 7
PAINLEVEL_OUTOF10: 6

## 2023-06-16 ASSESSMENT — PAIN DESCRIPTION - ORIENTATION
ORIENTATION: RIGHT
ORIENTATION: RIGHT

## 2023-06-16 ASSESSMENT — PAIN DESCRIPTION - DESCRIPTORS
DESCRIPTORS: SHARP
DESCRIPTORS: ACHING

## 2023-06-17 LAB
ANION GAP SERPL CALC-SCNC: 7 MMOL/L (ref 5–15)
BASOPHILS # BLD AUTO: 0 X10E3/UL (ref 0–0.2)
BASOPHILS # BLD: 0 K/UL (ref 0–0.1)
BASOPHILS NFR BLD AUTO: 0 %
BASOPHILS NFR BLD: 0 % (ref 0–1)
BUN SERPL-MCNC: 46 MG/DL (ref 6–20)
BUN/CREAT SERPL: 4 (ref 12–20)
CALCIUM SERPL-MCNC: 9.6 MG/DL (ref 8.5–10.1)
CD3+CD4+ CELLS # BLD: 192 /UL (ref 359–1519)
CD3+CD4+ CELLS NFR BLD: 21.3 % (ref 30.8–58.5)
CHLORIDE SERPL-SCNC: 98 MMOL/L (ref 97–108)
CO2 SERPL-SCNC: 28 MMOL/L (ref 21–32)
CREAT SERPL-MCNC: 11.5 MG/DL (ref 0.7–1.3)
DIFFERENTIAL METHOD BLD: ABNORMAL
EOSINOPHIL # BLD AUTO: 0.3 X10E3/UL (ref 0–0.4)
EOSINOPHIL # BLD: 0.3 K/UL (ref 0–0.4)
EOSINOPHIL NFR BLD AUTO: 6 %
EOSINOPHIL NFR BLD: 7 % (ref 0–7)
ERYTHROCYTE [DISTWIDTH] IN BLOOD BY AUTOMATED COUNT: 14.1 % (ref 11.5–14.5)
ERYTHROCYTE [DISTWIDTH] IN BLOOD BY AUTOMATED COUNT: 14.2 % (ref 11.6–15.4)
GLUCOSE SERPL-MCNC: 94 MG/DL (ref 65–100)
HCT VFR BLD AUTO: 27.3 % (ref 36.6–50.3)
HCT VFR BLD AUTO: 27.4 % (ref 37.5–51)
HGB BLD-MCNC: 8.7 G/DL (ref 12.1–17)
HGB BLD-MCNC: 9.1 G/DL (ref 13–17.7)
IMM GRANULOCYTES # BLD AUTO: 0 K/UL (ref 0–0.04)
IMM GRANULOCYTES # BLD: 0 X10E3/UL (ref 0–0.1)
IMM GRANULOCYTES NFR BLD AUTO: 1 % (ref 0–0.5)
IMM GRANULOCYTES NFR BLD: 0 %
LYMPHOCYTES # BLD AUTO: 0.9 X10E3/UL (ref 0.7–3.1)
LYMPHOCYTES # BLD: 1.2 K/UL (ref 0.8–3.5)
LYMPHOCYTES NFR BLD AUTO: 19 %
LYMPHOCYTES NFR BLD: 24 % (ref 12–49)
MAGNESIUM SERPL-MCNC: 2.6 MG/DL (ref 1.6–2.4)
MCH RBC QN AUTO: 27.5 PG (ref 26–34)
MCH RBC QN AUTO: 27.9 PG (ref 26.6–33)
MCHC RBC AUTO-ENTMCNC: 31.9 G/DL (ref 30–36.5)
MCHC RBC AUTO-ENTMCNC: 33.2 G/DL (ref 31.5–35.7)
MCV RBC AUTO: 84 FL (ref 79–97)
MCV RBC AUTO: 86.4 FL (ref 80–99)
MONOCYTES # BLD AUTO: 0.4 X10E3/UL (ref 0.1–0.9)
MONOCYTES # BLD: 0.4 K/UL (ref 0–1)
MONOCYTES NFR BLD AUTO: 8 %
MONOCYTES NFR BLD: 8 % (ref 5–13)
NEUTROPHILS # BLD AUTO: 3.2 X10E3/UL (ref 1.4–7)
NEUTROPHILS NFR BLD AUTO: 67 %
NEUTS SEG # BLD: 2.8 K/UL (ref 1.8–8)
NEUTS SEG NFR BLD: 60 % (ref 32–75)
NRBC # BLD: 0 K/UL (ref 0–0.01)
NRBC BLD-RTO: 0 PER 100 WBC
PHOSPHATE SERPL-MCNC: 6 MG/DL (ref 2.6–4.7)
PLATELET # BLD AUTO: 200 K/UL (ref 150–400)
PLATELET # BLD AUTO: 210 X10E3/UL (ref 150–450)
PMV BLD AUTO: 9.8 FL (ref 8.9–12.9)
POTASSIUM SERPL-SCNC: 4.6 MMOL/L (ref 3.5–5.1)
RBC # BLD AUTO: 3.16 M/UL (ref 4.1–5.7)
RBC # BLD AUTO: 3.26 X10E6/UL (ref 4.14–5.8)
SODIUM SERPL-SCNC: 133 MMOL/L (ref 136–145)
UFH PPP CHRO-ACNC: 0.56 IU/ML
WBC # BLD AUTO: 4.8 K/UL (ref 4.1–11.1)
WBC # BLD AUTO: 4.9 X10E3/UL (ref 3.4–10.8)

## 2023-06-17 PROCEDURE — 6370000000 HC RX 637 (ALT 250 FOR IP): Performed by: INTERNAL MEDICINE

## 2023-06-17 PROCEDURE — 83735 ASSAY OF MAGNESIUM: CPT

## 2023-06-17 PROCEDURE — 99222 1ST HOSP IP/OBS MODERATE 55: CPT | Performed by: INTERNAL MEDICINE

## 2023-06-17 PROCEDURE — 2580000003 HC RX 258: Performed by: INTERNAL MEDICINE

## 2023-06-17 PROCEDURE — 80048 BASIC METABOLIC PNL TOTAL CA: CPT

## 2023-06-17 PROCEDURE — 2060000000 HC ICU INTERMEDIATE R&B

## 2023-06-17 PROCEDURE — 84100 ASSAY OF PHOSPHORUS: CPT

## 2023-06-17 PROCEDURE — 85520 HEPARIN ASSAY: CPT

## 2023-06-17 PROCEDURE — 36415 COLL VENOUS BLD VENIPUNCTURE: CPT

## 2023-06-17 PROCEDURE — 94760 N-INVAS EAR/PLS OXIMETRY 1: CPT

## 2023-06-17 PROCEDURE — 85025 COMPLETE CBC W/AUTO DIFF WBC: CPT

## 2023-06-17 PROCEDURE — 90935 HEMODIALYSIS ONE EVALUATION: CPT

## 2023-06-17 PROCEDURE — 6360000002 HC RX W HCPCS: Performed by: INTERNAL MEDICINE

## 2023-06-17 PROCEDURE — 1100000003 HC PRIVATE W/ TELEMETRY

## 2023-06-17 PROCEDURE — 2700000000 HC OXYGEN THERAPY PER DAY

## 2023-06-17 RX ADMIN — AZITHROMYCIN MONOHYDRATE 500 MG: 500 INJECTION, POWDER, LYOPHILIZED, FOR SOLUTION INTRAVENOUS at 06:18

## 2023-06-17 RX ADMIN — SEVELAMER CARBONATE 1600 MG: 800 TABLET, FILM COATED ORAL at 08:53

## 2023-06-17 RX ADMIN — HYDROMORPHONE HYDROCHLORIDE 1 MG: 1 INJECTION, SOLUTION INTRAMUSCULAR; INTRAVENOUS; SUBCUTANEOUS at 06:19

## 2023-06-17 RX ADMIN — NYSTATIN 500000 UNITS: 100000 SUSPENSION ORAL at 14:29

## 2023-06-17 RX ADMIN — HEPARIN SODIUM 18 UNITS/KG/HR: 10000 INJECTION, SOLUTION INTRAVENOUS at 03:45

## 2023-06-17 RX ADMIN — SODIUM CHLORIDE, PRESERVATIVE FREE 10 ML: 5 INJECTION INTRAVENOUS at 08:59

## 2023-06-17 RX ADMIN — ASPIRIN 81 MG CHEWABLE TABLET 81 MG: 81 TABLET CHEWABLE at 08:53

## 2023-06-17 RX ADMIN — GUAIFENESIN 600 MG: 600 TABLET, EXTENDED RELEASE ORAL at 08:54

## 2023-06-17 RX ADMIN — LOSARTAN POTASSIUM 100 MG: 50 TABLET, FILM COATED ORAL at 08:54

## 2023-06-17 RX ADMIN — NYSTATIN 500000 UNITS: 100000 SUSPENSION ORAL at 08:54

## 2023-06-17 RX ADMIN — METOPROLOL TARTRATE 50 MG: 50 TABLET, FILM COATED ORAL at 08:54

## 2023-06-17 RX ADMIN — LEVETIRACETAM 500 MG: 500 TABLET, FILM COATED ORAL at 08:53

## 2023-06-17 RX ADMIN — LEVETIRACETAM 500 MG: 500 TABLET, FILM COATED ORAL at 20:43

## 2023-06-17 RX ADMIN — NYSTATIN 500000 UNITS: 100000 SUSPENSION ORAL at 20:43

## 2023-06-17 RX ADMIN — HYDROMORPHONE HYDROCHLORIDE 1 MG: 1 INJECTION, SOLUTION INTRAMUSCULAR; INTRAVENOUS; SUBCUTANEOUS at 01:25

## 2023-06-17 RX ADMIN — FAMOTIDINE 20 MG: 20 TABLET ORAL at 08:53

## 2023-06-17 RX ADMIN — SEVELAMER CARBONATE 1600 MG: 800 TABLET, FILM COATED ORAL at 17:04

## 2023-06-17 RX ADMIN — ATORVASTATIN CALCIUM 40 MG: 40 TABLET, FILM COATED ORAL at 08:54

## 2023-06-17 RX ADMIN — MESALAMINE 800 MG: 800 TABLET, DELAYED RELEASE ORAL at 20:43

## 2023-06-17 RX ADMIN — CEFTRIAXONE SODIUM 1000 MG: 1 INJECTION, POWDER, FOR SOLUTION INTRAMUSCULAR; INTRAVENOUS at 06:19

## 2023-06-17 RX ADMIN — SODIUM CHLORIDE, PRESERVATIVE FREE 10 ML: 5 INJECTION INTRAVENOUS at 01:26

## 2023-06-17 RX ADMIN — SODIUM CHLORIDE, PRESERVATIVE FREE 10 ML: 5 INJECTION INTRAVENOUS at 20:48

## 2023-06-17 RX ADMIN — ATAZANAVIR 300 MG: 300 CAPSULE ORAL at 08:53

## 2023-06-17 RX ADMIN — HEPARIN SODIUM 18 UNITS/KG/HR: 10000 INJECTION, SOLUTION INTRAVENOUS at 18:26

## 2023-06-17 RX ADMIN — METOPROLOL TARTRATE 50 MG: 50 TABLET, FILM COATED ORAL at 20:43

## 2023-06-17 RX ADMIN — HYDROMORPHONE HYDROCHLORIDE 1 MG: 1 INJECTION, SOLUTION INTRAMUSCULAR; INTRAVENOUS; SUBCUTANEOUS at 23:24

## 2023-06-17 RX ADMIN — HYDROMORPHONE HYDROCHLORIDE 1 MG: 1 INJECTION, SOLUTION INTRAMUSCULAR; INTRAVENOUS; SUBCUTANEOUS at 18:26

## 2023-06-17 RX ADMIN — HYDROMORPHONE HYDROCHLORIDE 1 MG: 1 INJECTION, SOLUTION INTRAMUSCULAR; INTRAVENOUS; SUBCUTANEOUS at 14:28

## 2023-06-17 RX ADMIN — GUAIFENESIN 600 MG: 600 TABLET, EXTENDED RELEASE ORAL at 20:43

## 2023-06-17 RX ADMIN — SEVELAMER CARBONATE 1600 MG: 800 TABLET, FILM COATED ORAL at 14:28

## 2023-06-17 RX ADMIN — MESALAMINE 800 MG: 800 TABLET, DELAYED RELEASE ORAL at 14:28

## 2023-06-17 RX ADMIN — DOLUTEGRAVIR SODIUM 50 MG: 50 TABLET, FILM COATED ORAL at 08:53

## 2023-06-17 RX ADMIN — MESALAMINE 800 MG: 800 TABLET, DELAYED RELEASE ORAL at 08:53

## 2023-06-17 RX ADMIN — NYSTATIN 500000 UNITS: 100000 SUSPENSION ORAL at 17:04

## 2023-06-17 RX ADMIN — PANTOPRAZOLE SODIUM 40 MG: 40 TABLET, DELAYED RELEASE ORAL at 08:53

## 2023-06-17 RX ADMIN — SODIUM CHLORIDE, PRESERVATIVE FREE 10 ML: 5 INJECTION INTRAVENOUS at 20:47

## 2023-06-17 RX ADMIN — PANTOPRAZOLE SODIUM 40 MG: 40 TABLET, DELAYED RELEASE ORAL at 20:43

## 2023-06-17 ASSESSMENT — PAIN - FUNCTIONAL ASSESSMENT: PAIN_FUNCTIONAL_ASSESSMENT: ACTIVITIES ARE NOT PREVENTED

## 2023-06-17 ASSESSMENT — PAIN DESCRIPTION - DESCRIPTORS
DESCRIPTORS: SHARP
DESCRIPTORS: ACHING
DESCRIPTORS: SHARP
DESCRIPTORS: SHARP

## 2023-06-17 ASSESSMENT — PAIN DESCRIPTION - LOCATION
LOCATION: SHOULDER
LOCATION: ARM
LOCATION: SHOULDER
LOCATION: SHOULDER

## 2023-06-17 ASSESSMENT — PAIN DESCRIPTION - ORIENTATION
ORIENTATION: RIGHT

## 2023-06-17 ASSESSMENT — PAIN SCALES - GENERAL
PAINLEVEL_OUTOF10: 7
PAINLEVEL_OUTOF10: 7
PAINLEVEL_OUTOF10: 8

## 2023-06-18 LAB
ANION GAP SERPL CALC-SCNC: 10 MMOL/L (ref 5–15)
BACTERIA SPEC CULT: NORMAL
BACTERIA SPEC CULT: NORMAL
BASOPHILS # BLD: 0 K/UL (ref 0–0.1)
BASOPHILS NFR BLD: 0 % (ref 0–1)
BUN SERPL-MCNC: 33 MG/DL (ref 6–20)
BUN/CREAT SERPL: 3 (ref 12–20)
CALCIUM SERPL-MCNC: 9.8 MG/DL (ref 8.5–10.1)
CHLORIDE SERPL-SCNC: 97 MMOL/L (ref 97–108)
CO2 SERPL-SCNC: 31 MMOL/L (ref 21–32)
CREAT SERPL-MCNC: 9.45 MG/DL (ref 0.7–1.3)
DIFFERENTIAL METHOD BLD: ABNORMAL
EOSINOPHIL # BLD: 0.4 K/UL (ref 0–0.4)
EOSINOPHIL NFR BLD: 7 % (ref 0–7)
ERYTHROCYTE [DISTWIDTH] IN BLOOD BY AUTOMATED COUNT: 14.2 % (ref 11.5–14.5)
GLUCOSE SERPL-MCNC: 97 MG/DL (ref 65–100)
HCT VFR BLD AUTO: 27.6 % (ref 36.6–50.3)
HGB BLD-MCNC: 8.9 G/DL (ref 12.1–17)
IMM GRANULOCYTES # BLD AUTO: 0 K/UL (ref 0–0.04)
IMM GRANULOCYTES NFR BLD AUTO: 1 % (ref 0–0.5)
LYMPHOCYTES # BLD: 1.2 K/UL (ref 0.8–3.5)
LYMPHOCYTES NFR BLD: 21 % (ref 12–49)
Lab: NORMAL
Lab: NORMAL
MAGNESIUM SERPL-MCNC: 2.4 MG/DL (ref 1.6–2.4)
MCH RBC QN AUTO: 27.7 PG (ref 26–34)
MCHC RBC AUTO-ENTMCNC: 32.2 G/DL (ref 30–36.5)
MCV RBC AUTO: 86 FL (ref 80–99)
MONOCYTES # BLD: 0.5 K/UL (ref 0–1)
MONOCYTES NFR BLD: 8 % (ref 5–13)
NEUTS SEG # BLD: 3.7 K/UL (ref 1.8–8)
NEUTS SEG NFR BLD: 63 % (ref 32–75)
NRBC # BLD: 0 K/UL (ref 0–0.01)
NRBC BLD-RTO: 0 PER 100 WBC
PHOSPHATE SERPL-MCNC: 5.4 MG/DL (ref 2.6–4.7)
PLATELET # BLD AUTO: 216 K/UL (ref 150–400)
PMV BLD AUTO: 9.7 FL (ref 8.9–12.9)
POTASSIUM SERPL-SCNC: 4.7 MMOL/L (ref 3.5–5.1)
RBC # BLD AUTO: 3.21 M/UL (ref 4.1–5.7)
SODIUM SERPL-SCNC: 138 MMOL/L (ref 136–145)
UFH PPP CHRO-ACNC: 0.58 IU/ML
WBC # BLD AUTO: 5.9 K/UL (ref 4.1–11.1)

## 2023-06-18 PROCEDURE — 99222 1ST HOSP IP/OBS MODERATE 55: CPT | Performed by: INTERNAL MEDICINE

## 2023-06-18 PROCEDURE — 6360000002 HC RX W HCPCS: Performed by: INTERNAL MEDICINE

## 2023-06-18 PROCEDURE — 6370000000 HC RX 637 (ALT 250 FOR IP): Performed by: INTERNAL MEDICINE

## 2023-06-18 PROCEDURE — 2700000000 HC OXYGEN THERAPY PER DAY

## 2023-06-18 PROCEDURE — 84100 ASSAY OF PHOSPHORUS: CPT

## 2023-06-18 PROCEDURE — 2060000000 HC ICU INTERMEDIATE R&B

## 2023-06-18 PROCEDURE — 80048 BASIC METABOLIC PNL TOTAL CA: CPT

## 2023-06-18 PROCEDURE — 36415 COLL VENOUS BLD VENIPUNCTURE: CPT

## 2023-06-18 PROCEDURE — 83735 ASSAY OF MAGNESIUM: CPT

## 2023-06-18 PROCEDURE — 2580000003 HC RX 258: Performed by: INTERNAL MEDICINE

## 2023-06-18 PROCEDURE — 85025 COMPLETE CBC W/AUTO DIFF WBC: CPT

## 2023-06-18 PROCEDURE — 85520 HEPARIN ASSAY: CPT

## 2023-06-18 PROCEDURE — 94760 N-INVAS EAR/PLS OXIMETRY 1: CPT

## 2023-06-18 RX ORDER — SULFAMETHOXAZOLE AND TRIMETHOPRIM 400; 80 MG/1; MG/1
1 TABLET ORAL
Status: DISCONTINUED | OUTPATIENT
Start: 2023-06-19 | End: 2023-06-21 | Stop reason: HOSPADM

## 2023-06-18 RX ORDER — HYDRALAZINE HYDROCHLORIDE 20 MG/ML
10 INJECTION INTRAMUSCULAR; INTRAVENOUS EVERY 6 HOURS PRN
Status: DISCONTINUED | OUTPATIENT
Start: 2023-06-18 | End: 2023-06-21 | Stop reason: HOSPADM

## 2023-06-18 RX ADMIN — ASPIRIN 81 MG CHEWABLE TABLET 81 MG: 81 TABLET CHEWABLE at 08:40

## 2023-06-18 RX ADMIN — AZITHROMYCIN MONOHYDRATE 500 MG: 500 INJECTION, POWDER, LYOPHILIZED, FOR SOLUTION INTRAVENOUS at 05:54

## 2023-06-18 RX ADMIN — NYSTATIN 500000 UNITS: 100000 SUSPENSION ORAL at 11:54

## 2023-06-18 RX ADMIN — CEFTRIAXONE SODIUM 1000 MG: 1 INJECTION, POWDER, FOR SOLUTION INTRAMUSCULAR; INTRAVENOUS at 05:54

## 2023-06-18 RX ADMIN — SEVELAMER CARBONATE 1600 MG: 800 TABLET, FILM COATED ORAL at 11:54

## 2023-06-18 RX ADMIN — HEPARIN SODIUM 18 UNITS/KG/HR: 10000 INJECTION, SOLUTION INTRAVENOUS at 23:20

## 2023-06-18 RX ADMIN — HEPARIN SODIUM 18 UNITS/KG/HR: 10000 INJECTION, SOLUTION INTRAVENOUS at 08:52

## 2023-06-18 RX ADMIN — HYDROMORPHONE HYDROCHLORIDE 1 MG: 1 INJECTION, SOLUTION INTRAMUSCULAR; INTRAVENOUS; SUBCUTANEOUS at 06:03

## 2023-06-18 RX ADMIN — PANTOPRAZOLE SODIUM 40 MG: 40 TABLET, DELAYED RELEASE ORAL at 20:48

## 2023-06-18 RX ADMIN — SODIUM CHLORIDE, PRESERVATIVE FREE 10 ML: 5 INJECTION INTRAVENOUS at 08:41

## 2023-06-18 RX ADMIN — HYDROMORPHONE HYDROCHLORIDE 1 MG: 1 INJECTION, SOLUTION INTRAMUSCULAR; INTRAVENOUS; SUBCUTANEOUS at 17:15

## 2023-06-18 RX ADMIN — HYDRALAZINE HYDROCHLORIDE 10 MG: 20 INJECTION INTRAMUSCULAR; INTRAVENOUS at 19:20

## 2023-06-18 RX ADMIN — GUAIFENESIN 600 MG: 600 TABLET, EXTENDED RELEASE ORAL at 08:41

## 2023-06-18 RX ADMIN — PANTOPRAZOLE SODIUM 40 MG: 40 TABLET, DELAYED RELEASE ORAL at 08:40

## 2023-06-18 RX ADMIN — SEVELAMER CARBONATE 1600 MG: 800 TABLET, FILM COATED ORAL at 15:22

## 2023-06-18 RX ADMIN — MESALAMINE 800 MG: 800 TABLET, DELAYED RELEASE ORAL at 23:19

## 2023-06-18 RX ADMIN — LOSARTAN POTASSIUM 100 MG: 50 TABLET, FILM COATED ORAL at 08:40

## 2023-06-18 RX ADMIN — METOPROLOL TARTRATE 50 MG: 50 TABLET, FILM COATED ORAL at 20:48

## 2023-06-18 RX ADMIN — ATORVASTATIN CALCIUM 40 MG: 40 TABLET, FILM COATED ORAL at 08:41

## 2023-06-18 RX ADMIN — MESALAMINE 800 MG: 800 TABLET, DELAYED RELEASE ORAL at 12:20

## 2023-06-18 RX ADMIN — ATAZANAVIR 300 MG: 300 CAPSULE ORAL at 08:41

## 2023-06-18 RX ADMIN — HYDROMORPHONE HYDROCHLORIDE 1 MG: 1 INJECTION, SOLUTION INTRAMUSCULAR; INTRAVENOUS; SUBCUTANEOUS at 21:18

## 2023-06-18 RX ADMIN — GUAIFENESIN 600 MG: 600 TABLET, EXTENDED RELEASE ORAL at 20:48

## 2023-06-18 RX ADMIN — METOPROLOL TARTRATE 50 MG: 50 TABLET, FILM COATED ORAL at 08:41

## 2023-06-18 RX ADMIN — NYSTATIN 500000 UNITS: 100000 SUSPENSION ORAL at 15:22

## 2023-06-18 RX ADMIN — FAMOTIDINE 20 MG: 20 TABLET ORAL at 08:41

## 2023-06-18 RX ADMIN — DOLUTEGRAVIR SODIUM 50 MG: 50 TABLET, FILM COATED ORAL at 08:41

## 2023-06-18 RX ADMIN — MESALAMINE 800 MG: 800 TABLET, DELAYED RELEASE ORAL at 08:41

## 2023-06-18 RX ADMIN — LEVETIRACETAM 500 MG: 500 TABLET, FILM COATED ORAL at 20:48

## 2023-06-18 RX ADMIN — SEVELAMER CARBONATE 1600 MG: 800 TABLET, FILM COATED ORAL at 08:40

## 2023-06-18 RX ADMIN — NYSTATIN 500000 UNITS: 100000 SUSPENSION ORAL at 08:40

## 2023-06-18 RX ADMIN — LEVETIRACETAM 500 MG: 500 TABLET, FILM COATED ORAL at 08:41

## 2023-06-18 RX ADMIN — HYDROMORPHONE HYDROCHLORIDE 1 MG: 1 INJECTION, SOLUTION INTRAMUSCULAR; INTRAVENOUS; SUBCUTANEOUS at 10:31

## 2023-06-18 ASSESSMENT — PAIN DESCRIPTION - ORIENTATION
ORIENTATION: RIGHT

## 2023-06-18 ASSESSMENT — PAIN SCALES - GENERAL
PAINLEVEL_OUTOF10: 8
PAINLEVEL_OUTOF10: 7
PAINLEVEL_OUTOF10: 2
PAINLEVEL_OUTOF10: 0
PAINLEVEL_OUTOF10: 7
PAINLEVEL_OUTOF10: 7
PAINLEVEL_OUTOF10: 8
PAINLEVEL_OUTOF10: 8
PAINLEVEL_OUTOF10: 2

## 2023-06-18 ASSESSMENT — PAIN DESCRIPTION - LOCATION
LOCATION: SHOULDER

## 2023-06-18 ASSESSMENT — PAIN DESCRIPTION - DESCRIPTORS
DESCRIPTORS: ACHING
DESCRIPTORS: ACHING

## 2023-06-18 ASSESSMENT — PAIN - FUNCTIONAL ASSESSMENT
PAIN_FUNCTIONAL_ASSESSMENT: ACTIVITIES ARE NOT PREVENTED
PAIN_FUNCTIONAL_ASSESSMENT: ACTIVITIES ARE NOT PREVENTED

## 2023-06-19 LAB
ACT BLD: 269 SECS (ref 79–138)
ACT BLD: 275 SECS (ref 79–138)
ACT BLD: 317 SECS (ref 79–138)
ANION GAP SERPL CALC-SCNC: 8 MMOL/L (ref 5–15)
BASOPHILS # BLD: 0 K/UL (ref 0–0.1)
BASOPHILS NFR BLD: 0 % (ref 0–1)
BUN SERPL-MCNC: 47 MG/DL (ref 6–20)
BUN/CREAT SERPL: 4 (ref 12–20)
CALCIUM SERPL-MCNC: 9.7 MG/DL (ref 8.5–10.1)
CHLORIDE SERPL-SCNC: 97 MMOL/L (ref 97–108)
CO2 SERPL-SCNC: 28 MMOL/L (ref 21–32)
CREAT SERPL-MCNC: 12.5 MG/DL (ref 0.7–1.3)
DIFFERENTIAL METHOD BLD: ABNORMAL
EOSINOPHIL # BLD: 0.4 K/UL (ref 0–0.4)
EOSINOPHIL NFR BLD: 5 % (ref 0–7)
ERYTHROCYTE [DISTWIDTH] IN BLOOD BY AUTOMATED COUNT: 14.5 % (ref 11.5–14.5)
GLUCOSE SERPL-MCNC: 94 MG/DL (ref 65–100)
HCT VFR BLD AUTO: 27.2 % (ref 36.6–50.3)
HGB BLD-MCNC: 8.6 G/DL (ref 12.1–17)
IMM GRANULOCYTES # BLD AUTO: 0.1 K/UL (ref 0–0.04)
IMM GRANULOCYTES NFR BLD AUTO: 2 % (ref 0–0.5)
LYMPHOCYTES # BLD: 1.6 K/UL (ref 0.8–3.5)
LYMPHOCYTES NFR BLD: 23 % (ref 12–49)
MAGNESIUM SERPL-MCNC: 2.4 MG/DL (ref 1.6–2.4)
MCH RBC QN AUTO: 27.4 PG (ref 26–34)
MCHC RBC AUTO-ENTMCNC: 31.6 G/DL (ref 30–36.5)
MCV RBC AUTO: 86.6 FL (ref 80–99)
MONOCYTES # BLD: 0.4 K/UL (ref 0–1)
MONOCYTES NFR BLD: 6 % (ref 5–13)
NEUTS SEG # BLD: 4.3 K/UL (ref 1.8–8)
NEUTS SEG NFR BLD: 64 % (ref 32–75)
NRBC # BLD: 0 K/UL (ref 0–0.01)
NRBC BLD-RTO: 0 PER 100 WBC
PHOSPHATE SERPL-MCNC: 6.6 MG/DL (ref 2.6–4.7)
PLATELET # BLD AUTO: 214 K/UL (ref 150–400)
PMV BLD AUTO: 9.9 FL (ref 8.9–12.9)
POTASSIUM SERPL-SCNC: 4.7 MMOL/L (ref 3.5–5.1)
RBC # BLD AUTO: 3.14 M/UL (ref 4.1–5.7)
SODIUM SERPL-SCNC: 133 MMOL/L (ref 136–145)
UFH PPP CHRO-ACNC: 0.57 IU/ML
WBC # BLD AUTO: 6.9 K/UL (ref 4.1–11.1)

## 2023-06-19 PROCEDURE — 2060000000 HC ICU INTERMEDIATE R&B

## 2023-06-19 PROCEDURE — 84100 ASSAY OF PHOSPHORUS: CPT

## 2023-06-19 PROCEDURE — 6370000000 HC RX 637 (ALT 250 FOR IP): Performed by: INTERNAL MEDICINE

## 2023-06-19 PROCEDURE — 80048 BASIC METABOLIC PNL TOTAL CA: CPT

## 2023-06-19 PROCEDURE — 99233 SBSQ HOSP IP/OBS HIGH 50: CPT | Performed by: INTERNAL MEDICINE

## 2023-06-19 PROCEDURE — C1887 CATHETER, GUIDING: HCPCS | Performed by: INTERNAL MEDICINE

## 2023-06-19 PROCEDURE — C9600 PERC DRUG-EL COR STENT SING: HCPCS | Performed by: INTERNAL MEDICINE

## 2023-06-19 PROCEDURE — 92978 ENDOLUMINL IVUS OCT C 1ST: CPT | Performed by: INTERNAL MEDICINE

## 2023-06-19 PROCEDURE — 85520 HEPARIN ASSAY: CPT

## 2023-06-19 PROCEDURE — 6360000002 HC RX W HCPCS: Performed by: INTERNAL MEDICINE

## 2023-06-19 PROCEDURE — 90935 HEMODIALYSIS ONE EVALUATION: CPT

## 2023-06-19 PROCEDURE — C1725 CATH, TRANSLUMIN NON-LASER: HCPCS | Performed by: INTERNAL MEDICINE

## 2023-06-19 PROCEDURE — 2580000003 HC RX 258: Performed by: INTERNAL MEDICINE

## 2023-06-19 PROCEDURE — 36415 COLL VENOUS BLD VENIPUNCTURE: CPT

## 2023-06-19 PROCEDURE — 85347 COAGULATION TIME ACTIVATED: CPT

## 2023-06-19 PROCEDURE — C1874 STENT, COATED/COV W/DEL SYS: HCPCS | Performed by: INTERNAL MEDICINE

## 2023-06-19 PROCEDURE — 2500000003 HC RX 250 WO HCPCS: Performed by: INTERNAL MEDICINE

## 2023-06-19 PROCEDURE — 2580000003 HC RX 258: Performed by: NURSE PRACTITIONER

## 2023-06-19 PROCEDURE — 02C30ZZ EXTIRPATION OF MATTER FROM CORONARY ARTERY, FOUR OR MORE ARTERIES, OPEN APPROACH: ICD-10-PCS | Performed by: INTERNAL MEDICINE

## 2023-06-19 PROCEDURE — 99153 MOD SED SAME PHYS/QHP EA: CPT | Performed by: INTERNAL MEDICINE

## 2023-06-19 PROCEDURE — 6360000004 HC RX CONTRAST MEDICATION: Performed by: INTERNAL MEDICINE

## 2023-06-19 PROCEDURE — C1753 CATH, INTRAVAS ULTRASOUND: HCPCS | Performed by: INTERNAL MEDICINE

## 2023-06-19 PROCEDURE — C1894 INTRO/SHEATH, NON-LASER: HCPCS | Performed by: INTERNAL MEDICINE

## 2023-06-19 PROCEDURE — 2709999900 HC NON-CHARGEABLE SUPPLY: Performed by: INTERNAL MEDICINE

## 2023-06-19 PROCEDURE — C1724 CATH, TRANS ATHEREC,ROTATION: HCPCS | Performed by: INTERNAL MEDICINE

## 2023-06-19 PROCEDURE — 99152 MOD SED SAME PHYS/QHP 5/>YRS: CPT | Performed by: INTERNAL MEDICINE

## 2023-06-19 PROCEDURE — 027206Z DILATION OF CORONARY ARTERY, THREE ARTERIES WITH THREE DRUG-ELUTING INTRALUMINAL DEVICES, OPEN APPROACH: ICD-10-PCS | Performed by: INTERNAL MEDICINE

## 2023-06-19 PROCEDURE — C1769 GUIDE WIRE: HCPCS | Performed by: INTERNAL MEDICINE

## 2023-06-19 PROCEDURE — 92924 PRQ TRLUML C ATHRC 1 ART&/BR: CPT | Performed by: INTERNAL MEDICINE

## 2023-06-19 PROCEDURE — 83735 ASSAY OF MAGNESIUM: CPT

## 2023-06-19 PROCEDURE — 92979 ENDOLUMINL IVUS OCT C EA: CPT | Performed by: INTERNAL MEDICINE

## 2023-06-19 PROCEDURE — C1713 ANCHOR/SCREW BN/BN,TIS/BN: HCPCS | Performed by: INTERNAL MEDICINE

## 2023-06-19 PROCEDURE — 85025 COMPLETE CBC W/AUTO DIFF WBC: CPT

## 2023-06-19 PROCEDURE — 93458 L HRT ARTERY/VENTRICLE ANGIO: CPT | Performed by: INTERNAL MEDICINE

## 2023-06-19 DEVICE — STENT ONYXNG45015UX ONYX 4.50X15RX
Type: IMPLANTABLE DEVICE | Status: FUNCTIONAL
Brand: ONYX FRONTIER™

## 2023-06-19 DEVICE — STENT ONYXNG40012UX ONYX 4.00X12RX
Type: IMPLANTABLE DEVICE | Status: FUNCTIONAL
Brand: ONYX FRONTIER™

## 2023-06-19 DEVICE — STENT ONYXNG25018UX ONYX 2.50X18RX
Type: IMPLANTABLE DEVICE | Status: FUNCTIONAL
Brand: ONYX FRONTIER™

## 2023-06-19 RX ORDER — HEPARIN SODIUM 1000 [USP'U]/ML
INJECTION, SOLUTION INTRAVENOUS; SUBCUTANEOUS PRN
Status: DISCONTINUED | OUTPATIENT
Start: 2023-06-19 | End: 2023-06-19 | Stop reason: HOSPADM

## 2023-06-19 RX ORDER — NITROGLYCERIN 20 MG/100ML
20 INJECTION INTRAVENOUS CONTINUOUS
Status: CANCELLED | OUTPATIENT
Start: 2023-06-19

## 2023-06-19 RX ORDER — ACETAMINOPHEN 325 MG/1
650 TABLET ORAL EVERY 4 HOURS PRN
Status: DISCONTINUED | OUTPATIENT
Start: 2023-06-19 | End: 2023-06-21 | Stop reason: HOSPADM

## 2023-06-19 RX ORDER — HEPARIN SODIUM 200 [USP'U]/100ML
INJECTION, SOLUTION INTRAVENOUS CONTINUOUS PRN
Status: COMPLETED | OUTPATIENT
Start: 2023-06-19 | End: 2023-06-19

## 2023-06-19 RX ORDER — CLOPIDOGREL 300 MG/1
TABLET, FILM COATED ORAL PRN
Status: DISCONTINUED | OUTPATIENT
Start: 2023-06-19 | End: 2023-06-19 | Stop reason: HOSPADM

## 2023-06-19 RX ORDER — ATORVASTATIN CALCIUM 40 MG/1
TABLET, FILM COATED ORAL
Qty: 90 TABLET | Refills: 1 | Status: SHIPPED | OUTPATIENT
Start: 2023-06-19

## 2023-06-19 RX ORDER — SODIUM CHLORIDE 9 MG/ML
INJECTION, SOLUTION INTRAVENOUS PRN
Status: DISCONTINUED | OUTPATIENT
Start: 2023-06-19 | End: 2023-06-21 | Stop reason: HOSPADM

## 2023-06-19 RX ORDER — FENTANYL CITRATE 50 UG/ML
INJECTION, SOLUTION INTRAMUSCULAR; INTRAVENOUS PRN
Status: DISCONTINUED | OUTPATIENT
Start: 2023-06-19 | End: 2023-06-19 | Stop reason: HOSPADM

## 2023-06-19 RX ORDER — HYDRALAZINE HYDROCHLORIDE 20 MG/ML
INJECTION INTRAMUSCULAR; INTRAVENOUS PRN
Status: DISCONTINUED | OUTPATIENT
Start: 2023-06-19 | End: 2023-06-19 | Stop reason: HOSPADM

## 2023-06-19 RX ORDER — LIDOCAINE HYDROCHLORIDE 10 MG/ML
INJECTION, SOLUTION INFILTRATION; PERINEURAL PRN
Status: DISCONTINUED | OUTPATIENT
Start: 2023-06-19 | End: 2023-06-19 | Stop reason: HOSPADM

## 2023-06-19 RX ORDER — METOPROLOL TARTRATE 5 MG/5ML
5 INJECTION INTRAVENOUS ONCE
Status: COMPLETED | OUTPATIENT
Start: 2023-06-19 | End: 2023-06-19

## 2023-06-19 RX ORDER — SODIUM CHLORIDE 0.9 % (FLUSH) 0.9 %
5-40 SYRINGE (ML) INJECTION PRN
Status: DISCONTINUED | OUTPATIENT
Start: 2023-06-19 | End: 2023-06-21 | Stop reason: HOSPADM

## 2023-06-19 RX ORDER — CLOPIDOGREL BISULFATE 75 MG/1
75 TABLET ORAL DAILY
Status: DISCONTINUED | OUTPATIENT
Start: 2023-06-20 | End: 2023-06-21 | Stop reason: HOSPADM

## 2023-06-19 RX ORDER — SODIUM CHLORIDE 9 MG/ML
INJECTION, SOLUTION INTRAVENOUS CONTINUOUS PRN
Status: COMPLETED | OUTPATIENT
Start: 2023-06-19 | End: 2023-06-19

## 2023-06-19 RX ORDER — SODIUM CHLORIDE 0.9 % (FLUSH) 0.9 %
5-40 SYRINGE (ML) INJECTION EVERY 12 HOURS SCHEDULED
Status: DISCONTINUED | OUTPATIENT
Start: 2023-06-19 | End: 2023-06-21 | Stop reason: HOSPADM

## 2023-06-19 RX ORDER — MIDAZOLAM HYDROCHLORIDE 1 MG/ML
INJECTION INTRAMUSCULAR; INTRAVENOUS PRN
Status: DISCONTINUED | OUTPATIENT
Start: 2023-06-19 | End: 2023-06-19 | Stop reason: HOSPADM

## 2023-06-19 RX ORDER — SODIUM CHLORIDE 9 MG/ML
INJECTION, SOLUTION INTRAVENOUS CONTINUOUS
Status: DISCONTINUED | OUTPATIENT
Start: 2023-06-19 | End: 2023-06-21 | Stop reason: HOSPADM

## 2023-06-19 RX ORDER — DIPHENHYDRAMINE HYDROCHLORIDE 50 MG/ML
INJECTION INTRAMUSCULAR; INTRAVENOUS PRN
Status: DISCONTINUED | OUTPATIENT
Start: 2023-06-19 | End: 2023-06-19 | Stop reason: HOSPADM

## 2023-06-19 RX ADMIN — LOSARTAN POTASSIUM 100 MG: 50 TABLET, FILM COATED ORAL at 08:39

## 2023-06-19 RX ADMIN — METOPROLOL TARTRATE 50 MG: 50 TABLET, FILM COATED ORAL at 20:18

## 2023-06-19 RX ADMIN — PANTOPRAZOLE SODIUM 40 MG: 40 TABLET, DELAYED RELEASE ORAL at 20:18

## 2023-06-19 RX ADMIN — MESALAMINE 800 MG: 800 TABLET, DELAYED RELEASE ORAL at 08:43

## 2023-06-19 RX ADMIN — HYDRALAZINE HYDROCHLORIDE 10 MG: 20 INJECTION INTRAMUSCULAR; INTRAVENOUS at 21:42

## 2023-06-19 RX ADMIN — FAMOTIDINE 20 MG: 20 TABLET ORAL at 08:39

## 2023-06-19 RX ADMIN — HYDROMORPHONE HYDROCHLORIDE 1 MG: 1 INJECTION, SOLUTION INTRAMUSCULAR; INTRAVENOUS; SUBCUTANEOUS at 05:58

## 2023-06-19 RX ADMIN — HYDROMORPHONE HYDROCHLORIDE 1 MG: 1 INJECTION, SOLUTION INTRAMUSCULAR; INTRAVENOUS; SUBCUTANEOUS at 22:50

## 2023-06-19 RX ADMIN — SODIUM CHLORIDE, PRESERVATIVE FREE 10 ML: 5 INJECTION INTRAVENOUS at 20:21

## 2023-06-19 RX ADMIN — LEVETIRACETAM 500 MG: 500 TABLET, FILM COATED ORAL at 08:38

## 2023-06-19 RX ADMIN — LEVETIRACETAM 500 MG: 500 TABLET, FILM COATED ORAL at 21:22

## 2023-06-19 RX ADMIN — ASPIRIN 81 MG CHEWABLE TABLET 81 MG: 81 TABLET CHEWABLE at 08:39

## 2023-06-19 RX ADMIN — ATAZANAVIR 300 MG: 300 CAPSULE ORAL at 08:43

## 2023-06-19 RX ADMIN — HYDROMORPHONE HYDROCHLORIDE 1 MG: 1 INJECTION, SOLUTION INTRAMUSCULAR; INTRAVENOUS; SUBCUTANEOUS at 01:41

## 2023-06-19 RX ADMIN — ACETAMINOPHEN 650 MG: 325 TABLET ORAL at 21:41

## 2023-06-19 RX ADMIN — GUAIFENESIN 600 MG: 600 TABLET, EXTENDED RELEASE ORAL at 08:38

## 2023-06-19 RX ADMIN — PANTOPRAZOLE SODIUM 40 MG: 40 TABLET, DELAYED RELEASE ORAL at 08:38

## 2023-06-19 RX ADMIN — SODIUM CHLORIDE, PRESERVATIVE FREE 10 ML: 5 INJECTION INTRAVENOUS at 17:46

## 2023-06-19 RX ADMIN — NITROGLYCERIN 0.4 MG: 0.4 TABLET, ORALLY DISINTEGRATING SUBLINGUAL at 17:29

## 2023-06-19 RX ADMIN — HYDROMORPHONE HYDROCHLORIDE 1 MG: 1 INJECTION, SOLUTION INTRAMUSCULAR; INTRAVENOUS; SUBCUTANEOUS at 10:17

## 2023-06-19 RX ADMIN — ATORVASTATIN CALCIUM 40 MG: 40 TABLET, FILM COATED ORAL at 08:38

## 2023-06-19 RX ADMIN — METOPROLOL TARTRATE 5 MG: 1 INJECTION, SOLUTION INTRAVENOUS at 17:45

## 2023-06-19 RX ADMIN — HYDROMORPHONE HYDROCHLORIDE 1 MG: 1 INJECTION, SOLUTION INTRAMUSCULAR; INTRAVENOUS; SUBCUTANEOUS at 18:44

## 2023-06-19 RX ADMIN — SODIUM CHLORIDE, PRESERVATIVE FREE 10 ML: 5 INJECTION INTRAVENOUS at 20:20

## 2023-06-19 RX ADMIN — NYSTATIN 500000 UNITS: 100000 SUSPENSION ORAL at 20:18

## 2023-06-19 RX ADMIN — SODIUM CHLORIDE: 9 INJECTION, SOLUTION INTRAVENOUS at 17:46

## 2023-06-19 RX ADMIN — EPOETIN ALFA-EPBX 10000 UNITS: 10000 INJECTION, SOLUTION INTRAVENOUS; SUBCUTANEOUS at 08:53

## 2023-06-19 RX ADMIN — METOPROLOL TARTRATE 50 MG: 50 TABLET, FILM COATED ORAL at 08:39

## 2023-06-19 RX ADMIN — DOLUTEGRAVIR SODIUM 50 MG: 50 TABLET, FILM COATED ORAL at 08:43

## 2023-06-19 RX ADMIN — MESALAMINE 800 MG: 800 TABLET, DELAYED RELEASE ORAL at 20:18

## 2023-06-19 RX ADMIN — NYSTATIN 500000 UNITS: 100000 SUSPENSION ORAL at 08:40

## 2023-06-19 RX ADMIN — GUAIFENESIN 600 MG: 600 TABLET, EXTENDED RELEASE ORAL at 20:18

## 2023-06-19 ASSESSMENT — PAIN DESCRIPTION - ORIENTATION
ORIENTATION: RIGHT

## 2023-06-19 ASSESSMENT — PAIN DESCRIPTION - LOCATION
LOCATION: SHOULDER

## 2023-06-19 ASSESSMENT — PAIN SCALES - GENERAL
PAINLEVEL_OUTOF10: 9
PAINLEVEL_OUTOF10: 0
PAINLEVEL_OUTOF10: 9
PAINLEVEL_OUTOF10: 0
PAINLEVEL_OUTOF10: 0
PAINLEVEL_OUTOF10: 9
PAINLEVEL_OUTOF10: 0
PAINLEVEL_OUTOF10: 8
PAINLEVEL_OUTOF10: 9
PAINLEVEL_OUTOF10: 0

## 2023-06-19 ASSESSMENT — PAIN DESCRIPTION - DESCRIPTORS
DESCRIPTORS: SHARP
DESCRIPTORS: SHARP
DESCRIPTORS: ACHING

## 2023-06-19 NOTE — PROCEDURES
Procedure note    Left main, LAD ostial stenting with ostial Lcx angioplasty/atherectomy. Mid LAD and distal LAD atherectomy with PCI. Right radial access    Mild post procedural chest pain controlled on metoprolol and nitroglycerine. Hemodynamically stable.

## 2023-06-20 ENCOUNTER — APPOINTMENT (OUTPATIENT)
Facility: HOSPITAL | Age: 58
DRG: 228 | End: 2023-06-20
Attending: FAMILY MEDICINE
Payer: MEDICARE

## 2023-06-20 LAB
ANION GAP SERPL CALC-SCNC: 8 MMOL/L (ref 5–15)
BASOPHILS # BLD: 0 K/UL (ref 0–0.1)
BASOPHILS NFR BLD: 0 % (ref 0–1)
BUN SERPL-MCNC: 25 MG/DL (ref 6–20)
BUN/CREAT SERPL: 3 (ref 12–20)
CALCIUM SERPL-MCNC: 9.7 MG/DL (ref 8.5–10.1)
CHLORIDE SERPL-SCNC: 97 MMOL/L (ref 97–108)
CO2 SERPL-SCNC: 28 MMOL/L (ref 21–32)
CREAT SERPL-MCNC: 7.54 MG/DL (ref 0.7–1.3)
DIFFERENTIAL METHOD BLD: ABNORMAL
ECHO BSA: 2.33 M2
EKG ATRIAL RATE: 76 BPM
EKG DIAGNOSIS: NORMAL
EKG P AXIS: 57 DEGREES
EKG P-R INTERVAL: 188 MS
EKG Q-T INTERVAL: 400 MS
EKG QRS DURATION: 104 MS
EKG QTC CALCULATION (BAZETT): 450 MS
EKG R AXIS: -10 DEGREES
EKG T AXIS: 26 DEGREES
EKG VENTRICULAR RATE: 76 BPM
EOSINOPHIL # BLD: 0.2 K/UL (ref 0–0.4)
EOSINOPHIL NFR BLD: 3 % (ref 0–7)
ERYTHROCYTE [DISTWIDTH] IN BLOOD BY AUTOMATED COUNT: 14.7 % (ref 11.5–14.5)
GLUCOSE SERPL-MCNC: 80 MG/DL (ref 65–100)
HCT VFR BLD AUTO: 29.7 % (ref 36.6–50.3)
HGB BLD-MCNC: 9.1 G/DL (ref 12.1–17)
IMM GRANULOCYTES # BLD AUTO: 0.1 K/UL (ref 0–0.04)
IMM GRANULOCYTES NFR BLD AUTO: 1 % (ref 0–0.5)
LYMPHOCYTES # BLD: 0.7 K/UL (ref 0.8–3.5)
LYMPHOCYTES NFR BLD: 10 % (ref 12–49)
MAGNESIUM SERPL-MCNC: 2.2 MG/DL (ref 1.6–2.4)
MCH RBC QN AUTO: 27.2 PG (ref 26–34)
MCHC RBC AUTO-ENTMCNC: 30.6 G/DL (ref 30–36.5)
MCV RBC AUTO: 88.9 FL (ref 80–99)
MONOCYTES # BLD: 0.6 K/UL (ref 0–1)
MONOCYTES NFR BLD: 9 % (ref 5–13)
NEUTS SEG # BLD: 5.3 K/UL (ref 1.8–8)
NEUTS SEG NFR BLD: 77 % (ref 32–75)
NRBC # BLD: 0 K/UL (ref 0–0.01)
NRBC BLD-RTO: 0 PER 100 WBC
PHOSPHATE SERPL-MCNC: 3.9 MG/DL (ref 2.6–4.7)
PLATELET # BLD AUTO: 204 K/UL (ref 150–400)
PMV BLD AUTO: 9.9 FL (ref 8.9–12.9)
POTASSIUM SERPL-SCNC: 3.8 MMOL/L (ref 3.5–5.1)
RBC # BLD AUTO: 3.34 M/UL (ref 4.1–5.7)
RBC MORPH BLD: ABNORMAL
SODIUM SERPL-SCNC: 133 MMOL/L (ref 136–145)
WBC # BLD AUTO: 6.9 K/UL (ref 4.1–11.1)

## 2023-06-20 PROCEDURE — 6370000000 HC RX 637 (ALT 250 FOR IP)

## 2023-06-20 PROCEDURE — 97116 GAIT TRAINING THERAPY: CPT

## 2023-06-20 PROCEDURE — 73030 X-RAY EXAM OF SHOULDER: CPT

## 2023-06-20 PROCEDURE — 97161 PT EVAL LOW COMPLEX 20 MIN: CPT

## 2023-06-20 PROCEDURE — 6360000002 HC RX W HCPCS: Performed by: INTERNAL MEDICINE

## 2023-06-20 PROCEDURE — 6370000000 HC RX 637 (ALT 250 FOR IP): Performed by: INTERNAL MEDICINE

## 2023-06-20 PROCEDURE — 84100 ASSAY OF PHOSPHORUS: CPT

## 2023-06-20 PROCEDURE — 2580000003 HC RX 258: Performed by: NURSE PRACTITIONER

## 2023-06-20 PROCEDURE — 2580000003 HC RX 258: Performed by: INTERNAL MEDICINE

## 2023-06-20 PROCEDURE — 80048 BASIC METABOLIC PNL TOTAL CA: CPT

## 2023-06-20 PROCEDURE — 99233 SBSQ HOSP IP/OBS HIGH 50: CPT | Performed by: INTERNAL MEDICINE

## 2023-06-20 PROCEDURE — 85025 COMPLETE CBC W/AUTO DIFF WBC: CPT

## 2023-06-20 PROCEDURE — 83735 ASSAY OF MAGNESIUM: CPT

## 2023-06-20 PROCEDURE — 6370000000 HC RX 637 (ALT 250 FOR IP): Performed by: NURSE PRACTITIONER

## 2023-06-20 PROCEDURE — 2060000000 HC ICU INTERMEDIATE R&B

## 2023-06-20 PROCEDURE — 36415 COLL VENOUS BLD VENIPUNCTURE: CPT

## 2023-06-20 RX ORDER — CARVEDILOL 12.5 MG/1
12.5 TABLET ORAL 2 TIMES DAILY WITH MEALS
Status: DISCONTINUED | OUTPATIENT
Start: 2023-06-20 | End: 2023-06-21

## 2023-06-20 RX ORDER — LIDOCAINE 4 G/G
1 PATCH TOPICAL DAILY
Status: DISCONTINUED | OUTPATIENT
Start: 2023-06-20 | End: 2023-06-21 | Stop reason: HOSPADM

## 2023-06-20 RX ADMIN — MESALAMINE 800 MG: 800 TABLET, DELAYED RELEASE ORAL at 13:14

## 2023-06-20 RX ADMIN — SODIUM CHLORIDE, PRESERVATIVE FREE 10 ML: 5 INJECTION INTRAVENOUS at 20:28

## 2023-06-20 RX ADMIN — MESALAMINE 800 MG: 800 TABLET, DELAYED RELEASE ORAL at 09:02

## 2023-06-20 RX ADMIN — HYDROMORPHONE HYDROCHLORIDE 1 MG: 1 INJECTION, SOLUTION INTRAMUSCULAR; INTRAVENOUS; SUBCUTANEOUS at 07:03

## 2023-06-20 RX ADMIN — SEVELAMER CARBONATE 1600 MG: 800 TABLET, FILM COATED ORAL at 09:03

## 2023-06-20 RX ADMIN — HYDRALAZINE HYDROCHLORIDE 10 MG: 20 INJECTION INTRAMUSCULAR; INTRAVENOUS at 17:42

## 2023-06-20 RX ADMIN — NYSTATIN 500000 UNITS: 100000 SUSPENSION ORAL at 17:33

## 2023-06-20 RX ADMIN — SEVELAMER CARBONATE 1600 MG: 800 TABLET, FILM COATED ORAL at 17:33

## 2023-06-20 RX ADMIN — ATORVASTATIN CALCIUM 40 MG: 40 TABLET, FILM COATED ORAL at 09:03

## 2023-06-20 RX ADMIN — HYDROMORPHONE HYDROCHLORIDE 1 MG: 1 INJECTION, SOLUTION INTRAMUSCULAR; INTRAVENOUS; SUBCUTANEOUS at 11:05

## 2023-06-20 RX ADMIN — ASPIRIN 81 MG CHEWABLE TABLET 81 MG: 81 TABLET CHEWABLE at 09:03

## 2023-06-20 RX ADMIN — HYDRALAZINE HYDROCHLORIDE 10 MG: 20 INJECTION INTRAMUSCULAR; INTRAVENOUS at 07:11

## 2023-06-20 RX ADMIN — HYDROMORPHONE HYDROCHLORIDE 1 MG: 1 INJECTION, SOLUTION INTRAMUSCULAR; INTRAVENOUS; SUBCUTANEOUS at 02:55

## 2023-06-20 RX ADMIN — HYDROMORPHONE HYDROCHLORIDE 1 MG: 1 INJECTION, SOLUTION INTRAMUSCULAR; INTRAVENOUS; SUBCUTANEOUS at 23:09

## 2023-06-20 RX ADMIN — NYSTATIN 500000 UNITS: 100000 SUSPENSION ORAL at 09:02

## 2023-06-20 RX ADMIN — DOLUTEGRAVIR SODIUM 50 MG: 50 TABLET, FILM COATED ORAL at 09:02

## 2023-06-20 RX ADMIN — SODIUM CHLORIDE, PRESERVATIVE FREE 10 ML: 5 INJECTION INTRAVENOUS at 09:03

## 2023-06-20 RX ADMIN — ACETAMINOPHEN 650 MG: 325 TABLET ORAL at 17:42

## 2023-06-20 RX ADMIN — HYDROMORPHONE HYDROCHLORIDE 1 MG: 1 INJECTION, SOLUTION INTRAMUSCULAR; INTRAVENOUS; SUBCUTANEOUS at 19:01

## 2023-06-20 RX ADMIN — MESALAMINE 800 MG: 800 TABLET, DELAYED RELEASE ORAL at 20:24

## 2023-06-20 RX ADMIN — FAMOTIDINE 20 MG: 20 TABLET ORAL at 09:02

## 2023-06-20 RX ADMIN — GUAIFENESIN 600 MG: 600 TABLET, EXTENDED RELEASE ORAL at 09:03

## 2023-06-20 RX ADMIN — PANTOPRAZOLE SODIUM 40 MG: 40 TABLET, DELAYED RELEASE ORAL at 20:25

## 2023-06-20 RX ADMIN — PANTOPRAZOLE SODIUM 40 MG: 40 TABLET, DELAYED RELEASE ORAL at 09:03

## 2023-06-20 RX ADMIN — GUAIFENESIN 600 MG: 600 TABLET, EXTENDED RELEASE ORAL at 20:24

## 2023-06-20 RX ADMIN — SODIUM CHLORIDE, PRESERVATIVE FREE 10 ML: 5 INJECTION INTRAVENOUS at 09:04

## 2023-06-20 RX ADMIN — LEVETIRACETAM 500 MG: 500 TABLET, FILM COATED ORAL at 20:30

## 2023-06-20 RX ADMIN — SEVELAMER CARBONATE 1600 MG: 800 TABLET, FILM COATED ORAL at 13:14

## 2023-06-20 RX ADMIN — NYSTATIN 500000 UNITS: 100000 SUSPENSION ORAL at 13:14

## 2023-06-20 RX ADMIN — CLOPIDOGREL BISULFATE 75 MG: 75 TABLET, FILM COATED ORAL at 09:02

## 2023-06-20 RX ADMIN — LOSARTAN POTASSIUM 100 MG: 50 TABLET, FILM COATED ORAL at 09:04

## 2023-06-20 RX ADMIN — ATAZANAVIR 300 MG: 300 CAPSULE ORAL at 09:02

## 2023-06-20 RX ADMIN — NYSTATIN 500000 UNITS: 100000 SUSPENSION ORAL at 20:26

## 2023-06-20 RX ADMIN — SODIUM CHLORIDE, PRESERVATIVE FREE 10 ML: 5 INJECTION INTRAVENOUS at 20:27

## 2023-06-20 RX ADMIN — LEVETIRACETAM 500 MG: 500 TABLET, FILM COATED ORAL at 09:02

## 2023-06-20 RX ADMIN — METOPROLOL TARTRATE 50 MG: 50 TABLET, FILM COATED ORAL at 09:03

## 2023-06-20 RX ADMIN — HYDROMORPHONE HYDROCHLORIDE 1 MG: 1 INJECTION, SOLUTION INTRAMUSCULAR; INTRAVENOUS; SUBCUTANEOUS at 15:00

## 2023-06-20 RX ADMIN — ACETAMINOPHEN 650 MG: 325 TABLET ORAL at 09:03

## 2023-06-20 RX ADMIN — CARVEDILOL 12.5 MG: 12.5 TABLET, FILM COATED ORAL at 17:33

## 2023-06-20 ASSESSMENT — PAIN SCALES - GENERAL
PAINLEVEL_OUTOF10: 6
PAINLEVEL_OUTOF10: 7
PAINLEVEL_OUTOF10: 6
PAINLEVEL_OUTOF10: 8
PAINLEVEL_OUTOF10: 0
PAINLEVEL_OUTOF10: 8
PAINLEVEL_OUTOF10: 9
PAINLEVEL_OUTOF10: 4
PAINLEVEL_OUTOF10: 4
PAINLEVEL_OUTOF10: 7
PAINLEVEL_OUTOF10: 0
PAINLEVEL_OUTOF10: 5
PAINLEVEL_OUTOF10: 7

## 2023-06-20 ASSESSMENT — PAIN DESCRIPTION - DESCRIPTORS
DESCRIPTORS: ACHING
DESCRIPTORS: SHARP;SHOOTING

## 2023-06-20 ASSESSMENT — PAIN DESCRIPTION - FREQUENCY
FREQUENCY: CONTINUOUS
FREQUENCY: CONTINUOUS

## 2023-06-20 ASSESSMENT — PAIN DESCRIPTION - LOCATION
LOCATION: SHOULDER

## 2023-06-20 ASSESSMENT — PAIN DESCRIPTION - ORIENTATION
ORIENTATION: RIGHT

## 2023-06-20 ASSESSMENT — PAIN - FUNCTIONAL ASSESSMENT
PAIN_FUNCTIONAL_ASSESSMENT: ACTIVITIES ARE NOT PREVENTED
PAIN_FUNCTIONAL_ASSESSMENT: PREVENTS OR INTERFERES SOME ACTIVE ACTIVITIES AND ADLS
PAIN_FUNCTIONAL_ASSESSMENT: PREVENTS OR INTERFERES SOME ACTIVE ACTIVITIES AND ADLS
PAIN_FUNCTIONAL_ASSESSMENT: ACTIVITIES ARE NOT PREVENTED
PAIN_FUNCTIONAL_ASSESSMENT: PREVENTS OR INTERFERES SOME ACTIVE ACTIVITIES AND ADLS
PAIN_FUNCTIONAL_ASSESSMENT: PREVENTS OR INTERFERES SOME ACTIVE ACTIVITIES AND ADLS

## 2023-06-20 ASSESSMENT — PAIN DESCRIPTION - PAIN TYPE
TYPE: ACUTE PAIN

## 2023-06-20 ASSESSMENT — PAIN DESCRIPTION - ONSET: ONSET: ON-GOING

## 2023-06-20 ASSESSMENT — PAIN DESCRIPTION - DIRECTION: RADIATING_TOWARDS: RIGHT SHOULDER

## 2023-06-20 NOTE — CARE COORDINATION
Transition of Care Plan:    RUR: 17% - moderate  Prior Level of Functioning: independent  Disposition: home health  Follow up appointments: PCP  DME needed: none  Transportation at discharge: family  Caregiver Contact: Asiya Ramírez - p: [phone]  Discharge Caregiver contacted prior to discharge? no  Care Conference needed? no  Barriers to discharge: medical    CM updated in IDR that patient may be medically stable for discharge home with home health tomorrow pending BP management. CM spoke with patient re discharge plan. Patient agreeable to home health services. Any agency that will accept patient's [place] Medicare insurance.  order sent to:      CM will continue to follow.     Disposition Plan:  Home Health:  Transportation:  OP HD: [facility] - resumption

## 2023-06-20 NOTE — CARDIO/PULMONARY
A&Ox4. Up ad oswald. VSS. RA      No c/o n/v/d. Poor appetite      No c/o pain     Skin dry and intact. BLE pitting edema    R PICC clean, dry, intact, and flushed. Blood return noted.     1 unit PLTs infusing and tolerating well. 1 unit PRBC to be transfused, will endorse to oncoming RN     Meds adx per MD orders. No acute changes for this shift. Will continue to monitor and notify MD of any changes    Chart reviewed: Patient is 56 y.o. male admitted with NSTEMI (non-ST elevated myocardial infarction) ([facility] [I21.4]    Education: MI education folder,  to bedside of Ashlee Salinas. Educated using teach back method. Reviewed MI diagnosis definition and purpose of intervention. Discussed risk factors for CAD to include the following: family history, elevated BMI, hyperlipidemia, hypertension, diabetes, stress, and smoking. . Reviewed the importance of medication compliance, the purpose of plavix, and potential side effects. Discussed follow up appointments with cardiologist, signs and symptoms of angina, and what to report to physician after discharge. Emphasized the value of cardiac rehab. Discussed Cardiac Rehab Program format, benefits, and encouraged enrollment to assist with risk modification and management. Referral was placed for OP CR at [facility] and the contact information is on his AVS.    Ashlee Salinas verbalized understanding with questions answered.      Slim Maloney RN

## 2023-06-20 NOTE — FLOWSHEET NOTE
06/19/23 2300   Vital Signs   BP (!) 150/59   Temp 98.3 °F (36.8 °C)   Pulse 63   Respirations 11   SpO2 96 %   Pain Assessment   Pain Assessment 0-10   Pain Level 9  (primary RN to give pain medication)   Pain Location Shoulder   Pain Orientation Right   Patient's Stated Pain Goal 0 - No pain   Treatment   Time On 2330   Treatment Goal 2000   Observations & Evaluations   Level of Consciousness 0   Oriented X 4   Heart Rhythm Regular   Respiratory Quality/Effort Unlabored   O2 Device None (Room air)   Bilateral Breath Sounds Diminished   Skin Color   (appropriate for ethnicity)   Skin Condition/Temp Warm;Dry   Abdomen Inspection Soft   Edema None   Technical Checks   Dialysis Machine No. [id number]   RO Machine Number [id number]   Dialyzer Lot No. [id number]   Tubing Lot Number [id number]   All Connections Secure Yes   NS Bag Yes   Saline Line Double Clamped Yes   Dialyzer Revaclear 300   Prime Volume (mL) 200 mL   ICEBOAT I;C;E;B;O;A;T   RO Machine Log Sheet Completed Yes   Machine Alarm Self Test Completed; Passed   Air Foam Detector Tested;Proper Function   Extracorporeal Circuit Tested for Integrity Yes   Machine Conductivity 13.7   Manual Conductivity 13.4   Manual Ph 7.4   Bleach Test (Neg) Yes   Bath Temperature 96.8 °F (36 °C)   Treatment Initiation   Dialyze Hours 3.5   Treatment  Initiation Universal Precautions maintained;Lines secured to patient; Connections secured;Prime given;Venous Parameters set; Arterial Parameters set; Air foam detector engaged;Dialysate;Saline line double clamped; Revaclear Dialyzer;REV-300   Dialysis Bath   K+ (Potassium) 3   Ca+ (Calcium) 2.5   Na+ (Sodium) 138   HCO3 (Bicarb) 38   Bicarbonate Concentrate Lot No. [id number]   Acid Concentrate Lot No. [id number]   Hemodialysis Fistula/Graft Superior Arm   No placement date or time found.    Orientation: Superior  Access Location: Arm   Site Assessment Clean, dry & intact   Thrill Present   Bruit Present   Status Accessed   Venous Needle Size no

## 2023-06-21 VITALS
HEART RATE: 88 BPM | WEIGHT: 210.32 LBS | TEMPERATURE: 98.1 F | DIASTOLIC BLOOD PRESSURE: 86 MMHG | RESPIRATION RATE: 20 BRPM | SYSTOLIC BLOOD PRESSURE: 126 MMHG | OXYGEN SATURATION: 94 % | HEIGHT: 71 IN | BODY MASS INDEX: 29.44 KG/M2

## 2023-06-21 LAB
ANION GAP SERPL CALC-SCNC: 9 MMOL/L (ref 5–15)
BASOPHILS # BLD: 0 K/UL (ref 0–0.1)
BASOPHILS NFR BLD: 0 % (ref 0–1)
BUN SERPL-MCNC: 43 MG/DL (ref 6–20)
BUN/CREAT SERPL: 4 (ref 12–20)
CALCIUM SERPL-MCNC: 10.5 MG/DL (ref 8.5–10.1)
CHLORIDE SERPL-SCNC: 96 MMOL/L (ref 97–108)
CO2 SERPL-SCNC: 26 MMOL/L (ref 21–32)
CREAT SERPL-MCNC: 11.7 MG/DL (ref 0.7–1.3)
DIFFERENTIAL METHOD BLD: ABNORMAL
EOSINOPHIL # BLD: 0.2 K/UL (ref 0–0.4)
EOSINOPHIL NFR BLD: 2 % (ref 0–7)
ERYTHROCYTE [DISTWIDTH] IN BLOOD BY AUTOMATED COUNT: 15 % (ref 11.5–14.5)
GLUCOSE SERPL-MCNC: 94 MG/DL (ref 65–100)
HCT VFR BLD AUTO: 28.5 % (ref 36.6–50.3)
HGB BLD-MCNC: 9.2 G/DL (ref 12.1–17)
IMM GRANULOCYTES # BLD AUTO: 0.1 K/UL (ref 0–0.04)
IMM GRANULOCYTES NFR BLD AUTO: 1 % (ref 0–0.5)
LYMPHOCYTES # BLD: 1.1 K/UL (ref 0.8–3.5)
LYMPHOCYTES NFR BLD: 12 % (ref 12–49)
MAGNESIUM SERPL-MCNC: 2.4 MG/DL (ref 1.6–2.4)
MCH RBC QN AUTO: 27.6 PG (ref 26–34)
MCHC RBC AUTO-ENTMCNC: 32.3 G/DL (ref 30–36.5)
MCV RBC AUTO: 85.6 FL (ref 80–99)
MONOCYTES # BLD: 0.8 K/UL (ref 0–1)
MONOCYTES NFR BLD: 8 % (ref 5–13)
NEUTS SEG # BLD: 7 K/UL (ref 1.8–8)
NEUTS SEG NFR BLD: 77 % (ref 32–75)
NRBC # BLD: 0 K/UL (ref 0–0.01)
NRBC BLD-RTO: 0 PER 100 WBC
PHOSPHATE SERPL-MCNC: 6.8 MG/DL (ref 2.6–4.7)
PLATELET # BLD AUTO: 208 K/UL (ref 150–400)
PMV BLD AUTO: 10.3 FL (ref 8.9–12.9)
POTASSIUM SERPL-SCNC: 4.7 MMOL/L (ref 3.5–5.1)
RBC # BLD AUTO: 3.33 M/UL (ref 4.1–5.7)
SODIUM SERPL-SCNC: 131 MMOL/L (ref 136–145)
WBC # BLD AUTO: 9.1 K/UL (ref 4.1–11.1)

## 2023-06-21 PROCEDURE — 6370000000 HC RX 637 (ALT 250 FOR IP): Performed by: INTERNAL MEDICINE

## 2023-06-21 PROCEDURE — 6370000000 HC RX 637 (ALT 250 FOR IP): Performed by: NURSE PRACTITIONER

## 2023-06-21 PROCEDURE — 84100 ASSAY OF PHOSPHORUS: CPT

## 2023-06-21 PROCEDURE — 2580000003 HC RX 258: Performed by: INTERNAL MEDICINE

## 2023-06-21 PROCEDURE — 80048 BASIC METABOLIC PNL TOTAL CA: CPT

## 2023-06-21 PROCEDURE — 6360000002 HC RX W HCPCS: Performed by: INTERNAL MEDICINE

## 2023-06-21 PROCEDURE — 36415 COLL VENOUS BLD VENIPUNCTURE: CPT

## 2023-06-21 PROCEDURE — 85025 COMPLETE CBC W/AUTO DIFF WBC: CPT

## 2023-06-21 PROCEDURE — 90935 HEMODIALYSIS ONE EVALUATION: CPT

## 2023-06-21 PROCEDURE — 83735 ASSAY OF MAGNESIUM: CPT

## 2023-06-21 PROCEDURE — 2580000003 HC RX 258: Performed by: NURSE PRACTITIONER

## 2023-06-21 PROCEDURE — 6370000000 HC RX 637 (ALT 250 FOR IP)

## 2023-06-21 RX ORDER — SULFAMETHOXAZOLE AND TRIMETHOPRIM 400; 80 MG/1; MG/1
1 TABLET ORAL
Qty: 12 TABLET | Refills: 0 | Status: SHIPPED | OUTPATIENT
Start: 2023-06-21 | End: 2023-07-21

## 2023-06-21 RX ORDER — LOSARTAN POTASSIUM 100 MG/1
100 TABLET ORAL DAILY
Qty: 30 TABLET | Refills: 0 | Status: SHIPPED | OUTPATIENT
Start: 2023-06-21 | End: 2023-07-21

## 2023-06-21 RX ORDER — CLOPIDOGREL BISULFATE 75 MG/1
75 TABLET ORAL DAILY
Qty: 30 TABLET | Refills: 0 | Status: SHIPPED | OUTPATIENT
Start: 2023-06-21 | End: 2023-07-21

## 2023-06-21 RX ORDER — CLOPIDOGREL BISULFATE 75 MG/1
75 TABLET ORAL DAILY
Qty: 30 TABLET | Refills: 0 | Status: SHIPPED | OUTPATIENT
Start: 2023-06-21 | End: 2023-06-21 | Stop reason: SDUPTHER

## 2023-06-21 RX ORDER — SULFAMETHOXAZOLE AND TRIMETHOPRIM 400; 80 MG/1; MG/1
1 TABLET ORAL
Qty: 12 TABLET | Refills: 0 | Status: SHIPPED | OUTPATIENT
Start: 2023-06-21 | End: 2023-06-21 | Stop reason: SDUPTHER

## 2023-06-21 RX ORDER — CARVEDILOL 12.5 MG/1
25 TABLET ORAL 2 TIMES DAILY WITH MEALS
Status: DISCONTINUED | OUTPATIENT
Start: 2023-06-21 | End: 2023-06-21 | Stop reason: HOSPADM

## 2023-06-21 RX ORDER — LOSARTAN POTASSIUM 100 MG/1
100 TABLET ORAL DAILY
Qty: 30 TABLET | Refills: 0 | Status: SHIPPED | OUTPATIENT
Start: 2023-06-21 | End: 2023-06-21 | Stop reason: SDUPTHER

## 2023-06-21 RX ORDER — CARVEDILOL 25 MG/1
25 TABLET ORAL 2 TIMES DAILY WITH MEALS
Qty: 60 TABLET | Refills: 3 | Status: SHIPPED | OUTPATIENT
Start: 2023-06-21 | End: 2023-06-21 | Stop reason: SDUPTHER

## 2023-06-21 RX ORDER — CARVEDILOL 25 MG/1
25 TABLET ORAL 2 TIMES DAILY WITH MEALS
Qty: 60 TABLET | Refills: 0 | Status: SHIPPED | OUTPATIENT
Start: 2023-06-21 | End: 2023-07-21

## 2023-06-21 RX ADMIN — SODIUM CHLORIDE, PRESERVATIVE FREE 10 ML: 5 INJECTION INTRAVENOUS at 08:37

## 2023-06-21 RX ADMIN — FAMOTIDINE 20 MG: 20 TABLET ORAL at 11:32

## 2023-06-21 RX ADMIN — GUAIFENESIN 600 MG: 600 TABLET, EXTENDED RELEASE ORAL at 11:33

## 2023-06-21 RX ADMIN — LEVETIRACETAM 500 MG: 500 TABLET, FILM COATED ORAL at 11:32

## 2023-06-21 RX ADMIN — LOSARTAN POTASSIUM 100 MG: 50 TABLET, FILM COATED ORAL at 11:33

## 2023-06-21 RX ADMIN — HYDROMORPHONE HYDROCHLORIDE 1 MG: 1 INJECTION, SOLUTION INTRAMUSCULAR; INTRAVENOUS; SUBCUTANEOUS at 03:10

## 2023-06-21 RX ADMIN — MESALAMINE 800 MG: 800 TABLET, DELAYED RELEASE ORAL at 11:34

## 2023-06-21 RX ADMIN — CARVEDILOL 25 MG: 12.5 TABLET, FILM COATED ORAL at 11:34

## 2023-06-21 RX ADMIN — CLOPIDOGREL BISULFATE 75 MG: 75 TABLET, FILM COATED ORAL at 11:33

## 2023-06-21 RX ADMIN — MESALAMINE 800 MG: 800 TABLET, DELAYED RELEASE ORAL at 12:40

## 2023-06-21 RX ADMIN — NYSTATIN 500000 UNITS: 100000 SUSPENSION ORAL at 11:33

## 2023-06-21 RX ADMIN — HYDRALAZINE HYDROCHLORIDE 10 MG: 20 INJECTION INTRAMUSCULAR; INTRAVENOUS at 03:42

## 2023-06-21 RX ADMIN — PANTOPRAZOLE SODIUM 40 MG: 40 TABLET, DELAYED RELEASE ORAL at 11:35

## 2023-06-21 RX ADMIN — SEVELAMER CARBONATE 1600 MG: 800 TABLET, FILM COATED ORAL at 11:34

## 2023-06-21 RX ADMIN — ASPIRIN 81 MG CHEWABLE TABLET 81 MG: 81 TABLET CHEWABLE at 11:34

## 2023-06-21 RX ADMIN — EPOETIN ALFA-EPBX 10000 UNITS: 10000 INJECTION, SOLUTION INTRAVENOUS; SUBCUTANEOUS at 11:36

## 2023-06-21 RX ADMIN — HYDROMORPHONE HYDROCHLORIDE 1 MG: 1 INJECTION, SOLUTION INTRAMUSCULAR; INTRAVENOUS; SUBCUTANEOUS at 12:40

## 2023-06-21 RX ADMIN — ATORVASTATIN CALCIUM 40 MG: 40 TABLET, FILM COATED ORAL at 11:33

## 2023-06-21 RX ADMIN — ATAZANAVIR 300 MG: 300 CAPSULE ORAL at 11:36

## 2023-06-21 RX ADMIN — HYDROMORPHONE HYDROCHLORIDE 1 MG: 1 INJECTION, SOLUTION INTRAMUSCULAR; INTRAVENOUS; SUBCUTANEOUS at 08:36

## 2023-06-21 RX ADMIN — DOLUTEGRAVIR SODIUM 50 MG: 50 TABLET, FILM COATED ORAL at 11:36

## 2023-06-21 ASSESSMENT — PAIN DESCRIPTION - DESCRIPTORS
DESCRIPTORS: ACHING

## 2023-06-21 ASSESSMENT — PAIN DESCRIPTION - ORIENTATION
ORIENTATION: RIGHT

## 2023-06-21 ASSESSMENT — PAIN DESCRIPTION - LOCATION
LOCATION: SHOULDER

## 2023-06-21 ASSESSMENT — PAIN SCALES - GENERAL
PAINLEVEL_OUTOF10: 7
PAINLEVEL_OUTOF10: 8
PAINLEVEL_OUTOF10: 0
PAINLEVEL_OUTOF10: 8
PAINLEVEL_OUTOF10: 0
PAINLEVEL_OUTOF10: 7
PAINLEVEL_OUTOF10: 5
PAINLEVEL_OUTOF10: 8

## 2023-06-21 ASSESSMENT — PAIN DESCRIPTION - PAIN TYPE
TYPE: ACUTE PAIN

## 2023-06-21 ASSESSMENT — PAIN - FUNCTIONAL ASSESSMENT
PAIN_FUNCTIONAL_ASSESSMENT: PREVENTS OR INTERFERES SOME ACTIVE ACTIVITIES AND ADLS
PAIN_FUNCTIONAL_ASSESSMENT: ACTIVITIES ARE NOT PREVENTED

## 2023-06-21 ASSESSMENT — PAIN DESCRIPTION - FREQUENCY
FREQUENCY: CONTINUOUS
FREQUENCY: CONTINUOUS

## 2023-06-21 NOTE — DISCHARGE INSTRUCTIONS
ADDITIONAL CARE RECOMMENDATIONS:   - Please take all medications as prescribed. Note changes as below. **Add Coreg instead of metoprolol,   **Add losartan  **Add plavix  **Add bactrim for pneumonia prophylaxis  - Please make sure to follow up with your primary care physician within 1-2 weeks of discharge for hospital follow up. You should also follow up with Cardiology at [facility], and your infectious disease physician   - Please make sure to continue to monitor for: Chest pain, shortness of breath, high fevers, and return to the Emergency Department with any of these symptoms.   - Please get up slowly from a seated or laying position, avoid falls. - Avoid tobacco, alcohol and other illicit drug use.    - Diet restrictions: Cardiac, renal diet   - Activity restrictions: As tolerated

## 2023-06-21 NOTE — PROGRESS NOTES
0730: Bedside shift change report given to Osorio Hong RN (oncoming nurse) by Aruna Brooks RN (offgoing nurse). Report included the following information Nurse Handoff Report, MAR, and Recent Results. 1530: Dr. Gela Langston notified of patient's continued shoulder pain. Orders received. 1730: Patient's /102, prn hydralazine given. 1930: Bedside shift change report given to ANN-MARIE Hernandez (oncoming nurse) by Osorio Hong RN (offgoing nurse). Report included the following information Nurse Handoff Report, MAR, and Recent Results.
0730: Bedside shift change report given to Sanjay Dutton RN (oncoming nurse) by Kamla Potts RN (offgoing nurse). Report included the following information Nurse Handoff Report, MAR, and Recent Results. 0830: Patient awake in bed. Dialysis at bedside. 1400: AVS reviewed with patient. Specific information and education provided regarding new medications listed below. Follow-up appointments as indicated in the AVS were also reviewed. Patient verbalized understanding and all questions were answered.         Medication List        START taking these medications      carvedilol 25 MG tablet  Commonly known as: COREG  Take 1 tablet by mouth 2 times daily (with meals)  Notes to patient: Next dose 6/21 in the evening     clopidogrel 75 MG tablet  Commonly known as: PLAVIX  Take 1 tablet by mouth daily  Notes to patient: Next dose 6/22     losartan 100 MG tablet  Commonly known as: COZAAR  Take 1 tablet by mouth daily  Notes to patient: Next dose 6/22     sulfamethoxazole-trimethoprim 400-80 MG per tablet  Commonly known as: BACTRIM;SEPTRA  Take 1 tablet by mouth three times a week            CONTINUE taking these medications      albuterol sulfate  (90 Base) MCG/ACT inhaler  Commonly known as: PROVENTIL;VENTOLIN;PROAIR     aspirin 81 MG chewable tablet  Notes to patient: Next dose 6/22     atazanavir 300 MG capsule  Commonly known as: REYATAZ  TAKE 1 CAPSULE BY MOUTH DAILY BEFORE BREAKFAST  Notes to patient: Next dose 6/22     atorvastatin 40 MG tablet  Commonly known as: LIPITOR  take 1 tablet by mouth once daily  Notes to patient: Next dose 6/22     calcium carbonate 1250 (500 Ca) MG chewable tablet  Commonly known as: OS-MOON     dolutegravir sodium 50 MG tablet  Commonly known as: Rehan Dorsey  Notes to patient: Next dose 6/22     famotidine 40 MG tablet  Commonly known as: PEPCID  Notes to patient: Next dose 6/21 in the evening     levETIRAcetam 500 MG tablet  Commonly known as: KEPPRA  Notes to patient: Next dose 6/21 at
[facility]  Cardiology Care Note                  []Initial visit     [x]Established visit     Patient Name: Waleska Boyd - PXD:67/97[birth year] - [facility]:[id number]  Primary Cardiologist: none  Consulting Cardiologist: Dennise Hoskins MD     Reason for initial visit: Left main CAD     SUBJECTIVE:   Resting in bed, on RA. Denies CP or SOB,  +dry cough. On heparin gtt       Assessment and Plan       NSTEMI/Coronary artery disease:  No ischemic changes on EKG. Trop peaked at [facility]. On heparin gtt. LHC revealed istal left main ostial LAD mid LAD distal LAD and ostial circumflex artery has a critical stenosis with well-preserved left ventricular systolic contractility. Cardiac surgery evaluated, calcified LAD which is felt to be poor target for surgical revasculaization. Discussed in detail about the relative risks and benefits of PCI. Does not appear to be a candidate for cardiothoracic surgery/bypass due to calcified LAD which may not be graftable. I discussed the risks/benefits/alternatives of the procedure with the patient. Risks include (but are not limited to) bleeding, infection,stroke,heart attack, need for emergency surgery/pericardiocentesis, need for dialysis or death. The patient understands and agrees to proceed. Bacterial Pneumonia:  on RA. Completed rocephin, zithromax. CXR with mild pulm edema. proBNP elevated >21k. Procal 1.20. Bactrim added for prophylaxis d/t HIV/AIDs, recent CD4 count 192. Ascending aortic aneurysm:  measures about 5cm on recent CT. SBP goal < 120. Not a candidate for surgical intervention per Cardiac surgery. ESRD, On HD MWF: nephrology consulted. Will need HD post PCI today. Chronic anemia:  Hgb 8 range. (within pt's baseline). On epogen  HIV/AIDS: recent CD4 192.    per primary team.   Hypertension: on metoprolol 50 BID, losartan 100  Hyperlipidemia:  6/14: 
[facility]  Cardiology Care Note                  []Initial visit     [x]Established visit     Patient Name: Minetta Closs - XFN: - QA  Primary Cardiologist:   Consulting Cardiologist: Jerene Denver, MD     Reason for initial visit: Left main CAD     SUBJECTIVE:   Resting in bed, on RA. Had chest discomfort post procedure, relieved with pain meds, Metoprolol IV and SL  Nitro. HD overnight, 2.5L removed. Denies CP or SOB this AM.   Hasn't been out of bed yet,  BP elevated      Assessment and Plan       NSTEMI/Coronary artery disease:  No ischemic changes on EKG[facility]. LHC revealed istal left main ostial LAD mid LAD distal LAD and ostial circumflex artery has a critical stenosis with well-preserved left ventricular systolic contractility. Cardiac surgery evaluated, calcified LAD which is felt to be poor target for surgical revasculaization. S/p Left main, LAD ostial stenting with ostial Lcx angioplasty/atherectomy. Mid LAD and distal LAD atherectomy with     PCI on 23 . Overall doing better except for some dizziness. Bacterial Pneumonia:  on RA. Completed rocephin, zithromax. CXR with mild pulm edema. proBNP elevated >21k. Procal 1.20. Bactrim added for prophylaxis d/t HIV/AIDs, recent CD4 count 192. Ascending aortic aneurysm:  measures about 5cm on recent CT. SBP goal < 120. Not a candidate for surgical intervention per Cardiac surgery. ESRD, On HD MWF: nephrology consulted. Will need HD post PCI today. Chronic anemia:  Hgb 8 range. (within pt's baseline). On epogen    HIV/AIDS: recent CD4 192. per primary team.     Hypertension: losartan 100. Change metoprolol 50 to coreg 12.5 mg PO BID. Hyperlipidemia:  :  Trig 82 LDL 35 HDL 67  On atorvastatin 40 mg PO daily. Hx of seizure d/o:  On
CATH LAB to RECOVERY ROOM REPORT    Procedure: PCI with Stent Placement    MD: Aditi Knox MD    The procedure was an intervention with 3 stent(s) placed to the LM, LAD, & Cx. Verbal Report given to Recovery Nurse on patient being transferred to Cardiac Cath Lab  for routine post-op. Patient stable upon transfer to . Vitals, mental status, MAR, procedural summary discussed with recovery RN.     Medication given during procedure:    Contrast:190mL                          Heparin:16,000units     Versed:5mg                                                               Fentanyl:200mcg                                                           Plavix:600 mg         Other Meds/Drips given:  Benadryl: 50 mg  Nitroglycerin: 900mcg  Total fluid = ~500cc NS 0.9%      Sheaths:    Right radial sheath pulled at 451 pm, band at 13mL of air
Cardiac Cath Lab Procedure Area Arrival Note:    Irena Pitts arrived to Cardiac Cath Lab, Procedure Area. Patient identifiers verified with NAME and DATE OF BIRTH. Procedure verified with patient. Consent forms verified. Allergies verified. Patient informed of procedure and plan of care. Questions answered with review. Patient voiced understanding of procedure and plan of care. Patient on cardiac monitor, non-invasive blood pressure, SPO2 monitor. On RA . IV of NS on pump at 50 ml/hr. Patient status doing well without problems. Patient is A&Ox 4. Patient reports no CP or SOB. Patient medicated during procedure with orders obtained and verified by Dr. Kvng Vanegas. Refer to patients Cardiac Cath Lab PROCEDURE REPORT for vital signs, assessment, status, and response during procedure, printed at end of case. Printed report on chart or scanned into chart.
Cath Lab Recovery Room Arrival Note    Patient arrived to Cardiac Cath/EP Lab Recovery Room for  PCI Procedure. Staff introduced to patient. Patient identifiers verified with Name and Date of Birth. Procedure verified with patient. Consent forms reviewed and signed by patient or authorized representative and verified. Allergies verified. Patient informed of procedure and plan of care. Questions answered with review. Patient on cardiac monitor, non-invasive blood pressure, SPO2 monitor. On RA. Patient is A&Ox4. Patient reports no complaints.
Hospital follow-up PCP transitional care appointment has been scheduled with Poly Ornelas NP for Tuesday June 27, 2023 at 9:30 AM.? Pending patient discharge.   Destin Burris, Care Management Assistant
Hospitalist Progress Note  Alvino Abrams MD  Answering service: [phone] from in house phone        Date of Service:  2023  NAME:  Ana Lowe  :  1965  MRN:  [id number]      Admission Summary: This is a 53-year-old man with past medical history significant for end-stage renal disease on hemodialysis Monday, Wednesday and Friday; HIV infection; hypertension; dyslipidemia; and seizure disorder, presented to the emergency room at [facility] with shortness of breath. Found to have NSTEMI with single vessel disease, deemed not amenable to PCI and transferred here for CABG evaluation       Interval history / Subjective:   Doing well. Denies chest pain or SOB. Plan PCI later today, then possible DC home tomorrow., Needs HD post C. Assessment & Plan:     NSTEMI  CAD   - CT surgery evaluated and deemed not candidate for CABG given comorbidities. - Cardiology consulted for high risk PCI, tentative for   - remains on heparin gtt  - continue ASA, lipitor, and metoprolol    ESRD on HD   - Nephrology consulted   - HD per their schedule, will need HD post PCI today      HIV/AIDS CD4 192   - home ARVs (last CD4 260)  - Start bactrim prophylaxis  - Will need close FU with ID     HTN - metoprolol, norvac  Anemia, likely chronic - monitor and transfuse for hemoglobin less than 7   Seizure disorder- home keppra   CAP? - Completed CTX and azithro x 5 days.        Code status: FULL  Prophylaxis: heparin gtt  Care Plan discussed with: pt, RN, consultants   Anticipated Disposition: Home tomorrow am, HD post PCI       Principal Problem:    NSTEMI (non-ST elevated myocardial infarction) ([address]  Active Problems:    HIV (human immunodeficiency virus infection) ([address]    ESRD (end stage renal disease) ([address]    Thoracic aortic aneurysm ([address]    Respiratory failure with hypoxia ([address]
Hospitalist Progress Note  Brian Santana MD  Answering service: [phone] from in house phone        Date of Service:  2023  NAME:  Diana Abraham  :  1965  MRN:  [id number]      Admission Summary: This is a 56-year-old man with past medical history significant for end-stage renal disease on hemodialysis Monday, Wednesday and Friday; HIV infection; hypertension; dyslipidemia; and seizure disorder, presented to the emergency room at [facility] with shortness of breath. Found to have NSTEMI with single vessel disease, deemed not amenable to PCI and transferred here for CABG evaluation       Interval history / Subjective:   Had some chest pain post procedure, but none since. BP have been elevated      Assessment & Plan:     NSTEMI  CAD   - CT surgery evaluated and deemed not candidate for CABG given comorbidities. - Cardiology following  - s/p high risk PCI by Dr. Marisol Dewitt  --> LAD ostial stenting with ostial Lcx agioplasty/atherectomy  - s/p heparin gtt   - continue ASA, lipitor, and coreg    ESRD on HD   - Nephrology consulted   - HD per their schedule, will need HD post PCI today      HIV/AIDS CD4 192   - home ARVs (last CD4 260)  - Start bactrim prophylaxis  - Will need close FU with ID     HTN - metoprolol changed to coreg today, norvac  Anemia, likely chronic - monitor and transfuse for hemoglobin less than 7   Seizure disorder- home keppra   CAP? - Completed CTX and azithro x 5 days.        Code status: FULL  Prophylaxis: heparin gtt  Care Plan discussed with: pt, RN, consultants   Anticipated Disposition: Home hopefully tomorrow, pending BP control     Principal Problem:    NSTEMI (non-ST elevated myocardial infarction) ([facility]  Active Problems:    HIV (human immunodeficiency virus infection) ([facility]    ESRD (end stage renal disease) ([facility]    Thoracic aortic aneurysm ([facility]   [address] [employer], [address] [place] [address]  Phone: [phone]   [phone]    [email or web address]     Nephrology Progress Note    Patient Name : Allan See      : 1965     MRN : [id number]  Date of Admission : 2023  Date of Servive : 23    CC: Follow up for ESRD       Assessment and Plan   ESRD -HD dialysis dependent  - followed by   - Dialy[facility] MWF @ [address] [place] [address]  - Access: LAVF+ bruit/ thrill  - HD :  with 2L removed  - renally dose medications    Anemia of CKD  - Hgb improving  - continue HAYDE     MBD of CKD  -  continue Renvela 1300 mg TID     CAD/ NSTEMI  CHF  - multivessel CAD on [facility]  -   atherectomy with PCI to LAD       Bacterial Pneumonia  - abx per primary team     HTN  HLD  Seizure Disorder  HIV        Interval History:  Seen and examined this AM. Doing well this AM- BP elevated during rounds. He tolerated HD without issues 2L removed. He denies SOB or chest discomfort this AM- oxygenating well on RA. Review of Systems: A comprehensive review of systems was negative.     Current Medications:   Current Facility-Administered Medications   Medication Dose Route Frequency    lidocaine 4 % external patch 1 patch  1 patch TransDERmal Daily    0.9 % sodium chloride infusion   IntraVENous Continuous    sodium chloride flush 0.9 % injection 5-40 mL  5-40 mL IntraVENous 2 times per day    sodium chloride flush 0.9 % injection 5-40 mL  5-40 mL IntraVENous PRN    0.9 % sodium chloride infusion   IntraVENous PRN    acetaminophen (TYLENOL) tablet 650 mg  650 mg Oral Q4H PRN    clopidogrel (PLAVIX) tablet 75 mg  75 mg Oral Daily    sulfamethoxazole-trimethoprim (BACTRIM;SEPTRA) 400-80 MG per tablet 1 tablet  1 tablet Oral Once per day on     hydrALAZINE (APRESOLINE) injection 10 mg  10 mg IntraVENous Q6H PRN    losartan (COZAAR) tablet 100 mg  100 mg Oral Daily    famotidine (PEPCID) tablet 20 mg  20 mg
[facility]   [address] [facility], [address] [facility]  Phone: [phone]   [phone]    [email or web address]     Nephrology Progress Note    Patient Name : Maliha Whitman      : 1965     MRN : [id number]  Date of Admission : 2023  Date of Servive : 23    CC: Follow up for ESRD       Assessment and Plan   ESRD -HD dialysis dependent  - followed by   - Carsonyzzachariah MWF @ [address] [place] [address]  - Access: LAVF+ bruit/ thrill  - HD :  with plans to remove 3L as tolerated  - renally dose medications  - stable from renal stand point for discharge- patient to return to home HD unit. Anemia of CKD  - Hgb improving  - continue HAYDE     MBD of CKD  -  continue Renvela 1300 mg TID  - Corcal elevated switched to low Ca bath     CAD/ NSTEMI  CHF  - multivessel CAD on [facility]  -   atherectomy with PCI to LAD       Bacterial Pneumonia  - abx per primary team     HTN  HLD  Seizure Disorder  HIV        Interval History:  Seen and examined this AM on dialysis. He is doing well continues with shoulder discomfort. He is awaiting for discharge plans. Review of Systems: A comprehensive review of systems was negative.     Current Medications:   Current Facility-Administered Medications   Medication Dose Route Frequency    carvedilol (COREG) tablet 25 mg  25 mg Oral BID WC    lidocaine 4 % external patch 1 patch  1 patch TransDERmal Daily    0.9 % sodium chloride infusion   IntraVENous Continuous    sodium chloride flush 0.9 % injection 5-40 mL  5-40 mL IntraVENous 2 times per day    sodium chloride flush 0.9 % injection 5-40 mL  5-40 mL IntraVENous PRN    0.9 % sodium chloride infusion   IntraVENous PRN    acetaminophen (TYLENOL) tablet 650 mg  650 mg Oral Q4H PRN    clopidogrel (PLAVIX) tablet 75 mg  75 mg Oral Daily    sulfamethoxazole-trimethoprim (BACTRIM;SEPTRA) 400-80 MG per tablet 1 tablet  1 tablet Oral Once per day on     hydrALAZINE
Resumed care of Manette Nyhan. EKG ordered and completed. 1728- C/O 6/10 chest pain radiating to right arm. Paged Dr. Dl Khan. 1 sublingual nitro given    Chest pain 5/10 immediately after nitro. Dr. Dl Khan at bedside. Order for metoprolol IV. Given. 56- Dr. Dl Khan returned to bedside. 1800- Pain 3/10, improving more after the metorprolol. Patient dosing off and on. 1815- TRANSFER - OUT REPORT:    Verbal report given to Deidre Henning RN on Madelaine Berumen  being transferred to CVSU for routine progression of patient care       Report consisted of patient's Situation, Background, Assessment and   Recommendations(SBAR). Information from the following report(s) Nurse Handoff Report, Surgery Report, Intake/Output, MAR, Recent Results, Med Rec Status, and Cardiac Rhythm NSR  was reviewed with the receiving nurse. Lines:   Peripheral IV 06/13/23 Right; Anterior Forearm (Active)   Site Assessment Other (Comment) 06/19/23 1603   Line Status Infusing 06/19/23 0800   Line Care Connections checked and tightened 06/19/23 0800   Phlebitis Assessment No symptoms 06/19/23 0800   Infiltration Assessment 0 06/19/23 0800   Alcohol Cap Used Yes 06/19/23 0800   Dressing Status Clean, dry & intact 06/19/23 0800   Dressing Type Transparent 06/19/23 0800       Peripheral IV 06/15/23 Right;Dorsal Wrist (Active)   Site Assessment Other (Comment) 06/19/23 1603   Line Status Flushed;Capped 06/19/23 0800   Line Care Cap changed 06/19/23 0800   Phlebitis Assessment No symptoms 06/19/23 0800   Infiltration Assessment 0 06/19/23 0800   Alcohol Cap Used Yes 06/19/23 0800   Dressing Status Clean, dry & intact 06/19/23 0800   Dressing Type Transparent 06/19/23 0800        Opportunity for questions and clarification was provided.       Patient transported with:  Monitor and Registered Nurse
Spiritual Care Assessment/Progress Note  [address]    Name: Tamiko Santizo MRN: [id number]    Age: 56 y.o. Sex: male   Language: English     Date: 6/20/2023            Total Time Calculated: 23 min              Spiritual Assessment begun in [facility] CV SERVICES UN[facility]  Service Provided For[de-identified] Patient  Referral/Consult From[de-identified] Rounding  Encounter Overview/Reason : Initial Encounter    Spiritual beliefs:      [x] Involved in a mary anne tradition/spiritual practice: Manoj Harrison     [] Supported by a mary anne community:      [] Claims no spiritual orientation:      [] Seeking spiritual identity:           [] Adheres to an individual form of spirituality:      [] Not able to assess:                Identified resources for coping and support system:   Support System: Family members       [] Prayer                  [] Devotional reading               [] Music                  [] Guided Imagery     [] Pet visits                                        [] Other: (COMMENT)     Specific area/focus of visit   Encounter: Type: Initial Screen/Assessment  Crisis:    Spiritual/Emotional needs:    Ritual, Rites and Sacraments:    Grief, Loss, and Adjustments: Type: Life Adjustments, Adjustment to illness  Ethics/Mediation:    Behavioral Health:    Palliative Care: Advance Care Planning:      Plan/Referrals: Other (Comment) (Please contact spiritual care for future services.)    Narrative:   Referral source:   Tamiko Santizo at [facility] in [facility] CV SERVICES UNIT. I reviewed the medical record prior to this encounter. Spiritual Assessment:     We discussed Tamiko Santizo current feelings/concerns and spiritual perspectives. Idalia Jacobs talked about his surgery on yesterday. He is feeling tired, weak, and sore, but is feeling better. He is single, no children. He lives in [place], [place]. He is a [employer] and member of [employer]. No spiritual needs noted.       Outcome: Tamiko Santizo clarified beliefs
TRANSFER - IN Cath Lab RR REPORT:    Verbal report received from Santi Drummond [address], [address] on Denny Morris  being received from the Cardiac Cath Lab for routine progression of care. Report consisted of patients Situation, Background, Assessment and Recommendations(SBAR). Procedural summary and MAR reviewed with receiving nurse. Opportunity for questions and clarification was provided. Assessment completed upon patients arrival to [address] [place] [address] and care assumed. Cardiac Cath Lab Recovery Arrival Note:    Denny Morris arrived to [facility] recovery area. Patient procedure= PCI. Patient on cardiac monitor, non-invasive blood pressure, SPO2 monitor. On RA. Patient is A&Ox 4. Patient reports no complaints. PROCEDURE SITE CHECK:    Procedure site: No bleeding and no hematoma, no pain/discomfort reported at procedure site. No change in patient status. Continue to monitor patient and status.
flowsheets. Chart and Pertinent Notes have been reviewed. No change in PMH ,family and social history from Consult note.       JENELLE Alcaraz - NP  [facility]
every 5 minutes as needed for Chest pain up to max of 3 total doses. If no relief after 1 dose, call 911., Disp: 3 tablet, Rfl: 3    atazanavir (REYATAZ) 300 MG capsule, TAKE 1 CAPSULE BY MOUTH DAILY BEFORE BREAKFAST, Disp: 180 capsule, Rfl: 0    VEMLIDY 25 MG TABS, Take 25 mg by mouth daily, Disp: , Rfl:     pantoprazole (PROTONIX) 40 MG tablet, Take 1 tablet by mouth 2 times daily, Disp: , Rfl:     mesalamine (DELZICOL) 800 MG TBEC TBEC tablet, Take 1 tablet by mouth 3 times daily, Disp: , Rfl:     famotidine (PEPCID) 40 MG tablet, Take 1 tablet by mouth 2 times daily, Disp: , Rfl:     albuterol sulfate HFA (PROVENTIL;VENTOLIN;PROAIR) 108 (90 Base) MCG/ACT inhaler, inhale 1 puff by mouth and INTO THE LUNGS every 4 hours if needed for wheezing, Disp: , Rfl:     aspirin 81 MG chewable tablet, aspirin 81 mg chewable tablet  chew and swallow 1 tablet by mouth once daily, Disp: , Rfl:     calcium carbonate (OS-MOON) 1250 (500 Ca) MG chewable tablet, Take 1 tablet by mouth as needed, Disp: , Rfl:     dolutegravir sodium (TIVICAY) 50 MG tablet, TAKE 1 TABLET BY MOUTH DAILY, Disp: , Rfl:     levETIRAcetam (KEPPRA) 500 MG tablet, Take 1 tablet by mouth 2 times daily, Disp: , Rfl:     lidocaine-prilocaine (EMLA) 2.5-2.5 % cream, APPLY SMALL AMOUNT TO ACCESS SITE (AVF) 1 HOUR BEFORE DIALYSIS. COVER WITH OCCLUSIVE DRESSING (SARAN WRAP), Disp: , Rfl:     ondansetron (ZOFRAN-ODT) 8 MG TBDP disintegrating tablet, Take 1 tablet by mouth every 8 hours as needed, Disp: , Rfl:     sevelamer (RENVELA) 800 MG tablet, Take 2 tablets by mouth 2 times daily, Disp: , Rfl:     Lali Prasad MD    [facility]  Call center: (l) [phone] (q) [phone]      CC: JENELLE Robles - NP

## 2023-06-21 NOTE — CARE COORDINATION
Transition of Care Plan:    RUR: 19% - moderate  Prior Level of Functioning: independent  Disposition: home health  Follow up appointments: Nephrology; PCP  DME needed: none  Transportation at discharge: family/friends  IM/IMM Medicare/Maxx letter given: 6/21/23  Caregiver Contact: Caroline Chely - p: [phone]  Discharge Caregiver contacted prior to discharge? no  Care Conference needed? no  Barriers to discharge: IP HD this AM    CM updated by treatment team that patient will likely discharge today after HD treatment. Home health confirmed with [facility] and start of care for PT will be next week. Latest renal notes and flowsheet faxed to [facility] (p: [phone] and f: [phone]) with notification that patient will discharge today. Resumption of OP HD at [facility] MWF 0530. Family to transport patient home today.     Disposition Plan:  Home Health: [facility]  Transportation: Family    Daryle Navy, [address] [place] [address],[address]  Available via [employer] or

## 2023-06-21 NOTE — PLAN OF CARE
Problem: Pain  Goal: Verbalizes/displays adequate comfort level or baseline comfort level  Outcome: Progressing  Flowsheets (Taken 6/20/2023 1105 by Nitin Schulz RN)  Verbalizes/displays adequate comfort level or baseline comfort level: Encourage patient to monitor pain and request assistance     Problem: Safety - Adult  Goal: Free from fall injury  6/20/2023 2224 by Waldo Parrish RN  Outcome: Progressing  6/20/2023 1102 by Nitin Schulz RN  Outcome: Progressing
Problem: Safety - Adult  Goal: Free from fall injury  Outcome: Progressing     Problem: Pain  Goal: Verbalizes/displays adequate comfort level or baseline comfort level  Outcome: Progressing
stable on RA, however patient with increased WOB with short distance ambulation and reporting fatigue and SOB post-activity. 06/20/23 1042 06/20/23 1048 06/20/23 1053   Vital Signs   Pulse 80 83 83   BP (!) 176/92 (!) 162/110 (!) 175/87   MAP (Calculated) 120 127 116   Patient Position Semi fowlers Sitting (EOB) Sitting;Up in chair   Oxygen Therapy   SpO2 97 % 97 % 96 %   O2 Device None (Room air) None (Room air) None (Room air)     Patient will benefit from skilled intervention to address the above impairments. Functional Outcome Measure: The patient scored 22/24 on the [facility] outcome measure with Cutoff score ?171,2,3 had higher odds of discharging home with home health or need of SNF/IPR. PLAN :  Recommendations and Planned Interventions:   bed mobility training, transfer training, gait training, therapeutic exercises, neuromuscular re-education, patient and family training/education, and therapeutic activities    Frequency/Duration: Patient will be followed by physical therapy to address goals, PT Plan of Care: 5 times/week to address goals. Recommendation for discharge: (in order for the patient to meet his/her long term goals): Therapy 2 days/week in the home and assist for safety    Other factors to consider for discharge: concern for safely navigating or managing the home environment    IF patient discharges home will need the following DME: none       SUBJECTIVE:   Patient stated My shoulder is hurting.     OBJECTIVE DATA SUMMARY:       Past Medical History:   Diagnosis Date    Anemia     Chronic abdominal pain     Chronic kidney disease     dialysis pt. - MRicky WRickyF    Complication of AV dialysis fistula 2019    2 large pseudo-aneurysm    Dependence on renal dialysis ([facility]     Esophagitis     ESRD on dialysis ([facility]     Gastritis     GERD (gastroesophageal reflux disease)     Gout     High cholesterol     History of blood transfusion     at least 8 units over the years    HIV (human

## 2023-06-21 NOTE — FLOWSHEET NOTE
HD INITIATION NOTE 06/21/23 0705   Treatment   Time On 0705   Treatment Goal 2   Observations & Evaluations   Level of Consciousness 0   Oriented X 4   Respiratory Quality/Effort Unlabored   O2 Device None (Room air)   Bilateral Breath Sounds Clear   Skin Color   (appropriate for ethnicity)   Skin Condition/Temp Warm   Appetite   (patient states poor appetite)   Abdomen Inspection Soft   RLE Edema Trace   LLE Edema Trace   Vital Signs   BP (!) 173/86   Temp 98.6 °F (37 °C)   Pulse 78   Respirations 12   SpO2 96 %   Pain Assessment   Pain Assessment 0-10   Pain Level 8   Patient's Stated Pain Goal 0 - No pain   Pain Location Shoulder   Pain Orientation Right   Pain Type Acute pain  (stated onset on Monday this week)   Response to Pain Intervention   (primary rn aware)   Technical Checks   Dialysis Machine No. [id number]   RO Machine Number [id number]   Dialyzer Lot No. [id number]   Tubing Lot Number [id number]   All Connections Secure Yes   NS Bag Yes   Saline Line Double Clamped Yes   Dialyzer Revaclear 300   Prime Volume (mL) 200 mL   ICEBOAT I;C;E;B;O;A;T   RO Machine Log Sheet Completed Yes   Machine Alarm Self Test Completed; Passed   Air Foam Detector Proper Function; Tested   Extracorporeal Circuit Tested for Integrity Yes   Machine Conductivity 13.6   Manual Conductivity 13.6   Manual Ph 7.2   Bleach Test (Neg) Yes   Bath Temperature 96.8 °F (36 °C)   Treatment Initiation   Dialyze Hours 3.5   Treatment  Initiation Universal Precautions maintained;Lines secured to patient; Connections secured;Prime given;Venous Parameters set; Arterial Parameters set; Air foam detector engaged; Saline line double clamped;REV-300   During Hemodialysis Assessment   Blood Flow Rate (ml/min) 400 ml/min   Arterial Pressure (mmHg) -80 mmHg   Venous Pressure (mmHg) 160   TMP 40      Comments Treatment initiated   Access Visible Yes   Ultrafiltration Rate (ml/hr) 710 ml/hr   Ultrafiltration Removed (mL) 0 ml/min   Hemodialysis Fistula/Graft

## 2023-06-21 NOTE — DISCHARGE SUMMARY
[phone] Tonja Sink [phone]  [address], [address] [address]      Phone: [phone]   atorvastatin 40 MG tablet       These medications were sent to [facility] [phone]  [address] [facility], [address] [address]      Phone: [phone]   carvedilol 25 MG tablet  clopidogrel 75 MG tablet  losartan 100 MG tablet  sulfamethoxazole-trimethoprim 400-80 MG per tablet           NOTIFY YOUR PHYSICIAN FOR ANY OF THE FOLLOWING:   Fever over 101 degrees for 24 hours. Chest pain, shortness of breath, fever, chills, nausea, vomiting, diarrhea, change in mentation, falling, weakness, bleeding. Severe pain or pain not relieved by medications. Or, any other signs or symptoms that you may have questions about.     DISPOSITION:   X Home With:   OT  PT  HH  RN       Long term SNF/Inpatient Rehab    Independent/assisted living    Hospice    Other:       PATIENT CONDITION AT DISCHARGE:     Functional status    Poor     Deconditioned    X Independent      Cognition    X Lucid     Forgetful     Dementia      Catheters/lines (plus indication)    [place]     PICC     PEG    X None      Code status    X Full code     DNR      PHYSICAL EXAMINATION AT DISCHARGE:  BP (!) 121/91   Pulse 80   Temp 98.6 °F (37 °C) (Oral)   Resp 18   Ht 1.803 m (5' 10.98\")   Wt 95.4 kg (210 lb 5.1 oz)   SpO2 98%   BMI 29.35 kg/m²     Gen: NAD, awake in bed  HEENT: NC/AT, sclera anicteric, PERRL, EOMI  CV: RRR no m/r/g, normal S1 and S2, no pedal edema   Resp: CTA b/l no increased work of breathing, no wheezing or rhonchi, speaking in full sentences   Abd: NT/ND, normal bowel sounds, no rebound or guarding  Ext: 2+ pulses, no edema  Skin: No rashes or lesions      CHRONIC MEDICAL DIAGNOSES:  Principal Problem:    NSTEMI (non-ST elevated myocardial infarction) ([facility]  Active Problems:    [facility]    ESRD (end stage renal

## 2023-06-27 ENCOUNTER — OFFICE VISIT (OUTPATIENT)
Facility: CLINIC | Age: 58
End: 2023-06-27

## 2023-06-27 VITALS
BODY MASS INDEX: 28.58 KG/M2 | HEIGHT: 72 IN | DIASTOLIC BLOOD PRESSURE: 76 MMHG | RESPIRATION RATE: 18 BRPM | OXYGEN SATURATION: 97 % | TEMPERATURE: 98.6 F | SYSTOLIC BLOOD PRESSURE: 112 MMHG | HEART RATE: 76 BPM | WEIGHT: 211 LBS

## 2023-06-27 DIAGNOSIS — I25.10 CORONARY ARTERY DISEASE DUE TO LIPID RICH PLAQUE: ICD-10-CM

## 2023-06-27 DIAGNOSIS — I10 PRIMARY HYPERTENSION: ICD-10-CM

## 2023-06-27 DIAGNOSIS — Z09 HOSPITAL DISCHARGE FOLLOW-UP: Primary | ICD-10-CM

## 2023-06-27 DIAGNOSIS — D64.9 ANEMIA, UNSPECIFIED TYPE: ICD-10-CM

## 2023-06-27 DIAGNOSIS — I25.83 CORONARY ARTERY DISEASE DUE TO LIPID RICH PLAQUE: ICD-10-CM

## 2023-06-27 DIAGNOSIS — N18.6 ESRD (END STAGE RENAL DISEASE) (HCC): ICD-10-CM

## 2023-06-27 DIAGNOSIS — B20 HIV INFECTION, UNSPECIFIED SYMPTOM STATUS (HCC): ICD-10-CM

## 2023-06-27 PROBLEM — J96.91 RESPIRATORY FAILURE WITH HYPOXIA (HCC): Status: RESOLVED | Noted: 2022-08-14 | Resolved: 2023-06-27

## 2023-06-27 ASSESSMENT — ENCOUNTER SYMPTOMS
RESPIRATORY NEGATIVE: 1
GASTROINTESTINAL NEGATIVE: 1

## 2023-07-06 ENCOUNTER — TELEPHONE (OUTPATIENT)
Age: 58
End: 2023-07-06

## 2023-07-06 NOTE — TELEPHONE ENCOUNTER
Mora Hagan came into the office today stating that his [facility] paperwork was incorrect and needed to be updated. Mr. Mary Ann Llanos stated that at his last appointment Dr. Jovita Sánchez stated that he will update it.  Patient can be reached at [phone]

## 2023-07-09 ENCOUNTER — HOSPITAL ENCOUNTER (EMERGENCY)
Facility: HOSPITAL | Age: 58
Discharge: HOME | End: 2023-07-09
Attending: EMERGENCY MEDICINE
Payer: MEDICARE

## 2023-07-09 ENCOUNTER — APPOINTMENT (OUTPATIENT)
Facility: HOSPITAL | Age: 58
End: 2023-07-09
Payer: MEDICARE

## 2023-07-09 VITALS
SYSTOLIC BLOOD PRESSURE: 153 MMHG | RESPIRATION RATE: 16 BRPM | WEIGHT: 226 LBS | DIASTOLIC BLOOD PRESSURE: 94 MMHG | HEART RATE: 58 BPM | BODY MASS INDEX: 30.61 KG/M2 | OXYGEN SATURATION: 100 % | HEIGHT: 72 IN | TEMPERATURE: 97.7 F

## 2023-07-09 DIAGNOSIS — I20.8 STABLE ANGINA PECTORIS (HCC): Primary | ICD-10-CM

## 2023-07-09 LAB
ALBUMIN SERPL-MCNC: 3.8 G/DL (ref 3.5–5)
ALBUMIN/GLOB SERPL: 0.9 (ref 1.1–2.2)
ALP SERPL-CCNC: 65 U/L (ref 45–117)
ALT SERPL-CCNC: 11 U/L (ref 12–78)
ANION GAP SERPL CALC-SCNC: 11 MMOL/L (ref 5–15)
AST SERPL W P-5'-P-CCNC: 16 U/L (ref 15–37)
BASOPHILS # BLD: 0 K/UL (ref 0–0.1)
BASOPHILS NFR BLD: 1 % (ref 0–1)
BILIRUB SERPL-MCNC: 0.8 MG/DL (ref 0.2–1)
BUN SERPL-MCNC: 41 MG/DL (ref 6–20)
BUN/CREAT SERPL: 3 (ref 12–20)
CA-I BLD-MCNC: 9.7 MG/DL (ref 8.5–10.1)
CHLORIDE SERPL-SCNC: 95 MMOL/L (ref 97–108)
CO2 SERPL-SCNC: 33 MMOL/L (ref 21–32)
CREAT SERPL-MCNC: 11.97 MG/DL (ref 0.7–1.3)
DIFFERENTIAL METHOD BLD: ABNORMAL
EOSINOPHIL # BLD: 0.2 K/UL (ref 0–0.4)
EOSINOPHIL NFR BLD: 5 % (ref 0–7)
ERYTHROCYTE [DISTWIDTH] IN BLOOD BY AUTOMATED COUNT: 15.3 % (ref 11.5–14.5)
GLOBULIN SER CALC-MCNC: 4.3 G/DL (ref 2–4)
GLUCOSE SERPL-MCNC: 88 MG/DL (ref 65–100)
HCT VFR BLD AUTO: 29.3 % (ref 36.6–50.3)
HGB BLD-MCNC: 9.2 G/DL (ref 12.1–17)
IMM GRANULOCYTES # BLD AUTO: 0 K/UL (ref 0–0.04)
IMM GRANULOCYTES NFR BLD AUTO: 0 % (ref 0–0.5)
INR PPP: 1 (ref 0.9–1.1)
LYMPHOCYTES # BLD: 0.9 K/UL (ref 0.8–3.5)
LYMPHOCYTES NFR BLD: 17 % (ref 12–49)
MCH RBC QN AUTO: 28 PG (ref 26–34)
MCHC RBC AUTO-ENTMCNC: 31.4 G/DL (ref 30–36.5)
MCV RBC AUTO: 89.3 FL (ref 80–99)
MONOCYTES # BLD: 0.4 K/UL (ref 0–1)
MONOCYTES NFR BLD: 7 % (ref 5–13)
NEUTS SEG # BLD: 3.7 K/UL (ref 1.8–8)
NEUTS SEG NFR BLD: 70 % (ref 32–75)
NRBC # BLD: 0 K/UL (ref 0–0.01)
NRBC BLD-RTO: 0 PER 100 WBC
PLATELET # BLD AUTO: 214 K/UL (ref 150–400)
PMV BLD AUTO: 9.4 FL (ref 8.9–12.9)
POTASSIUM SERPL-SCNC: 3.9 MMOL/L (ref 3.5–5.1)
PROT SERPL-MCNC: 8.1 G/DL (ref 6.4–8.2)
PROTHROMBIN TIME: 12.9 SEC (ref 11.9–14.6)
RBC # BLD AUTO: 3.28 M/UL (ref 4.1–5.7)
SODIUM SERPL-SCNC: 139 MMOL/L (ref 136–145)
TROPONIN I SERPL HS-MCNC: 37 NG/L (ref 0–76)
TROPONIN I SERPL HS-MCNC: 69 NG/L (ref 0–76)
WBC # BLD AUTO: 5.3 K/UL (ref 4.1–11.1)

## 2023-07-09 PROCEDURE — 85610 PROTHROMBIN TIME: CPT

## 2023-07-09 PROCEDURE — 84484 ASSAY OF TROPONIN QUANT: CPT

## 2023-07-09 PROCEDURE — 6370000000 HC RX 637 (ALT 250 FOR IP): Performed by: EMERGENCY MEDICINE

## 2023-07-09 PROCEDURE — 85025 COMPLETE CBC W/AUTO DIFF WBC: CPT

## 2023-07-09 PROCEDURE — 6360000002 HC RX W HCPCS: Performed by: EMERGENCY MEDICINE

## 2023-07-09 PROCEDURE — 80053 COMPREHEN METABOLIC PANEL: CPT

## 2023-07-09 PROCEDURE — 96375 TX/PRO/DX INJ NEW DRUG ADDON: CPT

## 2023-07-09 PROCEDURE — 99285 EMERGENCY DEPT VISIT HI MDM: CPT

## 2023-07-09 PROCEDURE — 36415 COLL VENOUS BLD VENIPUNCTURE: CPT

## 2023-07-09 PROCEDURE — 93005 ELECTROCARDIOGRAM TRACING: CPT | Performed by: EMERGENCY MEDICINE

## 2023-07-09 PROCEDURE — 71045 X-RAY EXAM CHEST 1 VIEW: CPT

## 2023-07-09 PROCEDURE — 96374 THER/PROPH/DIAG INJ IV PUSH: CPT

## 2023-07-09 RX ORDER — ONDANSETRON 2 MG/ML
4 INJECTION INTRAMUSCULAR; INTRAVENOUS EVERY 6 HOURS PRN
Status: DISCONTINUED | OUTPATIENT
Start: 2023-07-09 | End: 2023-07-09 | Stop reason: HOSPADM

## 2023-07-09 RX ORDER — FENTANYL CITRATE 50 UG/ML
50 INJECTION, SOLUTION INTRAMUSCULAR; INTRAVENOUS
Status: COMPLETED | OUTPATIENT
Start: 2023-07-09 | End: 2023-07-09

## 2023-07-09 RX ORDER — LIDOCAINE HYDROCHLORIDE 20 MG/ML
15 SOLUTION OROPHARYNGEAL
Status: COMPLETED | OUTPATIENT
Start: 2023-07-09 | End: 2023-07-09

## 2023-07-09 RX ORDER — MAGNESIUM HYDROXIDE/ALUMINUM HYDROXICE/SIMETHICONE 120; 1200; 1200 MG/30ML; MG/30ML; MG/30ML
30 SUSPENSION ORAL
Status: COMPLETED | OUTPATIENT
Start: 2023-07-09 | End: 2023-07-09

## 2023-07-09 RX ADMIN — ALUMINUM HYDROXIDE, MAGNESIUM HYDROXIDE, AND SIMETHICONE 30 ML: 200; 200; 20 SUSPENSION ORAL at 05:46

## 2023-07-09 RX ADMIN — ONDANSETRON 4 MG: 2 INJECTION INTRAMUSCULAR; INTRAVENOUS at 04:48

## 2023-07-09 RX ADMIN — Medication 15 ML: at 05:46

## 2023-07-09 RX ADMIN — NITROGLYCERIN 0.5 INCH: 20 OINTMENT TOPICAL at 04:03

## 2023-07-09 RX ADMIN — FENTANYL CITRATE 50 MCG: 50 INJECTION, SOLUTION INTRAMUSCULAR; INTRAVENOUS at 04:48

## 2023-07-09 ASSESSMENT — LIFESTYLE VARIABLES
HOW MANY STANDARD DRINKS CONTAINING ALCOHOL DO YOU HAVE ON A TYPICAL DAY: PATIENT DOES NOT DRINK
HOW OFTEN DO YOU HAVE A DRINK CONTAINING ALCOHOL: NEVER

## 2023-07-09 ASSESSMENT — PAIN DESCRIPTION - PAIN TYPE
TYPE: ACUTE PAIN
TYPE: ACUTE PAIN

## 2023-07-09 ASSESSMENT — PAIN DESCRIPTION - LOCATION
LOCATION: CHEST

## 2023-07-09 ASSESSMENT — ENCOUNTER SYMPTOMS
NAUSEA: 0
VOMITING: 0
EYES NEGATIVE: 1
CHEST TIGHTNESS: 1
SHORTNESS OF BREATH: 0
GASTROINTESTINAL NEGATIVE: 1
ALLERGIC/IMMUNOLOGIC NEGATIVE: 1

## 2023-07-09 ASSESSMENT — PAIN - FUNCTIONAL ASSESSMENT
PAIN_FUNCTIONAL_ASSESSMENT: 0-10
PAIN_FUNCTIONAL_ASSESSMENT: ACTIVITIES ARE NOT PREVENTED
PAIN_FUNCTIONAL_ASSESSMENT: 0-10
PAIN_FUNCTIONAL_ASSESSMENT: ACTIVITIES ARE NOT PREVENTED
PAIN_FUNCTIONAL_ASSESSMENT: 0-10
PAIN_FUNCTIONAL_ASSESSMENT: ACTIVITIES ARE NOT PREVENTED

## 2023-07-09 ASSESSMENT — PAIN SCALES - GENERAL
PAINLEVEL_OUTOF10: 9
PAINLEVEL_OUTOF10: 6
PAINLEVEL_OUTOF10: 8
PAINLEVEL_OUTOF10: 2

## 2023-07-09 ASSESSMENT — PAIN DESCRIPTION - DESCRIPTORS: DESCRIPTORS: PRESSURE

## 2023-07-09 ASSESSMENT — PAIN DESCRIPTION - ORIENTATION: ORIENTATION: MID

## 2023-07-10 LAB
EKG ATRIAL RATE: 59 BPM
EKG DIAGNOSIS: NORMAL
EKG P AXIS: 12 DEGREES
EKG P-R INTERVAL: 164 MS
EKG Q-T INTERVAL: 426 MS
EKG QRS DURATION: 94 MS
EKG QTC CALCULATION (BAZETT): 419 MS
EKG R AXIS: -9 DEGREES
EKG T AXIS: 54 DEGREES
EKG VENTRICULAR RATE: 58 BPM

## 2023-07-13 RX ORDER — AMLODIPINE BESYLATE 10 MG/1
TABLET ORAL
Qty: 90 TABLET | Refills: 1 | Status: SHIPPED | OUTPATIENT
Start: 2023-07-13 | End: 2023-07-17 | Stop reason: ALTCHOICE

## 2023-07-13 RX ORDER — FAMOTIDINE 40 MG/1
TABLET, FILM COATED ORAL
Qty: 180 TABLET | Refills: 1 | Status: SHIPPED | OUTPATIENT
Start: 2023-07-13

## 2023-07-17 ENCOUNTER — TELEPHONE (OUTPATIENT)
Facility: CLINIC | Age: 58
End: 2023-07-17

## 2023-07-17 DIAGNOSIS — I25.10 CORONARY ARTERY DISEASE DUE TO LIPID RICH PLAQUE: ICD-10-CM

## 2023-07-17 DIAGNOSIS — I10 PRIMARY HYPERTENSION: Primary | ICD-10-CM

## 2023-07-17 DIAGNOSIS — I25.83 CORONARY ARTERY DISEASE DUE TO LIPID RICH PLAQUE: ICD-10-CM

## 2023-07-17 RX ORDER — CLOPIDOGREL BISULFATE 75 MG/1
75 TABLET ORAL DAILY
Qty: 90 TABLET | Refills: 1 | Status: SHIPPED | OUTPATIENT
Start: 2023-07-17

## 2023-07-17 RX ORDER — NIFEDIPINE 30 MG/1
30 TABLET, EXTENDED RELEASE ORAL DAILY
Qty: 90 TABLET | Refills: 1 | Status: SHIPPED | OUTPATIENT
Start: 2023-07-17

## 2023-07-17 RX ORDER — LOSARTAN POTASSIUM 100 MG/1
100 TABLET ORAL DAILY
Qty: 90 TABLET | Refills: 1 | Status: SHIPPED | OUTPATIENT
Start: 2023-07-17

## 2023-07-17 RX ORDER — CARVEDILOL 25 MG/1
25 TABLET ORAL 2 TIMES DAILY WITH MEALS
Qty: 180 TABLET | Refills: 1 | Status: SHIPPED | OUTPATIENT
Start: 2023-07-17

## 2023-07-22 ENCOUNTER — HOSPITAL ENCOUNTER (EMERGENCY)
Facility: HOSPITAL | Age: 58
Discharge: HOME | End: 2023-07-22
Attending: EMERGENCY MEDICINE
Payer: MEDICARE

## 2023-07-22 VITALS
DIASTOLIC BLOOD PRESSURE: 111 MMHG | SYSTOLIC BLOOD PRESSURE: 186 MMHG | OXYGEN SATURATION: 98 % | TEMPERATURE: 98.5 F | RESPIRATION RATE: 16 BRPM | HEART RATE: 61 BPM

## 2023-07-22 DIAGNOSIS — S76.212A INGUINAL STRAIN, LEFT, INITIAL ENCOUNTER: Primary | ICD-10-CM

## 2023-07-22 PROCEDURE — 99282 EMERGENCY DEPT VISIT SF MDM: CPT

## 2023-07-22 ASSESSMENT — PAIN SCALES - GENERAL: PAINLEVEL_OUTOF10: 7

## 2023-07-22 ASSESSMENT — ENCOUNTER SYMPTOMS
EYES NEGATIVE: 1
RESPIRATORY NEGATIVE: 1
ALLERGIC/IMMUNOLOGIC NEGATIVE: 1

## 2023-07-22 NOTE — ED TRIAGE NOTES
PT reports right sided groin pain that radiates to right knee started 4 days ago. PT reports stent placed through groin on 6/20/23. PT denies any known injury.

## 2023-07-22 NOTE — ED NOTES
Patient stable at time of discharge. Education and discharge paperwork is reviewed with patient who verbalizes understanding of same. Patient ambulates from ED with all belongings.       Avani Forde RN  07/22/23 1349

## 2023-07-25 ENCOUNTER — HOSPITAL ENCOUNTER (OUTPATIENT)
Facility: HOSPITAL | Age: 58
Setting detail: RECURRING SERIES
Discharge: HOME | End: 2023-07-28
Payer: MEDICARE

## 2023-07-25 VITALS — HEIGHT: 72 IN | BODY MASS INDEX: 28.71 KG/M2 | WEIGHT: 212 LBS | OXYGEN SATURATION: 98 %

## 2023-07-25 PROCEDURE — 93797 PHYS/QHP OP CAR RHAB WO ECG: CPT

## 2023-07-25 PROCEDURE — 93798 PHYS/QHP OP CAR RHAB W/ECG: CPT

## 2023-07-25 RX ORDER — LEVETIRACETAM 500 MG/1
TABLET ORAL
Qty: 180 TABLET | Refills: 1 | Status: SHIPPED | OUTPATIENT
Start: 2023-07-25

## 2023-07-25 ASSESSMENT — EJECTION FRACTION
EF_VALUE: 55
EF_VALUE: 55

## 2023-07-25 ASSESSMENT — PATIENT HEALTH QUESTIONNAIRE - PHQ9
4. FEELING TIRED OR HAVING LITTLE ENERGY: 1
SUM OF ALL RESPONSES TO PHQ9 QUESTIONS 1 & 2: 2
2. FEELING DOWN, DEPRESSED OR HOPELESS: 0
7. TROUBLE CONCENTRATING ON THINGS, SUCH AS READING THE NEWSPAPER OR WATCHING TELEVISION: 0
SUM OF ALL RESPONSES TO PHQ QUESTIONS 1-9: 4
1. LITTLE INTEREST OR PLEASURE IN DOING THINGS: 2
6. FEELING BAD ABOUT YOURSELF - OR THAT YOU ARE A FAILURE OR HAVE LET YOURSELF OR YOUR FAMILY DOWN: 0
SUM OF ALL RESPONSES TO PHQ QUESTIONS 1-9: 4
8. MOVING OR SPEAKING SO SLOWLY THAT OTHER PEOPLE COULD HAVE NOTICED. OR THE OPPOSITE, BEING SO FIGETY OR RESTLESS THAT YOU HAVE BEEN MOVING AROUND A LOT MORE THAN USUAL: 0
9. THOUGHTS THAT YOU WOULD BE BETTER OFF DEAD, OR OF HURTING YOURSELF: 0
3. TROUBLE FALLING OR STAYING ASLEEP: 0
SUM OF ALL RESPONSES TO PHQ QUESTIONS 1-9: 4
SUM OF ALL RESPONSES TO PHQ QUESTIONS 1-9: 4
10. IF YOU CHECKED OFF ANY PROBLEMS, HOW DIFFICULT HAVE THESE PROBLEMS MADE IT FOR YOU TO DO YOUR WORK, TAKE CARE OF THINGS AT HOME, OR GET ALONG WITH OTHER PEOPLE: 0
5. POOR APPETITE OR OVEREATING: 1

## 2023-07-25 ASSESSMENT — EXERCISE STRESS TEST
PEAK_HR: 91
PEAK_RPE: 11
PEAK_METS: 2.8
PEAK_RPE: 11
PEAK_HR: 91
PEAK_BP: 171/92

## 2023-07-25 ASSESSMENT — LIFESTYLE VARIABLES
ALCOHOL_TYPE: LIQUOR
ALCOHOL_USE: SPECIAL

## 2023-07-25 NOTE — CARDIO/PULMONARY
INTAKE APPOINTMENT NOTE  2023    NAME: Brigida Encarnacion : 1965 AGE: 56 y.o. GENDER: male    CARDIAC REHAB ADMITTING DIAGNOSIS: Non-ST elevation (NSTEMI) myocardial infarction [I21.4]    REFERRING PHYSICIAN: Kylah Max MD    MEDICAL HX:  Past Medical History:   Diagnosis Date    Anemia     Chronic abdominal pain     Chronic kidney disease     dialysis pt. - M. W.F    Colon cancer ([address])     Complication of AV dialysis fistula     2 large pseudo-aneurysm    Dependence on renal dialysis ([address])     Esophagitis     ESRD on dialysis ([address])     Gastritis     GERD (gastroesophageal reflux disease)     Gout     High cholesterol     History of blood transfusion     at least 8 units over the years    HIV (human immunodeficiency virus infection) ([address])     Hypertension     NSTEMI (non-ST elevated myocardial infarction) ([address])     Other ill-defined conditions(799.89)     SMALL VEIN IN HEART BEING MONITORED    Pancreatitis     Renal insufficiency     Seizure ([address])     one time and none sense    Seizures ([address])     Sepsis ([address])  & 2019       LABS:     No results found for: HBA1C, CHG1MYFD  Lab Results   Component Value Date/Time    CHOL 119 2023 03:00 AM    HDL 67 2023 03:00 AM    VLDL 16 2021 10:47 AM         ANTHROPOMETRICS:      Ht Readings from Last 1 Encounters:   23 1.829 m (6')      Wt Readings from Last 1 Encounters:   23 96.2 kg (212 lb)        WAIST: 42\"       VISIT SUMMARY:    Brigida Encarnacion 56 y.o. presented to Cardiac Rehab for program orientation and 6 minute walk test today with a primary diagnosis of Non-ST elevation (NSTEMI) myocardial infarction [I21.4]. EF is 55 % Cardiac risk factors include family history, dyslipidemia, obesity, hypertension, stress. Brigida Encarnacion is (not)  and lives with sister Cindi Chamber social hx.  Patient was evaluated for depression using the PHQ-9 assessment tool with a result of 4 which is

## 2023-07-26 ENCOUNTER — HOSPITAL ENCOUNTER (OUTPATIENT)
Facility: HOSPITAL | Age: 58
Setting detail: RECURRING SERIES
Discharge: HOME | End: 2023-07-29
Payer: MEDICARE

## 2023-07-26 VITALS — BODY MASS INDEX: 28.35 KG/M2 | WEIGHT: 209 LBS

## 2023-07-26 PROCEDURE — 93798 PHYS/QHP OP CAR RHAB W/ECG: CPT

## 2023-07-26 PROCEDURE — 93797 PHYS/QHP OP CAR RHAB WO ECG: CPT

## 2023-07-26 ASSESSMENT — EXERCISE STRESS TEST
PEAK_RPE: 11
PEAK_HR: 102
PEAK_METS: 2.8
PEAK_BP: 140/83

## 2023-07-27 ENCOUNTER — HOSPITAL ENCOUNTER (OUTPATIENT)
Facility: HOSPITAL | Age: 58
Setting detail: RECURRING SERIES
Discharge: HOME | End: 2023-07-30
Payer: MEDICARE

## 2023-07-27 ENCOUNTER — TELEPHONE (OUTPATIENT)
Facility: CLINIC | Age: 58
End: 2023-07-27

## 2023-07-27 VITALS — WEIGHT: 211 LBS | BODY MASS INDEX: 28.62 KG/M2

## 2023-07-27 PROBLEM — Z09 HOSPITAL DISCHARGE FOLLOW-UP: Status: RESOLVED | Noted: 2023-06-27 | Resolved: 2023-07-27

## 2023-07-27 PROCEDURE — 93797 PHYS/QHP OP CAR RHAB WO ECG: CPT

## 2023-07-27 PROCEDURE — 93798 PHYS/QHP OP CAR RHAB W/ECG: CPT

## 2023-07-27 ASSESSMENT — EXERCISE STRESS TEST
PEAK_HR: 87
PEAK_BP: 155/78
PEAK_RPE: 9
PEAK_METS: 2.8

## 2023-07-27 NOTE — PROGRESS NOTES
Left a voicemail with important pre-procedure instructions Tarsorrhaphy Text: A tarsorrhaphy was performed using Frost sutures.

## 2023-07-27 NOTE — CARDIO/PULMONARY
Cardiac Rehab Nutrition Assessment- 1:1 Evaluation  2023      NAME: Lamin Silva : 1965 AGE: 56 y.o. GENDER: male  CARDIAC REHAB ADMITTING DIAGNOSIS: Non-ST elevation (NSTEMI) myocardial infarction [I21.4]    PROBLEM LIST:  Patient Active Problem List   Diagnosis    Seizure disorder ([address])    Gout    Elevated LFTs    HIV (human immunodeficiency virus infection) ([address])    ESRD (end stage renal disease) ([address])    Anemia    Syncope and collapse    Thoracic aortic aneurysm (HCC)    HTN (hypertension)    Fluid overload    Anal condyloma    Squamous cell carcinoma of anal margin    NSTEMI (non-ST elevated myocardial infarction) ([address])    Coronary artery disease due to lipid rich plaque    Hospital discharge follow-up         LABS:   No results found for: HBA1C, NFO2NDUD  Lab Results   Component Value Date/Time    CHOL 119 2023 03:00 AM    HDL 67 2023 03:00 AM    VLDL 16 2021 10:47 AM         MEDICATIONS/SUPPLEMENTS:   Scheduled Meds:  Continuous Infusions:  PRN Meds:. Prior to Admission medications    Medication Sig Start Date End Date Taking?  Authorizing Provider   levETIRAcetam (KEPPRA) 500 MG tablet take 1 tablet by mouth twice a day 23   JENELLE Golden NP   NIFEdipine (PROCARDIA XL) 30 MG extended release tablet Take 1 tablet by mouth daily 23   JENELLE Golden NP   carvedilol (COREG) 25 MG tablet Take 1 tablet by mouth 2 times daily (with meals) 23   JENELLE Golden NP   clopidogrel (PLAVIX) 75 MG tablet Take 1 tablet by mouth daily 23   JENELLE Golden NP   losartan (COZAAR) 100 MG tablet Take 1 tablet by mouth daily 23   JENELLE Golden NP   famotidine (PEPCID) 40 MG tablet take 1 tablet by mouth twice a day 23   JENELLE Golden NP   Methoxy PEG-Epoetin Beta (MIRCERA IJ) Inject 150 mcg as directed every 14 days Given with dialysis 23   Historical Provider, MD

## 2023-07-28 NOTE — TELEPHONE ENCOUNTER
Patient stated he was having pain where stent was put in over groin area down to his knee which started  around 07/22/2023 and he was told he may have lifted something and pulled something. He requested something for pain. I informed him that PCP was out until Monday and he may need to call the physician that did the stent just in case something needed to be checked. He stated he had an appointment coming up on 07/31/2023 and was going to wait until then. He denied any signs of infection or injury to area. Informed him if any worse to go to nearest ED. Patient stated understanding and stated he had been once for this.

## 2023-07-31 ENCOUNTER — HOSPITAL ENCOUNTER (OUTPATIENT)
Facility: HOSPITAL | Age: 58
Setting detail: RECURRING SERIES
Discharge: HOME | End: 2023-08-03
Payer: MEDICARE

## 2023-07-31 VITALS — WEIGHT: 211 LBS | BODY MASS INDEX: 28.62 KG/M2

## 2023-07-31 PROCEDURE — 93798 PHYS/QHP OP CAR RHAB W/ECG: CPT

## 2023-07-31 ASSESSMENT — EXERCISE STRESS TEST
PEAK_BP: 162/95
PEAK_METS: 2.9
PEAK_HR: 96
PEAK_RPE: 11

## 2023-08-01 LAB — LEFT VENTRICULAR EJECTION FRACTION, EXTERNAL: NORMAL

## 2023-08-02 ENCOUNTER — HOSPITAL ENCOUNTER (OUTPATIENT)
Facility: HOSPITAL | Age: 58
Setting detail: RECURRING SERIES
Discharge: HOME | End: 2023-08-05
Payer: MEDICARE

## 2023-08-02 VITALS — BODY MASS INDEX: 28.48 KG/M2 | WEIGHT: 210 LBS

## 2023-08-02 PROCEDURE — 93798 PHYS/QHP OP CAR RHAB W/ECG: CPT

## 2023-08-02 PROCEDURE — 93797 PHYS/QHP OP CAR RHAB WO ECG: CPT

## 2023-08-02 ASSESSMENT — EXERCISE STRESS TEST
PEAK_HR: 101
PEAK_RPE: 11
PEAK_METS: 2.9
PEAK_BP: 146/87

## 2023-08-03 ENCOUNTER — HOSPITAL ENCOUNTER (OUTPATIENT)
Facility: HOSPITAL | Age: 58
Setting detail: RECURRING SERIES
Discharge: HOME | End: 2023-08-06
Payer: MEDICARE

## 2023-08-03 VITALS — WEIGHT: 211 LBS | BODY MASS INDEX: 28.62 KG/M2

## 2023-08-03 PROCEDURE — 93798 PHYS/QHP OP CAR RHAB W/ECG: CPT

## 2023-08-03 RX ORDER — AMLODIPINE BESYLATE 10 MG/1
10 TABLET ORAL DAILY
COMMUNITY

## 2023-08-03 ASSESSMENT — EXERCISE STRESS TEST
PEAK_BP: 146/83
PEAK_HR: 83
PEAK_RPE: 9
PEAK_METS: 2.9

## 2023-08-07 ENCOUNTER — HOSPITAL ENCOUNTER (OUTPATIENT)
Facility: HOSPITAL | Age: 58
Setting detail: RECURRING SERIES
Discharge: HOME | End: 2023-08-10
Payer: MEDICARE

## 2023-08-07 VITALS — WEIGHT: 210 LBS | BODY MASS INDEX: 28.48 KG/M2

## 2023-08-07 PROCEDURE — 93798 PHYS/QHP OP CAR RHAB W/ECG: CPT

## 2023-08-07 ASSESSMENT — EXERCISE STRESS TEST
PEAK_HR: 94
PEAK_RPE: 13
PEAK_BP: 133/84
PEAK_METS: 3.4

## 2023-08-09 ENCOUNTER — HOSPITAL ENCOUNTER (OUTPATIENT)
Facility: HOSPITAL | Age: 58
Setting detail: RECURRING SERIES
End: 2023-08-09
Payer: MEDICARE

## 2023-08-09 ENCOUNTER — HOSPITAL ENCOUNTER (OUTPATIENT)
Facility: HOSPITAL | Age: 58
Setting detail: RECURRING SERIES
Discharge: HOME | End: 2023-08-12
Payer: MEDICARE

## 2023-08-09 VITALS — BODY MASS INDEX: 28.48 KG/M2 | WEIGHT: 210 LBS

## 2023-08-09 PROCEDURE — 93798 PHYS/QHP OP CAR RHAB W/ECG: CPT

## 2023-08-09 ASSESSMENT — EXERCISE STRESS TEST
PEAK_METS: 3.4
PEAK_BP: 131/78
PEAK_RPE: 13
PEAK_HR: 106

## 2023-08-10 ENCOUNTER — HOSPITAL ENCOUNTER (OUTPATIENT)
Facility: HOSPITAL | Age: 58
Setting detail: RECURRING SERIES
Discharge: HOME | End: 2023-08-13
Payer: MEDICARE

## 2023-08-10 VITALS — BODY MASS INDEX: 28.75 KG/M2 | WEIGHT: 212 LBS

## 2023-08-10 PROCEDURE — 93798 PHYS/QHP OP CAR RHAB W/ECG: CPT

## 2023-08-10 ASSESSMENT — EXERCISE STRESS TEST
PEAK_RPE: 11
PEAK_METS: 3.4
PEAK_BP: 118/72
PEAK_HR: 83

## 2023-08-12 ENCOUNTER — HOSPITAL ENCOUNTER (OUTPATIENT)
Facility: HOSPITAL | Age: 58
Discharge: HOME | End: 2023-08-15
Payer: MEDICARE

## 2023-08-12 DIAGNOSIS — C44.520 SQUAMOUS CELL CARCINOMA OF PERIANAL REGION: ICD-10-CM

## 2023-08-12 PROCEDURE — A9552 F18 FDG: HCPCS | Performed by: INTERNAL MEDICINE

## 2023-08-12 PROCEDURE — 3430000000 HC RX DIAGNOSTIC RADIOPHARMACEUTICAL: Performed by: INTERNAL MEDICINE

## 2023-08-12 PROCEDURE — 78815 PET IMAGE W/CT SKULL-THIGH: CPT

## 2023-08-12 RX ADMIN — FLUDEOXYGLUCOSE F-18 10.82 MILLICURIE: 500 INJECTION INTRAVENOUS at 13:30

## 2023-08-14 RX ORDER — FLUDEOXYGLUCOSE F-18 500 MCI/ML
10.82 INJECTION INTRAVENOUS
Status: COMPLETED | OUTPATIENT
Start: 2023-08-14 | End: 2023-08-12

## 2023-08-16 ENCOUNTER — HOSPITAL ENCOUNTER (OUTPATIENT)
Facility: HOSPITAL | Age: 58
Setting detail: RECURRING SERIES
Discharge: HOME | End: 2023-08-19
Payer: MEDICARE

## 2023-08-16 ENCOUNTER — HOSPITAL ENCOUNTER (OUTPATIENT)
Facility: HOSPITAL | Age: 58
Setting detail: RECURRING SERIES
End: 2023-08-16
Payer: MEDICARE

## 2023-08-16 VITALS — BODY MASS INDEX: 28.75 KG/M2 | WEIGHT: 212 LBS

## 2023-08-16 PROCEDURE — 93798 PHYS/QHP OP CAR RHAB W/ECG: CPT

## 2023-08-16 ASSESSMENT — EXERCISE STRESS TEST
PEAK_HR: 91
PEAK_RPE: 13
PEAK_BP: 111/75
PEAK_METS: 3.4

## 2023-08-17 ENCOUNTER — HOSPITAL ENCOUNTER (OUTPATIENT)
Facility: HOSPITAL | Age: 58
Setting detail: RECURRING SERIES
Discharge: HOME | End: 2023-08-20
Payer: MEDICARE

## 2023-08-17 VITALS — BODY MASS INDEX: 29.02 KG/M2 | WEIGHT: 214 LBS

## 2023-08-17 PROCEDURE — 93798 PHYS/QHP OP CAR RHAB W/ECG: CPT

## 2023-08-17 ASSESSMENT — EXERCISE STRESS TEST
PEAK_METS: 3.4
PEAK_RPE: 11
PEAK_HR: 96
PEAK_BP: 123/74

## 2023-08-21 ENCOUNTER — HOSPITAL ENCOUNTER (OUTPATIENT)
Facility: HOSPITAL | Age: 58
Setting detail: RECURRING SERIES
Discharge: HOME | End: 2023-08-24
Payer: MEDICARE

## 2023-08-21 VITALS — WEIGHT: 212 LBS | BODY MASS INDEX: 28.75 KG/M2

## 2023-08-21 PROCEDURE — 93798 PHYS/QHP OP CAR RHAB W/ECG: CPT

## 2023-08-21 ASSESSMENT — EJECTION FRACTION: EF_VALUE: 55

## 2023-08-21 ASSESSMENT — LIFESTYLE VARIABLES
ALCOHOL_USE: SPECIAL
ALCOHOL_TYPE: LIQUOR

## 2023-08-21 ASSESSMENT — EXERCISE STRESS TEST
PEAK_METS: 3.5
PEAK_RPE: 11
PEAK_HR: 92
PEAK_BP: 127/79
PEAK_BP: 127/79

## 2023-08-23 ENCOUNTER — HOSPITAL ENCOUNTER (OUTPATIENT)
Facility: HOSPITAL | Age: 58
Setting detail: RECURRING SERIES
End: 2023-08-23
Payer: MEDICARE

## 2023-08-23 ENCOUNTER — HOSPITAL ENCOUNTER (EMERGENCY)
Facility: HOSPITAL | Age: 58
Discharge: HOME | End: 2023-08-23
Attending: EMERGENCY MEDICINE
Payer: MEDICARE

## 2023-08-23 ENCOUNTER — APPOINTMENT (OUTPATIENT)
Facility: HOSPITAL | Age: 58
End: 2023-08-23
Payer: MEDICARE

## 2023-08-23 ENCOUNTER — OFFICE VISIT (OUTPATIENT)
Facility: CLINIC | Age: 58
End: 2023-08-23
Payer: MEDICARE

## 2023-08-23 VITALS
TEMPERATURE: 97.6 F | HEIGHT: 72 IN | BODY MASS INDEX: 28.74 KG/M2 | OXYGEN SATURATION: 98 % | DIASTOLIC BLOOD PRESSURE: 75 MMHG | SYSTOLIC BLOOD PRESSURE: 110 MMHG | HEART RATE: 76 BPM | RESPIRATION RATE: 18 BRPM | WEIGHT: 212.2 LBS

## 2023-08-23 VITALS
SYSTOLIC BLOOD PRESSURE: 98 MMHG | DIASTOLIC BLOOD PRESSURE: 71 MMHG | RESPIRATION RATE: 19 BRPM | OXYGEN SATURATION: 99 % | BODY MASS INDEX: 28.71 KG/M2 | HEART RATE: 75 BPM | WEIGHT: 212 LBS | HEIGHT: 72 IN | TEMPERATURE: 98.5 F

## 2023-08-23 DIAGNOSIS — M25.562 ACUTE PAIN OF LEFT KNEE: ICD-10-CM

## 2023-08-23 DIAGNOSIS — M25.552 LEFT HIP PAIN: Primary | ICD-10-CM

## 2023-08-23 DIAGNOSIS — T82.49XA OTHER COMPLICATION OF VASCULAR DIALYSIS CATHETER, INITIAL ENCOUNTER (HCC): Primary | ICD-10-CM

## 2023-08-23 PROCEDURE — 3078F DIAST BP <80 MM HG: CPT | Performed by: NURSE PRACTITIONER

## 2023-08-23 PROCEDURE — 99282 EMERGENCY DEPT VISIT SF MDM: CPT

## 2023-08-23 PROCEDURE — 99213 OFFICE O/P EST LOW 20 MIN: CPT | Performed by: NURSE PRACTITIONER

## 2023-08-23 PROCEDURE — 3074F SYST BP LT 130 MM HG: CPT | Performed by: NURSE PRACTITIONER

## 2023-08-23 RX ORDER — ALBUTEROL SULFATE 90 UG/1
AEROSOL, METERED RESPIRATORY (INHALATION)
Qty: 6.7 G | Refills: 2 | Status: SHIPPED | OUTPATIENT
Start: 2023-08-23

## 2023-08-23 ASSESSMENT — PAIN - FUNCTIONAL ASSESSMENT: PAIN_FUNCTIONAL_ASSESSMENT: 0-10

## 2023-08-23 ASSESSMENT — PAIN SCALES - GENERAL: PAINLEVEL_OUTOF10: 0

## 2023-08-23 NOTE — ED TRIAGE NOTES
Pt arrives from Cardiac rehab for bleeding to dialysis shunt. Pt has elastic bandage applied to left AC site. Pt denies pain. Pt wants to be sure bleeding has ceased and [facility]. Pt states he did attend full dialysis treatment and took ASA afterwards.

## 2023-08-23 NOTE — PROGRESS NOTES
Chief Complaint   Patient presents with    Leg Pain    Hip Pain    Groin Pain    ED Follow-up     Was seen by Cardio as well. Was told it may be arthritis. But has been bothering him every since he had the stent done     1. Have you been to the ER, urgent care clinic since your last visit? Hospitalized since your last visit? In chart     2. Have you seen or consulted any other health care providers outside of the [address] [place] [address] Avenue since your last visit? Include any pap smears or colon screening.  No
recommendations pending test results and symptom progression        Aspects of this note have been generated using voice recognition software. Despite editing, there may be some syntax errors. Medication risks/benefits/costs/interactions/alternatives discussed with patient. Advised patient to call back or return to office if symptoms worsen/change/persist. If patient cannot reach us or should anything more severe/urgent arise he should proceed directly to the nearest emergency department. Discussed expected course/resolution/complications of diagnosis in detail with patient. Patient given a written after visit summary which includes his diagnoses, current medications and vitals. If referral was completed or labs during visit, advised to notify provider if they do not receive appointment or results within 1-2 weeks. Patient expressed understanding with the diagnosis and plan. Follow-up and Dispositions    Return pending test results or sooner if worsening symptoms.        JENELLE Naqvi NP  .

## 2023-08-24 ENCOUNTER — HOSPITAL ENCOUNTER (OUTPATIENT)
Facility: HOSPITAL | Age: 58
Setting detail: RECURRING SERIES
Discharge: HOME | End: 2023-08-27
Payer: MEDICARE

## 2023-08-24 VITALS — WEIGHT: 212 LBS | BODY MASS INDEX: 28.75 KG/M2

## 2023-08-24 PROCEDURE — 93798 PHYS/QHP OP CAR RHAB W/ECG: CPT

## 2023-08-24 ASSESSMENT — EXERCISE STRESS TEST
PEAK_HR: 83
PEAK_BP: 104/68
PEAK_METS: 3.4
PEAK_RPE: 13

## 2023-08-25 NOTE — ED PROVIDER NOTES
[facility] EMERGENCY DEPT  EMERGENCY DEPARTMENT HISTORY AND PHYSICAL EXAM      Date: 8/23/2023  Patient Name: Emanuel Sung  MRN: [id number]  [address] [facility] [place] 1965  Date of evaluation: 8/23/2023  Provider: Amey Collet, MD   Note Started: 3:24 PM EDT 8/25/23    HISTORY OF PRESENT ILLNESS     Chief Complaint   Patient presents with    Vascular Access Problem       History Provided By: Patient    HPI: Emanuel Sung is a 56 y.o. male with ESRD on dialysis had received usual dialysis this morning and was heading to his cardiac rehab when his dialysis catheter started bleeding. He took his usual aspirin afterwards. PAST MEDICAL HISTORY   Past Medical History:  Past Medical History:   Diagnosis Date    Anemia     Chronic abdominal pain     Chronic kidney disease     dialysis pt. - M. W.F    Colon cancer ([address])     Complication of AV dialysis fistula 2019    2 large pseudo-aneurysm    Dependence on renal dialysis ([address])     Esophagitis     ESRD on dialysis ([address])     Gastritis     GERD (gastroesophageal reflux disease)     Gout     High cholesterol     History of blood transfusion     at least 8 units over the years    HIV (human immunodeficiency virus infection) ([address]) 2011    Hypertension     NSTEMI (non-ST elevated myocardial infarction) ([address])     Other ill-defined conditions(799.89)     SMALL VEIN IN HEART BEING MONITORED    Pancreatitis     Renal insufficiency     Seizure ([address]) 2012    one time and none sense    Seizures ([address])     Sepsis ([address]) 2011 & 8/2019       Past Surgical History:  Past Surgical History:   Procedure Laterality Date    AV FISTULA CREATION Left 2012    & 2019    CARDIAC PROCEDURE N/A 06/13/2023    Left heart cath / coronary angiography performed by Darshan Pitts MD at [address] [place] [address]  N/A 06/19/2023    Percutaneous coronary intervention performed by Adama Aguirre MD at [address] [place] [address]  N/A 06/19/2023    Atherectomy coronary performed by [facility] Migel Lansgton MD at [address] [facility] CATH LAB    CAROTID STENT      CHOLECYSTECTOMY  2011    COLONOSCOPY N/A 12/14/2021    COLONOSCOPY performed by Elke Elliott MD at [address] [facility]      COLONOSCOPY N/A 11/15/2022    COLONOSCOPY/EGD performed by Elke Elliott MD at [facility] ENDOSCOPY       Family History:  Family History   Problem Relation Age of Onset    Heart Disease Mother     Heart Disease Brother     Cancer Brother         colon    Heart Disease Brother     No Known Problems Brother     Heart Disease Nephew        Social History:  Social History     Tobacco Use    Smoking status: Never     Passive exposure: Never    Smokeless tobacco: Never   Vaping Use    Vaping Use: Never used   Substance Use Topics    Alcohol use: Yes     Comment: socially several liquor drinks occ. Drug use: No       Allergies: Allergies   Allergen Reactions    Penicillin G Itching       PCP: JENELLE Oconnell NP    Current Meds:   No current facility-administered medications for this encounter.      Current Outpatient Medications   Medication Sig Dispense Refill    albuterol sulfate HFA (PROVENTIL;VENTOLIN;PROAIR) 108 (90 Base) MCG/ACT inhaler inhale 1 puff by mouth and INTO THE LUNGS every 4 hours if needed for wheezing 6.7 g 2    amLODIPine (NORVASC) 10 MG tablet Take 1 tablet by mouth daily      levETIRAcetam (KEPPRA) 500 MG tablet take 1 tablet by mouth twice a day 180 tablet 1    NIFEdipine (PROCARDIA XL) 30 MG extended release tablet Take 1 tablet by mouth daily 90 tablet 1    carvedilol (COREG) 25 MG tablet Take 1 tablet by mouth 2 times daily (with meals) 180 tablet 1    clopidogrel (PLAVIX) 75 MG tablet Take 1 tablet by mouth daily 90 tablet 1    losartan (COZAAR) 100 MG tablet Take 1 tablet by mouth daily 90 tablet 1    famotidine (PEPCID) 40 MG tablet take 1 tablet by mouth twice a day 180 tablet 1    Methoxy PEG-Epoetin Beta (MIRCERA IJ) Inject 150 mcg as directed every 14 days Given with dialysis

## 2023-08-30 ENCOUNTER — HOSPITAL ENCOUNTER (OUTPATIENT)
Facility: HOSPITAL | Age: 58
Setting detail: RECURRING SERIES
Discharge: HOME | End: 2023-09-02
Payer: MEDICARE

## 2023-08-30 ENCOUNTER — HOSPITAL ENCOUNTER (OUTPATIENT)
Facility: HOSPITAL | Age: 58
Discharge: HOME | End: 2023-09-02
Payer: MEDICARE

## 2023-08-30 ENCOUNTER — HOSPITAL ENCOUNTER (OUTPATIENT)
Facility: HOSPITAL | Age: 58
Setting detail: RECURRING SERIES
End: 2023-08-30
Payer: MEDICARE

## 2023-08-30 VITALS — WEIGHT: 212 LBS | BODY MASS INDEX: 28.75 KG/M2

## 2023-08-30 DIAGNOSIS — M25.562 ACUTE PAIN OF LEFT KNEE: ICD-10-CM

## 2023-08-30 DIAGNOSIS — M25.552 LEFT HIP PAIN: ICD-10-CM

## 2023-08-30 PROCEDURE — 73700 CT LOWER EXTREMITY W/O DYE: CPT

## 2023-08-30 PROCEDURE — 93798 PHYS/QHP OP CAR RHAB W/ECG: CPT

## 2023-08-30 PROCEDURE — 73562 X-RAY EXAM OF KNEE 3: CPT

## 2023-08-30 ASSESSMENT — ENCOUNTER SYMPTOMS
RESPIRATORY NEGATIVE: 1
GASTROINTESTINAL NEGATIVE: 1

## 2023-08-30 ASSESSMENT — EXERCISE STRESS TEST
PEAK_BP: 95/58
PEAK_HR: 107
PEAK_METS: 3.5
PEAK_RPE: 15

## 2023-08-31 ENCOUNTER — HOSPITAL ENCOUNTER (OUTPATIENT)
Facility: HOSPITAL | Age: 58
Setting detail: RECURRING SERIES
End: 2023-08-31
Payer: MEDICARE

## 2023-08-31 VITALS — WEIGHT: 214 LBS | BODY MASS INDEX: 29.02 KG/M2

## 2023-08-31 PROCEDURE — 93798 PHYS/QHP OP CAR RHAB W/ECG: CPT

## 2023-08-31 ASSESSMENT — EXERCISE STRESS TEST
PEAK_BP: 101/63
PEAK_METS: 4
PEAK_HR: 91
PEAK_RPE: 11

## 2023-09-05 DIAGNOSIS — M25.562 ACUTE PAIN OF LEFT KNEE: ICD-10-CM

## 2023-09-05 DIAGNOSIS — M25.552 LEFT HIP PAIN: Primary | ICD-10-CM

## 2023-09-05 RX ORDER — HYDROCODONE BITARTRATE AND ACETAMINOPHEN 5; 325 MG/1; MG/1
1 TABLET ORAL EVERY 6 HOURS PRN
Qty: 12 TABLET | Refills: 0 | Status: SHIPPED | OUTPATIENT
Start: 2023-09-05 | End: 2023-09-08

## 2023-09-06 ENCOUNTER — HOSPITAL ENCOUNTER (OUTPATIENT)
Facility: HOSPITAL | Age: 58
Setting detail: RECURRING SERIES
End: 2023-09-06
Payer: MEDICARE

## 2023-09-06 ENCOUNTER — HOSPITAL ENCOUNTER (OUTPATIENT)
Facility: HOSPITAL | Age: 58
Setting detail: RECURRING SERIES
Discharge: HOME | End: 2023-09-09
Payer: MEDICARE

## 2023-09-06 ENCOUNTER — TELEPHONE (OUTPATIENT)
Age: 58
End: 2023-09-06

## 2023-09-06 VITALS — BODY MASS INDEX: 28.75 KG/M2 | WEIGHT: 212 LBS

## 2023-09-06 DIAGNOSIS — B20 CURRENTLY ASYMPTOMATIC HIV INFECTION, WITH HISTORY OF HIV-RELATED ILLNESS (HCC): Primary | ICD-10-CM

## 2023-09-06 PROCEDURE — 93798 PHYS/QHP OP CAR RHAB W/ECG: CPT

## 2023-09-06 RX ORDER — ATAZANAVIR 300 MG/1
CAPSULE ORAL
Qty: 180 CAPSULE | Refills: 0 | OUTPATIENT
Start: 2023-09-06

## 2023-09-06 ASSESSMENT — EXERCISE STRESS TEST
PEAK_METS: 4.1
PEAK_RPE: 13
PEAK_BP: 91/63
PEAK_HR: 117

## 2023-09-07 ENCOUNTER — HOSPITAL ENCOUNTER (OUTPATIENT)
Facility: HOSPITAL | Age: 58
Setting detail: RECURRING SERIES
Discharge: HOME | End: 2023-09-10
Payer: MEDICARE

## 2023-09-07 VITALS — BODY MASS INDEX: 29.16 KG/M2 | WEIGHT: 215 LBS

## 2023-09-07 PROCEDURE — 93798 PHYS/QHP OP CAR RHAB W/ECG: CPT

## 2023-09-07 ASSESSMENT — EXERCISE STRESS TEST
PEAK_RPE: 11
PEAK_HR: 77
PEAK_METS: 4.1
PEAK_BP: 98/62

## 2023-09-11 ENCOUNTER — APPOINTMENT (OUTPATIENT)
Facility: HOSPITAL | Age: 58
End: 2023-09-11
Payer: MEDICARE

## 2023-09-13 ENCOUNTER — HOSPITAL ENCOUNTER (OUTPATIENT)
Facility: HOSPITAL | Age: 58
Setting detail: RECURRING SERIES
Discharge: HOME | End: 2023-09-16
Payer: MEDICARE

## 2023-09-13 VITALS — WEIGHT: 212 LBS | BODY MASS INDEX: 28.75 KG/M2

## 2023-09-13 PROCEDURE — 93798 PHYS/QHP OP CAR RHAB W/ECG: CPT

## 2023-09-13 ASSESSMENT — EXERCISE STRESS TEST
PEAK_RPE: 13
PEAK_HR: 117
PEAK_METS: 4.4
PEAK_BP: 103/68

## 2023-09-14 ENCOUNTER — HOSPITAL ENCOUNTER (OUTPATIENT)
Facility: HOSPITAL | Age: 58
Setting detail: RECURRING SERIES
Discharge: HOME | End: 2023-09-17
Payer: MEDICARE

## 2023-09-14 VITALS — WEIGHT: 214 LBS | BODY MASS INDEX: 29.02 KG/M2

## 2023-09-14 PROCEDURE — 93798 PHYS/QHP OP CAR RHAB W/ECG: CPT

## 2023-09-14 ASSESSMENT — EXERCISE STRESS TEST
PEAK_HR: 106
PEAK_RPE: 11
PEAK_BP: 94/62
PEAK_BP: 94/62
PEAK_METS: 4.4

## 2023-09-14 ASSESSMENT — EJECTION FRACTION: EF_VALUE: 55

## 2023-09-14 ASSESSMENT — LIFESTYLE VARIABLES
ALCOHOL_TYPE: LIQUOR
ALCOHOL_USE: SPECIAL

## 2023-09-18 ENCOUNTER — HOSPITAL ENCOUNTER (OUTPATIENT)
Facility: HOSPITAL | Age: 58
Setting detail: RECURRING SERIES
End: 2023-09-18
Payer: MEDICARE

## 2023-09-19 ENCOUNTER — HOSPITAL ENCOUNTER (OUTPATIENT)
Facility: HOSPITAL | Age: 58
Discharge: HOME | End: 2023-09-22

## 2023-09-19 ENCOUNTER — TELEPHONE (OUTPATIENT)
Age: 58
End: 2023-09-19

## 2023-09-19 LAB
ALBUMIN SERPL-MCNC: 5.7 G/DL (ref 3.8–4.9)
ALBUMIN/GLOB SERPL: 1.7 {RATIO} (ref 1.2–2.2)
ALP SERPL-CCNC: 80 IU/L (ref 44–121)
ALT SERPL-CCNC: 5 IU/L (ref 0–44)
AST SERPL-CCNC: 19 IU/L (ref 0–40)
BASOPHILS # BLD AUTO: 0 X10E3/UL (ref 0–0.2)
BASOPHILS NFR BLD AUTO: 1 %
BILIRUB SERPL-MCNC: 0.7 MG/DL (ref 0–1.2)
BUN SERPL-MCNC: 22 MG/DL (ref 6–24)
BUN/CREAT SERPL: 3 (ref 9–20)
CALCIUM SERPL-MCNC: 10 MG/DL (ref 8.7–10.2)
CD3+CD4+ CELLS # BLD: 229 /UL (ref 359–1519)
CD3+CD4+ CELLS NFR BLD: 22.9 % (ref 30.8–58.5)
CHLORIDE SERPL-SCNC: 92 MMOL/L (ref 96–106)
CO2 SERPL-SCNC: 28 MMOL/L (ref 20–29)
CREAT SERPL-MCNC: 6.74 MG/DL (ref 0.76–1.27)
EGFRCR SERPLBLD CKD-EPI 2021: 9 ML/MIN/1.73
EOSINOPHIL # BLD AUTO: 0.1 X10E3/UL (ref 0–0.4)
EOSINOPHIL NFR BLD AUTO: 3 %
ERYTHROCYTE [DISTWIDTH] IN BLOOD BY AUTOMATED COUNT: 16 % (ref 11.6–15.4)
GLOBULIN SER CALC-MCNC: 3.4 G/DL (ref 1.5–4.5)
GLUCOSE SERPL-MCNC: 71 MG/DL (ref 70–99)
HCT VFR BLD AUTO: 39 % (ref 37.5–51)
HGB BLD-MCNC: 12.6 G/DL (ref 13–17.7)
IMM GRANULOCYTES # BLD AUTO: 0 X10E3/UL (ref 0–0.1)
IMM GRANULOCYTES NFR BLD AUTO: 0 %
LYMPHOCYTES # BLD AUTO: 1 X10E3/UL (ref 0.7–3.1)
LYMPHOCYTES NFR BLD AUTO: 18 %
MCH RBC QN AUTO: 29.5 PG (ref 26.6–33)
MCHC RBC AUTO-ENTMCNC: 32.3 G/DL (ref 31.5–35.7)
MCV RBC AUTO: 91 FL (ref 79–97)
MONOCYTES # BLD AUTO: 0.3 X10E3/UL (ref 0.1–0.9)
MONOCYTES NFR BLD AUTO: 6 %
NEUTROPHILS # BLD AUTO: 4.2 X10E3/UL (ref 1.4–7)
NEUTROPHILS NFR BLD AUTO: 72 %
PLATELET # BLD AUTO: 230 X10E3/UL (ref 150–450)
POTASSIUM SERPL-SCNC: 4.1 MMOL/L (ref 3.5–5.2)
PROT SERPL-MCNC: 9.1 G/DL (ref 6–8.5)
RBC # BLD AUTO: 4.27 X10E6/UL (ref 4.14–5.8)
SODIUM SERPL-SCNC: 143 MMOL/L (ref 134–144)
WBC # BLD AUTO: 5.7 X10E3/UL (ref 3.4–10.8)

## 2023-09-19 NOTE — TELEPHONE ENCOUNTER
[address] back to let him know his surgery is in [place] on 9/25 Monday, voicemail was full, could not leave a message.

## 2023-09-19 NOTE — TELEPHONE ENCOUNTER
Erica Alexandra came into the office today regarding surgery that suppose to be scheduled for Sept 26. Please follow up with patient regarding surgery [phone].

## 2023-09-20 ENCOUNTER — HOSPITAL ENCOUNTER (OUTPATIENT)
Facility: HOSPITAL | Age: 58
Setting detail: RECURRING SERIES
Discharge: HOME | End: 2023-09-23
Payer: MEDICARE

## 2023-09-20 ENCOUNTER — HOSPITAL ENCOUNTER (OUTPATIENT)
Facility: HOSPITAL | Age: 58
Setting detail: RECURRING SERIES
End: 2023-09-20
Payer: MEDICARE

## 2023-09-20 VITALS — WEIGHT: 215 LBS | BODY MASS INDEX: 29.16 KG/M2

## 2023-09-20 LAB
HIV 1 RNA RT + PR + IN MUT DET PLAS SEQ: NORMAL
HIV1 RNA # SERPL NAA+PROBE: <20 COPIES/ML
HIV1 RNA SERPL NAA+PROBE-LOG#: NORMAL LOG10COPY/ML
SPECIMEN CONDITION: NORMAL

## 2023-09-20 PROCEDURE — 93798 PHYS/QHP OP CAR RHAB W/ECG: CPT

## 2023-09-20 ASSESSMENT — EXERCISE STRESS TEST
PEAK_METS: 3.1
PEAK_RPE: 11
PEAK_HR: 105
PEAK_RPE: 11
PEAK_BP: 94/61
PEAK_BP: 94/61

## 2023-09-21 ENCOUNTER — OFFICE VISIT (OUTPATIENT)
Age: 58
End: 2023-09-21
Payer: MEDICARE

## 2023-09-21 ENCOUNTER — TELEPHONE (OUTPATIENT)
Age: 58
End: 2023-09-21

## 2023-09-21 VITALS
SYSTOLIC BLOOD PRESSURE: 127 MMHG | OXYGEN SATURATION: 96 % | HEIGHT: 72 IN | BODY MASS INDEX: 15.93 KG/M2 | WEIGHT: 117.6 LBS | DIASTOLIC BLOOD PRESSURE: 81 MMHG | TEMPERATURE: 98.4 F | HEART RATE: 71 BPM | RESPIRATION RATE: 18 BRPM

## 2023-09-21 DIAGNOSIS — B20 HIV INFECTION, UNSPECIFIED SYMPTOM STATUS (HCC): Primary | ICD-10-CM

## 2023-09-21 PROCEDURE — 1036F TOBACCO NON-USER: CPT | Performed by: INTERNAL MEDICINE

## 2023-09-21 PROCEDURE — 3078F DIAST BP <80 MM HG: CPT | Performed by: INTERNAL MEDICINE

## 2023-09-21 PROCEDURE — 3017F COLORECTAL CA SCREEN DOC REV: CPT | Performed by: INTERNAL MEDICINE

## 2023-09-21 PROCEDURE — 3074F SYST BP LT 130 MM HG: CPT | Performed by: INTERNAL MEDICINE

## 2023-09-21 PROCEDURE — 99214 OFFICE O/P EST MOD 30 MIN: CPT | Performed by: INTERNAL MEDICINE

## 2023-09-21 PROCEDURE — G8427 DOCREV CUR MEDS BY ELIG CLIN: HCPCS | Performed by: INTERNAL MEDICINE

## 2023-09-21 PROCEDURE — G8418 CALC BMI BLW LOW PARAM F/U: HCPCS | Performed by: INTERNAL MEDICINE

## 2023-09-21 NOTE — TELEPHONE ENCOUNTER
Kayley Harding from [facility] called in regarding Asmita Fat. Patient is on blood thinner but may not be able to hold off due to having 2 stints placed in June so he will not be able to hold Plavix. Also patient stated that he may be in observation spending the night so he may have to have his dialysis done at the hospital. Please have Dr. Daylin Simmons call Kayley Harding in [facility]. Kayley Harding can be reached at [phone].

## 2023-09-21 NOTE — TELEPHONE ENCOUNTER
Lary Lopes called in again for the status of the order forms which isn't there. Just want to know how to assist the patient.  Please give her a call at [phone]

## 2023-09-21 NOTE — PERIOP NOTE
46 Dr. Bea Johnson office called, message taken to make Dr. Daylin Simmons aware that patient stated during phone interview that he takes aspirin 81mg daily and plavix 75mg daily, with history of MI with 2 stents done June 2023 and that patient has an vitor't with Dr. Regulo Greene today and he plans to discuss these meds with Dr. Joleen Trivedi at that time, also requested for patient status to be verified for day of surgery, patient stated he was told he might be overnight observation, no patient status noted on order form. [facility] Dr. Nae Hernadez office called, with permission given by patient during phone interview, requested office note ,after patient's visit today with Dr. Jloeen Trivedi, ekg , any cardiac test results, and a note with aspirin and plavix recommendations re: surgery scheduled on 9/26/2023 with Dr. Daylin Simmons to be faxed to PAT dept as soon as available.

## 2023-09-22 NOTE — PERIOP NOTE
3147 Dr. Hussain Hamilton called to PAT dept, he stated that the patient can continue his aspirin and plavix as usual for surgery scheduled on 9/26/2023 and order given for 23 hour outpatient status for surgery and order given for consent order to be updated to say: anal exam under anesthesia. 0980 Patient called at home, made aware of above note that he can continue his aspirin and plavix as usual and that he will be staying overnight post op for 23 hours, patient verbalized understanding.

## 2023-09-22 NOTE — TELEPHONE ENCOUNTER
Spoke w/ Dr Coral Calhoun who stated he was still going to do surgery w/ the patient on Plavix. The orders are also already in the system. I called PAT back to let them know and Lindsey stated that Dr Coral Calhoun already called and talked to Lary Lopes to let her know everything.

## 2023-09-23 ASSESSMENT — ENCOUNTER SYMPTOMS
ALLERGIC/IMMUNOLOGIC NEGATIVE: 1
GASTROINTESTINAL NEGATIVE: 1
RESPIRATORY NEGATIVE: 1
EYES NEGATIVE: 1

## 2023-09-23 NOTE — PROGRESS NOTES
Subjective    Arturo Batres is a 56 y.o. male    HIV    Patient with ESRD on hemodialysis, with long standing HIV infection, initially found to have CD4 count of only 4/mm3 and VL of 51,700 while on Reyataz/Lamivudine/ Ziagen. He was switched to Tivicay, Reyataz/ritonavir and P[place]mont with subsequent improvement. . Last CD4 in March 2023 was 260/mm3 (18.6) with VL <20 copies/ml. Interim period remarkable for NSTEMI June 2023 at which time he underwent cardiac stents. He states that he is scheduled of colonoscopy for colon cancer. Despite this, he appears to be doing very well and in good spirits. He is here today for routing follow-up. No complaints offered at this time. Review of Systems   Constitutional: Negative. HENT: Negative. Eyes: Negative. Respiratory: Negative. Cardiovascular: Negative. Gastrointestinal: Negative. Endocrine: Negative. Genitourinary: Negative. Musculoskeletal: Negative. Skin: Negative. Allergic/Immunologic: Negative. Neurological: Negative. Hematological: Negative. Psychiatric/Behavioral: Negative. Past Medical History:   Diagnosis Date    Anemia     Chronic abdominal pain     Chronic kidney disease     dialysis pt. - [facility] - [facility]    Colon cancer [facility])     Complication of AV dialysis fistula 2019    2 large pseudo-aneurysm    Dependence on renal dialysis ([address])     Esophagitis     ESRD on dialysis ([address])     Gastritis     GERD (gastroesophageal reflux disease)     Gout     pt states not in 12 years    Heart attack [facility])     pt stated June 2023 - with 2 stents placed    High cholesterol     History of blood transfusion     at least 8 units over the years  - pt states last given June 2023    HIV (human immunodeficiency virus infection) ([address]) 2011    Hypertension     NSTEMI (non-ST elevated myocardial infarction) ([address])     Other ill-defined conditions(809.89)     SMALL VEIN IN HEART BEING MONITORED

## 2023-09-25 ENCOUNTER — HOSPITAL ENCOUNTER (OUTPATIENT)
Facility: HOSPITAL | Age: 58
Setting detail: RECURRING SERIES
Discharge: HOME | End: 2023-09-28
Payer: MEDICARE

## 2023-09-25 VITALS — BODY MASS INDEX: 29.02 KG/M2 | WEIGHT: 214 LBS

## 2023-09-25 PROCEDURE — 93798 PHYS/QHP OP CAR RHAB W/ECG: CPT

## 2023-09-25 ASSESSMENT — EXERCISE STRESS TEST
PEAK_BP: 94/61
PEAK_RPE: 14
PEAK_HR: 107
PEAK_METS: 4.8

## 2023-09-26 ENCOUNTER — ANESTHESIA EVENT (OUTPATIENT)
Facility: HOSPITAL | Age: 58
End: 2023-09-26
Payer: MEDICARE

## 2023-09-26 ENCOUNTER — HOSPITAL ENCOUNTER (OUTPATIENT)
Facility: HOSPITAL | Age: 58
Setting detail: OBSERVATION
Discharge: HOME | End: 2023-09-26
Attending: COLON & RECTAL SURGERY | Admitting: COLON & RECTAL SURGERY
Payer: MEDICARE

## 2023-09-26 ENCOUNTER — ANESTHESIA (OUTPATIENT)
Facility: HOSPITAL | Age: 58
End: 2023-09-26
Payer: MEDICARE

## 2023-09-26 VITALS
WEIGHT: 212 LBS | SYSTOLIC BLOOD PRESSURE: 120 MMHG | HEART RATE: 61 BPM | DIASTOLIC BLOOD PRESSURE: 79 MMHG | TEMPERATURE: 97.7 F | HEIGHT: 72 IN | BODY MASS INDEX: 28.71 KG/M2 | RESPIRATION RATE: 18 BRPM | OXYGEN SATURATION: 99 %

## 2023-09-26 DIAGNOSIS — C21.0 ANAL CANCER (HCC): ICD-10-CM

## 2023-09-26 DIAGNOSIS — K62.82 ANAL DYSPLASIA: Primary | ICD-10-CM

## 2023-09-26 LAB
ANION GAP BLD CALC-SCNC: 7
ANION GAP SERPL CALC-SCNC: 10 MMOL/L (ref 5–15)
BUN SERPL-MCNC: 46 MG/DL (ref 6–20)
BUN/CREAT SERPL: 4 (ref 12–20)
CA-I BLD-MCNC: 0.88 MMOL/L (ref 1.12–1.32)
CA-I BLD-MCNC: 8.9 MG/DL (ref 8.5–10.1)
CHLORIDE BLD-SCNC: 104 MMOL/L (ref 98–107)
CHLORIDE SERPL-SCNC: 99 MMOL/L (ref 97–108)
CO2 BLD-SCNC: 28 MMOL/L
CO2 SERPL-SCNC: 33 MMOL/L (ref 21–32)
CREAT SERPL-MCNC: 11.9 MG/DL (ref 0.7–1.3)
CREAT UR-MCNC: 13.72 MG/DL (ref 0.6–1.3)
ERYTHROCYTE [DISTWIDTH] IN BLOOD BY AUTOMATED COUNT: 16.3 % (ref 11.5–14.5)
GLUCOSE BLD STRIP.AUTO-MCNC: 88 MG/DL (ref 65–100)
GLUCOSE SERPL-MCNC: 93 MG/DL (ref 65–100)
HCT VFR BLD AUTO: 36 % (ref 36.6–50.3)
HGB BLD-MCNC: 11.4 G/DL (ref 12.1–17)
MCH RBC QN AUTO: 29.6 PG (ref 26–34)
MCHC RBC AUTO-ENTMCNC: 31.7 G/DL (ref 30–36.5)
MCV RBC AUTO: 93.5 FL (ref 80–99)
MRSA DNA SPEC QL NAA+PROBE: NOT DETECTED
NRBC # BLD: 0 K/UL (ref 0–0.01)
NRBC BLD-RTO: 0 PER 100 WBC
PLATELET # BLD AUTO: 144 K/UL (ref 150–400)
PMV BLD AUTO: 9.8 FL (ref 8.9–12.9)
POTASSIUM BLD-SCNC: 5.8 MMOL/L (ref 3.5–5.5)
POTASSIUM SERPL-SCNC: 5.7 MMOL/L (ref 3.5–5.1)
RBC # BLD AUTO: 3.85 M/UL (ref 4.1–5.7)
SODIUM BLD-SCNC: 138 MMOL/L (ref 136–145)
SODIUM SERPL-SCNC: 142 MMOL/L (ref 136–145)
WBC # BLD AUTO: 5.6 K/UL (ref 4.1–11.1)

## 2023-09-26 PROCEDURE — 3700000000 HC ANESTHESIA ATTENDED CARE: Performed by: COLON & RECTAL SURGERY

## 2023-09-26 PROCEDURE — 87641 MR-STAPH DNA AMP PROBE: CPT

## 2023-09-26 PROCEDURE — 6370000000 HC RX 637 (ALT 250 FOR IP): Performed by: COLON & RECTAL SURGERY

## 2023-09-26 PROCEDURE — 7100000001 HC PACU RECOVERY - ADDTL 15 MIN: Performed by: COLON & RECTAL SURGERY

## 2023-09-26 PROCEDURE — 7100000011 HC PHASE II RECOVERY - ADDTL 15 MIN: Performed by: COLON & RECTAL SURGERY

## 2023-09-26 PROCEDURE — 36415 COLL VENOUS BLD VENIPUNCTURE: CPT

## 2023-09-26 PROCEDURE — 2500000003 HC RX 250 WO HCPCS: Performed by: ANESTHESIOLOGY

## 2023-09-26 PROCEDURE — 3600000002 HC SURGERY LEVEL 2 BASE: Performed by: COLON & RECTAL SURGERY

## 2023-09-26 PROCEDURE — 3700000001 HC ADD 15 MINUTES (ANESTHESIA): Performed by: COLON & RECTAL SURGERY

## 2023-09-26 PROCEDURE — 6370000000 HC RX 637 (ALT 250 FOR IP): Performed by: ANESTHESIOLOGY

## 2023-09-26 PROCEDURE — 88305 TISSUE EXAM BY PATHOLOGIST: CPT

## 2023-09-26 PROCEDURE — 7100000010 HC PHASE II RECOVERY - FIRST 15 MIN: Performed by: COLON & RECTAL SURGERY

## 2023-09-26 PROCEDURE — 45990 SURG DX EXAM ANORECTAL: CPT | Performed by: COLON & RECTAL SURGERY

## 2023-09-26 PROCEDURE — 6360000002 HC RX W HCPCS: Performed by: COLON & RECTAL SURGERY

## 2023-09-26 PROCEDURE — 85027 COMPLETE CBC AUTOMATED: CPT

## 2023-09-26 PROCEDURE — 2500000003 HC RX 250 WO HCPCS: Performed by: COLON & RECTAL SURGERY

## 2023-09-26 PROCEDURE — 3600000012 HC SURGERY LEVEL 2 ADDTL 15MIN: Performed by: COLON & RECTAL SURGERY

## 2023-09-26 PROCEDURE — G0378 HOSPITAL OBSERVATION PER HR: HCPCS

## 2023-09-26 PROCEDURE — 93005 ELECTROCARDIOGRAM TRACING: CPT | Performed by: ANESTHESIOLOGY

## 2023-09-26 PROCEDURE — 6360000002 HC RX W HCPCS: Performed by: NURSE ANESTHETIST, CERTIFIED REGISTERED

## 2023-09-26 PROCEDURE — 80048 BASIC METABOLIC PNL TOTAL CA: CPT

## 2023-09-26 PROCEDURE — 2580000003 HC RX 258: Performed by: COLON & RECTAL SURGERY

## 2023-09-26 PROCEDURE — 80047 BASIC METABLC PNL IONIZED CA: CPT

## 2023-09-26 PROCEDURE — 6360000002 HC RX W HCPCS: Performed by: ANESTHESIOLOGY

## 2023-09-26 PROCEDURE — 7100000000 HC PACU RECOVERY - FIRST 15 MIN: Performed by: COLON & RECTAL SURGERY

## 2023-09-26 PROCEDURE — 2709999900 HC NON-CHARGEABLE SUPPLY: Performed by: COLON & RECTAL SURGERY

## 2023-09-26 RX ORDER — NIFEDIPINE 30 MG/1
30 TABLET, EXTENDED RELEASE ORAL DAILY
Status: DISCONTINUED | OUTPATIENT
Start: 2023-09-26 | End: 2023-09-26 | Stop reason: HOSPADM

## 2023-09-26 RX ORDER — OXYCODONE HYDROCHLORIDE AND ACETAMINOPHEN 5; 325 MG/1; MG/1
2 TABLET ORAL EVERY 4 HOURS PRN
Status: DISCONTINUED | OUTPATIENT
Start: 2023-09-26 | End: 2023-09-26 | Stop reason: HOSPADM

## 2023-09-26 RX ORDER — LIDOCAINE 4 G/G
1 PATCH TOPICAL AS NEEDED
Status: DISCONTINUED | OUTPATIENT
Start: 2023-09-26 | End: 2023-09-26 | Stop reason: HOSPADM

## 2023-09-26 RX ORDER — LABETALOL HYDROCHLORIDE 5 MG/ML
10 INJECTION, SOLUTION INTRAVENOUS
Status: DISCONTINUED | OUTPATIENT
Start: 2023-09-26 | End: 2023-09-26 | Stop reason: HOSPADM

## 2023-09-26 RX ORDER — IBUPROFEN 200 MG
TABLET ORAL PRN
Status: DISCONTINUED | OUTPATIENT
Start: 2023-09-26 | End: 2023-09-26 | Stop reason: ALTCHOICE

## 2023-09-26 RX ORDER — ASPIRIN 81 MG/1
81 TABLET, CHEWABLE ORAL DAILY
Status: DISCONTINUED | OUTPATIENT
Start: 2023-09-26 | End: 2023-09-26 | Stop reason: HOSPADM

## 2023-09-26 RX ORDER — CLOPIDOGREL BISULFATE 75 MG/1
75 TABLET ORAL DAILY
Status: DISCONTINUED | OUTPATIENT
Start: 2023-09-26 | End: 2023-09-26 | Stop reason: HOSPADM

## 2023-09-26 RX ORDER — LIDOCAINE HYDROCHLORIDE 10 MG/ML
INJECTION, SOLUTION INFILTRATION; PERINEURAL PRN
Status: DISCONTINUED | OUTPATIENT
Start: 2023-09-26 | End: 2023-09-26 | Stop reason: ALTCHOICE

## 2023-09-26 RX ORDER — ATAZANAVIR 150 MG/1
300 CAPSULE ORAL DAILY
Status: DISCONTINUED | OUTPATIENT
Start: 2023-09-26 | End: 2023-09-26 | Stop reason: HOSPADM

## 2023-09-26 RX ORDER — CARVEDILOL 12.5 MG/1
25 TABLET ORAL 2 TIMES DAILY WITH MEALS
Status: DISCONTINUED | OUTPATIENT
Start: 2023-09-26 | End: 2023-09-26 | Stop reason: HOSPADM

## 2023-09-26 RX ORDER — PROPOFOL 10 MG/ML
INJECTION, EMULSION INTRAVENOUS PRN
Status: DISCONTINUED | OUTPATIENT
Start: 2023-09-26 | End: 2023-09-26 | Stop reason: SDUPTHER

## 2023-09-26 RX ORDER — HYDRALAZINE HYDROCHLORIDE 20 MG/ML
10 INJECTION INTRAMUSCULAR; INTRAVENOUS
Status: DISCONTINUED | OUTPATIENT
Start: 2023-09-26 | End: 2023-09-26 | Stop reason: HOSPADM

## 2023-09-26 RX ORDER — ALBUTEROL SULFATE 90 UG/1
2 AEROSOL, METERED RESPIRATORY (INHALATION) EVERY 6 HOURS PRN
Status: DISCONTINUED | OUTPATIENT
Start: 2023-09-26 | End: 2023-09-26 | Stop reason: HOSPADM

## 2023-09-26 RX ORDER — OXYCODONE HYDROCHLORIDE 5 MG/1
10 TABLET ORAL PRN
Status: COMPLETED | OUTPATIENT
Start: 2023-09-26 | End: 2023-09-26

## 2023-09-26 RX ORDER — IPRATROPIUM BROMIDE AND ALBUTEROL SULFATE 2.5; .5 MG/3ML; MG/3ML
1 SOLUTION RESPIRATORY (INHALATION)
Status: DISCONTINUED | OUTPATIENT
Start: 2023-09-26 | End: 2023-09-26 | Stop reason: HOSPADM

## 2023-09-26 RX ORDER — ONDANSETRON 2 MG/ML
4 INJECTION INTRAMUSCULAR; INTRAVENOUS
Status: DISCONTINUED | OUTPATIENT
Start: 2023-09-26 | End: 2023-09-26 | Stop reason: HOSPADM

## 2023-09-26 RX ORDER — SODIUM CHLORIDE 9 MG/ML
INJECTION, SOLUTION INTRAVENOUS PRN
Status: DISCONTINUED | OUTPATIENT
Start: 2023-09-26 | End: 2023-09-26 | Stop reason: HOSPADM

## 2023-09-26 RX ORDER — SODIUM CHLORIDE 0.9 % (FLUSH) 0.9 %
5-40 SYRINGE (ML) INJECTION EVERY 12 HOURS SCHEDULED
Status: DISCONTINUED | OUTPATIENT
Start: 2023-09-26 | End: 2023-09-26 | Stop reason: HOSPADM

## 2023-09-26 RX ORDER — SODIUM CHLORIDE, SODIUM LACTATE, POTASSIUM CHLORIDE, CALCIUM CHLORIDE 600; 310; 30; 20 MG/100ML; MG/100ML; MG/100ML; MG/100ML
INJECTION, SOLUTION INTRAVENOUS ONCE
Status: DISCONTINUED | OUTPATIENT
Start: 2023-09-26 | End: 2023-09-26 | Stop reason: HOSPADM

## 2023-09-26 RX ORDER — OXYCODONE HYDROCHLORIDE 5 MG/1
5 TABLET ORAL PRN
Status: COMPLETED | OUTPATIENT
Start: 2023-09-26 | End: 2023-09-26

## 2023-09-26 RX ORDER — OXYCODONE HYDROCHLORIDE 5 MG/1
5 TABLET ORAL EVERY 6 HOURS PRN
Qty: 18 TABLET | Refills: 0 | Status: SHIPPED | OUTPATIENT
Start: 2023-09-26 | End: 2023-10-01

## 2023-09-26 RX ORDER — DIPHENHYDRAMINE HYDROCHLORIDE 50 MG/ML
12.5 INJECTION INTRAMUSCULAR; INTRAVENOUS
Status: COMPLETED | OUTPATIENT
Start: 2023-09-26 | End: 2023-09-26

## 2023-09-26 RX ORDER — SEVELAMER CARBONATE 800 MG/1
1600 TABLET, FILM COATED ORAL 2 TIMES DAILY
Status: DISCONTINUED | OUTPATIENT
Start: 2023-09-26 | End: 2023-09-26 | Stop reason: HOSPADM

## 2023-09-26 RX ORDER — FAMOTIDINE 10 MG/ML
20 INJECTION, SOLUTION INTRAVENOUS DAILY
Status: DISCONTINUED | OUTPATIENT
Start: 2023-09-26 | End: 2023-09-26 | Stop reason: HOSPADM

## 2023-09-26 RX ORDER — OXYCODONE HYDROCHLORIDE AND ACETAMINOPHEN 5; 325 MG/1; MG/1
1 TABLET ORAL EVERY 4 HOURS PRN
Status: DISCONTINUED | OUTPATIENT
Start: 2023-09-26 | End: 2023-09-26 | Stop reason: HOSPADM

## 2023-09-26 RX ORDER — PHENYLEPHRINE HCL IN 0.9% NACL 1 MG/10 ML
SYRINGE (ML) INTRAVENOUS PRN
Status: DISCONTINUED | OUTPATIENT
Start: 2023-09-26 | End: 2023-09-26 | Stop reason: SDUPTHER

## 2023-09-26 RX ORDER — ATORVASTATIN CALCIUM 20 MG/1
40 TABLET, FILM COATED ORAL DAILY
Status: DISCONTINUED | OUTPATIENT
Start: 2023-09-26 | End: 2023-09-26 | Stop reason: HOSPADM

## 2023-09-26 RX ORDER — FENTANYL CITRATE 50 UG/ML
50 INJECTION, SOLUTION INTRAMUSCULAR; INTRAVENOUS EVERY 5 MIN PRN
Status: COMPLETED | OUTPATIENT
Start: 2023-09-26 | End: 2023-09-26

## 2023-09-26 RX ORDER — SODIUM CHLORIDE 0.9 % (FLUSH) 0.9 %
5-40 SYRINGE (ML) INJECTION PRN
Status: DISCONTINUED | OUTPATIENT
Start: 2023-09-26 | End: 2023-09-26 | Stop reason: HOSPADM

## 2023-09-26 RX ORDER — HYDROMORPHONE HYDROCHLORIDE 1 MG/ML
0.5 INJECTION, SOLUTION INTRAMUSCULAR; INTRAVENOUS; SUBCUTANEOUS EVERY 5 MIN PRN
Status: COMPLETED | OUTPATIENT
Start: 2023-09-26 | End: 2023-09-26

## 2023-09-26 RX ORDER — LOSARTAN POTASSIUM 25 MG/1
100 TABLET ORAL DAILY
Status: DISCONTINUED | OUTPATIENT
Start: 2023-09-26 | End: 2023-09-26 | Stop reason: HOSPADM

## 2023-09-26 RX ORDER — SODIUM CHLORIDE 9 MG/ML
INJECTION, SOLUTION INTRAVENOUS CONTINUOUS
Status: DISCONTINUED | OUTPATIENT
Start: 2023-09-26 | End: 2023-09-26 | Stop reason: HOSPADM

## 2023-09-26 RX ORDER — ACETAMINOPHEN 325 MG/1
650 TABLET ORAL
Status: DISCONTINUED | OUTPATIENT
Start: 2023-09-26 | End: 2023-09-26 | Stop reason: HOSPADM

## 2023-09-26 RX ORDER — LEVETIRACETAM 500 MG/1
500 TABLET ORAL DAILY
Status: DISCONTINUED | OUTPATIENT
Start: 2023-09-26 | End: 2023-09-26 | Stop reason: HOSPADM

## 2023-09-26 RX ADMIN — Medication 100 MCG: at 10:39

## 2023-09-26 RX ADMIN — DIPHENHYDRAMINE HYDROCHLORIDE 12.5 MG: 50 INJECTION INTRAMUSCULAR; INTRAVENOUS at 12:58

## 2023-09-26 RX ADMIN — Medication 100 MCG: at 10:47

## 2023-09-26 RX ADMIN — PROPOFOL 150 MCG/KG/MIN: 10 INJECTION, EMULSION INTRAVENOUS at 10:33

## 2023-09-26 RX ADMIN — HYDROMORPHONE HYDROCHLORIDE 0.5 MG: 1 INJECTION, SOLUTION INTRAMUSCULAR; INTRAVENOUS; SUBCUTANEOUS at 11:38

## 2023-09-26 RX ADMIN — Medication 100 MCG: at 11:00

## 2023-09-26 RX ADMIN — FENTANYL CITRATE 50 MCG: 50 INJECTION, SOLUTION INTRAMUSCULAR; INTRAVENOUS at 12:58

## 2023-09-26 RX ADMIN — Medication 100 MCG: at 10:35

## 2023-09-26 RX ADMIN — SODIUM CHLORIDE: 9 INJECTION, SOLUTION INTRAVENOUS at 07:46

## 2023-09-26 RX ADMIN — FENTANYL CITRATE 50 MCG: 50 INJECTION, SOLUTION INTRAMUSCULAR; INTRAVENOUS at 11:23

## 2023-09-26 RX ADMIN — Medication 100 MCG: at 10:54

## 2023-09-26 RX ADMIN — PROPOFOL 50 MG: 10 INJECTION, EMULSION INTRAVENOUS at 10:32

## 2023-09-26 RX ADMIN — OXYCODONE HYDROCHLORIDE 10 MG: 5 TABLET ORAL at 12:12

## 2023-09-26 RX ADMIN — METRONIDAZOLE 500 MG: 500 INJECTION, SOLUTION INTRAVENOUS at 10:05

## 2023-09-26 RX ADMIN — HYDROMORPHONE HYDROCHLORIDE 0.5 MG: 1 INJECTION, SOLUTION INTRAMUSCULAR; INTRAVENOUS; SUBCUTANEOUS at 12:00

## 2023-09-26 ASSESSMENT — PAIN SCALES - GENERAL
PAINLEVEL_OUTOF10: 7
PAINLEVEL_OUTOF10: 5
PAINLEVEL_OUTOF10: 6
PAINLEVEL_OUTOF10: 8
PAINLEVEL_OUTOF10: 5
PAINLEVEL_OUTOF10: 6
PAINLEVEL_OUTOF10: 8

## 2023-09-26 ASSESSMENT — PAIN DESCRIPTION - LOCATION
LOCATION: RECTUM

## 2023-09-26 ASSESSMENT — PAIN - FUNCTIONAL ASSESSMENT: PAIN_FUNCTIONAL_ASSESSMENT: 0-10

## 2023-09-26 NOTE — ANESTHESIA POSTPROCEDURE EVALUATION
Department of Anesthesiology  Postprocedure Note    Patient: Lianet Whitman  MRN: [id number]  YOB: 1965  Date of evaluation: 9/26/2023      Procedure Summary     Date: 09/26/23 Room / Location: [facility] MAIN OR 03 / [facility] MAIN OR    Anesthesia Start: 1419 Anesthesia Stop: 1112    Procedure: ANAL EXAM UNDER ANESTHESIA (Rectum) Diagnosis:       Anal cancer ([address])      (Anal cancer ([address]) [C21.0])    Surgeons: Unique Bolden MD Responsible Provider: Porfirio Kenney MD    Anesthesia Type: MAC ASA Status: 4          Anesthesia Type: MAC    Jose Manuel Phase I: Jose Manuel Score: 10    Jose Manuel Phase II: Jose Manuel Score: 10      Anesthesia Post Evaluation    Patient location during evaluation: PACU  Patient participation: complete - patient cannot participate  Level of consciousness: awake  Pain score: 0  Airway patency: patent  Nausea & Vomiting: no nausea and no vomiting  Complications: no  Cardiovascular status: hemodynamically stable  Respiratory status: acceptable  Hydration status: stable  Multimodal analgesia pain management approach

## 2023-09-26 NOTE — PERIOP NOTE
Dr. Karen Barajas reviewed patient's stat EKG re: hypokalemia. Per Dr. Karen Barajas, okay to proceed with patient's surgery today as previously scheduled. No new orders received.

## 2023-09-26 NOTE — PERIOP NOTE
Discharge instructions reviewed with patient and sister, verbalizes understanding. Up in room with no difficulty. Waiting on tylenol prior to discharge. Report given to Pam Moran RN.

## 2023-09-26 NOTE — DISCHARGE INSTRUCTIONS
Warm water baths after bowel movements  Follow-up my office 2 weeks  ***Oxycodone last at 12:12PM***

## 2023-09-27 ENCOUNTER — APPOINTMENT (OUTPATIENT)
Facility: HOSPITAL | Age: 58
End: 2023-09-27
Payer: MEDICARE

## 2023-09-27 ENCOUNTER — TELEPHONE (OUTPATIENT)
Facility: CLINIC | Age: 58
End: 2023-09-27

## 2023-09-27 NOTE — TELEPHONE ENCOUNTER
Care Transitions Initial Follow Up Call    Outreach made within 2 business days of discharge: Yes    Patient: Andrew Byrne Patient : 1965   MRN: [id number]  Reason for Admission: There are no discharge diagnoses documented for the most recent discharge. Discharge Date: 23       Spoke with: pt    Discharge department/facility: [facility] Interactive Patient Contact:  Was patient able to fill all prescriptions: Yes  Was patient instructed to bring all medications to the follow-up visit: Yes  Is patient taking all medications as directed in the discharge summary?  Yes  Does patient understand their discharge instructions: Yes  Does patient have questions or concerns that need addressed prior to 7-14 day follow up office visit: no    Scheduled appointment with PCP within 7-14 days    Follow Up  Future Appointments   Date Time Provider [address] [place] [address]   2023 10:00 AM JENELLE Levi NP BS AMB   10/2/2023  1:00 PM SVR CARDIOPULM EXERCISE SVRCP SVRM   10/4/2023  1:00 PM SVR CARDIOPULM EXERCISE SVRCP SVRM   10/5/2023  1:00 PM SVR CARDIOPULM EXERCISE SVRCP SVRM   10/9/2023  1:00 PM SVR CARDIOPULM EXERCISE SVRCP SVRM   10/9/2023  3:15 PM MD PAYAM OzunaE BS AMB   10/11/2023  1:00 PM SVR CARDIOPULM EXERCISE SVRCP SVRM   10/12/2023  1:00 PM SVR CARDIOPULM EXERCISE SVRCP SVRM   10/16/2023  1:00 PM SVR CARDIOPULM EXERCISE SVRCP SVRM   10/18/2023  1:00 PM SVR CARDIOPULM EXERCISE SVRCP SVRM   10/19/2023  1:00 PM SVR CARDIOPULM EXERCISE SVRCP SVRM   10/23/2023  1:00 PM SVR CARDIOPULM EXERCISE SVRCP SVRM   10/25/2023  1:00 PM SVR CARDIOPULM EXERCISE SVRCP SVRM   10/26/2023  1:00 PM SVR CARDIOPULM EXERCISE SVRCP SVRM   3/19/2024 11:00 AM Arnaldo Huerta MD [facility]   2024  1:15 PM MD VIOLET López BS AMB       Ghada Hernandez LPN

## 2023-09-28 ENCOUNTER — OFFICE VISIT (OUTPATIENT)
Facility: CLINIC | Age: 58
End: 2023-09-28
Payer: MEDICARE

## 2023-09-28 VITALS
RESPIRATION RATE: 18 BRPM | HEART RATE: 62 BPM | SYSTOLIC BLOOD PRESSURE: 119 MMHG | BODY MASS INDEX: 29.39 KG/M2 | OXYGEN SATURATION: 98 % | WEIGHT: 217 LBS | TEMPERATURE: 97.2 F | DIASTOLIC BLOOD PRESSURE: 81 MMHG | HEIGHT: 72 IN

## 2023-09-28 DIAGNOSIS — I95.9 HYPOTENSION, UNSPECIFIED HYPOTENSION TYPE: ICD-10-CM

## 2023-09-28 DIAGNOSIS — C20 RECTAL CANCER (HCC): ICD-10-CM

## 2023-09-28 DIAGNOSIS — M25.562 ACUTE PAIN OF LEFT KNEE: ICD-10-CM

## 2023-09-28 DIAGNOSIS — M25.552 LEFT HIP PAIN: Primary | ICD-10-CM

## 2023-09-28 LAB
EKG ATRIAL RATE: 61 BPM
EKG DIAGNOSIS: NORMAL
EKG P AXIS: 14 DEGREES
EKG P-R INTERVAL: 156 MS
EKG Q-T INTERVAL: 440 MS
EKG QRS DURATION: 88 MS
EKG QTC CALCULATION (BAZETT): 442 MS
EKG R AXIS: -1 DEGREES
EKG T AXIS: 20 DEGREES
EKG VENTRICULAR RATE: 61 BPM

## 2023-09-28 PROCEDURE — 99213 OFFICE O/P EST LOW 20 MIN: CPT | Performed by: NURSE PRACTITIONER

## 2023-09-28 PROCEDURE — 3074F SYST BP LT 130 MM HG: CPT | Performed by: NURSE PRACTITIONER

## 2023-09-28 PROCEDURE — 3078F DIAST BP <80 MM HG: CPT | Performed by: NURSE PRACTITIONER

## 2023-09-28 RX ORDER — LIDOCAINE 40 MG/G
CREAM TOPICAL
COMMUNITY

## 2023-09-28 NOTE — PROGRESS NOTES
Chief Complaint   Patient presents with    Follow-Up from [address] [place] [address]     Follow up     No other c/o        1. Have you been to the ER, urgent care clinic since your last visit? Hospitalized since your last visit? No    2. Have you seen or consulted any other health care providers outside of the [address] [place] [address] since your last visit? Include any pap smears or colon screening.  No

## 2023-09-29 RX ORDER — AMLODIPINE BESYLATE 5 MG/1
5 TABLET ORAL DAILY
COMMUNITY

## 2023-09-29 ASSESSMENT — ENCOUNTER SYMPTOMS
GASTROINTESTINAL NEGATIVE: 1
RESPIRATORY NEGATIVE: 1

## 2023-09-29 NOTE — PROGRESS NOTES
Subjective  Chief Complaint   Patient presents with    Follow-Up from Hospital    Cancer     HPI:  Lucio Cast is a 56 y.o. male who presents for 4 week follow up regarding left hip and knee pain . States that symptoms are improved with only occasional pain in left hip. No longer taking medicine for pain and has been able to continue cardiac rehab exercises. Was experiencing low blood pressure but started cutting his amlodipine in half to 5mg from 10mg. This seems to be keep blood pressure normal now. Did have repeat biopsy with Dr. Tonia Keith due to rectal cancer and has follow up 10/2/23. Past Medical History:   Diagnosis Date    Anemia     Chronic abdominal pain     Chronic kidney disease     dialysis pt. - [facility] W[facility]F - [facility]    Colon cancer [facility])     Complication of AV dialysis fistula 2019    2 large pseudo-aneurysm    Dependence on renal dialysis ([address])     Esophagitis     ESRD on dialysis ([address])     Gastritis     GERD (gastroesophageal reflux disease)     Gout     pt states not in 12 years    Heart attack [facility])     pt stated June 2023 - with 2 stents placed    High cholesterol     History of blood transfusion     at least 8 units over the years  - pt states last given June 2023    HIV (human immunodeficiency virus infection) ([address]) 2011    Hypertension     NSTEMI (non-ST elevated myocardial infarction) ([address])     Other ill-defined conditions(799.89)     SMALL VEIN IN HEART BEING MONITORED    Pancreatitis     Renal insufficiency     Seizure ([address]) 2012    one time and none sense    Seizures ([address])     Sepsis ([address]) 2011 & 8/2019        Past Surgical History:   Procedure Laterality Date    AV FISTULA CREATION Left 2012    & 2019    CARDIAC PROCEDURE N/A 06/13/2023    Left heart cath / coronary angiography performed by Kellen Bowles MD at [address] LAB and 2nd stent with 2nd cath at [facility] June 2023    CARDIAC PROCEDURE N/A 06/19/2023    Percutaneous coronary intervention

## 2023-10-06 NOTE — OP NOTE
Operative Note      Patient: Neida Mcgowan  YOB: 1965  MRN: [id number]    Date of Procedure: 9/26/2023    Pre-Op Diagnosis Codes:     * Anal cancer ([address]) [C21.0]    Post-Op Diagnosis: Same       Procedure(s):  ANAL EXAM UNDER ANESTHESIA    Surgeon(s):  Mariam Conte MD    Assistant:   * No surgical staff found *    Anesthesia: Monitor Anesthesia Care    Estimated Blood Loss (mL): Minimal    Complications: None    Specimens:   ID Type Source Tests Collected by Time Destination   A : Anterior Perianal  Tissue Rectum SURGICAL PATHOLOGY Mariam Conte MD 9/26/2023 1012        Implants:  * No implants in log *      Drains: * No LDAs found *    Findings: Small area of nodularity in the anterior perianal quadrant    Detailed Description of Procedure: The patient was brought to the operating room and positioned on the OR table in the supine position. Following the induction of general anesthesia the patient was placed in lithotomy position and the perianal region prepped and draped in the standard sterile fashion. In general inspection and palpation of the perianal skin was made. The patient previously had an anal squamous cell carcinoma excised in the left lateral position 2 cm in the anal verge. There is no area of concern at the excision site. In the anterior perianal region there is a small area of nodularity. This excised with the scalpel and hemostasis maintained with the cautery. Specimen was passed off the table. Additional examination was then performed followed by placement the Hill-Abrams retractor. No areas of suspicion for high-grade dysplasia or malignancy were noted within the anal canal.    At this point the procedure was terminated. Bacitracin is applied to the patient's wound in the anterior perianal region and the patient returned to supine position, awakened, extubated, taken recovery in stable condition.   All counts were correct at the close the case.

## 2023-10-09 ENCOUNTER — OFFICE VISIT (OUTPATIENT)
Age: 58
End: 2023-10-09

## 2023-10-09 VITALS
OXYGEN SATURATION: 97 % | HEIGHT: 72 IN | TEMPERATURE: 98.1 F | BODY MASS INDEX: 29.66 KG/M2 | HEART RATE: 63 BPM | DIASTOLIC BLOOD PRESSURE: 68 MMHG | RESPIRATION RATE: 18 BRPM | WEIGHT: 219 LBS | SYSTOLIC BLOOD PRESSURE: 120 MMHG

## 2023-10-09 DIAGNOSIS — C44.520 SQUAMOUS CELL CARCINOMA OF ANAL MARGIN: Primary | ICD-10-CM

## 2023-10-09 PROCEDURE — 99024 POSTOP FOLLOW-UP VISIT: CPT | Performed by: COLON & RECTAL SURGERY

## 2023-10-09 RX ORDER — TENOFOVIR ALAFENAMIDE 25 MG/1
1 TABLET ORAL DAILY
Qty: 180 TABLET | Refills: 2 | Status: SHIPPED | OUTPATIENT
Start: 2023-10-09

## 2023-10-30 ENCOUNTER — TELEPHONE (OUTPATIENT)
Facility: CLINIC | Age: 58
End: 2023-10-30

## 2023-11-20 ENCOUNTER — APPOINTMENT (OUTPATIENT)
Facility: HOSPITAL | Age: 58
End: 2023-11-20
Attending: EMERGENCY MEDICINE
Payer: MEDICARE

## 2023-11-20 ENCOUNTER — HOSPITAL ENCOUNTER (EMERGENCY)
Facility: HOSPITAL | Age: 58
Discharge: HOME | End: 2023-11-20
Attending: EMERGENCY MEDICINE
Payer: MEDICARE

## 2023-11-20 VITALS
HEART RATE: 60 BPM | RESPIRATION RATE: 18 BRPM | TEMPERATURE: 98.9 F | SYSTOLIC BLOOD PRESSURE: 112 MMHG | OXYGEN SATURATION: 99 % | DIASTOLIC BLOOD PRESSURE: 78 MMHG

## 2023-11-20 DIAGNOSIS — R07.89 ATYPICAL CHEST PAIN: Primary | ICD-10-CM

## 2023-11-20 LAB
ALBUMIN SERPL-MCNC: 4.2 G/DL (ref 3.5–5)
ALBUMIN/GLOB SERPL: 1 (ref 1.1–2.2)
ALP SERPL-CCNC: 75 U/L (ref 45–117)
ALT SERPL-CCNC: <6 U/L (ref 12–78)
ANION GAP SERPL CALC-SCNC: 12 MMOL/L (ref 5–15)
AST SERPL W P-5'-P-CCNC: 16 U/L (ref 15–37)
BASOPHILS # BLD: 0 K/UL (ref 0–0.1)
BASOPHILS NFR BLD: 1 % (ref 0–1)
BILIRUB SERPL-MCNC: 0.5 MG/DL (ref 0.2–1)
BUN SERPL-MCNC: 25 MG/DL (ref 6–20)
BUN/CREAT SERPL: 3 (ref 12–20)
CA-I BLD-MCNC: 9.2 MG/DL (ref 8.5–10.1)
CHLORIDE SERPL-SCNC: 96 MMOL/L (ref 97–108)
CO2 SERPL-SCNC: 32 MMOL/L (ref 21–32)
CREAT SERPL-MCNC: 7.85 MG/DL (ref 0.7–1.3)
DIFFERENTIAL METHOD BLD: ABNORMAL
EOSINOPHIL # BLD: 0.1 K/UL (ref 0–0.4)
EOSINOPHIL NFR BLD: 2 % (ref 0–7)
ERYTHROCYTE [DISTWIDTH] IN BLOOD BY AUTOMATED COUNT: 14.1 % (ref 11.5–14.5)
GLOBULIN SER CALC-MCNC: 4.4 G/DL (ref 2–4)
GLUCOSE SERPL-MCNC: 102 MG/DL (ref 65–100)
HCT VFR BLD AUTO: 30.2 % (ref 36.6–50.3)
HGB BLD-MCNC: 10.3 G/DL (ref 12.1–17)
IMM GRANULOCYTES # BLD AUTO: 0 K/UL (ref 0–0.04)
IMM GRANULOCYTES NFR BLD AUTO: 0 % (ref 0–0.5)
LYMPHOCYTES # BLD: 1.2 K/UL (ref 0.8–3.5)
LYMPHOCYTES NFR BLD: 20 % (ref 12–49)
MCH RBC QN AUTO: 31.4 PG (ref 26–34)
MCHC RBC AUTO-ENTMCNC: 34.1 G/DL (ref 30–36.5)
MCV RBC AUTO: 92.1 FL (ref 80–99)
MONOCYTES # BLD: 0.4 K/UL (ref 0–1)
MONOCYTES NFR BLD: 7 % (ref 5–13)
NEUTS SEG # BLD: 4.2 K/UL (ref 1.8–8)
NEUTS SEG NFR BLD: 70 % (ref 32–75)
NRBC # BLD: 0 K/UL (ref 0–0.01)
NRBC BLD-RTO: 0 PER 100 WBC
PLATELET # BLD AUTO: 181 K/UL (ref 150–400)
PMV BLD AUTO: 9.7 FL (ref 8.9–12.9)
POTASSIUM SERPL-SCNC: 3.6 MMOL/L (ref 3.5–5.1)
PROT SERPL-MCNC: 8.6 G/DL (ref 6.4–8.2)
RBC # BLD AUTO: 3.28 M/UL (ref 4.1–5.7)
SODIUM SERPL-SCNC: 140 MMOL/L (ref 136–145)
TROPONIN I SERPL HS-MCNC: 15 NG/L (ref 0–76)
TROPONIN I SERPL HS-MCNC: 16 NG/L (ref 0–76)
WBC # BLD AUTO: 5.9 K/UL (ref 4.1–11.1)

## 2023-11-20 PROCEDURE — 36415 COLL VENOUS BLD VENIPUNCTURE: CPT

## 2023-11-20 PROCEDURE — 96374 THER/PROPH/DIAG INJ IV PUSH: CPT

## 2023-11-20 PROCEDURE — 71045 X-RAY EXAM CHEST 1 VIEW: CPT

## 2023-11-20 PROCEDURE — 80053 COMPREHEN METABOLIC PANEL: CPT

## 2023-11-20 PROCEDURE — 94760 N-INVAS EAR/PLS OXIMETRY 1: CPT

## 2023-11-20 PROCEDURE — 84484 ASSAY OF TROPONIN QUANT: CPT

## 2023-11-20 PROCEDURE — 6360000002 HC RX W HCPCS: Performed by: EMERGENCY MEDICINE

## 2023-11-20 PROCEDURE — 2580000003 HC RX 258: Performed by: EMERGENCY MEDICINE

## 2023-11-20 PROCEDURE — 93005 ELECTROCARDIOGRAM TRACING: CPT | Performed by: EMERGENCY MEDICINE

## 2023-11-20 PROCEDURE — A4216 STERILE WATER/SALINE, 10 ML: HCPCS | Performed by: EMERGENCY MEDICINE

## 2023-11-20 PROCEDURE — 99285 EMERGENCY DEPT VISIT HI MDM: CPT

## 2023-11-20 PROCEDURE — C9113 INJ PANTOPRAZOLE SODIUM, VIA: HCPCS | Performed by: EMERGENCY MEDICINE

## 2023-11-20 PROCEDURE — 85025 COMPLETE CBC W/AUTO DIFF WBC: CPT

## 2023-11-20 RX ORDER — OMEPRAZOLE 20 MG/1
20 CAPSULE, DELAYED RELEASE ORAL
Qty: 60 CAPSULE | Refills: 1 | Status: SHIPPED | OUTPATIENT
Start: 2023-11-20

## 2023-11-20 RX ADMIN — PANTOPRAZOLE SODIUM 40 MG: 40 INJECTION, POWDER, FOR SOLUTION INTRAVENOUS at 14:38

## 2023-11-20 ASSESSMENT — PAIN DESCRIPTION - LOCATION: LOCATION: CHEST

## 2023-11-20 ASSESSMENT — PAIN - FUNCTIONAL ASSESSMENT
PAIN_FUNCTIONAL_ASSESSMENT: NONE - DENIES PAIN
PAIN_FUNCTIONAL_ASSESSMENT: 0-10

## 2023-11-20 ASSESSMENT — PAIN SCALES - GENERAL
PAINLEVEL_OUTOF10: 6
PAINLEVEL_OUTOF10: 5

## 2023-11-20 ASSESSMENT — PAIN DESCRIPTION - DESCRIPTORS: DESCRIPTORS: BURNING

## 2023-11-20 NOTE — ED PROVIDER NOTES
[facility] EMERGENCY DEPT  EMERGENCY DEPARTMENT HISTORY AND PHYSICAL EXAM      Date: 11/20/2023  Patient Name: Goldie Junior  MRN: [id number]  [address] 1965  Date of evaluation: 11/20/2023  Provider: Natasha Verma MD   Note Started: 1:05 PM EST 11/20/23    HISTORY OF PRESENT ILLNESS     Chief Complaint   Patient presents with    Chest Pain       History Provided By: Patient    HPI: Goldie Junior is a 66 y.o. male     PAST MEDICAL HISTORY   Past Medical History:  Past Medical History:   Diagnosis Date    Anemia     Chronic abdominal pain     Chronic kidney disease     dialysis pt. - [facility] - [facility]    Colon cancer [facility])     Complication of AV dialysis fistula 2019    2 large pseudo-aneurysm    Dependence on renal dialysis ([address])     Esophagitis     ESRD on dialysis ([address])     Gastritis     GERD (gastroesophageal reflux disease)     Gout     pt states not in 12 years    Heart attack [facility])     pt stated June 2023 - with 2 stents placed    High cholesterol     History of blood transfusion     at least 8 units over the years  - pt states last given June 2023    HIV (human immunodeficiency virus infection) ([address]) 2011    Hypertension     NSTEMI (non-ST elevated myocardial infarction) ([address])     Other ill-defined conditions(799.89)     SMALL VEIN IN HEART BEING MONITORED    Pancreatitis     Renal insufficiency     Seizure ([address]) 2012    one time and none sense    Seizures ([address])     Sepsis ([address]) 2011 & 8/2019       Past Surgical History:  Past Surgical History:   Procedure Laterality Date    AV FISTULA CREATION Left 2012    & 2019    CARDIAC PROCEDURE N/A 06/13/2023    Left heart cath / coronary angiography performed by Simin Umanzor MD at [address] LAB and 2nd stent with 2nd cath at [facility] June 2023    CARDIAC PROCEDURE N/A 06/19/2023    Percutaneous coronary intervention performed by Lien Conner MD at [address] [facility] N/A 06/19/2023

## 2023-11-20 NOTE — ED NOTES
Pt reports mid chest pain that is intermittent and feels like \"a burning sensation\". Pt reports hx of MI with stents placed in June of this year. PT reports he received a full treatment of dialysis today with no issues. Pt denies N/V.       Heena Laureano RN  11/20/23 [id number]

## 2023-11-21 LAB
EKG ATRIAL RATE: 66 BPM
EKG DIAGNOSIS: NORMAL
EKG P AXIS: 10 DEGREES
EKG P-R INTERVAL: 169 MS
EKG Q-T INTERVAL: 424 MS
EKG QRS DURATION: 89 MS
EKG QTC CALCULATION (BAZETT): 448 MS
EKG R AXIS: 4 DEGREES
EKG T AXIS: 38 DEGREES
EKG VENTRICULAR RATE: 67 BPM

## 2023-11-22 ENCOUNTER — HOSPITAL ENCOUNTER (EMERGENCY)
Facility: HOSPITAL | Age: 58
Discharge: HOME | End: 2023-11-22
Attending: EMERGENCY MEDICINE
Payer: MEDICARE

## 2023-11-22 ENCOUNTER — APPOINTMENT (OUTPATIENT)
Facility: HOSPITAL | Age: 58
End: 2023-11-22
Attending: EMERGENCY MEDICINE
Payer: MEDICARE

## 2023-11-22 VITALS
HEIGHT: 72 IN | OXYGEN SATURATION: 99 % | DIASTOLIC BLOOD PRESSURE: 86 MMHG | HEART RATE: 74 BPM | SYSTOLIC BLOOD PRESSURE: 121 MMHG | TEMPERATURE: 98.5 F | WEIGHT: 216 LBS | BODY MASS INDEX: 29.26 KG/M2 | RESPIRATION RATE: 18 BRPM

## 2023-11-22 DIAGNOSIS — K21.9 GASTROESOPHAGEAL REFLUX DISEASE, UNSPECIFIED WHETHER ESOPHAGITIS PRESENT: ICD-10-CM

## 2023-11-22 DIAGNOSIS — R07.89 ATYPICAL CHEST PAIN: Primary | ICD-10-CM

## 2023-11-22 LAB
ALBUMIN SERPL-MCNC: 4.2 G/DL (ref 3.5–5)
ALBUMIN/GLOB SERPL: 1 (ref 1.1–2.2)
ALP SERPL-CCNC: 77 U/L (ref 45–117)
ALT SERPL-CCNC: <6 U/L (ref 12–78)
ANION GAP SERPL CALC-SCNC: 7 MMOL/L (ref 5–15)
AST SERPL W P-5'-P-CCNC: 21 U/L (ref 15–37)
BASOPHILS # BLD: 0 K/UL (ref 0–0.1)
BASOPHILS NFR BLD: 0 % (ref 0–1)
BILIRUB SERPL-MCNC: 0.4 MG/DL (ref 0.2–1)
BUN SERPL-MCNC: 27 MG/DL (ref 6–20)
BUN/CREAT SERPL: 4 (ref 12–20)
CA-I BLD-MCNC: 9 MG/DL (ref 8.5–10.1)
CHLORIDE SERPL-SCNC: 99 MMOL/L (ref 97–108)
CO2 SERPL-SCNC: 31 MMOL/L (ref 21–32)
CREAT SERPL-MCNC: 7.38 MG/DL (ref 0.7–1.3)
DIFFERENTIAL METHOD BLD: ABNORMAL
EOSINOPHIL # BLD: 0.1 K/UL (ref 0–0.4)
EOSINOPHIL NFR BLD: 2 % (ref 0–7)
ERYTHROCYTE [DISTWIDTH] IN BLOOD BY AUTOMATED COUNT: 13.9 % (ref 11.5–14.5)
GLOBULIN SER CALC-MCNC: 4.3 G/DL (ref 2–4)
GLUCOSE SERPL-MCNC: 101 MG/DL (ref 65–100)
HCT VFR BLD AUTO: 29 % (ref 36.6–50.3)
HGB BLD-MCNC: 9.5 G/DL (ref 12.1–17)
IMM GRANULOCYTES # BLD AUTO: 0 K/UL (ref 0–0.04)
IMM GRANULOCYTES NFR BLD AUTO: 0 % (ref 0–0.5)
INR PPP: 1 (ref 0.9–1.1)
LIPASE SERPL-CCNC: 99 U/L (ref 13–75)
LYMPHOCYTES # BLD: 1.1 K/UL (ref 0.8–3.5)
LYMPHOCYTES NFR BLD: 19 % (ref 12–49)
MAGNESIUM SERPL-MCNC: 2.1 MG/DL (ref 1.6–2.4)
MCH RBC QN AUTO: 30.1 PG (ref 26–34)
MCHC RBC AUTO-ENTMCNC: 32.8 G/DL (ref 30–36.5)
MCV RBC AUTO: 91.8 FL (ref 80–99)
MONOCYTES # BLD: 0.4 K/UL (ref 0–1)
MONOCYTES NFR BLD: 7 % (ref 5–13)
NEUTS SEG # BLD: 4.3 K/UL (ref 1.8–8)
NEUTS SEG NFR BLD: 72 % (ref 32–75)
NRBC # BLD: 0 K/UL (ref 0–0.01)
NRBC BLD-RTO: 0 PER 100 WBC
PLATELET # BLD AUTO: 171 K/UL (ref 150–400)
PMV BLD AUTO: 9.9 FL (ref 8.9–12.9)
POTASSIUM SERPL-SCNC: 4 MMOL/L (ref 3.5–5.1)
PROT SERPL-MCNC: 8.5 G/DL (ref 6.4–8.2)
PROTHROMBIN TIME: 12.7 SEC (ref 11.9–14.6)
RBC # BLD AUTO: 3.16 M/UL (ref 4.1–5.7)
SODIUM SERPL-SCNC: 137 MMOL/L (ref 136–145)
TROPONIN I SERPL HS-MCNC: 21 NG/L (ref 0–76)
TROPONIN I SERPL HS-MCNC: 21 NG/L (ref 0–76)
WBC # BLD AUTO: 6 K/UL (ref 4.1–11.1)

## 2023-11-22 PROCEDURE — 85025 COMPLETE CBC W/AUTO DIFF WBC: CPT

## 2023-11-22 PROCEDURE — 83735 ASSAY OF MAGNESIUM: CPT

## 2023-11-22 PROCEDURE — 99285 EMERGENCY DEPT VISIT HI MDM: CPT

## 2023-11-22 PROCEDURE — 71045 X-RAY EXAM CHEST 1 VIEW: CPT

## 2023-11-22 PROCEDURE — 85610 PROTHROMBIN TIME: CPT

## 2023-11-22 PROCEDURE — 36415 COLL VENOUS BLD VENIPUNCTURE: CPT

## 2023-11-22 PROCEDURE — 84484 ASSAY OF TROPONIN QUANT: CPT

## 2023-11-22 PROCEDURE — 80053 COMPREHEN METABOLIC PANEL: CPT

## 2023-11-22 PROCEDURE — 6370000000 HC RX 637 (ALT 250 FOR IP): Performed by: EMERGENCY MEDICINE

## 2023-11-22 PROCEDURE — 93005 ELECTROCARDIOGRAM TRACING: CPT | Performed by: EMERGENCY MEDICINE

## 2023-11-22 PROCEDURE — 83690 ASSAY OF LIPASE: CPT

## 2023-11-22 RX ORDER — MAGNESIUM HYDROXIDE/ALUMINUM HYDROXICE/SIMETHICONE 120; 1200; 1200 MG/30ML; MG/30ML; MG/30ML
30 SUSPENSION ORAL
Status: COMPLETED | OUTPATIENT
Start: 2023-11-22 | End: 2023-11-22

## 2023-11-22 RX ORDER — SUCRALFATE ORAL 1 G/10ML
1 SUSPENSION ORAL 3 TIMES DAILY
Qty: 420 ML | Refills: 0 | Status: SHIPPED | OUTPATIENT
Start: 2023-11-22 | End: 2023-11-22 | Stop reason: SDUPTHER

## 2023-11-22 RX ORDER — LIDOCAINE HYDROCHLORIDE 20 MG/ML
15 SOLUTION OROPHARYNGEAL
Status: COMPLETED | OUTPATIENT
Start: 2023-11-22 | End: 2023-11-22

## 2023-11-22 RX ORDER — FAMOTIDINE 20 MG/1
20 TABLET, FILM COATED ORAL ONCE
Status: COMPLETED | OUTPATIENT
Start: 2023-11-22 | End: 2023-11-22

## 2023-11-22 RX ORDER — ASPIRIN 81 MG/1
324 TABLET, CHEWABLE ORAL
Status: COMPLETED | OUTPATIENT
Start: 2023-11-22 | End: 2023-11-22

## 2023-11-22 RX ORDER — SUCRALFATE ORAL 1 G/10ML
1 SUSPENSION ORAL 3 TIMES DAILY
Qty: 420 ML | Refills: 0 | Status: SHIPPED | OUTPATIENT
Start: 2023-11-22 | End: 2023-12-06

## 2023-11-22 RX ADMIN — FAMOTIDINE 20 MG: 20 TABLET ORAL at 14:31

## 2023-11-22 RX ADMIN — ALUMINUM HYDROXIDE, MAGNESIUM HYDROXIDE, AND SIMETHICONE 30 ML: 200; 200; 20 SUSPENSION ORAL at 14:31

## 2023-11-22 RX ADMIN — LIDOCAINE HYDROCHLORIDE 15 ML: 20 SOLUTION ORAL at 14:31

## 2023-11-22 RX ADMIN — ASPIRIN 324 MG: 81 TABLET, CHEWABLE ORAL at 14:31

## 2023-11-22 ASSESSMENT — PAIN DESCRIPTION - PAIN TYPE: TYPE: ACUTE PAIN

## 2023-11-22 ASSESSMENT — PAIN DESCRIPTION - DESCRIPTORS: DESCRIPTORS: BURNING;SHARP

## 2023-11-22 ASSESSMENT — PAIN SCALES - GENERAL: PAINLEVEL_OUTOF10: 10

## 2023-11-22 ASSESSMENT — PAIN - FUNCTIONAL ASSESSMENT: PAIN_FUNCTIONAL_ASSESSMENT: 0-10

## 2023-11-22 ASSESSMENT — PAIN DESCRIPTION - ORIENTATION: ORIENTATION: MID

## 2023-11-22 ASSESSMENT — PAIN DESCRIPTION - LOCATION: LOCATION: CHEST

## 2023-11-22 NOTE — ED TRIAGE NOTES
Pt presents to ED for c/o midsternal chest burning for the past few days. Was seen here yesterday for same.  Pt has hx MI.

## 2023-11-22 NOTE — ED PROVIDER NOTES
[facility] EMERGENCY DEPT  EMERGENCY DEPARTMENT HISTORY AND PHYSICAL EXAM      Date: 11/22/2023  Patient Name: Shanon Mcdonald  MRN: [id number]  [address] 1965  Date of evaluation: 11/22/2023  Provider: Erica Salinas MD   Note Started: 2:08 PM EST 11/22/23    HISTORY OF PRESENT ILLNESS     Chief Complaint   Patient presents with    Chest Pain       History Provided By: Patient    HPI: Shanon Mcdonald is a 66 y.o. male with chest discomfort for the past 4 days intermittently. Described as a burning pain epigastric to substernal seen on Monday for same. Thought to be Acid refulx. Symptoms had improved but came back yesterday. Now not so sure. No sob, n/v.     States has MI in June and these symptoms are not similar. PAST MEDICAL HISTORY   Past Medical History:  Past Medical History:   Diagnosis Date    Anemia     Chronic abdominal pain     Chronic kidney disease     dialysis pt. - [facility] - [facility]    Colon cancer [facility])     Complication of AV dialysis fistula 2019    2 large pseudo-aneurysm    Dependence on renal dialysis ([address])     Esophagitis     ESRD on dialysis ([address])     Gastritis     GERD (gastroesophageal reflux disease)     Gout     pt states not in 12 years    Heart attack [facility])     pt stated June 2023 - with 2 stents placed    High cholesterol     History of blood transfusion     at least 8 units over the years  - pt states last given June 2023    HIV (human immunodeficiency virus infection) ([address]) 2011    Hypertension     NSTEMI (non-ST elevated myocardial infarction) ([address])     Other ill-defined conditions(799.89)     SMALL VEIN IN HEART BEING MONITORED    Pancreatitis     Renal insufficiency     Seizure ([address]) 2012    one time and none sense    Seizures ([address])     Sepsis ([address]) 2011 & 8/2019       Past Surgical History:  Past Surgical History:   Procedure Laterality Date    AV FISTULA CREATION Left 2012    & 2019    CARDIAC PROCEDURE N/A 06/13/2023    Left heart cath /

## 2023-11-25 LAB
EKG ATRIAL RATE: 71 BPM
EKG DIAGNOSIS: NORMAL
EKG P AXIS: 12 DEGREES
EKG P-R INTERVAL: 151 MS
EKG Q-T INTERVAL: 410 MS
EKG QRS DURATION: 97 MS
EKG QTC CALCULATION (BAZETT): 446 MS
EKG R AXIS: 3 DEGREES
EKG T AXIS: 49 DEGREES
EKG VENTRICULAR RATE: 71 BPM

## 2023-12-05 ENCOUNTER — OFFICE VISIT (OUTPATIENT)
Facility: CLINIC | Age: 58
End: 2023-12-05
Payer: MEDICARE

## 2023-12-05 VITALS
HEART RATE: 73 BPM | TEMPERATURE: 98.3 F | WEIGHT: 222 LBS | OXYGEN SATURATION: 98 % | BODY MASS INDEX: 30.07 KG/M2 | RESPIRATION RATE: 18 BRPM | SYSTOLIC BLOOD PRESSURE: 114 MMHG | DIASTOLIC BLOOD PRESSURE: 85 MMHG | HEIGHT: 72 IN

## 2023-12-05 DIAGNOSIS — N18.6 ESRD (END STAGE RENAL DISEASE) (HCC): ICD-10-CM

## 2023-12-05 DIAGNOSIS — Z87.898 HISTORY OF SEIZURES: ICD-10-CM

## 2023-12-05 DIAGNOSIS — B20 HIV INFECTION, UNSPECIFIED SYMPTOM STATUS (HCC): Primary | ICD-10-CM

## 2023-12-05 DIAGNOSIS — G89.29 CHRONIC ABDOMINAL PAIN: ICD-10-CM

## 2023-12-05 DIAGNOSIS — R10.9 CHRONIC ABDOMINAL PAIN: ICD-10-CM

## 2023-12-05 DIAGNOSIS — I10 PRIMARY HYPERTENSION: ICD-10-CM

## 2023-12-05 DIAGNOSIS — I25.83 CORONARY ARTERY DISEASE DUE TO LIPID RICH PLAQUE: ICD-10-CM

## 2023-12-05 DIAGNOSIS — E78.2 MIXED HYPERLIPIDEMIA: ICD-10-CM

## 2023-12-05 DIAGNOSIS — I25.10 CORONARY ARTERY DISEASE DUE TO LIPID RICH PLAQUE: ICD-10-CM

## 2023-12-05 DIAGNOSIS — I71.20 THORACIC AORTIC ANEURYSM WITHOUT RUPTURE, UNSPECIFIED PART (HCC): ICD-10-CM

## 2023-12-05 DIAGNOSIS — K21.9 GASTRO-ESOPHAGEAL REFLUX DISEASE WITHOUT ESOPHAGITIS: ICD-10-CM

## 2023-12-05 DIAGNOSIS — C20 RECTAL CANCER (HCC): ICD-10-CM

## 2023-12-05 PROCEDURE — 3074F SYST BP LT 130 MM HG: CPT | Performed by: NURSE PRACTITIONER

## 2023-12-05 PROCEDURE — 99214 OFFICE O/P EST MOD 30 MIN: CPT | Performed by: NURSE PRACTITIONER

## 2023-12-05 PROCEDURE — 3079F DIAST BP 80-89 MM HG: CPT | Performed by: NURSE PRACTITIONER

## 2023-12-05 RX ORDER — CARVEDILOL 25 MG/1
25 TABLET ORAL 2 TIMES DAILY WITH MEALS
Qty: 180 TABLET | Refills: 1 | Status: SHIPPED | OUTPATIENT
Start: 2023-12-05

## 2023-12-05 RX ORDER — CARVEDILOL 25 MG/1
25 TABLET ORAL 2 TIMES DAILY WITH MEALS
Qty: 180 TABLET | Refills: 1 | Status: SHIPPED | OUTPATIENT
Start: 2023-12-05 | End: 2023-12-05 | Stop reason: SDUPTHER

## 2023-12-05 NOTE — PROGRESS NOTES
Chief Complaint   Patient presents with    form completetion for DMV    Hypertension     Pt brought paperwork in to be filled. No other c/o    1. Have you been to the ER, urgent care clinic since your last visit? Hospitalized since your last visit? No    2. Have you seen or consulted any other health care providers outside of the [address] [place] [address] Avenue since your last visit? Include any pap smears or colon screening.  No

## 2023-12-05 NOTE — PROGRESS NOTES
Subjective  Chief Complaint   Patient presents with    form completetion for DMV    Hypertension     HPI:  Gloria Mai is a 66 y.o. male who presents for follow up of chronic conditions with PMH of  ESRD, HIV,  rectal cancer,hypertension, seizures, thoracic aneurysm,chronic abdominal pain and obesity. Reports taking medicines as directed. Following with nephrology, cardiology, CT surgery , gastroenterology and infectious disease. Reports he was recently seen in ED 11/22 for chest pain. Had follow up with his cardiologist and now has stress test scheduled. No current chest pain or sob. Also has repeat colonoscopy and EGD with GI on 1/2/24. Taylor Garcia had any new concerns but reports he needs DMV forms completed. Is required to complete forms due to history of seizures. Past Medical History:   Diagnosis Date    Anemia     Chronic abdominal pain     Chronic kidney disease     dialysis pt. - [facility] - [facility]    Colon cancer [facility])     Complication of AV dialysis fistula 2019    2 large pseudo-aneurysm    Dependence on renal dialysis ([address])     Esophagitis     ESRD on dialysis ([address])     Gastritis     GERD (gastroesophageal reflux disease)     Gout     pt states not in 12 years    Heart attack [facility])     pt stated June 2023 - with 2 stents placed    High cholesterol     History of blood transfusion     at least 8 units over the years  - pt states last given June 2023    HIV (human immunodeficiency virus infection) ([address]) 2011    Hypertension     NSTEMI (non-ST elevated myocardial infarction) ([address])     Other ill-defined conditions(799.89)     SMALL VEIN IN HEART BEING MONITORED    Pancreatitis     Renal insufficiency     Seizure ([address]) 2012    one time and none sense    Seizures ([address])     Sepsis ([address]) 2011 & 8/2019        Past Surgical History:   Procedure Laterality Date    AV FISTULA CREATION Left 2012    & 2019    CARDIAC PROCEDURE N/A 06/13/2023    Left heart cath / coronary angiography

## 2023-12-17 RX ORDER — FAMOTIDINE 20 MG/1
20 TABLET, FILM COATED ORAL 2 TIMES DAILY
Qty: 180 TABLET | Refills: 1 | Status: SHIPPED | OUTPATIENT
Start: 2023-12-17

## 2023-12-26 RX ORDER — ATAZANAVIR 300 MG/1
CAPSULE ORAL
Qty: 180 CAPSULE | Refills: 0 | OUTPATIENT
Start: 2023-12-26

## 2024-01-02 ENCOUNTER — ANESTHESIA EVENT (OUTPATIENT)
Facility: HOSPITAL | Age: 59
End: 2024-01-02
Payer: MEDICARE

## 2024-01-02 ENCOUNTER — HOSPITAL ENCOUNTER (OUTPATIENT)
Facility: HOSPITAL | Age: 59
Setting detail: OUTPATIENT SURGERY
Discharge: HOME | End: 2024-01-02
Attending: INTERNAL MEDICINE | Admitting: INTERNAL MEDICINE
Payer: MEDICARE

## 2024-01-02 ENCOUNTER — ANESTHESIA (OUTPATIENT)
Facility: HOSPITAL | Age: 59
End: 2024-01-02
Payer: MEDICARE

## 2024-01-02 VITALS
OXYGEN SATURATION: 96 % | HEART RATE: 77 BPM | TEMPERATURE: 98.2 F | DIASTOLIC BLOOD PRESSURE: 85 MMHG | BODY MASS INDEX: 29.29 KG/M2 | SYSTOLIC BLOOD PRESSURE: 142 MMHG | RESPIRATION RATE: 20 BRPM | WEIGHT: 216 LBS

## 2024-01-02 DIAGNOSIS — K62.5 RECTAL BLEEDING: ICD-10-CM

## 2024-01-02 LAB
GLUCOSE BLD STRIP.AUTO-MCNC: 72 MG/DL (ref 65–100)
GLUCOSE BLD STRIP.AUTO-MCNC: 81 MG/DL (ref 65–100)
GLUCOSE BLD STRIP.AUTO-MCNC: 85 MG/DL (ref 65–100)
PERFORMED BY:: NORMAL

## 2024-01-02 PROCEDURE — 2580000003 HC RX 258: Performed by: INTERNAL MEDICINE

## 2024-01-02 PROCEDURE — 2580000003 HC RX 258: Performed by: NURSE ANESTHETIST, CERTIFIED REGISTERED

## 2024-01-02 PROCEDURE — 3600007502: Performed by: INTERNAL MEDICINE

## 2024-01-02 PROCEDURE — 82962 GLUCOSE BLOOD TEST: CPT

## 2024-01-02 PROCEDURE — 6360000002 HC RX W HCPCS: Performed by: NURSE ANESTHETIST, CERTIFIED REGISTERED

## 2024-01-02 PROCEDURE — 3700000000 HC ANESTHESIA ATTENDED CARE: Performed by: INTERNAL MEDICINE

## 2024-01-02 PROCEDURE — 3600007512: Performed by: INTERNAL MEDICINE

## 2024-01-02 PROCEDURE — 2500000003 HC RX 250 WO HCPCS: Performed by: NURSE ANESTHETIST, CERTIFIED REGISTERED

## 2024-01-02 PROCEDURE — 2709999900 HC NON-CHARGEABLE SUPPLY: Performed by: INTERNAL MEDICINE

## 2024-01-02 PROCEDURE — 7100000010 HC PHASE II RECOVERY - FIRST 15 MIN: Performed by: INTERNAL MEDICINE

## 2024-01-02 PROCEDURE — 3700000001 HC ADD 15 MINUTES (ANESTHESIA): Performed by: INTERNAL MEDICINE

## 2024-01-02 PROCEDURE — 88305 TISSUE EXAM BY PATHOLOGIST: CPT

## 2024-01-02 PROCEDURE — 7100000011 HC PHASE II RECOVERY - ADDTL 15 MIN: Performed by: INTERNAL MEDICINE

## 2024-01-02 RX ORDER — DEXAMETHASONE SODIUM PHOSPHATE 4 MG/ML
INJECTION, SOLUTION INTRA-ARTICULAR; INTRALESIONAL; INTRAMUSCULAR; INTRAVENOUS; SOFT TISSUE PRN
Status: DISCONTINUED | OUTPATIENT
Start: 2024-01-02 | End: 2024-01-02 | Stop reason: SDUPTHER

## 2024-01-02 RX ORDER — SODIUM CHLORIDE 9 MG/ML
INJECTION, SOLUTION INTRAVENOUS CONTINUOUS
Status: DISCONTINUED | OUTPATIENT
Start: 2024-01-02 | End: 2024-01-02 | Stop reason: HOSPADM

## 2024-01-02 RX ORDER — DEXAMETHASONE SODIUM PHOSPHATE 4 MG/ML
INJECTION, SOLUTION INTRA-ARTICULAR; INTRALESIONAL; INTRAMUSCULAR; INTRAVENOUS; SOFT TISSUE
Status: COMPLETED
Start: 2024-01-02 | End: 2024-01-02

## 2024-01-02 RX ORDER — GLYCOPYRROLATE 0.2 MG/ML
INJECTION INTRAMUSCULAR; INTRAVENOUS
Status: COMPLETED
Start: 2024-01-02 | End: 2024-01-02

## 2024-01-02 RX ORDER — 0.9 % SODIUM CHLORIDE 0.9 %
INTRAVENOUS SOLUTION INTRAVENOUS PRN
Status: DISCONTINUED | OUTPATIENT
Start: 2024-01-02 | End: 2024-01-02 | Stop reason: SDUPTHER

## 2024-01-02 RX ORDER — PROPOFOL 10 MG/ML
INJECTION, EMULSION INTRAVENOUS
Status: COMPLETED
Start: 2024-01-02 | End: 2024-01-02

## 2024-01-02 RX ORDER — LIDOCAINE HYDROCHLORIDE 20 MG/ML
INJECTION, SOLUTION EPIDURAL; INFILTRATION; INTRACAUDAL; PERINEURAL PRN
Status: DISCONTINUED | OUTPATIENT
Start: 2024-01-02 | End: 2024-01-02 | Stop reason: SDUPTHER

## 2024-01-02 RX ORDER — PHENYLEPHRINE HCL IN 0.9% NACL 1 MG/10 ML
SYRINGE (ML) INTRAVENOUS
Status: DISCONTINUED
Start: 2024-01-02 | End: 2024-01-02 | Stop reason: HOSPADM

## 2024-01-02 RX ORDER — SODIUM CHLORIDE, SODIUM LACTATE, POTASSIUM CHLORIDE, CALCIUM CHLORIDE 600; 310; 30; 20 MG/100ML; MG/100ML; MG/100ML; MG/100ML
INJECTION, SOLUTION INTRAVENOUS CONTINUOUS
Status: DISCONTINUED | OUTPATIENT
Start: 2024-01-02 | End: 2024-01-02 | Stop reason: HOSPADM

## 2024-01-02 RX ORDER — ONDANSETRON 2 MG/ML
INJECTION INTRAMUSCULAR; INTRAVENOUS PRN
Status: DISCONTINUED | OUTPATIENT
Start: 2024-01-02 | End: 2024-01-02 | Stop reason: SDUPTHER

## 2024-01-02 RX ORDER — GLYCOPYRROLATE 0.2 MG/ML
INJECTION INTRAMUSCULAR; INTRAVENOUS PRN
Status: DISCONTINUED | OUTPATIENT
Start: 2024-01-02 | End: 2024-01-02 | Stop reason: SDUPTHER

## 2024-01-02 RX ORDER — PHENYLEPHRINE HCL IN 0.9% NACL 0.4MG/10ML
SYRINGE (ML) INTRAVENOUS PRN
Status: DISCONTINUED | OUTPATIENT
Start: 2024-01-02 | End: 2024-01-02 | Stop reason: SDUPTHER

## 2024-01-02 RX ORDER — ONDANSETRON 2 MG/ML
INJECTION INTRAMUSCULAR; INTRAVENOUS
Status: COMPLETED
Start: 2024-01-02 | End: 2024-01-02

## 2024-01-02 RX ADMIN — GLYCOPYRROLATE 0.2 MG: 0.2 INJECTION, SOLUTION INTRAMUSCULAR; INTRAVENOUS at 13:30

## 2024-01-02 RX ADMIN — PROPOFOL 50 MG: 10 INJECTION, EMULSION INTRAVENOUS at 13:43

## 2024-01-02 RX ADMIN — PROPOFOL 50 MG: 10 INJECTION, EMULSION INTRAVENOUS at 13:51

## 2024-01-02 RX ADMIN — SODIUM CHLORIDE 100 ML: 9 INJECTION, SOLUTION INTRAVENOUS at 14:01

## 2024-01-02 RX ADMIN — ONDANSETRON 4 MG: 2 INJECTION INTRAMUSCULAR; INTRAVENOUS at 13:30

## 2024-01-02 RX ADMIN — SODIUM CHLORIDE: 9 INJECTION, SOLUTION INTRAVENOUS at 12:14

## 2024-01-02 RX ADMIN — DEXAMETHASONE SODIUM PHOSPHATE 4 MG: 4 INJECTION, SOLUTION INTRA-ARTICULAR; INTRALESIONAL; INTRAMUSCULAR; INTRAVENOUS; SOFT TISSUE at 13:30

## 2024-01-02 RX ADMIN — PROPOFOL 150 MG: 10 INJECTION, EMULSION INTRAVENOUS at 13:37

## 2024-01-02 RX ADMIN — SODIUM CHLORIDE 50 ML: 9 INJECTION, SOLUTION INTRAVENOUS at 13:32

## 2024-01-02 RX ADMIN — Medication 200 MCG: at 13:52

## 2024-01-02 RX ADMIN — PROPOFOL 50 MG: 10 INJECTION, EMULSION INTRAVENOUS at 13:47

## 2024-01-02 RX ADMIN — PROPOFOL 50 MG: 10 INJECTION, EMULSION INTRAVENOUS at 13:56

## 2024-01-02 RX ADMIN — SODIUM CHLORIDE 150 ML: 9 INJECTION, SOLUTION INTRAVENOUS at 13:52

## 2024-01-02 RX ADMIN — LIDOCAINE HYDROCHLORIDE 100 MG: 20 INJECTION, SOLUTION EPIDURAL; INFILTRATION; INTRACAUDAL; PERINEURAL at 13:37

## 2024-01-02 ASSESSMENT — PAIN - FUNCTIONAL ASSESSMENT
PAIN_FUNCTIONAL_ASSESSMENT: 0-10
PAIN_FUNCTIONAL_ASSESSMENT: NONE - DENIES PAIN
PAIN_FUNCTIONAL_ASSESSMENT: NONE - DENIES PAIN

## 2024-01-02 NOTE — ANESTHESIA POSTPROCEDURE EVALUATION
Department of Anesthesiology  Postprocedure Note    Patient: Woodrow Madison  MRN: [id number]  YOB: 1965  Date of evaluation: 1/2/2024    Procedure Summary       Date: 01/02/24 Room / Location: [facility] ENDO 04 / [facility] ENDOSCOPY    Anesthesia Start: 1333 Anesthesia Stop:     Procedures:       EGD DIAGNOSTIC ONLY (Upper GI Region)      COLONOSCOPY DIAGNOSTIC (Lower GI Region)      EGD BIOPSY (Upper GI Region) Diagnosis:       Rectal bleeding      (Rectal bleeding [K62.5])    Surgeons: Chin Felix MD Responsible Provider: Estuardo Whitten Jr., MD    Anesthesia Type: MAC ASA Status: 4            Anesthesia Type: MAC    Jose Manuel Phase I: Jose Manuel Score: 10    Jose Manuel Phase II:      Anesthesia Post Evaluation    Patient location during evaluation: bedside  Patient participation: waiting for patient participation  Level of consciousness: sleepy but conscious  Pain score: 0  Airway patency: patent  Nausea & Vomiting: no nausea and no vomiting  Cardiovascular status: blood pressure returned to baseline and hemodynamically stable  Respiratory status: acceptable and nasal airway  Hydration status: hypovolemic  Pain management: adequate    No notable events documented.

## 2024-01-02 NOTE — DISCHARGE INSTRUCTIONS
Repeat in 1 year because of poor bowel prep  Resume previous diet   Return to endoscopist in 4 weeks  Return to normal activities tomorrow

## 2024-01-02 NOTE — ANESTHESIA PRE PROCEDURE
Department of Anesthesiology  Preprocedure Note       Name:  Woodrow Madison   Age:  58 y.o.  :  1965                                          MRN:  [id number]         Date:  2024      Surgeon: Surgeon(s):  Chin Felix MD    Procedure: Procedure(s):  COLONOSCOPY, EGD  EGD DIAGNOSTIC ONLY    Medications prior to admission:   Prior to Admission medications    Medication Sig Start Date End Date Taking? Authorizing Provider   atorvastatin (LIPITOR) 40 MG tablet take 1 tablet by mouth once daily 23   Maggy Daniel APRN - NP   famotidine (PEPCID) 20 MG tablet Take 1 tablet by mouth 2 times daily 23   Maggy Daniel APRN - NP   carvedilol (COREG) 25 MG tablet Take 1 tablet by mouth 2 times daily (with meals) 23   Maggy Daniel APRN - NP   bismuth subsalicylate (BISMATROL) 262 MG/15ML suspension Take 15 mLs by mouth every 6 hours as needed for Indigestion or Heartburn  Patient not taking: Reported on 23   Thomas Lazar MD   sucralfate (CARAFATE) 1 GM/10ML suspension Take 10 mLs by mouth in the morning, at noon, and at bedtime for 14 days 23  Thomas Lazar MD   VEMLIDY 25 MG TABS take 1 tablet by mouth once daily 10/9/23   Kirk Fish MD   amLODIPine (NORVASC) 5 MG tablet Take 1 tablet by mouth daily    ProviderEmily MD   lidocaine (LMX) 4 % cream CHERRI A SML AMT TO AFFECTED SKIN PRN P    Emily Ortiz MD   albuterol sulfate HFA (PROVENTIL;VENTOLIN;PROAIR) 108 (90 Base) MCG/ACT inhaler inhale 1 puff by mouth and INTO THE LUNGS every 4 hours if needed for wheezing 23   Maggy Daniel APRN - NP   levETIRAcetam (KEPPRA) 500 MG tablet take 1 tablet by mouth twice a day 23   Maggy Daniel APRN - NP   clopidogrel (PLAVIX) 75 MG tablet Take 1 tablet by mouth daily 23   Maggy Daniel APRN - NP   losartan (COZAAR) 100 MG tablet Take 1 tablet by mouth daily  Patient

## 2024-01-04 DIAGNOSIS — I25.83 CORONARY ARTERY DISEASE DUE TO LIPID RICH PLAQUE: ICD-10-CM

## 2024-01-04 DIAGNOSIS — I10 PRIMARY HYPERTENSION: ICD-10-CM

## 2024-01-04 DIAGNOSIS — I25.10 CORONARY ARTERY DISEASE DUE TO LIPID RICH PLAQUE: ICD-10-CM

## 2024-01-04 RX ORDER — CLOPIDOGREL BISULFATE 75 MG/1
75 TABLET ORAL DAILY
Qty: 90 TABLET | Refills: 1 | Status: SHIPPED | OUTPATIENT
Start: 2024-01-04

## 2024-01-15 DIAGNOSIS — B20 HIV INFECTION, UNSPECIFIED SYMPTOM STATUS (HCC): Primary | ICD-10-CM

## 2024-01-15 RX ORDER — ATAZANAVIR 150 MG/1
300 CAPSULE ORAL
Qty: 360 CAPSULE | Refills: 1 | Status: SHIPPED | OUTPATIENT
Start: 2024-01-15 | End: 2025-01-09

## 2024-01-15 RX ORDER — ATAZANAVIR 300 MG/1
CAPSULE ORAL
Qty: 180 CAPSULE | Refills: 0 | Status: SHIPPED | OUTPATIENT
Start: 2024-01-15

## 2024-01-16 ENCOUNTER — TELEPHONE (OUTPATIENT)
Age: 59
End: 2024-01-16

## 2024-01-16 NOTE — TELEPHONE ENCOUNTER
[facility] specialty pharmacy department called in for clarification on the Atazanavir 150 mg and Atazanavir 300 mg. They are wanting to know if he needs both or just one.  The number to reach this department is [phone]. Reference number:[id number]

## 2024-01-20 DIAGNOSIS — I25.10 CORONARY ARTERY DISEASE DUE TO LIPID RICH PLAQUE: ICD-10-CM

## 2024-01-20 DIAGNOSIS — I10 PRIMARY HYPERTENSION: ICD-10-CM

## 2024-01-20 DIAGNOSIS — I25.83 CORONARY ARTERY DISEASE DUE TO LIPID RICH PLAQUE: ICD-10-CM

## 2024-01-21 RX ORDER — LOSARTAN POTASSIUM 100 MG/1
100 TABLET ORAL DAILY
Qty: 90 TABLET | Refills: 1 | Status: SHIPPED | OUTPATIENT
Start: 2024-01-21

## 2024-01-23 ENCOUNTER — TELEPHONE (OUTPATIENT)
Facility: CLINIC | Age: 59
End: 2024-01-23

## 2024-01-30 RX ORDER — METOPROLOL TARTRATE 50 MG/1
50 TABLET, FILM COATED ORAL 2 TIMES DAILY
Qty: 180 TABLET | Refills: 1 | Status: SHIPPED | OUTPATIENT
Start: 2024-01-30

## 2024-02-13 ENCOUNTER — TELEPHONE (OUTPATIENT)
Age: 59
End: 2024-02-13

## 2024-02-13 RX ORDER — DOLUTEGRAVIR SODIUM 50 MG/1
50 TABLET, FILM COATED ORAL DAILY
Qty: 90 TABLET | Refills: 1 | Status: SHIPPED | OUTPATIENT
Start: 2024-02-13

## 2024-03-01 ENCOUNTER — HOSPITAL ENCOUNTER (EMERGENCY)
Facility: HOSPITAL | Age: 59
Discharge: HOME | End: 2024-03-01
Attending: EMERGENCY MEDICINE
Payer: MEDICARE

## 2024-03-01 ENCOUNTER — APPOINTMENT (OUTPATIENT)
Facility: HOSPITAL | Age: 59
End: 2024-03-01
Attending: EMERGENCY MEDICINE
Payer: MEDICARE

## 2024-03-01 VITALS
HEART RATE: 80 BPM | WEIGHT: 213 LBS | SYSTOLIC BLOOD PRESSURE: 167 MMHG | DIASTOLIC BLOOD PRESSURE: 105 MMHG | RESPIRATION RATE: 14 BRPM | OXYGEN SATURATION: 98 % | BODY MASS INDEX: 28.23 KG/M2 | TEMPERATURE: 99 F | HEIGHT: 73 IN

## 2024-03-01 DIAGNOSIS — S83.422A SPRAIN OF LATERAL COLLATERAL LIGAMENT OF LEFT KNEE, INITIAL ENCOUNTER: Primary | ICD-10-CM

## 2024-03-01 PROCEDURE — 99283 EMERGENCY DEPT VISIT LOW MDM: CPT

## 2024-03-01 PROCEDURE — 73562 X-RAY EXAM OF KNEE 3: CPT

## 2024-03-01 ASSESSMENT — PAIN - FUNCTIONAL ASSESSMENT
PAIN_FUNCTIONAL_ASSESSMENT: 0-10
PAIN_FUNCTIONAL_ASSESSMENT: 0-10

## 2024-03-01 ASSESSMENT — PAIN SCALES - GENERAL
PAINLEVEL_OUTOF10: 8
PAINLEVEL_OUTOF10: 8

## 2024-03-01 ASSESSMENT — PAIN DESCRIPTION - ORIENTATION: ORIENTATION: LEFT

## 2024-03-01 ASSESSMENT — PAIN DESCRIPTION - LOCATION: LOCATION: KNEE

## 2024-03-01 ASSESSMENT — PAIN DESCRIPTION - PAIN TYPE: TYPE: ACUTE PAIN

## 2024-03-01 NOTE — ED PROVIDER NOTES
[facility] EMERGENCY DEPT  EMERGENCY DEPARTMENT ENCOUNTER      Pt Name: Woodrow Madison  MRN: [id number]  Birthdate 1965  Date of evaluation: 3/1/2024  Provider: Flip Hutchison MD    CHIEF COMPLAINT       Chief Complaint   Patient presents with    Knee Pain         HISTORY OF PRESENT ILLNESS   (Location/Symptom, Timing/Onset, Context/Setting, Quality, Duration, Modifying Factors, Severity)  Note limiting factors.   Woodrow Madison is a 58 y.o. male who presents to the emergency department left knee injury occurring 3 nights ago when he attempted to break the fall of family member.  Pain in the lateral knee.  No prior knee injury.    Complicated medical history including chronic renal failure on hemodialysis currently under evaluation for renal transplant HIV automatic call problems listed below    HPI    Nursing Notes were reviewed.    REVIEW OF SYSTEMS    (2-9 systems for level 4, 10 or more for level 5)     Review of Systems   All other systems reviewed and are negative.      Except as noted above the remainder of the review of systems was reviewed and negative.       PAST MEDICAL HISTORY     Past Medical History:   Diagnosis Date    Anemia     Chronic abdominal pain     Chronic kidney disease     dialysis pt. - M[facility]ALEXA - [facility]    Colon cancer ([facility])     Complication of AV dialysis fistula 2019    2 large pseudo-aneurysm    Dependence on renal dialysis ([facility])     Esophagitis     ESRD on dialysis ([facility])     Gastritis     GERD (gastroesophageal reflux disease)     Gout     pt states not in 12 years    Heart attack ([facility])     pt stated June 2023 - with 2 stents placed    High cholesterol     History of blood transfusion     at least 8 units over the years  - pt states last given June 2023    HIV (human immunodeficiency virus infection) (HCC) 2011    Hypertension     NSTEMI (non-ST elevated myocardial infarction) ([facility])     JUNE 2023    Other ill-defined conditions(799.89)     SMALL VEIN IN HEART BEING

## 2024-03-08 ENCOUNTER — TELEPHONE (OUTPATIENT)
Facility: CLINIC | Age: 59
End: 2024-03-08

## 2024-03-08 NOTE — TELEPHONE ENCOUNTER
Sandee Osborn ([phone]) from dialysis in [place] called requesting medical records (last OV) -she will be faxing over a request. Please review

## 2024-03-19 ENCOUNTER — HOSPITAL ENCOUNTER (OUTPATIENT)
Facility: HOSPITAL | Age: 59
Discharge: HOME | End: 2024-03-22

## 2024-03-19 VITALS
TEMPERATURE: 98.9 F | RESPIRATION RATE: 18 BRPM | OXYGEN SATURATION: 100 % | DIASTOLIC BLOOD PRESSURE: 106 MMHG | BODY MASS INDEX: 29.18 KG/M2 | HEART RATE: 59 BPM | WEIGHT: 221.2 LBS | SYSTOLIC BLOOD PRESSURE: 175 MMHG

## 2024-03-19 DIAGNOSIS — A63.0 ANAL CONDYLOMATA: Primary | ICD-10-CM

## 2024-03-19 ASSESSMENT — PAIN SCALES - GENERAL: PAINLEVEL_OUTOF10: 0

## 2024-03-19 NOTE — PROGRESS NOTES
[facility]    Radiation Oncology Follow Up    Woodrow Madison  [id number]  1965     Diagnosis   Anal cancer. Condyloma anal skin.     AJCC Staging has been reviewed.  Oncologic History     No xrt.  Interval History   Mr. Madison arrives today for routine follow up .    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    Followed by Dr. Coyle and myself . No chemo or radiotherapy offered last year.    Mr. Madison is doing well. On schedule for kidney transplant at [facility].    Most recent colonoscopy in January 2024 negative for malignancy.         Review of Systems:  A complete review of systems was obtained and negative except as described above.    Interval Imaging/Labs     Above imaging/lab reports were reviewed.  Allergies and Medications     Allergies   Allergen Reactions    Penicillin G Itching       Current Outpatient Medications   Medication Instructions    albuterol sulfate HFA (PROVENTIL;VENTOLIN;PROAIR) 108 (90 Base) MCG/ACT inhaler inhale 1 puff by mouth and INTO THE LUNGS every 4 hours if needed for wheezing    amLODIPine (NORVASC) 5 mg, Oral, DAILY    aspirin 81 MG chewable tablet aspirin 81 mg chewable tablet   chew and swallow 1 tablet by mouth once daily    atazanavir (REYATAZ) 300 MG capsule TAKE 1 CAPSULE BY MOUTH EVERY DAY BEFORE BREAKFAST    atazanavir (REYATAZ) 300 mg, Oral, DAILY WITH BREAKFAST    atorvastatin (LIPITOR) 40 MG tablet take 1 tablet by mouth once daily    bismuth subsalicylate (BISMATROL) 262 MG/15ML suspension 15 mLs, Oral, EVERY 6 HOURS PRN    calcium carbonate (OS-MOON) 1250 (500 Ca) MG chewable tablet 1 tablet, Oral, PRN    carvedilol (COREG) 25 mg, Oral, 2 TIMES DAILY WITH MEALS    clopidogrel (PLAVIX) 75 mg, Oral, DAILY    famotidine (PEPCID) 20 mg, Oral, 2 TIMES DAILY    levETIRAcetam (KEPPRA) 500 MG tablet take 1 tablet by mouth twice a day    lidocaine

## 2024-03-26 PROBLEM — N18.9 CHRONIC KIDNEY DISEASE: Status: ACTIVE | Noted: 2024-03-11

## 2024-03-26 PROBLEM — K21.9 GERD (GASTROESOPHAGEAL REFLUX DISEASE): Status: ACTIVE | Noted: 2024-02-24

## 2024-03-26 PROBLEM — Z99.2 DIALYSIS PATIENT (HCC): Status: ACTIVE | Noted: 2024-03-11

## 2024-04-10 LAB — LEFT VENTRICULAR EJECTION FRACTION, EXTERNAL: NORMAL

## 2024-04-13 DIAGNOSIS — I25.10 CORONARY ARTERY DISEASE DUE TO LIPID RICH PLAQUE: ICD-10-CM

## 2024-04-13 DIAGNOSIS — I25.83 CORONARY ARTERY DISEASE DUE TO LIPID RICH PLAQUE: ICD-10-CM

## 2024-04-13 DIAGNOSIS — I10 PRIMARY HYPERTENSION: ICD-10-CM

## 2024-04-14 RX ORDER — CLOPIDOGREL BISULFATE 75 MG/1
75 TABLET ORAL DAILY
Qty: 90 TABLET | Refills: 1 | Status: SHIPPED | OUTPATIENT
Start: 2024-04-14

## 2024-04-19 DIAGNOSIS — I10 PRIMARY HYPERTENSION: ICD-10-CM

## 2024-04-19 DIAGNOSIS — I25.83 CORONARY ARTERY DISEASE DUE TO LIPID RICH PLAQUE: ICD-10-CM

## 2024-04-19 DIAGNOSIS — I25.10 CORONARY ARTERY DISEASE DUE TO LIPID RICH PLAQUE: ICD-10-CM

## 2024-04-19 RX ORDER — CLOPIDOGREL BISULFATE 75 MG/1
75 TABLET ORAL DAILY
Qty: 90 TABLET | Refills: 1 | Status: SHIPPED | OUTPATIENT
Start: 2024-04-19

## 2024-04-23 ENCOUNTER — HOSPITAL ENCOUNTER (OUTPATIENT)
Facility: HOSPITAL | Age: 59
Discharge: HOME | End: 2024-04-26
Payer: MEDICARE

## 2024-04-23 LAB
ALBUMIN SERPL-MCNC: 4.3 G/DL (ref 3.5–5)
ALBUMIN/GLOB SERPL: 1.2 (ref 1.1–2.2)
ALP SERPL-CCNC: 53 U/L (ref 45–117)
ALT SERPL-CCNC: 8 U/L (ref 12–78)
ANION GAP SERPL CALC-SCNC: 9 MMOL/L (ref 5–15)
AST SERPL W P-5'-P-CCNC: 14 U/L (ref 15–37)
BASOPHILS # BLD: 0 K/UL (ref 0–0.1)
BASOPHILS NFR BLD: 0 % (ref 0–1)
BILIRUB SERPL-MCNC: 1.3 MG/DL (ref 0.2–1)
BUN SERPL-MCNC: 48 MG/DL (ref 6–20)
BUN/CREAT SERPL: 4 (ref 12–20)
CA-I BLD-MCNC: 9.8 MG/DL (ref 8.5–10.1)
CHLORIDE SERPL-SCNC: 99 MMOL/L (ref 97–108)
CO2 SERPL-SCNC: 30 MMOL/L (ref 21–32)
CREAT SERPL-MCNC: 11.7 MG/DL (ref 0.7–1.3)
DIFFERENTIAL METHOD BLD: ABNORMAL
EOSINOPHIL # BLD: 0.1 K/UL (ref 0–0.4)
EOSINOPHIL NFR BLD: 2 % (ref 0–7)
ERYTHROCYTE [DISTWIDTH] IN BLOOD BY AUTOMATED COUNT: 15.6 % (ref 11.5–14.5)
GLOBULIN SER CALC-MCNC: 3.6 G/DL (ref 2–4)
GLUCOSE SERPL-MCNC: 111 MG/DL (ref 65–100)
HCT VFR BLD AUTO: 32.2 % (ref 36.6–50.3)
HGB BLD-MCNC: 10.3 G/DL (ref 12.1–17)
IMM GRANULOCYTES # BLD AUTO: 0 K/UL (ref 0–0.04)
IMM GRANULOCYTES NFR BLD AUTO: 0 % (ref 0–0.5)
LYMPHOCYTES # BLD: 1.3 K/UL (ref 0.8–3.5)
LYMPHOCYTES NFR BLD: 24 % (ref 12–49)
MCH RBC QN AUTO: 29.9 PG (ref 26–34)
MCHC RBC AUTO-ENTMCNC: 32 G/DL (ref 30–36.5)
MCV RBC AUTO: 93.6 FL (ref 80–99)
MONOCYTES # BLD: 0.3 K/UL (ref 0–1)
MONOCYTES NFR BLD: 6 % (ref 5–13)
NEUTS SEG # BLD: 3.8 K/UL (ref 1.8–8)
NEUTS SEG NFR BLD: 68 % (ref 32–75)
NRBC # BLD: 0 K/UL (ref 0–0.01)
NRBC BLD-RTO: 0 PER 100 WBC
PLATELET # BLD AUTO: 172 K/UL (ref 150–400)
PMV BLD AUTO: 9.7 FL (ref 8.9–12.9)
POTASSIUM SERPL-SCNC: 5.3 MMOL/L (ref 3.5–5.1)
PROT SERPL-MCNC: 7.9 G/DL (ref 6.4–8.2)
RBC # BLD AUTO: 3.44 M/UL (ref 4.1–5.7)
SODIUM SERPL-SCNC: 138 MMOL/L (ref 136–145)
WBC # BLD AUTO: 5.5 K/UL (ref 4.1–11.1)

## 2024-04-23 PROCEDURE — G0257 UNSCHED DIALYSIS ESRD PT HOS: HCPCS

## 2024-04-23 PROCEDURE — 85025 COMPLETE CBC W/AUTO DIFF WBC: CPT

## 2024-04-23 PROCEDURE — 2709999900 HC NON-CHARGEABLE SUPPLY

## 2024-04-23 PROCEDURE — 80053 COMPREHEN METABOLIC PANEL: CPT

## 2024-04-23 PROCEDURE — 36415 COLL VENOUS BLD VENIPUNCTURE: CPT

## 2024-04-23 PROCEDURE — 93005 ELECTROCARDIOGRAM TRACING: CPT | Performed by: INTERNAL MEDICINE

## 2024-04-23 ASSESSMENT — PAIN SCALES - GENERAL: PAINLEVEL_OUTOF10: 0

## 2024-04-24 ENCOUNTER — TELEPHONE (OUTPATIENT)
Facility: CLINIC | Age: 59
End: 2024-04-24

## 2024-04-24 DIAGNOSIS — M1A.9XX0 CHRONIC GOUT WITHOUT TOPHUS, UNSPECIFIED CAUSE, UNSPECIFIED SITE: Primary | ICD-10-CM

## 2024-04-24 LAB
EKG ATRIAL RATE: 74 BPM
EKG DIAGNOSIS: NORMAL
EKG P AXIS: 23 DEGREES
EKG P-R INTERVAL: 152 MS
EKG Q-T INTERVAL: 384 MS
EKG QRS DURATION: 86 MS
EKG QTC CALCULATION (BAZETT): 426 MS
EKG R AXIS: -13 DEGREES
EKG T AXIS: 27 DEGREES
EKG VENTRICULAR RATE: 74 BPM

## 2024-04-24 RX ORDER — PREDNISONE 20 MG/1
TABLET ORAL
Qty: 9 TABLET | Refills: 0 | Status: ON HOLD | OUTPATIENT
Start: 2024-04-24 | End: 2024-04-29

## 2024-04-24 NOTE — PROGRESS NOTES
Dr. Lockett notified of consult needed for dialysis post cardiac cath on Friday. States Dr. Chand sees patients in the hospital. He will notify Dr. Chand of the consult. Consult order placed in [facility]. Sameday to call Dr. Chand morning of procedure.

## 2024-04-24 NOTE — PROGRESS NOTES
Dr Glover notified of abnormal lab values, dialysis M/W/F, labs drawn on a non dialysis day. Per Dr. Glover no need to repeat labs. Okay to consult nephrology to set up dialysis post cardiac cath.

## 2024-04-24 NOTE — TELEPHONE ENCOUNTER
Patient states that he has painful right great toe.  Feels like gout; has not had in quite a long time.  Please advise.

## 2024-04-26 ENCOUNTER — APPOINTMENT (OUTPATIENT)
Facility: HOSPITAL | Age: 59
End: 2024-04-26
Attending: INTERNAL MEDICINE
Payer: MEDICARE

## 2024-04-26 ENCOUNTER — HOSPITAL ENCOUNTER (OUTPATIENT)
Facility: HOSPITAL | Age: 59
Discharge: HOME | End: 2024-04-30
Attending: INTERNAL MEDICINE | Admitting: INTERNAL MEDICINE
Payer: MEDICARE

## 2024-04-26 VITALS
TEMPERATURE: 98 F | BODY MASS INDEX: 30.15 KG/M2 | WEIGHT: 222.6 LBS | RESPIRATION RATE: 16 BRPM | OXYGEN SATURATION: 98 % | HEIGHT: 72 IN | DIASTOLIC BLOOD PRESSURE: 73 MMHG | SYSTOLIC BLOOD PRESSURE: 128 MMHG | HEART RATE: 66 BPM

## 2024-04-26 DIAGNOSIS — I10 PRIMARY HYPERTENSION: ICD-10-CM

## 2024-04-26 DIAGNOSIS — I25.83 CORONARY ARTERY DISEASE DUE TO LIPID RICH PLAQUE: ICD-10-CM

## 2024-04-26 DIAGNOSIS — I25.10 CORONARY ARTERY DISEASE DUE TO LIPID RICH PLAQUE: ICD-10-CM

## 2024-04-26 DIAGNOSIS — I21.4 NSTEMI (NON-ST ELEVATED MYOCARDIAL INFARCTION) (HCC): ICD-10-CM

## 2024-04-26 DIAGNOSIS — R94.39 ABNORMAL STRESS TEST: ICD-10-CM

## 2024-04-26 PROBLEM — R07.9 CHEST PAIN: Status: ACTIVE | Noted: 2024-04-26

## 2024-04-26 LAB
ACT BLD: 212 SEC (ref 74–125)
ALBUMIN SERPL-MCNC: 4 G/DL (ref 3.5–5)
ALBUMIN/GLOB SERPL: 1 (ref 1.1–2.2)
ALP SERPL-CCNC: 72 U/L (ref 45–117)
ALT SERPL-CCNC: 31 U/L (ref 12–78)
ANION GAP SERPL CALC-SCNC: 8 MMOL/L (ref 5–15)
AST SERPL W P-5'-P-CCNC: 96 U/L (ref 15–37)
BILIRUB DIRECT SERPL-MCNC: 0.3 MG/DL (ref 0–0.2)
BILIRUB SERPL-MCNC: 0.8 MG/DL (ref 0.2–1)
BUN SERPL-MCNC: 72 MG/DL (ref 6–20)
BUN/CREAT SERPL: 5 (ref 12–20)
CA-I BLD-MCNC: 10.7 MG/DL (ref 8.5–10.1)
CHLORIDE SERPL-SCNC: 100 MMOL/L (ref 97–108)
CO2 SERPL-SCNC: 29 MMOL/L (ref 21–32)
CREAT SERPL-MCNC: 13.2 MG/DL (ref 0.7–1.3)
EKG ATRIAL RATE: 57 BPM
EKG DIAGNOSIS: NORMAL
EKG P AXIS: 9 DEGREES
EKG P-R INTERVAL: 146 MS
EKG Q-T INTERVAL: 424 MS
EKG QRS DURATION: 94 MS
EKG QTC CALCULATION (BAZETT): 412 MS
EKG R AXIS: -1 DEGREES
EKG T AXIS: 26 DEGREES
EKG VENTRICULAR RATE: 57 BPM
GLOBULIN SER CALC-MCNC: 3.9 G/DL (ref 2–4)
GLUCOSE SERPL-MCNC: 101 MG/DL (ref 65–100)
PERFORMED BY:: ABNORMAL
POTASSIUM SERPL-SCNC: 5.9 MMOL/L (ref 3.5–5.1)
PROT SERPL-MCNC: 7.9 G/DL (ref 6.4–8.2)
SODIUM SERPL-SCNC: 137 MMOL/L (ref 136–145)
TROPONIN I SERPL HS-MCNC: 21 NG/L (ref 0–76)

## 2024-04-26 PROCEDURE — 99152 MOD SED SAME PHYS/QHP 5/>YRS: CPT | Performed by: INTERNAL MEDICINE

## 2024-04-26 PROCEDURE — 80076 HEPATIC FUNCTION PANEL: CPT

## 2024-04-26 PROCEDURE — 2580000003 HC RX 258: Performed by: INTERNAL MEDICINE

## 2024-04-26 PROCEDURE — 7100000010 HC PHASE II RECOVERY - FIRST 15 MIN: Performed by: INTERNAL MEDICINE

## 2024-04-26 PROCEDURE — 93005 ELECTROCARDIOGRAM TRACING: CPT | Performed by: INTERNAL MEDICINE

## 2024-04-26 PROCEDURE — 85347 COAGULATION TIME ACTIVATED: CPT

## 2024-04-26 PROCEDURE — 7100000001 HC PACU RECOVERY - ADDTL 15 MIN: Performed by: INTERNAL MEDICINE

## 2024-04-26 PROCEDURE — 7100000011 HC PHASE II RECOVERY - ADDTL 15 MIN: Performed by: INTERNAL MEDICINE

## 2024-04-26 PROCEDURE — 6360000002 HC RX W HCPCS: Performed by: STUDENT IN AN ORGANIZED HEALTH CARE EDUCATION/TRAINING PROGRAM

## 2024-04-26 PROCEDURE — 93005 ELECTROCARDIOGRAM TRACING: CPT | Performed by: STUDENT IN AN ORGANIZED HEALTH CARE EDUCATION/TRAINING PROGRAM

## 2024-04-26 PROCEDURE — 6360000002 HC RX W HCPCS: Performed by: INTERNAL MEDICINE

## 2024-04-26 PROCEDURE — C1887 CATHETER, GUIDING: HCPCS | Performed by: INTERNAL MEDICINE

## 2024-04-26 PROCEDURE — 80048 BASIC METABOLIC PNL TOTAL CA: CPT

## 2024-04-26 PROCEDURE — C1769 GUIDE WIRE: HCPCS | Performed by: INTERNAL MEDICINE

## 2024-04-26 PROCEDURE — 6370000000 HC RX 637 (ALT 250 FOR IP): Performed by: INTERNAL MEDICINE

## 2024-04-26 PROCEDURE — 71045 X-RAY EXAM CHEST 1 VIEW: CPT

## 2024-04-26 PROCEDURE — 93458 L HRT ARTERY/VENTRICLE ANGIO: CPT | Performed by: INTERNAL MEDICINE

## 2024-04-26 PROCEDURE — C1725 CATH, TRANSLUMIN NON-LASER: HCPCS | Performed by: INTERNAL MEDICINE

## 2024-04-26 PROCEDURE — 2500000003 HC RX 250 WO HCPCS: Performed by: INTERNAL MEDICINE

## 2024-04-26 PROCEDURE — C1894 INTRO/SHEATH, NON-LASER: HCPCS | Performed by: INTERNAL MEDICINE

## 2024-04-26 PROCEDURE — 6360000002 HC RX W HCPCS

## 2024-04-26 PROCEDURE — 84484 ASSAY OF TROPONIN QUANT: CPT

## 2024-04-26 PROCEDURE — 74178 CT ABD&PLV WO CNTR FLWD CNTR: CPT

## 2024-04-26 PROCEDURE — C1760 CLOSURE DEV, VASC: HCPCS | Performed by: INTERNAL MEDICINE

## 2024-04-26 PROCEDURE — 6360000004 HC RX CONTRAST MEDICATION: Performed by: INTERNAL MEDICINE

## 2024-04-26 PROCEDURE — 2709999900 HC NON-CHARGEABLE SUPPLY: Performed by: INTERNAL MEDICINE

## 2024-04-26 PROCEDURE — G0257 UNSCHED DIALYSIS ESRD PT HOS: HCPCS

## 2024-04-26 PROCEDURE — 36415 COLL VENOUS BLD VENIPUNCTURE: CPT

## 2024-04-26 PROCEDURE — 99153 MOD SED SAME PHYS/QHP EA: CPT | Performed by: INTERNAL MEDICINE

## 2024-04-26 PROCEDURE — 90935 HEMODIALYSIS ONE EVALUATION: CPT

## 2024-04-26 PROCEDURE — C1874 STENT, COATED/COV W/DEL SYS: HCPCS | Performed by: INTERNAL MEDICINE

## 2024-04-26 PROCEDURE — 7100000000 HC PACU RECOVERY - FIRST 15 MIN: Performed by: INTERNAL MEDICINE

## 2024-04-26 PROCEDURE — C9600 PERC DRUG-EL COR STENT SING: HCPCS | Performed by: INTERNAL MEDICINE

## 2024-04-26 DEVICE — STENT ONYXNG40015UX ONYX 4.00X15RX
Type: IMPLANTABLE DEVICE | Status: FUNCTIONAL
Brand: ONYX FRONTIER™

## 2024-04-26 RX ORDER — DIPHENHYDRAMINE HYDROCHLORIDE 50 MG/ML
INJECTION INTRAMUSCULAR; INTRAVENOUS PRN
Status: DISCONTINUED | OUTPATIENT
Start: 2024-04-26 | End: 2024-04-26 | Stop reason: HOSPADM

## 2024-04-26 RX ORDER — NITROGLYCERIN 0.4 MG/1
0.4 TABLET SUBLINGUAL EVERY 5 MIN PRN
Status: DISCONTINUED | OUTPATIENT
Start: 2024-04-26 | End: 2024-04-30 | Stop reason: HOSPADM

## 2024-04-26 RX ORDER — FAMOTIDINE 20 MG/1
20 TABLET, FILM COATED ORAL 2 TIMES DAILY
Status: DISCONTINUED | OUTPATIENT
Start: 2024-04-27 | End: 2024-04-30 | Stop reason: HOSPADM

## 2024-04-26 RX ORDER — PRAVASTATIN SODIUM 40 MG
40 TABLET ORAL NIGHTLY
Status: DISCONTINUED | OUTPATIENT
Start: 2024-04-26 | End: 2024-04-26

## 2024-04-26 RX ORDER — PANTOPRAZOLE SODIUM 40 MG/1
40 TABLET, DELAYED RELEASE ORAL
Status: DISCONTINUED | OUTPATIENT
Start: 2024-04-27 | End: 2024-04-27

## 2024-04-26 RX ORDER — LIDOCAINE HYDROCHLORIDE 10 MG/ML
INJECTION, SOLUTION INFILTRATION; PERINEURAL PRN
Status: DISCONTINUED | OUTPATIENT
Start: 2024-04-26 | End: 2024-04-26 | Stop reason: HOSPADM

## 2024-04-26 RX ORDER — ATAZANAVIR 150 MG/1
300 CAPSULE ORAL DAILY
Status: DISCONTINUED | OUTPATIENT
Start: 2024-04-27 | End: 2024-04-30 | Stop reason: HOSPADM

## 2024-04-26 RX ORDER — FENTANYL CITRATE 50 UG/ML
INJECTION, SOLUTION INTRAMUSCULAR; INTRAVENOUS PRN
Status: DISCONTINUED | OUTPATIENT
Start: 2024-04-26 | End: 2024-04-26 | Stop reason: HOSPADM

## 2024-04-26 RX ORDER — SODIUM CHLORIDE 9 MG/ML
INJECTION, SOLUTION INTRAVENOUS CONTINUOUS
Status: DISCONTINUED | OUTPATIENT
Start: 2024-04-26 | End: 2024-04-26

## 2024-04-26 RX ORDER — SODIUM CHLORIDE 0.9 % (FLUSH) 0.9 %
5-40 SYRINGE (ML) INJECTION PRN
Status: DISCONTINUED | OUTPATIENT
Start: 2024-04-26 | End: 2024-04-30 | Stop reason: HOSPADM

## 2024-04-26 RX ORDER — CLOPIDOGREL BISULFATE 75 MG/1
75 TABLET ORAL DAILY
Status: DISCONTINUED | OUTPATIENT
Start: 2024-04-26 | End: 2024-04-30 | Stop reason: HOSPADM

## 2024-04-26 RX ORDER — LOSARTAN POTASSIUM 25 MG/1
100 TABLET ORAL DAILY
Status: DISCONTINUED | OUTPATIENT
Start: 2024-04-26 | End: 2024-04-27

## 2024-04-26 RX ORDER — ASPIRIN 81 MG/1
81 TABLET, CHEWABLE ORAL DAILY
Status: DISCONTINUED | OUTPATIENT
Start: 2024-04-26 | End: 2024-04-30 | Stop reason: HOSPADM

## 2024-04-26 RX ORDER — ALPRAZOLAM 0.5 MG/1
0.5 TABLET ORAL
Status: ACTIVE | OUTPATIENT
Start: 2024-04-26 | End: 2024-04-27

## 2024-04-26 RX ORDER — ONDANSETRON 2 MG/ML
INJECTION INTRAMUSCULAR; INTRAVENOUS
Status: COMPLETED
Start: 2024-04-26 | End: 2024-04-26

## 2024-04-26 RX ORDER — SODIUM CHLORIDE 9 MG/ML
INJECTION, SOLUTION INTRAVENOUS PRN
Status: DISCONTINUED | OUTPATIENT
Start: 2024-04-26 | End: 2024-04-30 | Stop reason: HOSPADM

## 2024-04-26 RX ORDER — ONDANSETRON 2 MG/ML
4 INJECTION INTRAMUSCULAR; INTRAVENOUS EVERY 6 HOURS PRN
Status: DISCONTINUED | OUTPATIENT
Start: 2024-04-26 | End: 2024-04-30 | Stop reason: HOSPADM

## 2024-04-26 RX ORDER — CARVEDILOL 12.5 MG/1
25 TABLET ORAL 2 TIMES DAILY WITH MEALS
Status: DISCONTINUED | OUTPATIENT
Start: 2024-04-26 | End: 2024-04-30 | Stop reason: HOSPADM

## 2024-04-26 RX ORDER — CLOPIDOGREL BISULFATE 75 MG/1
75 TABLET ORAL DAILY
OUTPATIENT
Start: 2024-04-26

## 2024-04-26 RX ORDER — MIDAZOLAM HYDROCHLORIDE 1 MG/ML
INJECTION INTRAMUSCULAR; INTRAVENOUS PRN
Status: DISCONTINUED | OUTPATIENT
Start: 2024-04-26 | End: 2024-04-26 | Stop reason: HOSPADM

## 2024-04-26 RX ORDER — SEVELAMER CARBONATE 800 MG/1
800 TABLET, FILM COATED ORAL
Status: DISCONTINUED | OUTPATIENT
Start: 2024-04-26 | End: 2024-04-29

## 2024-04-26 RX ORDER — AMLODIPINE BESYLATE 5 MG/1
10 TABLET ORAL DAILY
Status: DISCONTINUED | OUTPATIENT
Start: 2024-04-27 | End: 2024-04-30 | Stop reason: HOSPADM

## 2024-04-26 RX ORDER — ATORVASTATIN CALCIUM 40 MG/1
40 TABLET, FILM COATED ORAL NIGHTLY
Status: DISCONTINUED | OUTPATIENT
Start: 2024-04-26 | End: 2024-04-30 | Stop reason: HOSPADM

## 2024-04-26 RX ORDER — SODIUM CHLORIDE 0.9 % (FLUSH) 0.9 %
5-40 SYRINGE (ML) INJECTION EVERY 12 HOURS SCHEDULED
Status: DISCONTINUED | OUTPATIENT
Start: 2024-04-26 | End: 2024-04-30 | Stop reason: HOSPADM

## 2024-04-26 RX ORDER — LEVETIRACETAM 250 MG/1
500 TABLET ORAL 2 TIMES DAILY
Status: DISCONTINUED | OUTPATIENT
Start: 2024-04-26 | End: 2024-04-30 | Stop reason: HOSPADM

## 2024-04-26 RX ORDER — HEPARIN SODIUM 1000 [USP'U]/ML
INJECTION, SOLUTION INTRAVENOUS; SUBCUTANEOUS PRN
Status: DISCONTINUED | OUTPATIENT
Start: 2024-04-26 | End: 2024-04-26 | Stop reason: HOSPADM

## 2024-04-26 RX ORDER — HYDROMORPHONE HYDROCHLORIDE 1 MG/ML
1 INJECTION, SOLUTION INTRAMUSCULAR; INTRAVENOUS; SUBCUTANEOUS EVERY 4 HOURS PRN
Status: DISPENSED | OUTPATIENT
Start: 2024-04-26 | End: 2024-04-28

## 2024-04-26 RX ORDER — HEPARIN SODIUM 200 [USP'U]/100ML
INJECTION, SOLUTION INTRAVENOUS CONTINUOUS PRN
Status: COMPLETED | OUTPATIENT
Start: 2024-04-26 | End: 2024-04-26

## 2024-04-26 RX ORDER — HYDROCHLOROTHIAZIDE 25 MG/1
25 TABLET ORAL DAILY
Status: DISCONTINUED | OUTPATIENT
Start: 2024-04-27 | End: 2024-04-30 | Stop reason: HOSPADM

## 2024-04-26 RX ORDER — MORPHINE SULFATE 2 MG/ML
INJECTION, SOLUTION INTRAMUSCULAR; INTRAVENOUS
Status: COMPLETED
Start: 2024-04-26 | End: 2024-04-26

## 2024-04-26 RX ORDER — OXYCODONE HYDROCHLORIDE AND ACETAMINOPHEN 5; 325 MG/1; MG/1
1 TABLET ORAL EVERY 6 HOURS PRN
Status: DISCONTINUED | OUTPATIENT
Start: 2024-04-26 | End: 2024-04-26

## 2024-04-26 RX ORDER — ACETAMINOPHEN 325 MG/1
650 TABLET ORAL EVERY 4 HOURS PRN
Status: DISCONTINUED | OUTPATIENT
Start: 2024-04-26 | End: 2024-04-30 | Stop reason: HOSPADM

## 2024-04-26 RX ADMIN — HYDROMORPHONE HYDROCHLORIDE 1 MG: 1 INJECTION, SOLUTION INTRAMUSCULAR; INTRAVENOUS; SUBCUTANEOUS at 19:19

## 2024-04-26 RX ADMIN — IOPAMIDOL 100 ML: 755 INJECTION, SOLUTION INTRAVENOUS at 13:38

## 2024-04-26 RX ADMIN — SODIUM CHLORIDE: 9 INJECTION, SOLUTION INTRAVENOUS at 08:07

## 2024-04-26 RX ADMIN — MORPHINE SULFATE 2 MG: 2 INJECTION, SOLUTION INTRAMUSCULAR; INTRAVENOUS at 17:10

## 2024-04-26 RX ADMIN — SODIUM CHLORIDE, PRESERVATIVE FREE 10 ML: 5 INJECTION INTRAVENOUS at 21:01

## 2024-04-26 RX ADMIN — ONDANSETRON 4 MG: 2 INJECTION INTRAMUSCULAR; INTRAVENOUS at 17:25

## 2024-04-26 RX ADMIN — LEVETIRACETAM 500 MG: 250 TABLET, FILM COATED ORAL at 21:01

## 2024-04-26 RX ADMIN — ATORVASTATIN CALCIUM 40 MG: 40 TABLET, FILM COATED ORAL at 21:01

## 2024-04-26 ASSESSMENT — PAIN DESCRIPTION - ORIENTATION
ORIENTATION: MID

## 2024-04-26 ASSESSMENT — PAIN DESCRIPTION - DESCRIPTORS
DESCRIPTORS: STABBING
DESCRIPTORS: PRESSURE
DESCRIPTORS: PRESSURE
DESCRIPTORS: SHARP

## 2024-04-26 ASSESSMENT — PAIN DESCRIPTION - LOCATION
LOCATION: CHEST
LOCATION: CHEST
LOCATION: ABDOMEN
LOCATION: ABDOMEN
LOCATION: CHEST

## 2024-04-26 ASSESSMENT — PAIN SCALES - GENERAL
PAINLEVEL_OUTOF10: 10
PAINLEVEL_OUTOF10: 7
PAINLEVEL_OUTOF10: 10
PAINLEVEL_OUTOF10: 8
PAINLEVEL_OUTOF10: 2

## 2024-04-26 ASSESSMENT — PAIN SCALES - WONG BAKER: WONGBAKER_NUMERICALRESPONSE: NO HURT

## 2024-04-26 ASSESSMENT — PAIN DESCRIPTION - PAIN TYPE: TYPE: ACUTE PAIN

## 2024-04-26 ASSESSMENT — PAIN - FUNCTIONAL ASSESSMENT: PAIN_FUNCTIONAL_ASSESSMENT: ACTIVITIES ARE NOT PREVENTED

## 2024-04-26 NOTE — DISCHARGE INSTRUCTIONS
IMPORTANT    People who undergo angioplasty and/or stent placement procedures are prescribed aspirin along with certain medications called anticlotting or antiplatelet medications. These medications include: Plavix (clopidogrel), Effient (prasugrel), and Brilinta (ticagrelor). It is important to take the aspirin and the anticlotting medication that you were prescribed every day in order to reduce the probability that the stent will become filled with blood clot. Angioplasty and/or stent placement procedures are done so that a blocked artery can be opened. If the stent becomes filled with blood clot, the artery becomes blocked off again. This can result in a heart attack, or even death.    It is extremely important to take all the medicines prescribed by your cardiologist exactly as they were prescribed. Failure to take certain medications - particularly aspirin along with Plavix (clopidogrel),  Effient (prasugrel), or Brilinta(ticagrelor) - can result in a heart attack, or even death. Do not miss any doses. It is vital that aspirin along with Plavix, Effient, or Brilinta be taken every single day. If you have any questions or concerns regarding your medications, please consult your cardiologist. He/she is the only person who can adjust or stop these medications.    You must fill the prescription for these medications immediately upon discharge so that you do not miss any doses. If you cannot afford the medication prescribed to you, please let your cardiologist know immediately.  ____________________________________________________________________________________________________    Your cardiologist has ordered cardiac rehabilitation for you as part of your recovery process. Cardiac rehab is a very important way to help you to feel better and stronger more quickly as well as reduce the risks of heart problems in the future.    Cardiac rehabilitation services are provided at:    [facility]  [address]

## 2024-04-26 NOTE — DIALYSIS
Pt 3.5 hour hemodialysis treatment ended 15 minutes early, due to c/o mid sternal chest pain.  Rapid response called.  EKG x 2 done.  Morphine 2 mg given IV.  Pt vomited large amt partially digested food.  Zofran 4mg given IV.  Report called to Harsha Brown in telemetry notified pt enroute to his room, box #69.  1.9 liters net fluid removed

## 2024-04-26 NOTE — PERIOP NOTE
Notified Telma JACOBSEN from dialysis that patient reports he is to have dialysis here at hospital after cardiac catheterization today and his nephrologist is Dr. Lockett, but Dr. Chand sees him in the hospital. Per Telma JACOBSEN, she will reach out to nephrologist to obtain orders.

## 2024-04-26 NOTE — DIALYSIS
[place] in telemetry notified pt is in dialysis, box #29.    Right groin dressing clean, dry and intact.  No bleeding.

## 2024-04-26 NOTE — PERIOP NOTE
Patient states okay to review and give discharge instructions to  his sisters Jalil Nieves or Jaclyn Bradshaw.

## 2024-04-26 NOTE — CONSULTS
NAME:  Woodrow Madison   :   1965   MRN:   [id number]     ATTENDING: Adal Glover MD  PCP:  Maggy Daniel APRN - NP    Date/Time:  2024       Subjective:   REQUESTING PHYSICIAN:    REASON FOR CONSULT:   ESRD on HD    History of presenting illness:    Woodrow Madison is a 57-year-old male with past medical history of end-stage renal failure who is on maintenance hemodialysis on a  schedule at [facility], HTN, CAD, NSTEMI, HIV, anal condyloma and squamous cell carcinoma of the rectum, and seizure disorder who underwent a cardiac catheterization earlier today.  Nephrology consulted for management of ESRD on HD. BUN and creatinine are 72/13.2, patient is hyperkalemic, potassium 5.9 and hypercalcemic, calcium 10.7. Patient is hemodynamically stable. Patient was seen in HD. He is awake, alert and oriented x3, and in no acute distress. He denies any symptoms including chest pain, palpitations, shortness of breath, N/V, fever or chills.       Past Medical History:   Diagnosis Date    A-V fistula (HCC)     left arm    Anemia     Chronic abdominal pain     Chronic kidney disease     dialysis pt. - [facility] - [facility] [place]    Colon cancer (HCC)     Complication of AV dialysis fistula     2 large pseudo-aneurysm    Esophagitis     ESRD on dialysis (HCC)     Gastritis     GERD (gastroesophageal reflux disease)     Gout     pt states not in 12 years    Hemodialysis patient (HCC)     High cholesterol     History of blood transfusion     at least 8 units over the years  - pt states last given 2023    HIV (human immunodeficiency virus infection) ([facility])     Hypertension     NSTEMI (non-ST elevated myocardial infarction) ([facility])     2023    Other ill-defined conditions(799.89)     SMALL VEIN IN HEART BEING MONITORED    Pancreatitis     Renal insufficiency     Seizure (HCC) 2012    one time and none sense    Sepsis ([facility])  & 2019      Past

## 2024-04-27 LAB
ALBUMIN SERPL-MCNC: 3.7 G/DL (ref 3.5–5)
ANION GAP SERPL CALC-SCNC: 6 MMOL/L (ref 5–15)
BUN SERPL-MCNC: 44 MG/DL (ref 6–20)
BUN/CREAT SERPL: 5 (ref 12–20)
CA-I BLD-MCNC: 8.5 MG/DL (ref 8.5–10.1)
CHLORIDE SERPL-SCNC: 99 MMOL/L (ref 97–108)
CHOLEST SERPL-MCNC: 114 MG/DL
CO2 SERPL-SCNC: 30 MMOL/L (ref 21–32)
CREAT SERPL-MCNC: 9.44 MG/DL (ref 0.7–1.3)
EKG ATRIAL RATE: 61 BPM
EKG ATRIAL RATE: 64 BPM
EKG ATRIAL RATE: 69 BPM
EKG DIAGNOSIS: NORMAL
EKG P AXIS: 12 DEGREES
EKG P AXIS: 14 DEGREES
EKG P AXIS: 16 DEGREES
EKG P-R INTERVAL: 144 MS
EKG P-R INTERVAL: 150 MS
EKG P-R INTERVAL: 158 MS
EKG Q-T INTERVAL: 410 MS
EKG Q-T INTERVAL: 414 MS
EKG Q-T INTERVAL: 428 MS
EKG QRS DURATION: 90 MS
EKG QRS DURATION: 92 MS
EKG QRS DURATION: 92 MS
EKG QTC CALCULATION (BAZETT): 427 MS
EKG QTC CALCULATION (BAZETT): 430 MS
EKG QTC CALCULATION (BAZETT): 439 MS
EKG R AXIS: -11 DEGREES
EKG R AXIS: -12 DEGREES
EKG R AXIS: -6 DEGREES
EKG T AXIS: 27 DEGREES
EKG T AXIS: 31 DEGREES
EKG T AXIS: 45 DEGREES
EKG VENTRICULAR RATE: 61 BPM
EKG VENTRICULAR RATE: 64 BPM
EKG VENTRICULAR RATE: 69 BPM
ERYTHROCYTE [DISTWIDTH] IN BLOOD BY AUTOMATED COUNT: 15.8 % (ref 11.5–14.5)
GLUCOSE SERPL-MCNC: 85 MG/DL (ref 65–100)
HCT VFR BLD AUTO: 30.9 % (ref 36.6–50.3)
HDLC SERPL-MCNC: 72 MG/DL
HDLC SERPL: 1.6 (ref 0–5)
HGB BLD-MCNC: 9.9 G/DL (ref 12.1–17)
LACTATE SERPL-SCNC: 2.1 MMOL/L (ref 0.4–2)
LDLC SERPL CALC-MCNC: 30.2 MG/DL (ref 0–100)
LIPID PANEL: NORMAL
MCH RBC QN AUTO: 30.4 PG (ref 26–34)
MCHC RBC AUTO-ENTMCNC: 32 G/DL (ref 30–36.5)
MCV RBC AUTO: 94.8 FL (ref 80–99)
NRBC # BLD: 0 K/UL (ref 0–0.01)
NRBC BLD-RTO: 0 PER 100 WBC
PHOSPHATE SERPL-MCNC: 5.5 MG/DL (ref 2.6–4.7)
PLATELET # BLD AUTO: 132 K/UL (ref 150–400)
PMV BLD AUTO: 10.3 FL (ref 8.9–12.9)
POTASSIUM SERPL-SCNC: 5.2 MMOL/L (ref 3.5–5.1)
POTASSIUM SERPL-SCNC: 6.1 MMOL/L (ref 3.5–5.1)
RBC # BLD AUTO: 3.26 M/UL (ref 4.1–5.7)
SODIUM SERPL-SCNC: 135 MMOL/L (ref 136–145)
TRIGL SERPL-MCNC: 59 MG/DL
TROPONIN I SERPL HS-MCNC: 48 NG/L (ref 0–76)
VLDLC SERPL CALC-MCNC: 11.8 MG/DL
WBC # BLD AUTO: 5.3 K/UL (ref 4.1–11.1)

## 2024-04-27 PROCEDURE — G0257 UNSCHED DIALYSIS ESRD PT HOS: HCPCS

## 2024-04-27 PROCEDURE — 2500000003 HC RX 250 WO HCPCS: Performed by: STUDENT IN AN ORGANIZED HEALTH CARE EDUCATION/TRAINING PROGRAM

## 2024-04-27 PROCEDURE — 94761 N-INVAS EAR/PLS OXIMETRY MLT: CPT

## 2024-04-27 PROCEDURE — 6370000000 HC RX 637 (ALT 250 FOR IP): Performed by: INTERNAL MEDICINE

## 2024-04-27 PROCEDURE — 2580000003 HC RX 258: Performed by: INTERNAL MEDICINE

## 2024-04-27 PROCEDURE — 6360000002 HC RX W HCPCS: Performed by: STUDENT IN AN ORGANIZED HEALTH CARE EDUCATION/TRAINING PROGRAM

## 2024-04-27 PROCEDURE — 80061 LIPID PANEL: CPT

## 2024-04-27 PROCEDURE — 83605 ASSAY OF LACTIC ACID: CPT

## 2024-04-27 PROCEDURE — 84132 ASSAY OF SERUM POTASSIUM: CPT

## 2024-04-27 PROCEDURE — 2709999900 HC NON-CHARGEABLE SUPPLY

## 2024-04-27 PROCEDURE — 2700000000 HC OXYGEN THERAPY PER DAY

## 2024-04-27 PROCEDURE — 36415 COLL VENOUS BLD VENIPUNCTURE: CPT

## 2024-04-27 PROCEDURE — 84484 ASSAY OF TROPONIN QUANT: CPT

## 2024-04-27 PROCEDURE — 80069 RENAL FUNCTION PANEL: CPT

## 2024-04-27 PROCEDURE — 93005 ELECTROCARDIOGRAM TRACING: CPT | Performed by: INTERNAL MEDICINE

## 2024-04-27 PROCEDURE — 85027 COMPLETE CBC AUTOMATED: CPT

## 2024-04-27 PROCEDURE — 6360000002 HC RX W HCPCS

## 2024-04-27 RX ORDER — LOSARTAN POTASSIUM 50 MG/1
25 TABLET ORAL DAILY
Status: DISCONTINUED | OUTPATIENT
Start: 2024-04-28 | End: 2024-04-30 | Stop reason: HOSPADM

## 2024-04-27 RX ORDER — HEPARIN SODIUM 5000 [USP'U]/ML
5000 INJECTION, SOLUTION INTRAVENOUS; SUBCUTANEOUS EVERY 8 HOURS SCHEDULED
Status: DISCONTINUED | OUTPATIENT
Start: 2024-04-27 | End: 2024-04-30 | Stop reason: HOSPADM

## 2024-04-27 RX ADMIN — DOLUTEGRAVIR SODIUM 50 MG: 50 TABLET, FILM COATED ORAL at 08:50

## 2024-04-27 RX ADMIN — PANTOPRAZOLE SODIUM 40 MG: 40 TABLET, DELAYED RELEASE ORAL at 08:50

## 2024-04-27 RX ADMIN — ASPIRIN 81 MG 81 MG: 81 TABLET ORAL at 08:50

## 2024-04-27 RX ADMIN — HYDROCHLOROTHIAZIDE 25 MG: 25 TABLET ORAL at 11:38

## 2024-04-27 RX ADMIN — ATORVASTATIN CALCIUM 40 MG: 40 TABLET, FILM COATED ORAL at 20:53

## 2024-04-27 RX ADMIN — CARVEDILOL 25 MG: 12.5 TABLET, FILM COATED ORAL at 08:50

## 2024-04-27 RX ADMIN — SODIUM CHLORIDE, PRESERVATIVE FREE 10 ML: 5 INJECTION INTRAVENOUS at 20:55

## 2024-04-27 RX ADMIN — LEVETIRACETAM 500 MG: 250 TABLET, FILM COATED ORAL at 20:53

## 2024-04-27 RX ADMIN — AMLODIPINE BESYLATE 10 MG: 5 TABLET ORAL at 11:38

## 2024-04-27 RX ADMIN — HYDROMORPHONE HYDROCHLORIDE 1 MG: 1 INJECTION, SOLUTION INTRAMUSCULAR; INTRAVENOUS; SUBCUTANEOUS at 03:37

## 2024-04-27 RX ADMIN — SEVELAMER CARBONATE 800 MG: 800 TABLET, FILM COATED ORAL at 18:06

## 2024-04-27 RX ADMIN — HYDROMORPHONE HYDROCHLORIDE 1 MG: 1 INJECTION, SOLUTION INTRAMUSCULAR; INTRAVENOUS; SUBCUTANEOUS at 18:06

## 2024-04-27 RX ADMIN — SEVELAMER CARBONATE 800 MG: 800 TABLET, FILM COATED ORAL at 11:38

## 2024-04-27 RX ADMIN — SODIUM CHLORIDE, PRESERVATIVE FREE 10 ML: 5 INJECTION INTRAVENOUS at 11:36

## 2024-04-27 RX ADMIN — LEVETIRACETAM 500 MG: 250 TABLET, FILM COATED ORAL at 08:50

## 2024-04-27 RX ADMIN — HEPARIN SODIUM 5000 UNITS: 5000 INJECTION INTRAVENOUS; SUBCUTANEOUS at 20:54

## 2024-04-27 RX ADMIN — CLOPIDOGREL BISULFATE 75 MG: 75 TABLET ORAL at 08:50

## 2024-04-27 RX ADMIN — HEPARIN SODIUM 5000 UNITS: 5000 INJECTION INTRAVENOUS; SUBCUTANEOUS at 13:07

## 2024-04-27 RX ADMIN — LOSARTAN POTASSIUM 100 MG: 25 TABLET, FILM COATED ORAL at 08:50

## 2024-04-27 RX ADMIN — EPOETIN ALFA-EPBX 10000 UNITS: 10000 INJECTION, SOLUTION INTRAVENOUS; SUBCUTANEOUS at 13:08

## 2024-04-27 RX ADMIN — CARVEDILOL 25 MG: 12.5 TABLET, FILM COATED ORAL at 18:06

## 2024-04-27 ASSESSMENT — PAIN DESCRIPTION - DESCRIPTORS: DESCRIPTORS: SHARP

## 2024-04-27 ASSESSMENT — PAIN SCALES - GENERAL
PAINLEVEL_OUTOF10: 0
PAINLEVEL_OUTOF10: 7
PAINLEVEL_OUTOF10: 0
PAINLEVEL_OUTOF10: 0

## 2024-04-27 ASSESSMENT — PAIN DESCRIPTION - ORIENTATION: ORIENTATION: MID

## 2024-04-27 ASSESSMENT — PAIN DESCRIPTION - LOCATION: LOCATION: ABDOMEN

## 2024-04-27 NOTE — DIALYSIS
Pt dory 3 hour hemodialysis well.  One liter net fluid removed.  Report given to Harsah Wyatt.  Sonya in telemetry notified pt kevinte to his room, box #29

## 2024-04-28 LAB
ALBUMIN SERPL-MCNC: 3.5 G/DL (ref 3.5–5)
ANION GAP SERPL CALC-SCNC: 7 MMOL/L (ref 5–15)
BASOPHILS # BLD: 0 K/UL (ref 0–0.1)
BASOPHILS NFR BLD: 1 % (ref 0–1)
BUN SERPL-MCNC: 38 MG/DL (ref 6–20)
BUN/CREAT SERPL: 5 (ref 12–20)
CA-I BLD-MCNC: 9.1 MG/DL (ref 8.5–10.1)
CHLORIDE SERPL-SCNC: 100 MMOL/L (ref 97–108)
CO2 SERPL-SCNC: 28 MMOL/L (ref 21–32)
CREAT SERPL-MCNC: 8.16 MG/DL (ref 0.7–1.3)
DIFFERENTIAL METHOD BLD: ABNORMAL
EOSINOPHIL # BLD: 0.2 K/UL (ref 0–0.4)
EOSINOPHIL NFR BLD: 4 % (ref 0–7)
ERYTHROCYTE [DISTWIDTH] IN BLOOD BY AUTOMATED COUNT: 15.7 % (ref 11.5–14.5)
GLUCOSE SERPL-MCNC: 82 MG/DL (ref 65–100)
HCT VFR BLD AUTO: 32.5 % (ref 36.6–50.3)
HGB BLD-MCNC: 10.2 G/DL (ref 12.1–17)
IMM GRANULOCYTES # BLD AUTO: 0 K/UL (ref 0–0.04)
IMM GRANULOCYTES NFR BLD AUTO: 1 % (ref 0–0.5)
LYMPHOCYTES # BLD: 0.8 K/UL (ref 0.8–3.5)
LYMPHOCYTES NFR BLD: 15 % (ref 12–49)
MCH RBC QN AUTO: 29.7 PG (ref 26–34)
MCHC RBC AUTO-ENTMCNC: 31.4 G/DL (ref 30–36.5)
MCV RBC AUTO: 94.5 FL (ref 80–99)
MONOCYTES # BLD: 0.5 K/UL (ref 0–1)
MONOCYTES NFR BLD: 9 % (ref 5–13)
NEUTS SEG # BLD: 3.8 K/UL (ref 1.8–8)
NEUTS SEG NFR BLD: 70 % (ref 32–75)
NRBC # BLD: 0.02 K/UL (ref 0–0.01)
NRBC BLD-RTO: 0.4 PER 100 WBC
PHOSPHATE SERPL-MCNC: 6.7 MG/DL (ref 2.6–4.7)
PLATELET # BLD AUTO: 143 K/UL (ref 150–400)
PMV BLD AUTO: 10.4 FL (ref 8.9–12.9)
POTASSIUM SERPL-SCNC: 5 MMOL/L (ref 3.5–5.1)
RBC # BLD AUTO: 3.44 M/UL (ref 4.1–5.7)
SODIUM SERPL-SCNC: 135 MMOL/L (ref 136–145)
WBC # BLD AUTO: 5.4 K/UL (ref 4.1–11.1)

## 2024-04-28 PROCEDURE — 80069 RENAL FUNCTION PANEL: CPT

## 2024-04-28 PROCEDURE — 36415 COLL VENOUS BLD VENIPUNCTURE: CPT

## 2024-04-28 PROCEDURE — 6360000002 HC RX W HCPCS: Performed by: STUDENT IN AN ORGANIZED HEALTH CARE EDUCATION/TRAINING PROGRAM

## 2024-04-28 PROCEDURE — 2580000003 HC RX 258: Performed by: INTERNAL MEDICINE

## 2024-04-28 PROCEDURE — 6370000000 HC RX 637 (ALT 250 FOR IP): Performed by: INTERNAL MEDICINE

## 2024-04-28 PROCEDURE — 85025 COMPLETE CBC W/AUTO DIFF WBC: CPT

## 2024-04-28 PROCEDURE — 2500000003 HC RX 250 WO HCPCS: Performed by: STUDENT IN AN ORGANIZED HEALTH CARE EDUCATION/TRAINING PROGRAM

## 2024-04-28 PROCEDURE — 94761 N-INVAS EAR/PLS OXIMETRY MLT: CPT

## 2024-04-28 PROCEDURE — 2500000003 HC RX 250 WO HCPCS: Performed by: HOSPITALIST

## 2024-04-28 PROCEDURE — 2700000000 HC OXYGEN THERAPY PER DAY

## 2024-04-28 RX ORDER — HYDROMORPHONE HYDROCHLORIDE 1 MG/ML
1 INJECTION, SOLUTION INTRAMUSCULAR; INTRAVENOUS; SUBCUTANEOUS EVERY 4 HOURS PRN
Status: DISCONTINUED | OUTPATIENT
Start: 2024-04-28 | End: 2024-04-30 | Stop reason: HOSPADM

## 2024-04-28 RX ADMIN — SEVELAMER CARBONATE 800 MG: 800 TABLET, FILM COATED ORAL at 08:37

## 2024-04-28 RX ADMIN — HEPARIN SODIUM 5000 UNITS: 5000 INJECTION INTRAVENOUS; SUBCUTANEOUS at 20:40

## 2024-04-28 RX ADMIN — ASPIRIN 81 MG 81 MG: 81 TABLET ORAL at 08:37

## 2024-04-28 RX ADMIN — AMLODIPINE BESYLATE 10 MG: 5 TABLET ORAL at 08:38

## 2024-04-28 RX ADMIN — CLOPIDOGREL BISULFATE 75 MG: 75 TABLET ORAL at 08:37

## 2024-04-28 RX ADMIN — CARVEDILOL 25 MG: 12.5 TABLET, FILM COATED ORAL at 15:35

## 2024-04-28 RX ADMIN — HYDROMORPHONE HYDROCHLORIDE 1 MG: 1 INJECTION, SOLUTION INTRAMUSCULAR; INTRAVENOUS; SUBCUTANEOUS at 20:35

## 2024-04-28 RX ADMIN — CARVEDILOL 25 MG: 12.5 TABLET, FILM COATED ORAL at 08:38

## 2024-04-28 RX ADMIN — LEVETIRACETAM 500 MG: 250 TABLET, FILM COATED ORAL at 08:37

## 2024-04-28 RX ADMIN — LOSARTAN POTASSIUM 25 MG: 50 TABLET, FILM COATED ORAL at 08:38

## 2024-04-28 RX ADMIN — FAMOTIDINE 20 MG: 20 TABLET, FILM COATED ORAL at 08:38

## 2024-04-28 RX ADMIN — LEVETIRACETAM 500 MG: 250 TABLET, FILM COATED ORAL at 20:37

## 2024-04-28 RX ADMIN — HEPARIN SODIUM 5000 UNITS: 5000 INJECTION INTRAVENOUS; SUBCUTANEOUS at 12:43

## 2024-04-28 RX ADMIN — SODIUM CHLORIDE, PRESERVATIVE FREE 10 ML: 5 INJECTION INTRAVENOUS at 08:38

## 2024-04-28 RX ADMIN — ATORVASTATIN CALCIUM 40 MG: 40 TABLET, FILM COATED ORAL at 20:37

## 2024-04-28 RX ADMIN — SEVELAMER CARBONATE 800 MG: 800 TABLET, FILM COATED ORAL at 15:35

## 2024-04-28 RX ADMIN — HEPARIN SODIUM 5000 UNITS: 5000 INJECTION INTRAVENOUS; SUBCUTANEOUS at 06:03

## 2024-04-28 RX ADMIN — SODIUM CHLORIDE, PRESERVATIVE FREE 10 ML: 5 INJECTION INTRAVENOUS at 20:35

## 2024-04-28 RX ADMIN — HYDROMORPHONE HYDROCHLORIDE 1 MG: 1 INJECTION, SOLUTION INTRAMUSCULAR; INTRAVENOUS; SUBCUTANEOUS at 10:41

## 2024-04-28 RX ADMIN — HYDROCHLOROTHIAZIDE 25 MG: 25 TABLET ORAL at 08:38

## 2024-04-28 RX ADMIN — SEVELAMER CARBONATE 800 MG: 800 TABLET, FILM COATED ORAL at 12:43

## 2024-04-28 RX ADMIN — HYDROMORPHONE HYDROCHLORIDE 1 MG: 1 INJECTION, SOLUTION INTRAMUSCULAR; INTRAVENOUS; SUBCUTANEOUS at 03:39

## 2024-04-28 ASSESSMENT — PAIN SCALES - GENERAL
PAINLEVEL_OUTOF10: 6
PAINLEVEL_OUTOF10: 0
PAINLEVEL_OUTOF10: 6

## 2024-04-28 ASSESSMENT — PAIN DESCRIPTION - LOCATION
LOCATION: ABDOMEN
LOCATION: ABDOMEN

## 2024-04-28 NOTE — PLAN OF CARE
Problem: Chronic Conditions and Co-morbidities  Goal: Patient's chronic conditions and co-morbidity symptoms are monitored and maintained or improved  4/27/2024 2206 by Turner Bright RN  Outcome: Progressing  4/27/2024 1034 by Marylu Jason RN  Outcome: Progressing     Problem: Pain  Goal: Verbalizes/displays adequate comfort level or baseline comfort level  4/27/2024 2206 by Turner Bright RN  Outcome: Progressing  4/27/2024 1034 by Marylu Jason RN  Outcome: Progressing     Problem: Discharge Planning  Goal: Discharge to home or other facility with appropriate resources  Outcome: Progressing     Problem: Safety - Adult  Goal: Free from fall injury  Outcome: Progressing

## 2024-04-29 LAB
ACT BLD: 331 SEC (ref 74–125)
ALBUMIN SERPL-MCNC: 3.5 G/DL (ref 3.5–5)
ANION GAP SERPL CALC-SCNC: 11 MMOL/L (ref 5–15)
BASOPHILS # BLD: 0 K/UL (ref 0–0.1)
BASOPHILS NFR BLD: 0 % (ref 0–1)
BUN SERPL-MCNC: 56 MG/DL (ref 6–20)
BUN/CREAT SERPL: 5 (ref 12–20)
CA-I BLD-MCNC: 9.3 MG/DL (ref 8.5–10.1)
CHLORIDE SERPL-SCNC: 98 MMOL/L (ref 97–108)
CO2 SERPL-SCNC: 27 MMOL/L (ref 21–32)
CREAT SERPL-MCNC: 11.5 MG/DL (ref 0.7–1.3)
DIFFERENTIAL METHOD BLD: ABNORMAL
ECHO BSA: 2.26 M2
EOSINOPHIL # BLD: 0.2 K/UL (ref 0–0.4)
EOSINOPHIL NFR BLD: 4 % (ref 0–7)
ERYTHROCYTE [DISTWIDTH] IN BLOOD BY AUTOMATED COUNT: 15.5 % (ref 11.5–14.5)
GLUCOSE SERPL-MCNC: 105 MG/DL (ref 65–100)
HBV SURFACE AG SER QL: <0.1 INDEX
HBV SURFACE AG SER QL: NEGATIVE
HCT VFR BLD AUTO: 31.5 % (ref 36.6–50.3)
HGB BLD-MCNC: 10.1 G/DL (ref 12.1–17)
IMM GRANULOCYTES # BLD AUTO: 0 K/UL (ref 0–0.04)
IMM GRANULOCYTES NFR BLD AUTO: 1 % (ref 0–0.5)
LYMPHOCYTES # BLD: 0.6 K/UL (ref 0.8–3.5)
LYMPHOCYTES NFR BLD: 11 % (ref 12–49)
MCH RBC QN AUTO: 29.7 PG (ref 26–34)
MCHC RBC AUTO-ENTMCNC: 32.1 G/DL (ref 30–36.5)
MCV RBC AUTO: 92.6 FL (ref 80–99)
MONOCYTES # BLD: 0.4 K/UL (ref 0–1)
MONOCYTES NFR BLD: 9 % (ref 5–13)
NEUTS SEG # BLD: 3.9 K/UL (ref 1.8–8)
NEUTS SEG NFR BLD: 75 % (ref 32–75)
NRBC # BLD: 0 K/UL (ref 0–0.01)
NRBC BLD-RTO: 0 PER 100 WBC
PERFORMED BY:: ABNORMAL
PHOSPHATE SERPL-MCNC: 8.1 MG/DL (ref 2.6–4.7)
PLATELET # BLD AUTO: 167 K/UL (ref 150–400)
PMV BLD AUTO: 11.1 FL (ref 8.9–12.9)
POTASSIUM SERPL-SCNC: 4.8 MMOL/L (ref 3.5–5.1)
RBC # BLD AUTO: 3.4 M/UL (ref 4.1–5.7)
SODIUM SERPL-SCNC: 136 MMOL/L (ref 136–145)
WBC # BLD AUTO: 5.2 K/UL (ref 4.1–11.1)

## 2024-04-29 PROCEDURE — 99152 MOD SED SAME PHYS/QHP 5/>YRS: CPT | Performed by: INTERNAL MEDICINE

## 2024-04-29 PROCEDURE — C1725 CATH, TRANSLUMIN NON-LASER: HCPCS | Performed by: INTERNAL MEDICINE

## 2024-04-29 PROCEDURE — G0257 UNSCHED DIALYSIS ESRD PT HOS: HCPCS

## 2024-04-29 PROCEDURE — C1769 GUIDE WIRE: HCPCS | Performed by: INTERNAL MEDICINE

## 2024-04-29 PROCEDURE — 36415 COLL VENOUS BLD VENIPUNCTURE: CPT

## 2024-04-29 PROCEDURE — 2500000003 HC RX 250 WO HCPCS: Performed by: INTERNAL MEDICINE

## 2024-04-29 PROCEDURE — 93005 ELECTROCARDIOGRAM TRACING: CPT | Performed by: INTERNAL MEDICINE

## 2024-04-29 PROCEDURE — 80069 RENAL FUNCTION PANEL: CPT

## 2024-04-29 PROCEDURE — 7100000000 HC PACU RECOVERY - FIRST 15 MIN: Performed by: INTERNAL MEDICINE

## 2024-04-29 PROCEDURE — 2709999900 HC NON-CHARGEABLE SUPPLY: Performed by: INTERNAL MEDICINE

## 2024-04-29 PROCEDURE — 6370000000 HC RX 637 (ALT 250 FOR IP): Performed by: INTERNAL MEDICINE

## 2024-04-29 PROCEDURE — C1894 INTRO/SHEATH, NON-LASER: HCPCS | Performed by: INTERNAL MEDICINE

## 2024-04-29 PROCEDURE — 6360000002 HC RX W HCPCS: Performed by: STUDENT IN AN ORGANIZED HEALTH CARE EDUCATION/TRAINING PROGRAM

## 2024-04-29 PROCEDURE — 85347 COAGULATION TIME ACTIVATED: CPT

## 2024-04-29 PROCEDURE — 2500000003 HC RX 250 WO HCPCS: Performed by: HOSPITALIST

## 2024-04-29 PROCEDURE — 6360000002 HC RX W HCPCS: Performed by: INTERNAL MEDICINE

## 2024-04-29 PROCEDURE — 6360000002 HC RX W HCPCS: Performed by: HOSPITALIST

## 2024-04-29 PROCEDURE — 99153 MOD SED SAME PHYS/QHP EA: CPT | Performed by: INTERNAL MEDICINE

## 2024-04-29 PROCEDURE — 7100000001 HC PACU RECOVERY - ADDTL 15 MIN: Performed by: INTERNAL MEDICINE

## 2024-04-29 PROCEDURE — 92943 PRQ TRLUML REVSC CH OCC ANT: CPT | Performed by: INTERNAL MEDICINE

## 2024-04-29 PROCEDURE — C1887 CATHETER, GUIDING: HCPCS | Performed by: INTERNAL MEDICINE

## 2024-04-29 PROCEDURE — 6360000004 HC RX CONTRAST MEDICATION: Performed by: INTERNAL MEDICINE

## 2024-04-29 PROCEDURE — 2580000003 HC RX 258: Performed by: INTERNAL MEDICINE

## 2024-04-29 PROCEDURE — 93454 CORONARY ARTERY ANGIO S&I: CPT | Performed by: INTERNAL MEDICINE

## 2024-04-29 PROCEDURE — 2709999900 HC NON-CHARGEABLE SUPPLY

## 2024-04-29 PROCEDURE — 85025 COMPLETE CBC W/AUTO DIFF WBC: CPT

## 2024-04-29 PROCEDURE — 87340 HEPATITIS B SURFACE AG IA: CPT

## 2024-04-29 PROCEDURE — 92920 PRQ TRLUML C ANGIOP 1ART&/BR: CPT | Performed by: INTERNAL MEDICINE

## 2024-04-29 RX ORDER — HEPARIN SODIUM 1000 [USP'U]/ML
INJECTION, SOLUTION INTRAVENOUS; SUBCUTANEOUS PRN
Status: DISCONTINUED | OUTPATIENT
Start: 2024-04-29 | End: 2024-04-29 | Stop reason: HOSPADM

## 2024-04-29 RX ORDER — SODIUM CHLORIDE 0.9 % (FLUSH) 0.9 %
5-40 SYRINGE (ML) INJECTION EVERY 12 HOURS SCHEDULED
Status: DISCONTINUED | OUTPATIENT
Start: 2024-04-29 | End: 2024-04-30 | Stop reason: HOSPADM

## 2024-04-29 RX ORDER — LIDOCAINE HYDROCHLORIDE 10 MG/ML
INJECTION, SOLUTION INFILTRATION; PERINEURAL PRN
Status: DISCONTINUED | OUTPATIENT
Start: 2024-04-29 | End: 2024-04-29 | Stop reason: HOSPADM

## 2024-04-29 RX ORDER — ACETAMINOPHEN 325 MG/1
650 TABLET ORAL EVERY 4 HOURS PRN
Status: DISCONTINUED | OUTPATIENT
Start: 2024-04-29 | End: 2024-04-30 | Stop reason: HOSPADM

## 2024-04-29 RX ORDER — SODIUM CHLORIDE 9 MG/ML
INJECTION, SOLUTION INTRAVENOUS PRN
Status: DISCONTINUED | OUTPATIENT
Start: 2024-04-29 | End: 2024-04-30 | Stop reason: HOSPADM

## 2024-04-29 RX ORDER — SODIUM CHLORIDE 0.9 % (FLUSH) 0.9 %
5-40 SYRINGE (ML) INJECTION PRN
Status: DISCONTINUED | OUTPATIENT
Start: 2024-04-29 | End: 2024-04-30 | Stop reason: HOSPADM

## 2024-04-29 RX ORDER — SEVELAMER CARBONATE 800 MG/1
1600 TABLET, FILM COATED ORAL
Status: DISCONTINUED | OUTPATIENT
Start: 2024-04-29 | End: 2024-04-30 | Stop reason: HOSPADM

## 2024-04-29 RX ORDER — MIDAZOLAM HYDROCHLORIDE 1 MG/ML
INJECTION INTRAMUSCULAR; INTRAVENOUS PRN
Status: DISCONTINUED | OUTPATIENT
Start: 2024-04-29 | End: 2024-04-29 | Stop reason: HOSPADM

## 2024-04-29 RX ORDER — HEPARIN SODIUM 200 [USP'U]/100ML
INJECTION, SOLUTION INTRAVENOUS CONTINUOUS PRN
Status: COMPLETED | OUTPATIENT
Start: 2024-04-29 | End: 2024-04-29

## 2024-04-29 RX ORDER — FENTANYL CITRATE 50 UG/ML
INJECTION, SOLUTION INTRAMUSCULAR; INTRAVENOUS PRN
Status: DISCONTINUED | OUTPATIENT
Start: 2024-04-29 | End: 2024-04-29 | Stop reason: HOSPADM

## 2024-04-29 RX ORDER — NICARDIPINE HYDROCHLORIDE 2.5 MG/ML
INJECTION INTRAVENOUS PRN
Status: DISCONTINUED | OUTPATIENT
Start: 2024-04-29 | End: 2024-04-29 | Stop reason: HOSPADM

## 2024-04-29 RX ADMIN — HYDROMORPHONE HYDROCHLORIDE 1 MG: 1 INJECTION, SOLUTION INTRAMUSCULAR; INTRAVENOUS; SUBCUTANEOUS at 20:38

## 2024-04-29 RX ADMIN — HYDROCHLOROTHIAZIDE 25 MG: 25 TABLET ORAL at 12:18

## 2024-04-29 RX ADMIN — FAMOTIDINE 20 MG: 20 TABLET, FILM COATED ORAL at 12:18

## 2024-04-29 RX ADMIN — HYDROMORPHONE HYDROCHLORIDE 1 MG: 1 INJECTION, SOLUTION INTRAMUSCULAR; INTRAVENOUS; SUBCUTANEOUS at 12:18

## 2024-04-29 RX ADMIN — LEVETIRACETAM 500 MG: 250 TABLET, FILM COATED ORAL at 12:18

## 2024-04-29 RX ADMIN — HYDROMORPHONE HYDROCHLORIDE 1 MG: 1 INJECTION, SOLUTION INTRAMUSCULAR; INTRAVENOUS; SUBCUTANEOUS at 05:15

## 2024-04-29 RX ADMIN — FAMOTIDINE 20 MG: 20 TABLET, FILM COATED ORAL at 20:38

## 2024-04-29 RX ADMIN — CARVEDILOL 25 MG: 12.5 TABLET, FILM COATED ORAL at 12:18

## 2024-04-29 RX ADMIN — CARVEDILOL 25 MG: 12.5 TABLET, FILM COATED ORAL at 18:49

## 2024-04-29 RX ADMIN — HEPARIN SODIUM 5000 UNITS: 5000 INJECTION INTRAVENOUS; SUBCUTANEOUS at 05:17

## 2024-04-29 RX ADMIN — LOSARTAN POTASSIUM 25 MG: 50 TABLET, FILM COATED ORAL at 12:18

## 2024-04-29 RX ADMIN — LEVETIRACETAM 500 MG: 250 TABLET, FILM COATED ORAL at 20:38

## 2024-04-29 RX ADMIN — CLOPIDOGREL BISULFATE 75 MG: 75 TABLET ORAL at 12:18

## 2024-04-29 RX ADMIN — HEPARIN SODIUM 5000 UNITS: 5000 INJECTION INTRAVENOUS; SUBCUTANEOUS at 22:25

## 2024-04-29 RX ADMIN — SODIUM CHLORIDE, PRESERVATIVE FREE 10 ML: 5 INJECTION INTRAVENOUS at 20:39

## 2024-04-29 RX ADMIN — SODIUM CHLORIDE, PRESERVATIVE FREE 10 ML: 5 INJECTION INTRAVENOUS at 12:25

## 2024-04-29 RX ADMIN — AMLODIPINE BESYLATE 10 MG: 5 TABLET ORAL at 12:18

## 2024-04-29 RX ADMIN — ASPIRIN 81 MG 81 MG: 81 TABLET ORAL at 12:18

## 2024-04-29 RX ADMIN — ATORVASTATIN CALCIUM 40 MG: 40 TABLET, FILM COATED ORAL at 20:38

## 2024-04-29 ASSESSMENT — PAIN DESCRIPTION - LOCATION
LOCATION: ABDOMEN
LOCATION: CHEST
LOCATION: ABDOMEN

## 2024-04-29 ASSESSMENT — PAIN SCALES - GENERAL
PAINLEVEL_OUTOF10: 0
PAINLEVEL_OUTOF10: 7
PAINLEVEL_OUTOF10: 7
PAINLEVEL_OUTOF10: 0
PAINLEVEL_OUTOF10: 6
PAINLEVEL_OUTOF10: 6
PAINLEVEL_OUTOF10: 0

## 2024-04-29 ASSESSMENT — PAIN DESCRIPTION - ORIENTATION
ORIENTATION: MID;UPPER
ORIENTATION: RIGHT

## 2024-04-29 ASSESSMENT — PAIN DESCRIPTION - DESCRIPTORS
DESCRIPTORS: CRAMPING;ACHING
DESCRIPTORS: THROBBING

## 2024-04-29 NOTE — DIALYSIS
Pt dory 3.5 hour hemodialysis well.  2.5 liter net fluid removed.  Report given to Harsha Kramer.  Corina in telemetry notified pt is enroute to his room, box #29

## 2024-04-29 NOTE — PROCEDURES
Cardiac Catheterization Procedure Note      Patient: Woodrow Madison Age: 58 y.o. Sex: male    YOB: 1965 Admit Date: 4/26/2024 PCP: Maggy Daniel APRN - NP   MRN: [id number]  CSN: [id number]         Date of Procedure: 4/29/2024   Referring Physician: Dr Glover  Pre-procedure Diagnosis: Coronary artery disease, stent restenosis  Post-procedure Diagnosis: Coronary disease, stent restenosis    Procedures performed    Ultrasound-guided access of right radial artery  Selective coronary angiography of the left coronary system  Attempted PCI to restenosis mid-distal LAD    Description of Procedure:  After obtaining informed consent, the patient was brought to the cardiac catheterization laboratory.  The right wrist was prepped and draped in sterile fashion.  After administration of 1% lidocaine to the access site, a 21-gauge needle was used to access the right radial artery and a 6 Bur[place] 16 cm slender glide sheath was introduced.  Left heart catheterization and selective coronary angiography was done using a 6 [place] XB 3.5 guiding catheter. Radial cocktail was given using verapamil .  Patient was appropriately heparinized.  We used coronary Fielder XT and whisper wire to cross the lesion.  An over-the-wire system was used with 1 mm balloon and wire escalation was performed.  We were able to cross the proximal lesion but wire keep getting stuck behind the second stent.  Soft inflation 1 mm balloon was done.  At this point, we had mild increase in blood flow to the distal segments of LAD.  We conclude the procedure for reattempt with a laser atherectomy later.  Conscious sedation was administered using Versed and fentanyl.  At the end of the procedure, a TR band was applied for hemostasis.  Patient tolerated procedure well without complications.  Please see below for details of angiography and/or intervention.    Conscious sedation      Sedating agents were administered under my supervision.  An

## 2024-04-29 NOTE — PLAN OF CARE
Problem: Chronic Conditions and Co-morbidities  Goal: Patient's chronic conditions and co-morbidity symptoms are monitored and maintained or improved  4/28/2024 0936 by Marylu Jason RN  Outcome: Progressing     Problem: Pain  Goal: Verbalizes/displays adequate comfort level or baseline comfort level  Outcome: Progressing     Problem: Discharge Planning  Goal: Discharge to home or other facility with appropriate resources  Outcome: Progressing     Problem: Safety - Adult  Goal: Free from fall injury  4/28/2024 2309 by Turner Bright, RN  Outcome: Progressing  4/28/2024 0936 by Marylu Jason, RN  Outcome: Progressing

## 2024-04-30 VITALS
TEMPERATURE: 99.3 F | OXYGEN SATURATION: 98 % | SYSTOLIC BLOOD PRESSURE: 122 MMHG | HEIGHT: 72 IN | DIASTOLIC BLOOD PRESSURE: 69 MMHG | WEIGHT: 222.66 LBS | RESPIRATION RATE: 18 BRPM | HEART RATE: 70 BPM | BODY MASS INDEX: 30.16 KG/M2

## 2024-04-30 LAB
ALBUMIN SERPL-MCNC: 3.4 G/DL (ref 3.5–5)
ANION GAP SERPL CALC-SCNC: 9 MMOL/L (ref 5–15)
BASOPHILS # BLD: 0 K/UL (ref 0–0.1)
BASOPHILS NFR BLD: 0 % (ref 0–1)
BUN SERPL-MCNC: 30 MG/DL (ref 6–20)
BUN/CREAT SERPL: 3 (ref 12–20)
CA-I BLD-MCNC: 9.2 MG/DL (ref 8.5–10.1)
CHLORIDE SERPL-SCNC: 97 MMOL/L (ref 97–108)
CO2 SERPL-SCNC: 28 MMOL/L (ref 21–32)
CREAT SERPL-MCNC: 8.77 MG/DL (ref 0.7–1.3)
DIFFERENTIAL METHOD BLD: ABNORMAL
EKG ATRIAL RATE: 71 BPM
EKG DIAGNOSIS: NORMAL
EKG P AXIS: 10 DEGREES
EKG P-R INTERVAL: 160 MS
EKG Q-T INTERVAL: 400 MS
EKG QRS DURATION: 90 MS
EKG QTC CALCULATION (BAZETT): 434 MS
EKG R AXIS: -17 DEGREES
EKG T AXIS: 8 DEGREES
EKG VENTRICULAR RATE: 71 BPM
EOSINOPHIL # BLD: 0.2 K/UL (ref 0–0.4)
EOSINOPHIL NFR BLD: 5 % (ref 0–7)
ERYTHROCYTE [DISTWIDTH] IN BLOOD BY AUTOMATED COUNT: 15.8 % (ref 11.5–14.5)
GLUCOSE SERPL-MCNC: 86 MG/DL (ref 65–100)
HCT VFR BLD AUTO: 31.8 % (ref 36.6–50.3)
HGB BLD-MCNC: 10.2 G/DL (ref 12.1–17)
IMM GRANULOCYTES # BLD AUTO: 0 K/UL (ref 0–0.04)
IMM GRANULOCYTES NFR BLD AUTO: 1 % (ref 0–0.5)
LYMPHOCYTES # BLD: 0.7 K/UL (ref 0.8–3.5)
LYMPHOCYTES NFR BLD: 16 % (ref 12–49)
MCH RBC QN AUTO: 29.7 PG (ref 26–34)
MCHC RBC AUTO-ENTMCNC: 32.1 G/DL (ref 30–36.5)
MCV RBC AUTO: 92.4 FL (ref 80–99)
MONOCYTES # BLD: 0.6 K/UL (ref 0–1)
MONOCYTES NFR BLD: 14 % (ref 5–13)
NEUTS SEG # BLD: 3 K/UL (ref 1.8–8)
NEUTS SEG NFR BLD: 64 % (ref 32–75)
NRBC # BLD: 0 K/UL (ref 0–0.01)
NRBC BLD-RTO: 0 PER 100 WBC
PHOSPHATE SERPL-MCNC: 6.9 MG/DL (ref 2.6–4.7)
PLATELET # BLD AUTO: 172 K/UL (ref 150–400)
PMV BLD AUTO: 10.2 FL (ref 8.9–12.9)
POTASSIUM SERPL-SCNC: 4.8 MMOL/L (ref 3.5–5.1)
RBC # BLD AUTO: 3.44 M/UL (ref 4.1–5.7)
SODIUM SERPL-SCNC: 134 MMOL/L (ref 136–145)
WBC # BLD AUTO: 4.7 K/UL (ref 4.1–11.1)

## 2024-04-30 PROCEDURE — 36415 COLL VENOUS BLD VENIPUNCTURE: CPT

## 2024-04-30 PROCEDURE — 6370000000 HC RX 637 (ALT 250 FOR IP): Performed by: INTERNAL MEDICINE

## 2024-04-30 PROCEDURE — 2580000003 HC RX 258: Performed by: INTERNAL MEDICINE

## 2024-04-30 PROCEDURE — 85025 COMPLETE CBC W/AUTO DIFF WBC: CPT

## 2024-04-30 PROCEDURE — 6370000000 HC RX 637 (ALT 250 FOR IP)

## 2024-04-30 PROCEDURE — 2500000003 HC RX 250 WO HCPCS: Performed by: HOSPITALIST

## 2024-04-30 PROCEDURE — 90935 HEMODIALYSIS ONE EVALUATION: CPT

## 2024-04-30 PROCEDURE — 80069 RENAL FUNCTION PANEL: CPT

## 2024-04-30 PROCEDURE — 6360000002 HC RX W HCPCS: Performed by: STUDENT IN AN ORGANIZED HEALTH CARE EDUCATION/TRAINING PROGRAM

## 2024-04-30 PROCEDURE — 2709999900 HC NON-CHARGEABLE SUPPLY

## 2024-04-30 RX ORDER — HYDROCHLOROTHIAZIDE 25 MG/1
25 TABLET ORAL DAILY
Qty: 30 TABLET | Refills: 3 | Status: SHIPPED | OUTPATIENT
Start: 2024-05-01

## 2024-04-30 RX ORDER — CLOPIDOGREL BISULFATE 75 MG/1
75 TABLET ORAL DAILY
Qty: 90 TABLET | Refills: 1 | Status: SHIPPED | OUTPATIENT
Start: 2024-04-30 | End: 2024-10-27

## 2024-04-30 RX ADMIN — ASPIRIN 81 MG 81 MG: 81 TABLET ORAL at 10:42

## 2024-04-30 RX ADMIN — HYDROMORPHONE HYDROCHLORIDE 1 MG: 1 INJECTION, SOLUTION INTRAMUSCULAR; INTRAVENOUS; SUBCUTANEOUS at 04:10

## 2024-04-30 RX ADMIN — CLOPIDOGREL BISULFATE 75 MG: 75 TABLET ORAL at 10:43

## 2024-04-30 RX ADMIN — HYDROMORPHONE HYDROCHLORIDE 1 MG: 1 INJECTION, SOLUTION INTRAMUSCULAR; INTRAVENOUS; SUBCUTANEOUS at 10:45

## 2024-04-30 RX ADMIN — FAMOTIDINE 20 MG: 20 TABLET, FILM COATED ORAL at 10:43

## 2024-04-30 RX ADMIN — SODIUM CHLORIDE, PRESERVATIVE FREE 10 ML: 5 INJECTION INTRAVENOUS at 11:04

## 2024-04-30 RX ADMIN — AMLODIPINE BESYLATE 10 MG: 5 TABLET ORAL at 10:42

## 2024-04-30 RX ADMIN — SODIUM CHLORIDE, PRESERVATIVE FREE 10 ML: 5 INJECTION INTRAVENOUS at 10:49

## 2024-04-30 RX ADMIN — HEPARIN SODIUM 5000 UNITS: 5000 INJECTION INTRAVENOUS; SUBCUTANEOUS at 05:30

## 2024-04-30 RX ADMIN — LOSARTAN POTASSIUM 25 MG: 50 TABLET, FILM COATED ORAL at 10:43

## 2024-04-30 RX ADMIN — LEVETIRACETAM 500 MG: 250 TABLET, FILM COATED ORAL at 10:42

## 2024-04-30 RX ADMIN — SEVELAMER CARBONATE 1600 MG: 800 TABLET, FILM COATED ORAL at 10:44

## 2024-04-30 RX ADMIN — HYDROCHLOROTHIAZIDE 25 MG: 25 TABLET ORAL at 10:43

## 2024-04-30 ASSESSMENT — PAIN SCALES - GENERAL
PAINLEVEL_OUTOF10: 7

## 2024-04-30 ASSESSMENT — PAIN DESCRIPTION - ORIENTATION
ORIENTATION: UPPER
ORIENTATION: ANTERIOR
ORIENTATION: ANTERIOR
ORIENTATION: ANTERIOR;MID

## 2024-04-30 ASSESSMENT — PAIN DESCRIPTION - LOCATION
LOCATION: ABDOMEN

## 2024-04-30 ASSESSMENT — PAIN SCALES - WONG BAKER: WONGBAKER_NUMERICALRESPONSE: NO HURT

## 2024-04-30 ASSESSMENT — PAIN DESCRIPTION - DESCRIPTORS
DESCRIPTORS: SHARP
DESCRIPTORS: SHARP
DESCRIPTORS: SHARP;CRAMPING
DESCRIPTORS: ACHING;SHARP

## 2024-04-30 NOTE — PROGRESS NOTES
Progress Note      4/27/2024 3:41 PM  NAME: Woodrow Madison   MRN:[id number]   Admit Diagnosis: Abnormal stress test [R94.39]  Chest pain [R07.9]      Subjective:   Chart reviewed.  Cardiac catheterization and angioplasty stent results discussed with the patient.  Patient feels much better.    Patient had a non-Q wave myocardial infarction in June 2023 and had a left main 90% stenosis and heavily calcified vessels and was referred for cardiac surgery however because at that time he had a colorectal cancer and therefore he underwent atherectomy and stent insertion to left main and a more or less entire LAD artery.  Subsequently colorectal cancer has been treated.  He is HIV positive status.  He has end-stage renal disease and is on dialysis.  He is contemplating renal transplant and he had a Cardiolite stress test that was abnormal and he underwent catheterization yesterday and had about 85 to 90% stenosis of mid right coronary artery and received a stent with angiographically excellent results.    He is found to have subtotal stenosis or about 99% in-stent in the mid LAD artery distal LAD artery is a probably total occlusion and get some collaterals.  I discussed all this with the patient and he is supposed to have intervention on Monday of his LAD artery.    Review of Systems:    Symptom Y/N Comments  Symptom Y/N Comments   Fever/Chills n   Chest Pain n    Poor Appetite    Edema     Cough    Abdominal Pain n    Sputum    Joint Pain     SOB/VÁZQUEZ n   Pruritis/Rash     Nausea/vomit    Other     Diarrhea         Constipation           Could NOT obtain due to:      Objective:          Physical Exam:    Last 24hrs VS reviewed since prior progress note. Most recent are:    /81   Pulse 63   Temp 98.2 °F (36.8 °C) (Oral)   Resp 16   SpO2 95%   No intake or output data in the 24 hours ending 04/27/24 1541     General Appearance: Well developed, well nourished, alert & oriented x 3,    no acute 
        Progress Note      4/28/2024 2:07 PM  NAME: Woodrow Madison   MRN:[id number]   Admit Diagnosis: Abnormal stress test [R94.39]  Chest pain [R07.9]      Subjective:   Chart reviewed.  Cardiac catheterization and angioplasty stent results discussed with the patient.  Patient feels much better.    Patient had a non-Q wave myocardial infarction in June 2023 and had a left main 90% stenosis and heavily calcified vessels and was referred for cardiac surgery however because at that time he had a colorectal cancer and therefore he underwent atherectomy and stent insertion to left main and a more or less entire LAD artery.  Subsequently colorectal cancer has been treated.  He is HIV positive status.  He has end-stage renal disease and is on dialysis.  He is contemplating renal transplant and he had a Cardiolite stress test that was abnormal and he underwent catheterization yesterday and had about 85 to 90% stenosis of mid right coronary artery and received a stent with angiographically excellent results.    He is found to have subtotal stenosis or about 99% in-stent in the mid LAD artery distal LAD artery is a probably total occlusion and get some collaterals.  I discussed all this with the patient and he is supposed to have intervention on Monday of his LAD artery.    04/28/2024 chart reviewed.  Patient feels better.  Had uneventful night.  I discussed about plan of care for tomorrow and Dr. Guthrie is going to try to open LAD artery which is a previous stent and subtotal occlusion.    Review of Systems:    Symptom Y/N Comments  Symptom Y/N Comments   Fever/Chills n   Chest Pain n    Poor Appetite    Edema     Cough    Abdominal Pain n    Sputum    Joint Pain     SOB/VÁZQUEZ n   Pruritis/Rash     Nausea/vomit    Other     Diarrhea         Constipation           Could NOT obtain due to:      Objective:          Physical Exam:    Last 24hrs VS reviewed since prior progress note. Most recent are:    BP (!) 144/86   Pulse 79 
  [facility] Medicine Progress Note      Date of Admission: 4/26/2024  Hospital Day: 3    Chief Admission Complaint: Cardiac catheterization     Subjective: Patient seen and examined this morning.  No acute events overnight.  Resting in bed comfortably.  Denies any nausea vomiting fevers or chills.  No diarrhea.  He is tolerating his diet.        Assessment/Plan:    #Abdominal pain  -Resolved  -Tolerating diet      #CAD  S/p cardiac catheterization-stent/arterectomy left main and LAD  -Now with mid LAD stent restenosis  -Plan for tentative intervention of LAD tomorrow  -Continue aspirin/Plavix/statin/Coreg  -Continue monitor telemetry       #Hypertension-continue with amlodipine/Coreg/HCTZ/losartan     # ESRD on HD  #Hyperkalemia  Continue hemodialysis per nephrology  Check renal function this morning     #HIV-continue with antiretroviral, Pepcid dose decreased to 20 twice daily from 40 as it will interact and decrease the concentration of HIV medications.     #Seizure disorder-continue with Keppra    Heparin for DVT prophylaxis       Physical Exam Performed:      General appearance:  No apparent distress  Respiratory:  Normal respiratory effort. CTA  Cardiovascular:  Regular rate and rhythm.  Abdomen:  Soft, non-tender, non-distended.  Extremities: No edema  SKIN: No rash  Neurologic:  Non-focal  Psychiatric:  Alert and oriented    BP (!) 144/86   Pulse 79   Temp 98.4 °F (36.9 °C) (Oral)   Resp 18   SpO2 98%     Diet: ADULT DIET; Regular; Low Fat/Low Chol/High Fiber/BOLA      Consults:      IP CONSULT TO NEPHROLOGY  IP CONSULT TO HOSPITALIST        ------------------------------------------------------------------------------------------------------------------------------------------------------------------------    MDM      [] High (any 2)    A. Problems (any 1)  [] Acute/Chronic Illness/injury posing threat to life or bodily function:    [] Severe exacerbation of chronic illness:  
  Hospital Medicine Progress Note      Date of Admission: 4/26/2024  Hospital Day: 2    Chief Admission Complaint: Cardiac catheterization     Subjective: Patient seen and examined this morning.  Pain much better controlled.  He is without pain this morning denies any abdominal pain nausea vomiting fevers or chills.  No diarrhea.        Assessment/Plan:    #Abdominal pain  Abdominal pain much better.  Labs as well as CT unremarkable  Continue pain medication as needed  Advance diet to cardiac diet and regular  Stable from medical standpoint if he tolerates diet    #CAD  S/p cardiac catheterization-report not available yet  Continue aspirin/Plavix/statin/Coreg  Continue monitor telemetry       #Hypertension-continue with amlodipine/Coreg/HCTZ/losartan     # ESRD on HD  #Hyperkalemia  Continue hemodialysis per nephrology  Continue monitor renal function     #HIV-continue with antiretroviral     #Seizure disorder-continue with Keppra    Heparin for DVT prophylaxis       Physical Exam Performed:      General appearance:  No apparent distress  Respiratory:  Normal respiratory effort. CTA  Cardiovascular:  Regular rate and rhythm.  Abdomen:  Soft, non-tender, non-distended.  Extremities: No edema  SKIN: No rash  Neurologic:  Non-focal  Psychiatric:  Alert and oriented    /83   Pulse 68   Temp 97.6 °F (36.4 °C) (Oral)   Resp 18   SpO2 97%     Diet: ADULT DIET; Regular; Low Fat/Low Chol/High Fiber/BOLA      Consults:      IP CONSULT TO NEPHROLOGY  IP CONSULT TO HOSPITALIST        ------------------------------------------------------------------------------------------------------------------------------------------------------------------------    MDM      [] High (any 2)    A. Problems (any 1)  [] Acute/Chronic Illness/injury posing threat to life or bodily function:    [] Severe exacerbation of chronic illness:    ---------------------------------------------------------------------  B. Risk of Treatment (any 1) 
1815 Patient brought to floor by transport from dialysis suite.  Patient stating he is in dire abdominal pain pleading \"Harman please help me\".  Charge nurse Vanita notified since patient was present during patient's rapid response in dialysis.  Charge  nurse contacted primary physician which was cardiologist Dr. Glover.  Primary RN at bedside with patient checking vital signs.  Vital signs stable.  Dr. Glover requested hospitalist consult.  Dr. Allen came to bedside to assess patient. Lab at bedside for lab draw.  Dr. Allen placed orders for pain management.      1920 Patient received 1mg Dilaudid IV.        1930 Patient now in bed resting.   
Information about the importance of aspirin and antiplatelet compliance and outpatient cardiac rehab will be included with the patient's printed discharge instructions. Patient was also provided with a folder containing this information from the cath lab staff after the procedure.      Included in the patient's discharge instructions is information making patient aware that an outpatient cardiac referral has been made to [facility]. Patient's information was forwarded to this facility. Patient will be contacted by this facility to schedule an initial appointment. Contact information for cardiac rehab facility will be included with discharge instructions.    
Patient cleared for discharge by cardiology. Awaiting discharge order.     Patient DCP is home, no needs. Will follow up with cardio a outpatient.     
Patient discharged home self care. Discharge instructions given. Patient informed of meds and follow up appointments. Patient's sister to  sister. IV and tele removed. Patient stated he needs new prescription for Plavix. Dr. Allen notified and prescription sent to pharmacy.   
Renal Progress Note    Patient: Woodrow Madison MRN: [id number]  SSN: [id number]    YOB: 1965  Age: 58 y.o.  Sex: male      Admit Date: 4/26/2024    LOS: 0 days     Subjective:   Patient seen at bedside. Alert and awake, no acute distress.   Patient is not giving any new complaints today. Denies chest pain, shortness of breath, N/V.   No complaints of any swelling in lower extremities.   No complaints of abdominal or flank pains.  Potassium 6.1 this morning    Current Facility-Administered Medications   Medication Dose Route Frequency    heparin (porcine) injection 5,000 Units  5,000 Units SubCUTAneous 3 times per day    [START ON 4/28/2024] losartan (COZAAR) tablet 25 mg  25 mg Oral Daily    sodium chloride flush 0.9 % injection 5-40 mL  5-40 mL IntraVENous 2 times per day    sodium chloride flush 0.9 % injection 5-40 mL  5-40 mL IntraVENous PRN    0.9 % sodium chloride infusion   IntraVENous PRN    acetaminophen (TYLENOL) tablet 650 mg  650 mg Oral Q4H PRN    ondansetron (ZOFRAN) injection 4 mg  4 mg IntraVENous Q6H PRN    aspirin chewable tablet 81 mg  81 mg Oral Daily    clopidogrel (PLAVIX) tablet 75 mg  75 mg Oral Daily    nitroGLYCERIN (NITROSTAT) SL tablet 0.4 mg  0.4 mg SubLINGual Q5 Min PRN    dolutegravir sodium (TIVICAY) tablet 50 mg  50 mg Oral Daily    famotidine (PEPCID) tablet 20 mg  20 mg Oral BID    hydroCHLOROthiazide (HYDRODIURIL) tablet 25 mg  25 mg Oral Daily    amLODIPine (NORVASC) tablet 10 mg  10 mg Oral Daily    sevelamer (RENVELA) tablet 800 mg  800 mg Oral TID WC    carvedilol (COREG) tablet 25 mg  25 mg Oral BID WC    atorvastatin (LIPITOR) tablet 40 mg  40 mg Oral Nightly    atazanavir (REYATAZ) capsule 300 mg  300 mg Oral Daily    levETIRAcetam (KEPPRA) tablet 500 mg  500 mg Oral BID    ALPRAZolam (XANAX) tablet 0.5 mg  0.5 mg Oral Once PRN    HYDROmorphone HCl PF (DILAUDID) injection 1 mg  1 mg IntraVENous Q4H PRN        Vitals:    04/27/24 0337 04/27/24 0846 
Renal Progress Note    Patient: Woodrow Madison MRN: [id number]  SSN: [id number]    YOB: 1965  Age: 58 y.o.  Sex: male      Admit Date: 4/26/2024    LOS: 0 days     Subjective:   Patient seen in HD, tolerating well, in no acute distress. Alert and awake, no new complaints today.     Scheduled for discharged today.    Current Facility-Administered Medications   Medication Dose Route Frequency    sevelamer (RENVELA) tablet 1,600 mg  1,600 mg Oral TID WC    sodium chloride flush 0.9 % injection 5-40 mL  5-40 mL IntraVENous 2 times per day    sodium chloride flush 0.9 % injection 5-40 mL  5-40 mL IntraVENous PRN    0.9 % sodium chloride infusion   IntraVENous PRN    acetaminophen (TYLENOL) tablet 650 mg  650 mg Oral Q4H PRN    HYDROmorphone HCl PF (DILAUDID) injection 1 mg  1 mg IntraVENous Q4H PRN    heparin (porcine) injection 5,000 Units  5,000 Units SubCUTAneous 3 times per day    losartan (COZAAR) tablet 25 mg  25 mg Oral Daily    Tenofovir Alafenamide Fumarate TABS 25 mg  25 mg Oral Daily    sodium chloride flush 0.9 % injection 5-40 mL  5-40 mL IntraVENous 2 times per day    sodium chloride flush 0.9 % injection 5-40 mL  5-40 mL IntraVENous PRN    0.9 % sodium chloride infusion   IntraVENous PRN    acetaminophen (TYLENOL) tablet 650 mg  650 mg Oral Q4H PRN    ondansetron (ZOFRAN) injection 4 mg  4 mg IntraVENous Q6H PRN    aspirin chewable tablet 81 mg  81 mg Oral Daily    clopidogrel (PLAVIX) tablet 75 mg  75 mg Oral Daily    nitroGLYCERIN (NITROSTAT) SL tablet 0.4 mg  0.4 mg SubLINGual Q5 Min PRN    [Held by provider] dolutegravir sodium (TIVICAY) tablet 50 mg  50 mg Oral Daily    famotidine (PEPCID) tablet 20 mg  20 mg Oral BID    hydroCHLOROthiazide (HYDRODIURIL) tablet 25 mg  25 mg Oral Daily    amLODIPine (NORVASC) tablet 10 mg  10 mg Oral Daily    carvedilol (COREG) tablet 25 mg  25 mg Oral BID WC    atorvastatin (LIPITOR) tablet 40 mg  40 mg Oral Nightly    atazanavir (REYATAZ) capsule 
  SpO2:  98%     Weight:       Height:         Objective:   General: alert, awake, well-oriented, no acute distress.  HEENT: EOMI, no icterus, no pallor, pupils reactive, mucosa moist.  Neck: Neck is supple, No JVD.  Lungs: breathsounds normal, no respiratory distress on inspection,.  CVS: heart sounds normal, regular rate and rhythm.  GI: soft, nontender, normal BS.  Extremities: no clubbing, no cyanosis, no edema.  Neuro: Alert, awake, oriented x3, moving all extremities well.  Skin: normal skin turgor, no skin rashes or ulcerations.  Musculoskeletal: no acute joint swellings.    Intake and Output:  Current Shift: 04/29 0701 - 04/29 1900  In: 600   Out: 2900   Last three shifts: 04/27 1901 - 04/29 0700  In: 20 [I.V.:20]  Out: -       Lab/Data Review:  Recent Labs     04/27/24 0318 04/28/24  0436 04/29/24  0555   WBC 5.3 5.4 5.2   HGB 9.9* 10.2* 10.1*   HCT 30.9* 32.5* 31.5*   * 143* 167     Recent Labs     04/26/24  1905 04/27/24 0318 04/27/24 0318 04/27/24  1045 04/28/24  0436 04/29/24  0555   NA  --  135*  --   --  135* 136   K  --  6.1*   < > 5.2* 5.0 4.8   CL  --  99  --   --  100 98   CO2  --  30  --   --  28 27   GLUCOSE  --  85  --   --  82 105*   BUN  --  44*  --   --  38* 56*   CREATININE  --  9.44*  --   --  8.16* 11.50*   CALCIUM  --  8.5  --   --  9.1 9.3   PHOS  --  5.5*  --   --  6.7* 8.1*   BILITOT 0.8  --   --   --   --   --    AST 96*  --   --   --   --   --    ALT 31  --   --   --   --   --     < > = values in this interval not displayed.     No results for input(s): \"PHART\", \"GSA6KGP\", \"PO2ART\", \"EDV5TOD\", \"BEART\", \"JUH8RHDX\", \"HGBART\", \"VY6NYRRDB\", \"FIO2A\", \"W0SSCHPR\", \"OXYHEM\", \"CARBOXHGBART\", \"METHGBART\", \"X1SQWYQEQ\", \"PHCORART\", \"TEMP\" in the last 72 hours.    Invalid input(s): \"HSX7VDXMB\"  Recent Results (from the past 24 hour(s))   Renal Function Panel    Collection Time: 04/29/24  5:55 AM   Result Value Ref Range    Sodium 136 136 - 145 mmol/L    Potassium 4.8 3.5 - 5.1 mmol/L 
98.1 °F (36.7 °C)  98.4 °F (36.9 °C)   TempSrc: Oral   Oral   SpO2: 99% 92%  98%     Objective:   General: alert, awake, well-oriented, no acute distress.  HEENT: EOMI, no icterus, no pallor, pupils reactive, mucosa moist.  Neck: Neck is supple, No JVD.  Lungs: breathsounds normal, no respiratory distress on inspection,.  CVS: heart sounds normal, regular rate and rhythm.  GI: soft, nontender, normal BS.  Extremities: no clubbing, no cyanosis, no edema.  Neuro: Alert, awake, oriented x3, moving all extremities well.  Skin: normal skin turgor, no skin rashes or ulcerations.  Musculoskeletal: no acute joint swellings.     Intake and Output:  Current Shift: No intake/output data recorded.  Last three shifts: 04/26 1901 - 04/28 0700  In: 610 [I.V.:10]  Out: 1600       Lab/Data Review:  Recent Labs     04/27/24 0318 04/28/24  0436   WBC 5.3 5.4   HGB 9.9* 10.2*   HCT 30.9* 32.5*   * 143*     Recent Labs     04/26/24  0806 04/26/24  1905 04/27/24 0318 04/27/24  1045 04/28/24  0436     --  135*  --  135*   K 5.9*  --  6.1* 5.2* 5.0     --  99  --  100   CO2 29  --  30  --  28   GLUCOSE 101*  --  85  --  82   BUN 72*  --  44*  --  38*   CREATININE 13.20*  --  9.44*  --  8.16*   CALCIUM 10.7*  --  8.5  --  9.1   PHOS  --   --  5.5*  --  6.7*   BILITOT  --  0.8  --   --   --    AST  --  96*  --   --   --    ALT  --  31  --   --   --      No results for input(s): \"PHART\", \"NBJ9JIT\", \"PO2ART\", \"UGF4EVV\", \"BEART\", \"LUI6BELK\", \"HGBART\", \"IH0CNIBQF\", \"FIO2A\", \"L6OQPNTK\", \"OXYHEM\", \"CARBOXHGBART\", \"METHGBART\", \"H2DPETXGV\", \"PHCORART\", \"TEMP\" in the last 72 hours.    Invalid input(s): \"QAS9FCFME\"  Recent Results (from the past 24 hour(s))   Renal Function Panel    Collection Time: 04/28/24  4:36 AM   Result Value Ref Range    Sodium 135 (L) 136 - 145 mmol/L    Potassium 5.0 3.5 - 5.1 mmol/L    Chloride 100 97 - 108 mmol/L    CO2 28 21 - 32 mmol/L    Anion Gap 7 5 - 15 mmol/L    Glucose 82 65 - 100 mg/dL    BUN 
    Recent Labs     04/27/24 0318 04/28/24  0436   WBC 5.3 5.4   HGB 9.9* 10.2*   HCT 30.9* 32.5*   * 143*     Recent Labs     04/27/24 0318 04/27/24  1045 04/28/24  0436 04/29/24  0555   *  --  135* 136   K 6.1* 5.2* 5.0 4.8   CL 99  --  100 98   CO2 30  --  28 27   BUN 44*  --  38* 56*   CREATININE 9.44*  --  8.16* 11.50*   CALCIUM 8.5  --  9.1 9.3   PHOS 5.5*  --  6.7* 8.1*     Recent Labs     04/26/24  1905 04/27/24  1045   TROPHS 21 48     No results for input(s): \"LABA1C\" in the last 72 hours.  Recent Labs     04/26/24 1905   AST 96*   ALT 31   BILIDIR 0.3*   BILITOT 0.8   ALKPHOS 72     No results for input(s): \"INR\", \"LACTA\", \"TSH\" in the last 72 hours.    Urine Cultures: No results found for: \"LABURIN\"  Blood Cultures: No results found for: \"BC\"  No results found for: \"BLOODCULT2\"  Organism: No results found for: \"ORG\"      Otis Allen MD  
\"GLOB\", \"GGT\", \"AMYLASE\", \"LIPASE\" in the last 72 hours.    Invalid input(s): \"GPT\", \"TBILITOT\"    No results for input(s): \"PH\", \"PCO2\", \"PO2\" in the last 72 hours.    Medications Personally Reviewed:    Current Facility-Administered Medications   Medication Dose Route Frequency    sevelamer (RENVELA) tablet 1,600 mg  1,600 mg Oral TID WC    sodium chloride flush 0.9 % injection 5-40 mL  5-40 mL IntraVENous 2 times per day    sodium chloride flush 0.9 % injection 5-40 mL  5-40 mL IntraVENous PRN    0.9 % sodium chloride infusion   IntraVENous PRN    acetaminophen (TYLENOL) tablet 650 mg  650 mg Oral Q4H PRN    HYDROmorphone HCl PF (DILAUDID) injection 1 mg  1 mg IntraVENous Q4H PRN    heparin (porcine) injection 5,000 Units  5,000 Units SubCUTAneous 3 times per day    losartan (COZAAR) tablet 25 mg  25 mg Oral Daily    Tenofovir Alafenamide Fumarate TABS 25 mg  25 mg Oral Daily    sodium chloride flush 0.9 % injection 5-40 mL  5-40 mL IntraVENous 2 times per day    sodium chloride flush 0.9 % injection 5-40 mL  5-40 mL IntraVENous PRN    0.9 % sodium chloride infusion   IntraVENous PRN    acetaminophen (TYLENOL) tablet 650 mg  650 mg Oral Q4H PRN    ondansetron (ZOFRAN) injection 4 mg  4 mg IntraVENous Q6H PRN    aspirin chewable tablet 81 mg  81 mg Oral Daily    clopidogrel (PLAVIX) tablet 75 mg  75 mg Oral Daily    nitroGLYCERIN (NITROSTAT) SL tablet 0.4 mg  0.4 mg SubLINGual Q5 Min PRN    [Held by provider] dolutegravir sodium (TIVICAY) tablet 50 mg  50 mg Oral Daily    famotidine (PEPCID) tablet 20 mg  20 mg Oral BID    hydroCHLOROthiazide (HYDRODIURIL) tablet 25 mg  25 mg Oral Daily    amLODIPine (NORVASC) tablet 10 mg  10 mg Oral Daily    carvedilol (COREG) tablet 25 mg  25 mg Oral BID WC    atorvastatin (LIPITOR) tablet 40 mg  40 mg Oral Nightly    atazanavir (REYATAZ) capsule 300 mg  300 mg Oral Daily    levETIRAcetam (KEPPRA) tablet 500 mg  500 mg Oral BID           Problem List:   Coronary artery disease 
    Assessment/Plan:   Will consider intervention of the LAD artery on Monday.  Will continue dual antiplatelet medications.  Will follow nephrology recommendations.  HIV management as per medical doctor.  Thank you.           [x]       High complexity decision making was performed in this patient at high risk for decompensation with multiple organ involvement.    Adal Glover MD

## 2024-04-30 NOTE — DISCHARGE SUMMARY
Coronary artery disease, stent restenosis Post-procedure Diagnosis: Coronary disease, stent restenosis Procedures performed Ultrasound-guided access of right radial artery Selective coronary angiography of the left coronary system Attempted PCI to restenosis mid-distal LAD Description of Procedure: After obtaining informed consent, the patient was brought to the cardiac catheterization laboratory.  The right wrist was prepped and draped in sterile fashion.  After administration of 1% lidocaine to the access site, a 21-gauge needle was used to access the right radial artery and a 6 [place] 16 cm slender glide sheath was introduced.  Left heart catheterization and selective coronary angiography was done using a 6 [place] XB 3.5 guiding catheter. Radial cocktail was given using verapamil .  Patient was appropriately heparinized.  We used coronary Fielder XT and whisper wire to cross the lesion.  An over-the-wire system was used with 1 mm balloon and wire escalation was performed.  We were able to cross the proximal lesion but wire keep getting stuck behind the second stent.  Soft inflation 1 mm balloon was done.  At this point, we had mild increase in blood flow to the distal segments of LAD.  We conclude the procedure for reattempt with a laser atherectomy later.  Conscious sedation was administered using Versed and fentanyl.  At the end of the procedure, a TR band was applied for hemostasis.  Patient tolerated procedure well without complications.  Please see below for details of angiography and/or intervention. Conscious sedation Sedating agents were administered under my supervision.  An independent trained observer was present during the procedure for monitoring of moderate sedation with hemodynamic, continuous telemetry and oxygen assessment as per case log. Surgeon:  Jeronimo Guthrie MD [facility]                   Interventional & Structural Cardiology Assistant(s):  None Anesthesia: Moderate Sedation

## 2024-04-30 NOTE — PLAN OF CARE
Problem: Chronic Conditions and Co-morbidities  Goal: Patient's chronic conditions and co-morbidity symptoms are monitored and maintained or improved  4/30/2024 1110 by Diana Chinchilla RN  Outcome: Progressing  4/29/2024 2314 by Morenita Garrett RN  Outcome: Progressing  Flowsheets (Taken 4/29/2024 2009)  Care Plan - Patient's Chronic Conditions and Co-Morbidity Symptoms are Monitored and Maintained or Improved: Monitor and assess patient's chronic conditions and comorbid symptoms for stability, deterioration, or improvement     Problem: Pain  Goal: Verbalizes/displays adequate comfort level or baseline comfort level  4/30/2024 1110 by Diana Chinchilla RN  Outcome: Progressing  4/29/2024 2314 by Morenita Garrett RN  Outcome: Progressing     Problem: Discharge Planning  Goal: Discharge to home or other facility with appropriate resources  Outcome: Progressing     Problem: Safety - Adult  Goal: Free from fall injury  Outcome: Progressing

## 2024-04-30 NOTE — PLAN OF CARE
Problem: Chronic Conditions and Co-morbidities  Goal: Patient's chronic conditions and co-morbidity symptoms are monitored and maintained or improved  4/30/2024 1325 by Diana Chinchilla RN  Outcome: Completed  4/30/2024 1110 by Diana Chinchilla RN  Outcome: Progressing     Problem: Pain  Goal: Verbalizes/displays adequate comfort level or baseline comfort level  4/30/2024 1325 by Diana Chinchilla RN  Outcome: Completed  4/30/2024 1110 by Diana Chinchilla RN  Outcome: Progressing     Problem: Discharge Planning  Goal: Discharge to home or other facility with appropriate resources  4/30/2024 1325 by Diana Chinchilla RN  Outcome: Completed  4/30/2024 1110 by Diana Chinchilla RN  Outcome: Progressing     Problem: Safety - Adult  Goal: Free from fall injury  4/30/2024 1325 by Diana Chinchilla RN  Outcome: Completed  4/30/2024 1110 by Diana Chinchilla RN  Outcome: Progressing

## 2024-04-30 NOTE — DIALYSIS
Patient completed 2 hour of UF only, tolerated 1L of fluid removal.    Seen by Dr. Lockett and RADHA Lowry while on treatment    Informed Milla of telemetry benito is en route to Room [address] - [address], with box #29.    ANN-MARIE Ortiz received dialysis report.

## 2024-05-07 ENCOUNTER — OFFICE VISIT (OUTPATIENT)
Age: 59
End: 2024-05-07
Payer: MEDICARE

## 2024-05-07 VITALS
WEIGHT: 223.6 LBS | BODY MASS INDEX: 30.28 KG/M2 | DIASTOLIC BLOOD PRESSURE: 76 MMHG | RESPIRATION RATE: 16 BRPM | SYSTOLIC BLOOD PRESSURE: 124 MMHG | HEART RATE: 80 BPM | OXYGEN SATURATION: 94 % | HEIGHT: 72 IN | TEMPERATURE: 98.3 F

## 2024-05-07 DIAGNOSIS — C44.520 SQUAMOUS CELL CARCINOMA OF ANAL SKIN: ICD-10-CM

## 2024-05-07 DIAGNOSIS — C44.520 SQUAMOUS CELL CARCINOMA OF ANAL MARGIN: Primary | ICD-10-CM

## 2024-05-07 PROCEDURE — 99212 OFFICE O/P EST SF 10 MIN: CPT | Performed by: SURGERY

## 2024-05-07 PROCEDURE — 3074F SYST BP LT 130 MM HG: CPT | Performed by: SURGERY

## 2024-05-07 PROCEDURE — 3078F DIAST BP <80 MM HG: CPT | Performed by: SURGERY

## 2024-05-07 ASSESSMENT — PATIENT HEALTH QUESTIONNAIRE - PHQ9
SUM OF ALL RESPONSES TO PHQ9 QUESTIONS 1 & 2: 0
SUM OF ALL RESPONSES TO PHQ QUESTIONS 1-9: 0
SUM OF ALL RESPONSES TO PHQ QUESTIONS 1-9: 0
1. LITTLE INTEREST OR PLEASURE IN DOING THINGS: NOT AT ALL
SUM OF ALL RESPONSES TO PHQ QUESTIONS 1-9: 0
SUM OF ALL RESPONSES TO PHQ QUESTIONS 1-9: 0
2. FEELING DOWN, DEPRESSED OR HOPELESS: NOT AT ALL

## 2024-05-07 NOTE — PROGRESS NOTES
Identified pt with two pt identifiers (name and ). Reviewed chart in preparation for visit and have obtained necessary documentation.    Woodrow Madison is a 58 y.o. male  Chief Complaint   Patient presents with    Follow-up     /76 (Site: Right Upper Arm, Position: Sitting, Cuff Size: Large Adult)   Pulse 80   Temp 98.3 °F (36.8 °C) (Oral)   Resp 16   Ht 1.829 m (6')   Wt 101.4 kg (223 lb 9.6 oz)   SpO2 94%   BMI 30.33 kg/m²     1. Have you been to the ER, urgent care clinic since your last visit?  Hospitalized since your last visit?no    2. Have you seen or consulted any other health care providers outside of the [facility] since your last visit?  Include any pap smears or colon screening. no

## 2024-05-09 ENCOUNTER — OFFICE VISIT (OUTPATIENT)
Facility: CLINIC | Age: 59
End: 2024-05-09
Payer: MEDICARE

## 2024-05-09 VITALS
DIASTOLIC BLOOD PRESSURE: 89 MMHG | OXYGEN SATURATION: 98 % | WEIGHT: 222.4 LBS | BODY MASS INDEX: 30.12 KG/M2 | RESPIRATION RATE: 18 BRPM | HEIGHT: 72 IN | TEMPERATURE: 97.8 F | HEART RATE: 69 BPM | SYSTOLIC BLOOD PRESSURE: 128 MMHG

## 2024-05-09 DIAGNOSIS — Z99.2 ANEMIA DUE TO CHRONIC KIDNEY DISEASE, ON CHRONIC DIALYSIS (HCC): ICD-10-CM

## 2024-05-09 DIAGNOSIS — G89.29 CHRONIC ABDOMINAL PAIN: ICD-10-CM

## 2024-05-09 DIAGNOSIS — E78.2 MIXED HYPERLIPIDEMIA: ICD-10-CM

## 2024-05-09 DIAGNOSIS — I25.83 CORONARY ARTERY DISEASE DUE TO LIPID RICH PLAQUE: ICD-10-CM

## 2024-05-09 DIAGNOSIS — Z12.5 SCREENING FOR PROSTATE CANCER: ICD-10-CM

## 2024-05-09 DIAGNOSIS — Z09 HOSPITAL DISCHARGE FOLLOW-UP: Primary | ICD-10-CM

## 2024-05-09 DIAGNOSIS — B20 HIV INFECTION, UNSPECIFIED SYMPTOM STATUS (HCC): ICD-10-CM

## 2024-05-09 DIAGNOSIS — N18.6 ANEMIA DUE TO CHRONIC KIDNEY DISEASE, ON CHRONIC DIALYSIS (HCC): ICD-10-CM

## 2024-05-09 DIAGNOSIS — K21.9 GASTRO-ESOPHAGEAL REFLUX DISEASE WITHOUT ESOPHAGITIS: ICD-10-CM

## 2024-05-09 DIAGNOSIS — I25.10 CORONARY ARTERY DISEASE DUE TO LIPID RICH PLAQUE: ICD-10-CM

## 2024-05-09 DIAGNOSIS — R10.9 CHRONIC ABDOMINAL PAIN: ICD-10-CM

## 2024-05-09 DIAGNOSIS — I71.20 THORACIC AORTIC ANEURYSM WITHOUT RUPTURE, UNSPECIFIED PART (HCC): ICD-10-CM

## 2024-05-09 DIAGNOSIS — D63.1 ANEMIA DUE TO CHRONIC KIDNEY DISEASE, ON CHRONIC DIALYSIS (HCC): ICD-10-CM

## 2024-05-09 DIAGNOSIS — Z87.898 HISTORY OF SEIZURES: ICD-10-CM

## 2024-05-09 DIAGNOSIS — N18.6 ESRD (END STAGE RENAL DISEASE) (HCC): ICD-10-CM

## 2024-05-09 DIAGNOSIS — C20 RECTAL CANCER (HCC): ICD-10-CM

## 2024-05-09 DIAGNOSIS — I10 PRIMARY HYPERTENSION: ICD-10-CM

## 2024-05-09 PROCEDURE — 3074F SYST BP LT 130 MM HG: CPT | Performed by: NURSE PRACTITIONER

## 2024-05-09 PROCEDURE — 99214 OFFICE O/P EST MOD 30 MIN: CPT | Performed by: NURSE PRACTITIONER

## 2024-05-09 PROCEDURE — 1111F DSCHRG MED/CURRENT MED MERGE: CPT | Performed by: NURSE PRACTITIONER

## 2024-05-09 PROCEDURE — 3079F DIAST BP 80-89 MM HG: CPT | Performed by: NURSE PRACTITIONER

## 2024-05-09 RX ORDER — FAMOTIDINE 40 MG/1
20 TABLET, FILM COATED ORAL
COMMUNITY
Start: 2024-04-24

## 2024-05-09 RX ORDER — CLONIDINE HYDROCHLORIDE 0.1 MG/1
0.1 TABLET ORAL
COMMUNITY
Start: 2023-07-12 | End: 2024-07-10

## 2024-05-09 NOTE — PROGRESS NOTES
Chief Complaint   Patient presents with    Follow-Up from Hospital    Hypertension     Follow up     Pt brought in meds went over list in chart, pt confirmed         \"Have you been to the ER, urgent care clinic since your last visit?  Hospitalized since your last visit?\"    NO    “Have you seen or consulted any other health care providers outside of [facility] since your last visit?”    NO            Click Here for Release of Records Request      
suspension  Commonly known as: Bismatrol  Take 15 mLs by mouth every 6 hours as needed for Indigestion or Heartburn     calcium carbonate 1250 (500 Ca) MG chewable tablet  Commonly known as: OS-MOON     carvedilol 25 MG tablet  Commonly known as: COREG  Take 1 tablet by mouth 2 times daily (with meals)     cloNIDine 0.1 MG tablet  Commonly known as: CATAPRES     clopidogrel 75 MG tablet  Commonly known as: PLAVIX  Take 1 tablet by mouth daily     hydroCHLOROthiazide 25 MG tablet  Commonly known as: HYDRODIURIL  Take 1 tablet by mouth daily     levETIRAcetam 500 MG tablet  Commonly known as: KEPPRA  take 1 tablet by mouth twice a day     lidocaine 4 % cream  Commonly known as: LMX     lidocaine-prilocaine 2.5-2.5 % cream  Commonly known as: EMLA     losartan 100 MG tablet  Commonly known as: COZAAR  TAKE 1 TABLET BY MOUTH DAILY     metoprolol tartrate 50 MG tablet  Commonly known as: LOPRESSOR  take 1 tablet by mouth twice a day     MIRCERA IJ     nitroGLYCERIN 0.4 MG SL tablet  Commonly known as: NITROSTAT     sevelamer 800 MG tablet  Commonly known as: RENVELA     Tivicay 50 MG tablet  Generic drug: dolutegravir sodium  TAKE 1 TABLET BY MOUTH DAILY     Vemlidy 25 MG Tabs  Generic drug: Tenofovir Alafenamide Fumarate  take 1 tablet by mouth once daily                Medications marked \"taking\" at this time  Outpatient Medications Marked as Taking for the 5/9/24 encounter (Office Visit) with Maggy Daniel APRN - NP   Medication Sig Dispense Refill    famotidine (PEPCID) 40 MG tablet 0.5 tablets      cloNIDine (CATAPRES) 0.1 MG tablet Take 1 tablet by mouth Given at dialysis      hydroCHLOROthiazide (HYDRODIURIL) 25 MG tablet Take 1 tablet by mouth daily 30 tablet 3    clopidogrel (PLAVIX) 75 MG tablet Take 1 tablet by mouth daily 90 tablet 1    nitroGLYCERIN (NITROSTAT) 0.4 MG SL tablet place 1 tablet under the tongue if needed every 5 minutes for michelle...  (REFER TO PRESCRIPTION NOTES).      dolutegravir

## 2024-05-09 NOTE — PROGRESS NOTES
[facility]      Clinic Note - Follow up    Subjective     Woodrow Madison returns for scheduled follow up today.  This is a 58-year-old male who is a patient of Dr. Ortez and who Dr. 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  Per patient he did not have radiation or chemotherapy preop or postop    He was initially taken to the operating room January 10, 2023 and was noted to have HPV associated changes consistent with condyloma possible dysplasia at the anal verge extending into the anal canal and anterior left anterior and right anterior quadrants.  Large condyloma lesion in the right perianal skin approximately 2 cm from the anal verge extending out approximately 3.5 cm.    Histopathology for the 3.5 cm perianal condyloma was consistent with well-differentiated squamous cell carcinoma, tumor invaded the perianal skin no lymphovascular and no perineural invasion identified.  Distance from closest deep margin was 3 mm and closest mucosal margin was 3 mm.    He has been following Dr. Davila for surveillance and presents today for his surveillance/follow-up.  He is doing very well.    He denies noticing any pain in his perianal region, any mass, or drainage from the area.  He denies any other symptoms including abdominal pain nausea vomiting diarrhea constipation blood per rectum melena fever or chills.        Objective     /76 (Site: Right Upper Arm, Position: Sitting, Cuff Size: Large Adult)   Pulse 80   Temp 98.3 °F (36.8 °C) (Oral)   Resp 16   Ht 1.829 m (6')   Wt 101.4 kg (223 lb 9.6 oz)   SpO2 94%   BMI 30.33 kg/m²       PE  GEN - Awake, alert, communicating appropriately.  NAD  Pulm - NWAB  CV - RRR  Abd - soft, NT, ND.   Perianal exam no mass present or visible.  I do not perform a digital rectal exam        Assessment     Woodrow Madison is a 58 y.o.yr old male T2 perianal/anal margin squamous cell carcinoma

## 2024-05-19 RX ORDER — LEVETIRACETAM 500 MG/1
TABLET ORAL
Qty: 180 TABLET | Refills: 1 | Status: SHIPPED | OUTPATIENT
Start: 2024-05-19

## 2024-05-21 NOTE — PROGRESS NOTES
OFFICE VISIT NOTE    Woodrow Madison is a 58 y.o. male who presents to the office today for:    Chief Complaint   Patient presents with    Post-Op Check       Woodrow comes in today for follow-up status post anal exam anesthesia and excision of perianal lesion.  He has no complaints today.  He denies any complications at surgical site.        Past Medical History:   Diagnosis Date    A-V fistula (HCC)     left arm    Anemia     Chronic abdominal pain     Chronic kidney disease     dialysis pt. - [facility].W.[facility] - [facility]    Colon cancer (HCC)     Complication of AV dialysis fistula 2019    2 large pseudo-aneurysm    Esophagitis     ESRD on dialysis (HCC)     Gastritis     GERD (gastroesophageal reflux disease)     Gout     pt states not in 12 years    Hemodialysis patient (HCC)     High cholesterol     History of blood transfusion     at least 8 units over the years  - pt states last given June 2023    HIV (human immunodeficiency virus infection) ([facility]) 2011    Hypertension     NSTEMI (non-ST elevated myocardial infarction) ([facility])     JUNE 2023    Other ill-defined conditions(799.89)     SMALL VEIN IN HEART BEING MONITORED    Pancreatitis     Renal insufficiency     Seizure ([facility]) 2012    one time and none sense    Sepsis ([facility]) 2011 & 8/2019       Past Surgical History:   Procedure Laterality Date    AV FISTULA CREATION Left 2012    & 2019    CARDIAC PROCEDURE N/A 06/13/2023    Left heart cath / coronary angiography performed by Adal Glover MD at [facility] CARDIAC CATH LAB and 2nd stent with 2nd cath at [facility] June 2023    CARDIAC PROCEDURE N/A 06/19/2023    Percutaneous coronary intervention performed by Luis M Watson MD at [facility] CARDIAC CATH LAB    CARDIAC PROCEDURE N/A 06/19/2023    Atherectomy coronary performed by Luis M Watson MD at [facility] CARDIAC CATH LAB    CARDIAC PROCEDURE

## 2024-05-22 ENCOUNTER — TELEPHONE (OUTPATIENT)
Facility: CLINIC | Age: 59
End: 2024-05-22

## 2024-05-22 DIAGNOSIS — M1A.9XX0 CHRONIC GOUT WITHOUT TOPHUS, UNSPECIFIED CAUSE, UNSPECIFIED SITE: Primary | ICD-10-CM

## 2024-05-22 RX ORDER — PREDNISONE 20 MG/1
TABLET ORAL
Qty: 9 TABLET | Refills: 0 | Status: SHIPPED | OUTPATIENT
Start: 2024-05-22 | End: 2024-05-27

## 2024-05-22 NOTE — TELEPHONE ENCOUNTER
Patient is requesting a call. He is requesting a medication that Mrs. Daniel prescribe before for Gout. He did not know the name of the medication. Requested to speak with nurse.

## 2024-05-27 PROBLEM — G40.909 SEIZURE DISORDER (HCC): Status: RESOLVED | Noted: 2017-04-27 | Resolved: 2024-05-27

## 2024-05-27 PROBLEM — Z99.2 DIALYSIS PATIENT (HCC): Status: RESOLVED | Noted: 2024-03-11 | Resolved: 2024-05-27

## 2024-05-27 ASSESSMENT — ENCOUNTER SYMPTOMS
GASTROINTESTINAL NEGATIVE: 1
EYES NEGATIVE: 1
RESPIRATORY NEGATIVE: 1

## 2024-06-13 RX ORDER — ATORVASTATIN CALCIUM 40 MG/1
TABLET, FILM COATED ORAL
Qty: 90 TABLET | Refills: 1 | Status: SHIPPED | OUTPATIENT
Start: 2024-06-13

## 2024-06-20 NOTE — PROGRESS NOTES
This note/encounter/order is being administratively closed in accordance with the defined policies, procedures and workflows established by [facility]. I cannot validate the clinical content accuracy of this information.

## 2024-06-28 LAB
PSA SERPL-MCNC: 0.9 NG/ML (ref 0–4)
REFLEX CRITERIA: NORMAL

## 2024-07-02 ENCOUNTER — HOSPITAL ENCOUNTER (OUTPATIENT)
Facility: HOSPITAL | Age: 59
Discharge: HOME | End: 2024-07-02
Attending: INTERNAL MEDICINE | Admitting: INTERNAL MEDICINE
Payer: MEDICARE

## 2024-07-02 VITALS
WEIGHT: 216 LBS | HEIGHT: 72 IN | TEMPERATURE: 97.4 F | RESPIRATION RATE: 14 BRPM | BODY MASS INDEX: 29.26 KG/M2 | OXYGEN SATURATION: 99 % | HEART RATE: 53 BPM | SYSTOLIC BLOOD PRESSURE: 169 MMHG | DIASTOLIC BLOOD PRESSURE: 90 MMHG

## 2024-07-02 DIAGNOSIS — R07.9 CHEST PAIN: ICD-10-CM

## 2024-07-02 LAB
ACT BLD: 269 SECS (ref 79–138)
ACT BLD: 403 SECS (ref 79–138)
ECHO BSA: 2.23 M2

## 2024-07-02 PROCEDURE — C1894 INTRO/SHEATH, NON-LASER: HCPCS | Performed by: INTERNAL MEDICINE

## 2024-07-02 PROCEDURE — 6370000000 HC RX 637 (ALT 250 FOR IP): Performed by: INTERNAL MEDICINE

## 2024-07-02 PROCEDURE — C1874 STENT, COATED/COV W/DEL SYS: HCPCS | Performed by: INTERNAL MEDICINE

## 2024-07-02 PROCEDURE — C1753 CATH, INTRAVAS ULTRASOUND: HCPCS | Performed by: INTERNAL MEDICINE

## 2024-07-02 PROCEDURE — 99153 MOD SED SAME PHYS/QHP EA: CPT | Performed by: INTERNAL MEDICINE

## 2024-07-02 PROCEDURE — 85347 COAGULATION TIME ACTIVATED: CPT

## 2024-07-02 PROCEDURE — 6360000004 HC RX CONTRAST MEDICATION: Performed by: INTERNAL MEDICINE

## 2024-07-02 PROCEDURE — C1713 ANCHOR/SCREW BN/BN,TIS/BN: HCPCS | Performed by: INTERNAL MEDICINE

## 2024-07-02 PROCEDURE — C1725 CATH, TRANSLUMIN NON-LASER: HCPCS | Performed by: INTERNAL MEDICINE

## 2024-07-02 PROCEDURE — 92978 ENDOLUMINL IVUS OCT C 1ST: CPT | Performed by: INTERNAL MEDICINE

## 2024-07-02 PROCEDURE — C1769 GUIDE WIRE: HCPCS | Performed by: INTERNAL MEDICINE

## 2024-07-02 PROCEDURE — 99152 MOD SED SAME PHYS/QHP 5/>YRS: CPT | Performed by: INTERNAL MEDICINE

## 2024-07-02 PROCEDURE — 2709999900 HC NON-CHARGEABLE SUPPLY: Performed by: INTERNAL MEDICINE

## 2024-07-02 PROCEDURE — 2500000003 HC RX 250 WO HCPCS: Performed by: INTERNAL MEDICINE

## 2024-07-02 PROCEDURE — 6360000002 HC RX W HCPCS: Performed by: INTERNAL MEDICINE

## 2024-07-02 PROCEDURE — C9607 PERC D-E COR REVASC CHRO SIN: HCPCS | Performed by: INTERNAL MEDICINE

## 2024-07-02 PROCEDURE — 76937 US GUIDE VASCULAR ACCESS: CPT | Performed by: INTERNAL MEDICINE

## 2024-07-02 PROCEDURE — C1887 CATHETER, GUIDING: HCPCS | Performed by: INTERNAL MEDICINE

## 2024-07-02 PROCEDURE — 93454 CORONARY ARTERY ANGIO S&I: CPT | Performed by: INTERNAL MEDICINE

## 2024-07-02 DEVICE — STENT ONYXNG35018UX ONYX 3.50X18RX
Type: IMPLANTABLE DEVICE | Status: FUNCTIONAL
Brand: ONYX FRONTIER™

## 2024-07-02 DEVICE — STENT ONYXNG25018UX ONYX 2.50X18RX
Type: IMPLANTABLE DEVICE | Status: FUNCTIONAL
Brand: ONYX FRONTIER™

## 2024-07-02 RX ORDER — NITROGLYCERIN 400 UG/1
SPRAY ORAL PRN
Status: DISCONTINUED | OUTPATIENT
Start: 2024-07-02 | End: 2024-07-02 | Stop reason: HOSPADM

## 2024-07-02 RX ORDER — PREDNISONE 20 MG/1
20 TABLET ORAL DAILY
COMMUNITY

## 2024-07-02 RX ORDER — HYDROCODONE BITARTRATE AND ACETAMINOPHEN 5; 325 MG/1; MG/1
1 TABLET ORAL EVERY 6 HOURS PRN
COMMUNITY

## 2024-07-02 RX ORDER — CLOPIDOGREL 300 MG/1
TABLET, FILM COATED ORAL PRN
Status: DISCONTINUED | OUTPATIENT
Start: 2024-07-02 | End: 2024-07-02 | Stop reason: HOSPADM

## 2024-07-02 RX ORDER — IMIQUIMOD 12.5 MG/.25G
CREAM TOPICAL
COMMUNITY

## 2024-07-02 RX ORDER — VERAPAMIL HYDROCHLORIDE 2.5 MG/ML
INJECTION, SOLUTION INTRAVENOUS PRN
Status: DISCONTINUED | OUTPATIENT
Start: 2024-07-02 | End: 2024-07-02 | Stop reason: HOSPADM

## 2024-07-02 RX ORDER — HEPARIN SODIUM 10000 [USP'U]/ML
INJECTION, SOLUTION INTRAVENOUS; SUBCUTANEOUS PRN
Status: DISCONTINUED | OUTPATIENT
Start: 2024-07-02 | End: 2024-07-02 | Stop reason: HOSPADM

## 2024-07-02 RX ORDER — ASPIRIN 81 MG/1
TABLET, CHEWABLE ORAL PRN
Status: DISCONTINUED | OUTPATIENT
Start: 2024-07-02 | End: 2024-07-02 | Stop reason: HOSPADM

## 2024-07-02 RX ORDER — OMEPRAZOLE 20 MG/1
20 CAPSULE, DELAYED RELEASE ORAL DAILY
COMMUNITY

## 2024-07-02 RX ORDER — HEPARIN SODIUM 1000 [USP'U]/ML
INJECTION, SOLUTION INTRAVENOUS; SUBCUTANEOUS PRN
Status: DISCONTINUED | OUTPATIENT
Start: 2024-07-02 | End: 2024-07-02 | Stop reason: HOSPADM

## 2024-07-02 RX ORDER — LIDOCAINE HYDROCHLORIDE 10 MG/ML
INJECTION, SOLUTION INFILTRATION; PERINEURAL PRN
Status: DISCONTINUED | OUTPATIENT
Start: 2024-07-02 | End: 2024-07-02 | Stop reason: HOSPADM

## 2024-07-02 RX ORDER — FENTANYL CITRATE 50 UG/ML
INJECTION, SOLUTION INTRAMUSCULAR; INTRAVENOUS PRN
Status: DISCONTINUED | OUTPATIENT
Start: 2024-07-02 | End: 2024-07-02 | Stop reason: HOSPADM

## 2024-07-02 RX ORDER — SEVELAMER HYDROCHLORIDE 800 MG/1
800 TABLET, FILM COATED ORAL
COMMUNITY

## 2024-07-02 ASSESSMENT — PAIN DESCRIPTION - LOCATION
LOCATION: CHEST
LOCATION: CHEST

## 2024-07-02 ASSESSMENT — PAIN SCALES - GENERAL: PAINLEVEL_OUTOF10: 5

## 2024-07-02 NOTE — PROGRESS NOTES
Woodrow Madison is a 58 year old male who was admitted today for a cardiac cath. The patient had 2 stents placed. Right radial site has no redness, bleeding, oozing, etc. Tr band @ 12. Educated patient about mobility, unit safety, and when to use the call bell.     1:50 pm: Tried removing the Tr band, but the site was oozing. Re-inflated to 12 and to recheck again in about 30 minutes    2:30 pm: Began removal process again with successful removal of the Tr band. Educated patient about mobility restrictions. Patient verbalized understanding and was able to teach back.

## 2024-07-02 NOTE — PLAN OF CARE
Problem: Chronic Conditions and Co-morbidities  Goal: Patient's chronic conditions and co-morbidity symptoms are monitored and maintained or improved  7/2/2024 1611 by Lo Carmona RN  Outcome: Adequate for Discharge  7/2/2024 1144 by Lo Carmona RN  Outcome: Progressing  Flowsheets (Taken 7/2/2024 1102)  Care Plan - Patient's Chronic Conditions and Co-Morbidity Symptoms are Monitored and Maintained or Improved: Monitor and assess patient's chronic conditions and comorbid symptoms for stability, deterioration, or improvement     Problem: Discharge Planning  Goal: Discharge to home or other facility with appropriate resources  7/2/2024 1611 by Lo Carmona RN  Outcome: Adequate for Discharge  7/2/2024 1144 by Lo Carmona RN  Outcome: Progressing  Flowsheets (Taken 7/2/2024 1102)  Discharge to home or other facility with appropriate resources: Identify barriers to discharge with patient and caregiver     Problem: Pain  Goal: Verbalizes/displays adequate comfort level or baseline comfort level  7/2/2024 1611 by Lo Carmona RN  Outcome: Adequate for Discharge  7/2/2024 1144 by Lo Carmona RN  Outcome: Progressing     Problem: Safety - Adult  Goal: Free from fall injury  7/2/2024 1611 by Lo Carmona RN  Outcome: Adequate for Discharge  7/2/2024 1144 by Lo Carmona RN  Outcome: Progressing

## 2024-07-02 NOTE — PROGRESS NOTES
Cardiac Cath Lab Recovery Arrival Note:      Woodrow Madison arrived to Cardiac Cath Lab, Recovery Area. Staff introduced to patient. Patient identifiers verified with NAME and DATE OF BIRTH. Procedure verified with patient. Consent forms reviewed and signed by patient or authorized representative and verified. Allergies verified.     Patient and family oriented to department. Patient and family informed of procedure and plan of care.     Questions answered with review. Patient prepped for procedure, per orders from physician, prior to arrival.    Patient on cardiac monitor, non-invasive blood pressure, SPO2 monitor. On RA. Patient is A&Ox 4. Patient reports no pain.     Patient in stretcher, in low position, with side rails up, call bell within reach, patient instructed to call if assistance as needed.    Patient prep in: [facility] Recovery Area, [place] [address].     Family in: Alexa, wife.   Prep by: Merrill

## 2024-07-02 NOTE — PLAN OF CARE
Predictive Model Details          22 (Normal)  Factor Value    Calculated 7/2/2024 11:45 47% Age 58 years old    Deterioration Index Model 28% Systolic 181     19% Respiratory rate 14     5% Pulse 53     1% Pulse oximetry 98 %     1% Temperature 97.4 °F (36.3 °C)        Problem: Chronic Conditions and Co-morbidities  Goal: Patient's chronic conditions and co-morbidity symptoms are monitored and maintained or improved  Outcome: Progressing  Flowsheets (Taken 7/2/2024 1102)  Care Plan - Patient's Chronic Conditions and Co-Morbidity Symptoms are Monitored and Maintained or Improved: Monitor and assess patient's chronic conditions and comorbid symptoms for stability, deterioration, or improvement     Problem: Discharge Planning  Goal: Discharge to home or other facility with appropriate resources  Outcome: Progressing  Flowsheets (Taken 7/2/2024 1102)  Discharge to home or other facility with appropriate resources: Identify barriers to discharge with patient and caregiver     Problem: Pain  Goal: Verbalizes/displays adequate comfort level or baseline comfort level  Outcome: Progressing     Problem: Safety - Adult  Goal: Free from fall injury  Outcome: Progressing

## 2024-07-02 NOTE — CARDIO/PULMONARY
Chart reviewed: Patient is 58 y.o. male admitted with Chest pain [R07.9]    Education: CAD education folder given to Woodrow Madison.      Educated using teach back method. Reviewed CAD diagnosis definition and purpose of intervention. Discussed risk factors for CAD to include the following: family history, elevated BMI, hyperlipidemia, hypertension, diabetes, stress, and smoking.  Discussed Heart Healthy/Low Sodium (2000 mg) diet. Reviewed the importance of medication compliance, the purpose of plavix, and potential side effects. Discussed follow up appointments with cardiologist, signs and symptoms of angina, and what to report to physician after discharge.  Emphasized the value of cardiac rehab. Discussed Cardiac Rehab Program format, benefits, and encouraged enrollment to assist with risk modification and management. Pt resides in [place] he will return to the cardiac rehab in [place] where he attended previously. [facility] contact information provided.    Woodrow Madison verbalized understanding with questions answered.     BRITTNEY ROMANO RN

## 2024-07-06 LAB
EKG ATRIAL RATE: 51 BPM
EKG DIAGNOSIS: NORMAL
EKG P AXIS: 15 DEGREES
EKG P-R INTERVAL: 154 MS
EKG Q-T INTERVAL: 472 MS
EKG QRS DURATION: 80 MS
EKG QTC CALCULATION (BAZETT): 435 MS
EKG R AXIS: -10 DEGREES
EKG T AXIS: 25 DEGREES
EKG VENTRICULAR RATE: 51 BPM

## 2024-07-14 DIAGNOSIS — I25.10 CORONARY ARTERY DISEASE DUE TO LIPID RICH PLAQUE: ICD-10-CM

## 2024-07-14 DIAGNOSIS — I25.83 CORONARY ARTERY DISEASE DUE TO LIPID RICH PLAQUE: ICD-10-CM

## 2024-07-14 DIAGNOSIS — I10 PRIMARY HYPERTENSION: ICD-10-CM

## 2024-07-14 RX ORDER — LOSARTAN POTASSIUM 100 MG/1
100 TABLET ORAL DAILY
Qty: 90 TABLET | Refills: 1 | Status: SHIPPED | OUTPATIENT
Start: 2024-07-14

## 2024-07-22 ENCOUNTER — TELEPHONE (OUTPATIENT)
Age: 59
End: 2024-07-22

## 2024-07-23 DIAGNOSIS — B20 SYMPTOMATIC HIV INFECTION (HCC): Primary | ICD-10-CM

## 2024-07-23 RX ORDER — TENOFOVIR ALAFENAMIDE 25 MG/1
25 TABLET ORAL DAILY
Qty: 90 TABLET | Refills: 0 | Status: SHIPPED | OUTPATIENT
Start: 2024-07-23 | End: 2024-10-21

## 2024-07-23 RX ORDER — RITONAVIR 100 MG/1
100 CAPSULE ORAL DAILY
Qty: 90 CAPSULE | Refills: 0 | Status: SHIPPED | OUTPATIENT
Start: 2024-07-23 | End: 2024-10-21

## 2024-07-23 RX ORDER — ATAZANAVIR 150 MG/1
300 CAPSULE ORAL
Qty: 180 CAPSULE | Refills: 0 | Status: SHIPPED | OUTPATIENT
Start: 2024-07-23 | End: 2024-10-21

## 2024-07-24 ENCOUNTER — TELEPHONE (OUTPATIENT)
Age: 59
End: 2024-07-24

## 2024-07-31 RX ORDER — FAMOTIDINE 40 MG/1
40 TABLET, FILM COATED ORAL 2 TIMES DAILY
Qty: 180 TABLET | Refills: 0 | Status: SHIPPED | OUTPATIENT
Start: 2024-07-31

## 2024-08-22 ENCOUNTER — TELEPHONE (OUTPATIENT)
Age: 59
End: 2024-08-22

## 2024-08-22 NOTE — TELEPHONE ENCOUNTER
[facility] called re:Woodrow zepeda for prior auth. She stated plan will cover one capsule per day of Atazanavir 150 mg. Her call back [phone] Reference # - Rx# [id number]

## 2024-08-23 DIAGNOSIS — B20 HIV INFECTION, UNSPECIFIED SYMPTOM STATUS (HCC): Primary | ICD-10-CM

## 2024-08-27 DIAGNOSIS — B20 HIV INFECTION, UNSPECIFIED SYMPTOM STATUS (HCC): Primary | ICD-10-CM

## 2024-08-27 NOTE — TELEPHONE ENCOUNTER
Spoke to caller last week and was told that his insurance would not cover 150 mg capsule of Atazanavir, but I explained that he needed to take two daily.  Called [facility] on [address] today and gave verbal order for Atazanavir 300 mg capsule daily, #90 with 3 refills.  Spoke also to patient.

## 2024-09-23 ENCOUNTER — TELEPHONE (OUTPATIENT)
Age: 59
End: 2024-09-23

## 2024-09-23 DIAGNOSIS — Z21 HIV INFECTION, UNSPECIFIED SYMPTOM STATUS (HCC): Primary | ICD-10-CM

## 2024-09-24 DIAGNOSIS — Z21 HIV INFECTION, UNSPECIFIED SYMPTOM STATUS (HCC): Primary | ICD-10-CM

## 2024-09-24 LAB
BASOPHILS # BLD AUTO: 0 X10E3/UL (ref 0–0.2)
BASOPHILS NFR BLD AUTO: 1 %
EOSINOPHIL # BLD AUTO: 0.1 X10E3/UL (ref 0–0.4)
EOSINOPHIL NFR BLD AUTO: 2 %
ERYTHROCYTE [DISTWIDTH] IN BLOOD BY AUTOMATED COUNT: 14.1 % (ref 11.6–15.4)
HCT VFR BLD AUTO: 37.6 % (ref 37.5–51)
HGB BLD-MCNC: 12.1 G/DL (ref 13–17.7)
IMM GRANULOCYTES # BLD AUTO: 0 X10E3/UL (ref 0–0.1)
IMM GRANULOCYTES NFR BLD AUTO: 0 %
LYMPHOCYTES # BLD AUTO: 0.9 X10E3/UL (ref 0.7–3.1)
LYMPHOCYTES NFR BLD AUTO: 15 %
MCH RBC QN AUTO: 29.2 PG (ref 26.6–33)
MCHC RBC AUTO-ENTMCNC: 32.2 G/DL (ref 31.5–35.7)
MCV RBC AUTO: 91 FL (ref 79–97)
MONOCYTES # BLD AUTO: 0.3 X10E3/UL (ref 0.1–0.9)
MONOCYTES NFR BLD AUTO: 6 %
NEUTROPHILS # BLD AUTO: 4.7 X10E3/UL (ref 1.4–7)
NEUTROPHILS NFR BLD AUTO: 76 %
PLATELET # BLD AUTO: 207 X10E3/UL (ref 150–450)
RBC # BLD AUTO: 4.14 X10E6/UL (ref 4.14–5.8)
WBC # BLD AUTO: 6.1 X10E3/UL (ref 3.4–10.8)

## 2024-10-07 DIAGNOSIS — I10 PRIMARY HYPERTENSION: ICD-10-CM

## 2024-10-07 RX ORDER — CARVEDILOL 25 MG/1
25 TABLET ORAL 2 TIMES DAILY WITH MEALS
Qty: 180 TABLET | Refills: 1 | Status: SHIPPED | OUTPATIENT
Start: 2024-10-07

## 2024-10-08 LAB
ALBUMIN SERPL-MCNC: 5.3 G/DL (ref 3.8–4.9)
ALP SERPL-CCNC: 71 IU/L (ref 44–121)
ALT SERPL-CCNC: 4 IU/L (ref 0–44)
AST SERPL-CCNC: 14 IU/L (ref 0–40)
BASOPHILS # BLD AUTO: 0 X10E3/UL (ref 0–0.2)
BASOPHILS NFR BLD AUTO: 0 %
BILIRUB SERPL-MCNC: 0.5 MG/DL (ref 0–1.2)
BUN SERPL-MCNC: 27 MG/DL (ref 6–24)
BUN/CREAT SERPL: 4 (ref 9–20)
CALCIUM SERPL-MCNC: 10.5 MG/DL (ref 8.7–10.2)
CD3+CD4+ CELLS # BLD: 205 /UL (ref 359–1519)
CD3+CD4+ CELLS NFR BLD: 22.8 % (ref 30.8–58.5)
CHLORIDE SERPL-SCNC: 95 MMOL/L (ref 96–106)
CO2 SERPL-SCNC: 33 MMOL/L (ref 20–29)
CREAT SERPL-MCNC: 7.12 MG/DL (ref 0.76–1.27)
EGFRCR SERPLBLD CKD-EPI 2021: 8 ML/MIN/1.73
EOSINOPHIL # BLD AUTO: 0.2 X10E3/UL (ref 0–0.4)
EOSINOPHIL NFR BLD AUTO: 3 %
ERYTHROCYTE [DISTWIDTH] IN BLOOD BY AUTOMATED COUNT: 14.5 % (ref 11.6–15.4)
GLOBULIN SER CALC-MCNC: 3.1 G/DL (ref 1.5–4.5)
GLUCOSE SERPL-MCNC: 75 MG/DL (ref 70–99)
HCT VFR BLD AUTO: 39.5 % (ref 37.5–51)
HGB BLD-MCNC: 12.5 G/DL (ref 13–17.7)
IMM GRANULOCYTES # BLD AUTO: 0 X10E3/UL (ref 0–0.1)
IMM GRANULOCYTES NFR BLD AUTO: 0 %
LYMPHOCYTES # BLD AUTO: 0.9 X10E3/UL (ref 0.7–3.1)
LYMPHOCYTES NFR BLD AUTO: 14 %
MCH RBC QN AUTO: 29.1 PG (ref 26.6–33)
MCHC RBC AUTO-ENTMCNC: 31.6 G/DL (ref 31.5–35.7)
MCV RBC AUTO: 92 FL (ref 79–97)
MONOCYTES # BLD AUTO: 0.4 X10E3/UL (ref 0.1–0.9)
MONOCYTES NFR BLD AUTO: 6 %
NEUTROPHILS # BLD AUTO: 5.4 X10E3/UL (ref 1.4–7)
NEUTROPHILS NFR BLD AUTO: 77 %
PLATELET # BLD AUTO: 240 X10E3/UL (ref 150–450)
POTASSIUM SERPL-SCNC: 4.6 MMOL/L (ref 3.5–5.2)
PROT SERPL-MCNC: 8.4 G/DL (ref 6–8.5)
RBC # BLD AUTO: 4.3 X10E6/UL (ref 4.14–5.8)
SODIUM SERPL-SCNC: 144 MMOL/L (ref 134–144)
WBC # BLD AUTO: 6.9 X10E3/UL (ref 3.4–10.8)

## 2024-10-09 LAB
GENOTYPE ASSAY: NORMAL
HIV1 RNA # SERPL NAA+PROBE: <20 COPIES/ML
HIV1 RNA SERPL NAA+PROBE-LOG#: NORMAL LOG10COPY/ML

## 2024-10-15 ENCOUNTER — OFFICE VISIT (OUTPATIENT)
Age: 59
End: 2024-10-15
Payer: MEDICARE

## 2024-10-15 VITALS
RESPIRATION RATE: 16 BRPM | DIASTOLIC BLOOD PRESSURE: 79 MMHG | HEIGHT: 72 IN | OXYGEN SATURATION: 96 % | SYSTOLIC BLOOD PRESSURE: 132 MMHG | WEIGHT: 225.6 LBS | BODY MASS INDEX: 30.56 KG/M2 | TEMPERATURE: 97.5 F | HEART RATE: 60 BPM

## 2024-10-15 DIAGNOSIS — B20 CURRENTLY ASYMPTOMATIC HIV INFECTION, WITH HISTORY OF HIV-RELATED ILLNESS (HCC): Primary | ICD-10-CM

## 2024-10-15 PROCEDURE — 3078F DIAST BP <80 MM HG: CPT | Performed by: INTERNAL MEDICINE

## 2024-10-15 PROCEDURE — 3075F SYST BP GE 130 - 139MM HG: CPT | Performed by: INTERNAL MEDICINE

## 2024-10-15 PROCEDURE — 99214 OFFICE O/P EST MOD 30 MIN: CPT | Performed by: INTERNAL MEDICINE

## 2024-10-15 NOTE — PROGRESS NOTES
\"Have you been to the ER, urgent care clinic since your last visit?  Hospitalized since your last visit?\"    NO    “Have you seen or consulted any other health care providers outside our system since your last visit?”    NO      Chief Complaint   Patient presents with    Follow-up     HIV     /79 (Site: Right Upper Arm, Position: Sitting, Cuff Size: Large Adult)   Pulse 60   Temp 97.5 °F (36.4 °C) (Temporal)   Resp 16   Ht 1.829 m (6')   Wt 102.3 kg (225 lb 9.6 oz)   SpO2 96%   BMI 30.60 kg/m²

## 2024-10-17 ENCOUNTER — TELEPHONE (OUTPATIENT)
Age: 59
End: 2024-10-17

## 2024-10-17 DIAGNOSIS — Z21 HIV INFECTION, UNSPECIFIED SYMPTOM STATUS (HCC): Primary | ICD-10-CM

## 2024-10-17 RX ORDER — ATAZANAVIR 150 MG/1
300 CAPSULE ORAL
Qty: 180 CAPSULE | Refills: 1 | Status: SHIPPED | OUTPATIENT
Start: 2024-10-17 | End: 2025-04-15

## 2024-10-17 RX ORDER — RITONAVIR 100 MG/1
100 CAPSULE ORAL DAILY
Qty: 90 CAPSULE | Refills: 1 | Status: SHIPPED | OUTPATIENT
Start: 2024-10-17 | End: 2025-04-15

## 2024-10-17 NOTE — TELEPHONE ENCOUNTER
ROUTED TO DR. PA IN ERROR       DR. VARGAS  PATIENT CALLED IN AND STATED THAT MEDICATION IS NOT CALLED INTO PHARMACY   PATIENT WOULD LIKE PRESCRIPTION   SENT TO      [facility] [address]

## 2024-10-17 NOTE — TELEPHONE ENCOUNTER
PATIENT CALLED IN AND STATED THAT MEDICATION IS NOT CALLED INTO PHARMACY   PATIENT WOULD LIKE PRESCRIPTION   SENT TO     [facility]ACY [address]

## 2024-10-19 ASSESSMENT — ENCOUNTER SYMPTOMS
EYES NEGATIVE: 1
RESPIRATORY NEGATIVE: 1
GASTROINTESTINAL NEGATIVE: 1

## 2024-10-23 DIAGNOSIS — B20 SYMPTOMATIC HIV INFECTION (HCC): ICD-10-CM

## 2024-11-08 ENCOUNTER — TELEPHONE (OUTPATIENT)
Facility: CLINIC | Age: 59
End: 2024-11-08

## 2024-11-08 NOTE — TELEPHONE ENCOUNTER
Pt has a cold / congestion started 3 days ago and he has been taking mucinex but it isnt working. He would like a recommendation of what to try since he has to be careful what she takes

## 2024-12-26 ENCOUNTER — HOSPITAL ENCOUNTER (EMERGENCY)
Facility: HOSPITAL | Age: 59
Discharge: HOME | End: 2024-12-26
Attending: EMERGENCY MEDICINE
Payer: MEDICARE

## 2024-12-26 VITALS
TEMPERATURE: 100.2 F | DIASTOLIC BLOOD PRESSURE: 83 MMHG | BODY MASS INDEX: 28.85 KG/M2 | OXYGEN SATURATION: 99 % | WEIGHT: 213 LBS | RESPIRATION RATE: 16 BRPM | HEART RATE: 87 BPM | HEIGHT: 72 IN | SYSTOLIC BLOOD PRESSURE: 132 MMHG

## 2024-12-26 DIAGNOSIS — J10.1 INFLUENZA A: Primary | ICD-10-CM

## 2024-12-26 LAB
FLUAV RNA SPEC QL NAA+PROBE: DETECTED
FLUBV RNA SPEC QL NAA+PROBE: NOT DETECTED
SARS-COV-2 RNA RESP QL NAA+PROBE: NOT DETECTED
SOURCE: ABNORMAL

## 2024-12-26 PROCEDURE — 6360000002 HC RX W HCPCS

## 2024-12-26 PROCEDURE — 6370000000 HC RX 637 (ALT 250 FOR IP)

## 2024-12-26 PROCEDURE — 99284 EMERGENCY DEPT VISIT MOD MDM: CPT

## 2024-12-26 PROCEDURE — 87636 SARSCOV2 & INF A&B AMP PRB: CPT

## 2024-12-26 RX ORDER — ONDANSETRON 4 MG/1
4 TABLET, ORALLY DISINTEGRATING ORAL
Status: COMPLETED | OUTPATIENT
Start: 2024-12-26 | End: 2024-12-26

## 2024-12-26 RX ORDER — ONDANSETRON 4 MG/1
4 TABLET, FILM COATED ORAL EVERY 8 HOURS PRN
Qty: 21 TABLET | Refills: 0 | Status: SHIPPED | OUTPATIENT
Start: 2024-12-26 | End: 2025-01-02

## 2024-12-26 RX ORDER — KETOROLAC TROMETHAMINE 30 MG/ML
15 INJECTION, SOLUTION INTRAMUSCULAR; INTRAVENOUS ONCE
Status: COMPLETED | OUTPATIENT
Start: 2024-12-26 | End: 2024-12-26

## 2024-12-26 RX ORDER — BENZONATATE 100 MG/1
200 CAPSULE ORAL ONCE
Status: COMPLETED | OUTPATIENT
Start: 2024-12-26 | End: 2024-12-26

## 2024-12-26 RX ORDER — ACETAMINOPHEN 500 MG
1000 TABLET ORAL
Status: COMPLETED | OUTPATIENT
Start: 2024-12-26 | End: 2024-12-26

## 2024-12-26 RX ORDER — SENNOSIDES 8.6 MG
650 CAPSULE ORAL EVERY 8 HOURS PRN
Qty: 60 TABLET | Refills: 3 | Status: SHIPPED | OUTPATIENT
Start: 2024-12-26

## 2024-12-26 RX ORDER — BENZONATATE 200 MG/1
200 CAPSULE ORAL 3 TIMES DAILY PRN
Qty: 21 CAPSULE | Refills: 0 | Status: SHIPPED | OUTPATIENT
Start: 2024-12-26 | End: 2025-01-02

## 2024-12-26 RX ADMIN — ACETAMINOPHEN 1000 MG: 500 TABLET ORAL at 14:22

## 2024-12-26 RX ADMIN — ONDANSETRON 4 MG: 4 TABLET, ORALLY DISINTEGRATING ORAL at 14:23

## 2024-12-26 RX ADMIN — KETOROLAC TROMETHAMINE 15 MG: 30 INJECTION, SOLUTION INTRAMUSCULAR at 13:40

## 2024-12-26 RX ADMIN — BENZONATATE 200 MG: 100 CAPSULE ORAL at 14:24

## 2024-12-26 ASSESSMENT — PAIN DESCRIPTION - DESCRIPTORS
DESCRIPTORS: ACHING
DESCRIPTORS: ACHING

## 2024-12-26 ASSESSMENT — PAIN SCALES - GENERAL
PAINLEVEL_OUTOF10: 8
PAINLEVEL_OUTOF10: 9
PAINLEVEL_OUTOF10: 9

## 2024-12-26 ASSESSMENT — ENCOUNTER SYMPTOMS
COUGH: 1
NAUSEA: 1
VOMITING: 1

## 2024-12-26 ASSESSMENT — PAIN DESCRIPTION - LOCATION
LOCATION: GENERALIZED
LOCATION: GENERALIZED

## 2024-12-26 ASSESSMENT — PAIN - FUNCTIONAL ASSESSMENT
PAIN_FUNCTIONAL_ASSESSMENT: PREVENTS OR INTERFERES WITH ALL ACTIVE AND SOME PASSIVE ACTIVITIES
PAIN_FUNCTIONAL_ASSESSMENT: 0-10
PAIN_FUNCTIONAL_ASSESSMENT: PREVENTS OR INTERFERES SOME ACTIVE ACTIVITIES AND ADLS

## 2024-12-26 NOTE — ED PROVIDER NOTES
Stability: Low Risk  (4/26/2024)    Housing Stability Vital Sign     Unable to Pay for Housing in the Last Year: No     Number of Places Lived in the Last Year: 1     Unstable Housing in the Last Year: No           PHYSICAL EXAM    (up to 7 for level 4, 8 or more for level 5)     ED Triage Vitals [12/26/24 1301]   BP Systolic BP Percentile Diastolic BP Percentile Temp Temp Source Pulse Respirations SpO2   132/83 -- -- (!) 101.1 °F (38.4 °C) Oral 87 16 99 %      Height Weight - Scale         1.829 m (6') 96.6 kg (213 lb)             Body mass index is 28.89 kg/m².    Physical Exam  Vitals and nursing note reviewed.   Constitutional:       Appearance: Normal appearance.   HENT:      Head: Normocephalic and atraumatic.      Nose: Nose normal.      Mouth/Throat:      Pharynx: No oropharyngeal exudate or posterior oropharyngeal erythema.   Eyes:      Extraocular Movements: Extraocular movements intact.      Conjunctiva/sclera: Conjunctivae normal.   Cardiovascular:      Rate and Rhythm: Normal rate.   Pulmonary:      Effort: Pulmonary effort is normal. No respiratory distress.   Abdominal:      Tenderness: There is abdominal tenderness.   Musculoskeletal:         General: Normal range of motion.      Cervical back: Normal range of motion.   Skin:     Capillary Refill: Capillary refill takes less than 2 seconds.   Neurological:      General: No focal deficit present.      Mental Status: He is alert.   Psychiatric:         Mood and Affect: Mood normal.         Behavior: Behavior normal.         DIAGNOSTIC RESULTS     EKG: All EKG's are interpreted by the Emergency Department Physician who either signs or Co-signs this chart in the absence of a cardiologist.        RADIOLOGY:   Non-plain film images such as CT, Ultrasound and MRI are read by the radiologist. Plain radiographic images are visualized and preliminarily interpreted by the emergency physician with the below findings:        Interpretation per the Radiologist

## 2024-12-26 NOTE — ED NOTES
Pt provided discharge instructions, prescriptions, education and follow up information. Pt verbalizing understanding. Pt A&Ox4,breathing unlabored on RA. Pain controlled. Patient ambulatory out of ER.

## 2024-12-26 NOTE — ED TRIAGE NOTES
Patient ambulatory to ED c/o chills since last night. Denies known fever or taking OTC meds for symptoms. Endorsing sick contacts with \"colds\"

## 2024-12-27 DIAGNOSIS — I10 PRIMARY HYPERTENSION: ICD-10-CM

## 2024-12-27 DIAGNOSIS — I25.83 CORONARY ARTERY DISEASE DUE TO LIPID RICH PLAQUE: ICD-10-CM

## 2024-12-27 DIAGNOSIS — I25.10 CORONARY ARTERY DISEASE DUE TO LIPID RICH PLAQUE: ICD-10-CM

## 2024-12-27 RX ORDER — LOSARTAN POTASSIUM 100 MG/1
100 TABLET ORAL DAILY
Qty: 90 TABLET | Refills: 1 | Status: SHIPPED | OUTPATIENT
Start: 2024-12-27

## 2025-01-02 ENCOUNTER — HOSPITAL ENCOUNTER (OUTPATIENT)
Age: 60
Discharge: HOME | End: 2025-01-02
Payer: MEDICARE

## 2025-01-02 ENCOUNTER — HOSPITAL ENCOUNTER (OUTPATIENT)
Facility: HOSPITAL | Age: 60
Discharge: HOME | End: 2025-01-02
Payer: MEDICARE

## 2025-01-02 VITALS
SYSTOLIC BLOOD PRESSURE: 151 MMHG | DIASTOLIC BLOOD PRESSURE: 90 MMHG | OXYGEN SATURATION: 96 % | HEART RATE: 54 BPM | WEIGHT: 220 LBS | HEIGHT: 72 IN | BODY MASS INDEX: 29.8 KG/M2 | TEMPERATURE: 98.6 F

## 2025-01-02 LAB
ALBUMIN SERPL-MCNC: 3.9 G/DL (ref 3.5–5)
ALBUMIN/GLOB SERPL: 1.1 (ref 1.1–2.2)
ALP SERPL-CCNC: 47 U/L (ref 45–117)
ALT SERPL-CCNC: 18 U/L (ref 12–78)
ANION GAP SERPL CALC-SCNC: 4 MMOL/L (ref 2–12)
AST SERPL-CCNC: 47 U/L (ref 15–37)
BASOPHILS # BLD: 0 K/UL (ref 0–0.1)
BASOPHILS NFR BLD: 1 % (ref 0–1)
BILIRUB SERPL-MCNC: 1.1 MG/DL (ref 0.2–1)
BUN SERPL-MCNC: 39 MG/DL (ref 6–20)
BUN/CREAT SERPL: 3 (ref 12–20)
CALCIUM SERPL-MCNC: 9.2 MG/DL (ref 8.5–10.1)
CHLORIDE SERPL-SCNC: 100 MMOL/L (ref 97–108)
CO2 SERPL-SCNC: 34 MMOL/L (ref 21–32)
CREAT SERPL-MCNC: 15 MG/DL (ref 0.7–1.3)
DIFFERENTIAL METHOD BLD: ABNORMAL
EKG ATRIAL RATE: 55 BPM
EKG DIAGNOSIS: NORMAL
EKG P AXIS: 18 DEGREES
EKG P-R INTERVAL: 160 MS
EKG Q-T INTERVAL: 474 MS
EKG QRS DURATION: 78 MS
EKG QTC CALCULATION (BAZETT): 453 MS
EKG R AXIS: -12 DEGREES
EKG T AXIS: 19 DEGREES
EKG VENTRICULAR RATE: 55 BPM
EOSINOPHIL # BLD: 0.1 K/UL (ref 0–0.4)
EOSINOPHIL NFR BLD: 3 % (ref 0–7)
ERYTHROCYTE [DISTWIDTH] IN BLOOD BY AUTOMATED COUNT: 14.5 % (ref 11.5–14.5)
GLOBULIN SER CALC-MCNC: 3.7 G/DL (ref 2–4)
GLUCOSE SERPL-MCNC: 99 MG/DL (ref 65–100)
HCT VFR BLD AUTO: 29.7 % (ref 36.6–50.3)
HGB BLD-MCNC: 9.4 G/DL (ref 12.1–17)
IMM GRANULOCYTES # BLD AUTO: 0 K/UL (ref 0–0.04)
IMM GRANULOCYTES NFR BLD AUTO: 1 % (ref 0–0.5)
LYMPHOCYTES # BLD: 1.3 K/UL (ref 0.8–3.5)
LYMPHOCYTES NFR BLD: 31 % (ref 12–49)
MCH RBC QN AUTO: 29.1 PG (ref 26–34)
MCHC RBC AUTO-ENTMCNC: 31.6 G/DL (ref 30–36.5)
MCV RBC AUTO: 92 FL (ref 80–99)
MONOCYTES # BLD: 0.3 K/UL (ref 0–1)
MONOCYTES NFR BLD: 7 % (ref 5–13)
NEUTS SEG # BLD: 2.5 K/UL (ref 1.8–8)
NEUTS SEG NFR BLD: 57 % (ref 32–75)
NRBC # BLD: 0 K/UL (ref 0–0.01)
NRBC BLD-RTO: 0 PER 100 WBC
PLATELET # BLD AUTO: 163 K/UL (ref 150–400)
PMV BLD AUTO: 10.2 FL (ref 8.9–12.9)
POTASSIUM SERPL-SCNC: 4.8 MMOL/L (ref 3.5–5.1)
PROT SERPL-MCNC: 7.6 G/DL (ref 6.4–8.2)
RBC # BLD AUTO: 3.23 M/UL (ref 4.1–5.7)
SODIUM SERPL-SCNC: 138 MMOL/L (ref 136–145)
WBC # BLD AUTO: 4.3 K/UL (ref 4.1–11.1)

## 2025-01-02 PROCEDURE — 93005 ELECTROCARDIOGRAM TRACING: CPT | Performed by: COLON & RECTAL SURGERY

## 2025-01-02 PROCEDURE — 71046 X-RAY EXAM CHEST 2 VIEWS: CPT

## 2025-01-02 PROCEDURE — 36415 COLL VENOUS BLD VENIPUNCTURE: CPT

## 2025-01-02 PROCEDURE — 80053 COMPREHEN METABOLIC PANEL: CPT

## 2025-01-02 PROCEDURE — 85025 COMPLETE CBC W/AUTO DIFF WBC: CPT

## 2025-01-02 RX ORDER — CLOPIDOGREL BISULFATE 75 MG/1
75 TABLET ORAL DAILY
COMMUNITY

## 2025-01-02 RX ORDER — LANTHANUM CARBONATE 1000 MG/1
1000 TABLET, CHEWABLE ORAL
COMMUNITY

## 2025-01-02 RX ORDER — AMLODIPINE BESYLATE 10 MG/1
5 TABLET ORAL DAILY
COMMUNITY

## 2025-01-02 RX ORDER — TENAPANOR 31.9 MG/1
TABLET, FILM COATED ORAL DAILY
COMMUNITY

## 2025-01-02 NOTE — PERIOP NOTE
[address] [address]   MAIN OR                                  [phone]    MAIN PRE OP             [phone]                                                                                AMBULATORY PRE OP          [phone]  PRE-ADMISSION TESTING    [phone]     Surgery Date:  1/7/25       Is surgery arrival time given by surgeon?  YES  NO    If “NO”, [facility] staff will call you between 4 and 7pm the day before your surgery with your arrival time. (If your surgery is on a Monday, we will call you the Friday before.)    Call [phone] after 7pm Monday-Friday if you did not receive this call.    INSTRUCTIONS BEFORE YOUR SURGERY   When You  Arrive Arrive at [facility] Patient Access on 1st floor the day of your surgery.   Medications to   TAKE   Morning of Surgery MEDICATIONS TO TAKE THE MORNING OF SURGERY WITH A SIP OF WATER: AMLODIPINE,  ATORVASTATIN,ATAZANAVIR,TIVICAY, VEMLIDY, CARVEDILOL, FAMOTIDINE ,KEPPRA,METOPROLOL,AND AMLODIPINE  You may take these medications, IF NEEDED, the morning of surgery: TYLENOL 4 HOURS PRIOR TO ARRIVAL IF NEEDED FOR PAIN  INHALER IF NEEDED   Ask your surgeon/prescribing doctor for instructions on taking or stopping these medications prior to surgery: ELIQUIS AND ASPIRIN   Medications to STOP  before surgery Non-Steroidal anti-inflammatory Drugs (NSAID's): for example, Diclofenac (Voltaren), Ibuprofen (Advil, Motrin), Naproxen (Aleve) 3 days  STOP all herbal supplements and vitamins(unless prescribed by your doctor), and fish oil for 7 days  Other:   (Pain medications not listed above, including Tylenol may be taken up until 4 hours prior to arrival time)   Blood  Thinners If you take Aspirin, Eliquis, Plavix, Coumadin, or any blood-thinning or anti-blood clot medicine, talk to the doctor who prescribed the medications for pre-operative instructions.  If you take aspirin or aspirin containing

## 2025-01-03 NOTE — PERIOP NOTE
ABNORMAL LABS FAXED AND CALLED TO DR. RONQUILLO'S OFFICE.  ABNORMAL CMP  HGB: 9.4  T&S ORDERED FOR DOS.

## 2025-01-07 ENCOUNTER — ANESTHESIA (OUTPATIENT)
Facility: HOSPITAL | Age: 60
End: 2025-01-07
Payer: MEDICARE

## 2025-01-07 ENCOUNTER — HOSPITAL ENCOUNTER (OUTPATIENT)
Facility: HOSPITAL | Age: 60
Setting detail: OBSERVATION
Discharge: HOME | End: 2025-01-08
Attending: COLON & RECTAL SURGERY | Admitting: COLON & RECTAL SURGERY
Payer: MEDICARE

## 2025-01-07 ENCOUNTER — ANESTHESIA EVENT (OUTPATIENT)
Facility: HOSPITAL | Age: 60
End: 2025-01-07
Payer: MEDICARE

## 2025-01-07 DIAGNOSIS — C44.520 SQUAMOUS CELL CARCINOMA OF ANAL MARGIN: Primary | ICD-10-CM

## 2025-01-07 DIAGNOSIS — C21.0 ANAL CANCER (HCC): ICD-10-CM

## 2025-01-07 DIAGNOSIS — G89.18 ACUTE POST-OPERATIVE PAIN: ICD-10-CM

## 2025-01-07 PROBLEM — A63.0 ANAL CONDYLOMATA: Status: ACTIVE | Noted: 2025-01-07

## 2025-01-07 LAB
ANION GAP BLD CALC-SCNC: 5.4 MMOL/L (ref 10–20)
CA-I BLD-MCNC: 0.91 MMOL/L (ref 1.15–1.33)
CHLORIDE BLD-SCNC: 105 MMOL/L (ref 98–107)
CO2 BLD-SCNC: 33.6 MMOL/L (ref 21–32)
CREAT BLD-MCNC: 9.35 MG/DL (ref 0.6–1.3)
GLUCOSE BLD-MCNC: 80 MG/DL (ref 74–99)
POTASSIUM BLD-SCNC: 5 MMOL/L (ref 3.5–5.1)
SERVICE CMNT-IMP: ABNORMAL
SODIUM BLD-SCNC: 144 MMOL/L (ref 136–145)

## 2025-01-07 PROCEDURE — 2500000003 HC RX 250 WO HCPCS: Performed by: NURSE ANESTHETIST, CERTIFIED REGISTERED

## 2025-01-07 PROCEDURE — 2709999900 HC NON-CHARGEABLE SUPPLY: Performed by: COLON & RECTAL SURGERY

## 2025-01-07 PROCEDURE — G0378 HOSPITAL OBSERVATION PER HR: HCPCS

## 2025-01-07 PROCEDURE — 3600000012 HC SURGERY LEVEL 2 ADDTL 15MIN: Performed by: COLON & RECTAL SURGERY

## 2025-01-07 PROCEDURE — 6370000000 HC RX 637 (ALT 250 FOR IP): Performed by: ANESTHESIOLOGY

## 2025-01-07 PROCEDURE — 6370000000 HC RX 637 (ALT 250 FOR IP): Performed by: COLON & RECTAL SURGERY

## 2025-01-07 PROCEDURE — 80047 BASIC METABLC PNL IONIZED CA: CPT

## 2025-01-07 PROCEDURE — 6360000002 HC RX W HCPCS: Performed by: ANESTHESIOLOGY

## 2025-01-07 PROCEDURE — 7100000000 HC PACU RECOVERY - FIRST 15 MIN: Performed by: COLON & RECTAL SURGERY

## 2025-01-07 PROCEDURE — 3600000002 HC SURGERY LEVEL 2 BASE: Performed by: COLON & RECTAL SURGERY

## 2025-01-07 PROCEDURE — 3700000000 HC ANESTHESIA ATTENDED CARE: Performed by: COLON & RECTAL SURGERY

## 2025-01-07 PROCEDURE — 6360000002 HC RX W HCPCS: Performed by: NURSE ANESTHETIST, CERTIFIED REGISTERED

## 2025-01-07 PROCEDURE — 2580000003 HC RX 258: Performed by: ANESTHESIOLOGY

## 2025-01-07 PROCEDURE — 6360000002 HC RX W HCPCS: Performed by: COLON & RECTAL SURGERY

## 2025-01-07 PROCEDURE — 3700000001 HC ADD 15 MINUTES (ANESTHESIA): Performed by: COLON & RECTAL SURGERY

## 2025-01-07 PROCEDURE — 7100000001 HC PACU RECOVERY - ADDTL 15 MIN: Performed by: COLON & RECTAL SURGERY

## 2025-01-07 PROCEDURE — 88305 TISSUE EXAM BY PATHOLOGIST: CPT

## 2025-01-07 RX ORDER — SEVELAMER CARBONATE 800 MG/1
800 TABLET, FILM COATED ORAL
Status: DISCONTINUED | OUTPATIENT
Start: 2025-01-07 | End: 2025-01-08 | Stop reason: HOSPADM

## 2025-01-07 RX ORDER — SODIUM CHLORIDE 0.9 % (FLUSH) 0.9 %
5-40 SYRINGE (ML) INJECTION EVERY 12 HOURS SCHEDULED
Status: DISCONTINUED | OUTPATIENT
Start: 2025-01-07 | End: 2025-01-07 | Stop reason: HOSPADM

## 2025-01-07 RX ORDER — PREDNISONE 20 MG/1
20 TABLET ORAL DAILY
Status: DISCONTINUED | OUTPATIENT
Start: 2025-01-07 | End: 2025-01-08 | Stop reason: HOSPADM

## 2025-01-07 RX ORDER — EPHEDRINE SULFATE 50 MG/ML
INJECTION INTRAVENOUS
Status: DISCONTINUED | OUTPATIENT
Start: 2025-01-07 | End: 2025-01-07 | Stop reason: SDUPTHER

## 2025-01-07 RX ORDER — CALCIUM CARBONATE 500 MG/1
500 TABLET, CHEWABLE ORAL PRN
Status: DISCONTINUED | OUTPATIENT
Start: 2025-01-07 | End: 2025-01-08 | Stop reason: HOSPADM

## 2025-01-07 RX ORDER — ATAZANAVIR 300 MG/1
300 CAPSULE ORAL
Status: DISCONTINUED | OUTPATIENT
Start: 2025-01-08 | End: 2025-01-07 | Stop reason: SDUPTHER

## 2025-01-07 RX ORDER — SODIUM CHLORIDE 9 MG/ML
INJECTION, SOLUTION INTRAVENOUS PRN
Status: DISCONTINUED | OUTPATIENT
Start: 2025-01-07 | End: 2025-01-07 | Stop reason: HOSPADM

## 2025-01-07 RX ORDER — FENTANYL CITRATE 50 UG/ML
INJECTION, SOLUTION INTRAMUSCULAR; INTRAVENOUS
Status: DISCONTINUED | OUTPATIENT
Start: 2025-01-07 | End: 2025-01-07 | Stop reason: SDUPTHER

## 2025-01-07 RX ORDER — SEVELAMER CARBONATE 800 MG/1
1600 TABLET, FILM COATED ORAL 2 TIMES DAILY
Status: DISCONTINUED | OUTPATIENT
Start: 2025-01-07 | End: 2025-01-08 | Stop reason: HOSPADM

## 2025-01-07 RX ORDER — NITROGLYCERIN 0.4 MG/1
0.4 TABLET SUBLINGUAL EVERY 5 MIN PRN
Status: DISCONTINUED | OUTPATIENT
Start: 2025-01-07 | End: 2025-01-08 | Stop reason: HOSPADM

## 2025-01-07 RX ORDER — METOPROLOL TARTRATE 25 MG/1
50 TABLET, FILM COATED ORAL 2 TIMES DAILY
Status: DISCONTINUED | OUTPATIENT
Start: 2025-01-07 | End: 2025-01-08 | Stop reason: HOSPADM

## 2025-01-07 RX ORDER — LANTHANUM CARBONATE 1000 MG/1
1000 TABLET, CHEWABLE ORAL
Status: DISCONTINUED | OUTPATIENT
Start: 2025-01-07 | End: 2025-01-08 | Stop reason: HOSPADM

## 2025-01-07 RX ORDER — HYDROMORPHONE HYDROCHLORIDE 1 MG/ML
0.5 INJECTION, SOLUTION INTRAMUSCULAR; INTRAVENOUS; SUBCUTANEOUS EVERY 5 MIN PRN
Status: COMPLETED | OUTPATIENT
Start: 2025-01-07 | End: 2025-01-07

## 2025-01-07 RX ORDER — MIDAZOLAM HYDROCHLORIDE 2 MG/2ML
2 INJECTION, SOLUTION INTRAMUSCULAR; INTRAVENOUS PRN
Status: DISCONTINUED | OUTPATIENT
Start: 2025-01-07 | End: 2025-01-07 | Stop reason: HOSPADM

## 2025-01-07 RX ORDER — BUPIVACAINE HYDROCHLORIDE AND EPINEPHRINE 2.5; 5 MG/ML; UG/ML
20 INJECTION, SOLUTION EPIDURAL; INFILTRATION; INTRACAUDAL; PERINEURAL ONCE
Status: DISCONTINUED | OUTPATIENT
Start: 2025-01-07 | End: 2025-01-07 | Stop reason: HOSPADM

## 2025-01-07 RX ORDER — SUCCINYLCHOLINE/SOD CL,ISO/PF 200MG/10ML
SYRINGE (ML) INTRAVENOUS
Status: DISCONTINUED | OUTPATIENT
Start: 2025-01-07 | End: 2025-01-07 | Stop reason: SDUPTHER

## 2025-01-07 RX ORDER — ONDANSETRON 2 MG/ML
4 INJECTION INTRAMUSCULAR; INTRAVENOUS
Status: DISCONTINUED | OUTPATIENT
Start: 2025-01-07 | End: 2025-01-07 | Stop reason: HOSPADM

## 2025-01-07 RX ORDER — CEFOXITIN 2 G/1
INJECTION, POWDER, FOR SOLUTION INTRAVENOUS
Status: DISCONTINUED | OUTPATIENT
Start: 2025-01-07 | End: 2025-01-07 | Stop reason: SDUPTHER

## 2025-01-07 RX ORDER — DEXAMETHASONE SODIUM PHOSPHATE 4 MG/ML
INJECTION, SOLUTION INTRA-ARTICULAR; INTRALESIONAL; INTRAMUSCULAR; INTRAVENOUS; SOFT TISSUE
Status: DISCONTINUED | OUTPATIENT
Start: 2025-01-07 | End: 2025-01-07 | Stop reason: SDUPTHER

## 2025-01-07 RX ORDER — MIDAZOLAM HYDROCHLORIDE 1 MG/ML
INJECTION, SOLUTION INTRAMUSCULAR; INTRAVENOUS
Status: DISCONTINUED | OUTPATIENT
Start: 2025-01-07 | End: 2025-01-07 | Stop reason: SDUPTHER

## 2025-01-07 RX ORDER — ROCURONIUM BROMIDE 10 MG/ML
INJECTION, SOLUTION INTRAVENOUS
Status: DISCONTINUED | OUTPATIENT
Start: 2025-01-07 | End: 2025-01-07 | Stop reason: SDUPTHER

## 2025-01-07 RX ORDER — ACETAMINOPHEN 500 MG
1000 TABLET ORAL EVERY 6 HOURS SCHEDULED
Status: DISCONTINUED | OUTPATIENT
Start: 2025-01-07 | End: 2025-01-08 | Stop reason: HOSPADM

## 2025-01-07 RX ORDER — NALOXONE HYDROCHLORIDE 0.4 MG/ML
INJECTION, SOLUTION INTRAMUSCULAR; INTRAVENOUS; SUBCUTANEOUS PRN
Status: DISCONTINUED | OUTPATIENT
Start: 2025-01-07 | End: 2025-01-07 | Stop reason: HOSPADM

## 2025-01-07 RX ORDER — LIDOCAINE HYDROCHLORIDE 10 MG/ML
1 INJECTION, SOLUTION EPIDURAL; INFILTRATION; INTRACAUDAL; PERINEURAL
Status: DISCONTINUED | OUTPATIENT
Start: 2025-01-07 | End: 2025-01-07 | Stop reason: HOSPADM

## 2025-01-07 RX ORDER — HYDRALAZINE HYDROCHLORIDE 20 MG/ML
10 INJECTION INTRAMUSCULAR; INTRAVENOUS ONCE
Status: DISCONTINUED | OUTPATIENT
Start: 2025-01-07 | End: 2025-01-07 | Stop reason: HOSPADM

## 2025-01-07 RX ORDER — SODIUM CHLORIDE, SODIUM LACTATE, POTASSIUM CHLORIDE, CALCIUM CHLORIDE 600; 310; 30; 20 MG/100ML; MG/100ML; MG/100ML; MG/100ML
INJECTION, SOLUTION INTRAVENOUS CONTINUOUS
Status: DISCONTINUED | OUTPATIENT
Start: 2025-01-07 | End: 2025-01-07 | Stop reason: HOSPADM

## 2025-01-07 RX ORDER — SODIUM CHLORIDE 0.9 % (FLUSH) 0.9 %
5-40 SYRINGE (ML) INJECTION PRN
Status: DISCONTINUED | OUTPATIENT
Start: 2025-01-07 | End: 2025-01-07 | Stop reason: HOSPADM

## 2025-01-07 RX ORDER — FENTANYL CITRATE 50 UG/ML
25 INJECTION, SOLUTION INTRAMUSCULAR; INTRAVENOUS EVERY 5 MIN PRN
Status: COMPLETED | OUTPATIENT
Start: 2025-01-07 | End: 2025-01-07

## 2025-01-07 RX ORDER — PROPOFOL 10 MG/ML
INJECTION, EMULSION INTRAVENOUS
Status: DISCONTINUED | OUTPATIENT
Start: 2025-01-07 | End: 2025-01-07 | Stop reason: SDUPTHER

## 2025-01-07 RX ORDER — LIDOCAINE HYDROCHLORIDE 20 MG/ML
INJECTION, SOLUTION EPIDURAL; INFILTRATION; INTRACAUDAL; PERINEURAL
Status: DISCONTINUED | OUTPATIENT
Start: 2025-01-07 | End: 2025-01-07 | Stop reason: SDUPTHER

## 2025-01-07 RX ORDER — FENTANYL CITRATE 50 UG/ML
100 INJECTION, SOLUTION INTRAMUSCULAR; INTRAVENOUS
Status: DISCONTINUED | OUTPATIENT
Start: 2025-01-07 | End: 2025-01-07 | Stop reason: HOSPADM

## 2025-01-07 RX ORDER — ALBUTEROL SULFATE 0.83 MG/ML
2.5 SOLUTION RESPIRATORY (INHALATION) EVERY 6 HOURS PRN
Status: DISCONTINUED | OUTPATIENT
Start: 2025-01-07 | End: 2025-01-08 | Stop reason: HOSPADM

## 2025-01-07 RX ORDER — PROCHLORPERAZINE EDISYLATE 5 MG/ML
5 INJECTION INTRAMUSCULAR; INTRAVENOUS
Status: DISCONTINUED | OUTPATIENT
Start: 2025-01-07 | End: 2025-01-07 | Stop reason: HOSPADM

## 2025-01-07 RX ORDER — ONDANSETRON 2 MG/ML
INJECTION INTRAMUSCULAR; INTRAVENOUS
Status: DISCONTINUED | OUTPATIENT
Start: 2025-01-07 | End: 2025-01-07 | Stop reason: SDUPTHER

## 2025-01-07 RX ORDER — ATAZANAVIR 300 MG/1
300 CAPSULE ORAL
Status: DISCONTINUED | OUTPATIENT
Start: 2025-01-08 | End: 2025-01-08 | Stop reason: HOSPADM

## 2025-01-07 RX ORDER — LOSARTAN POTASSIUM 50 MG/1
100 TABLET ORAL DAILY
Status: DISCONTINUED | OUTPATIENT
Start: 2025-01-07 | End: 2025-01-08 | Stop reason: HOSPADM

## 2025-01-07 RX ORDER — FAMOTIDINE 20 MG/1
40 TABLET, FILM COATED ORAL 2 TIMES DAILY
Status: DISCONTINUED | OUTPATIENT
Start: 2025-01-07 | End: 2025-01-08 | Stop reason: HOSPADM

## 2025-01-07 RX ORDER — AMLODIPINE BESYLATE 5 MG/1
5 TABLET ORAL DAILY
Status: DISCONTINUED | OUTPATIENT
Start: 2025-01-07 | End: 2025-01-08 | Stop reason: HOSPADM

## 2025-01-07 RX ORDER — ACETAMINOPHEN 500 MG
1000 TABLET ORAL ONCE
Status: COMPLETED | OUTPATIENT
Start: 2025-01-07 | End: 2025-01-07

## 2025-01-07 RX ORDER — PHENYLEPHRINE HCL IN 0.9% NACL 0.4MG/10ML
SYRINGE (ML) INTRAVENOUS
Status: DISCONTINUED | OUTPATIENT
Start: 2025-01-07 | End: 2025-01-07 | Stop reason: SDUPTHER

## 2025-01-07 RX ORDER — OXYCODONE HYDROCHLORIDE 5 MG/1
5 TABLET ORAL EVERY 4 HOURS PRN
Status: DISCONTINUED | OUTPATIENT
Start: 2025-01-07 | End: 2025-01-08 | Stop reason: HOSPADM

## 2025-01-07 RX ORDER — CARVEDILOL 12.5 MG/1
25 TABLET ORAL 2 TIMES DAILY WITH MEALS
Status: DISCONTINUED | OUTPATIENT
Start: 2025-01-07 | End: 2025-01-08 | Stop reason: HOSPADM

## 2025-01-07 RX ORDER — LEVETIRACETAM 500 MG/1
500 TABLET ORAL 2 TIMES DAILY
Status: DISCONTINUED | OUTPATIENT
Start: 2025-01-07 | End: 2025-01-08 | Stop reason: HOSPADM

## 2025-01-07 RX ORDER — ALBUTEROL SULFATE 90 UG/1
2 INHALANT RESPIRATORY (INHALATION) EVERY 4 HOURS PRN
Status: DISCONTINUED | OUTPATIENT
Start: 2025-01-07 | End: 2025-01-07

## 2025-01-07 RX ORDER — OXYCODONE HYDROCHLORIDE 5 MG/1
5 TABLET ORAL
Status: DISCONTINUED | OUTPATIENT
Start: 2025-01-07 | End: 2025-01-07 | Stop reason: HOSPADM

## 2025-01-07 RX ADMIN — Medication 160 MG: at 12:27

## 2025-01-07 RX ADMIN — MIDAZOLAM HYDROCHLORIDE 3 MG: 1 INJECTION, SOLUTION INTRAMUSCULAR; INTRAVENOUS at 12:20

## 2025-01-07 RX ADMIN — Medication 80 MCG: at 13:13

## 2025-01-07 RX ADMIN — Medication 30 MG: at 12:50

## 2025-01-07 RX ADMIN — Medication 80 MCG: at 12:27

## 2025-01-07 RX ADMIN — SUGAMMADEX 200 MG: 100 INJECTION, SOLUTION INTRAVENOUS at 13:37

## 2025-01-07 RX ADMIN — HYDROMORPHONE HYDROCHLORIDE 0.5 MG: 1 INJECTION, SOLUTION INTRAMUSCULAR; INTRAVENOUS; SUBCUTANEOUS at 20:38

## 2025-01-07 RX ADMIN — CEFOXITIN 2000 MG: 2 INJECTION, POWDER, FOR SOLUTION INTRAVENOUS at 12:50

## 2025-01-07 RX ADMIN — PROPOFOL 50 MG: 10 INJECTION, EMULSION INTRAVENOUS at 12:45

## 2025-01-07 RX ADMIN — FENTANYL CITRATE 25 MCG: 50 INJECTION INTRAMUSCULAR; INTRAVENOUS at 15:47

## 2025-01-07 RX ADMIN — ACETAMINOPHEN 1000 MG: 500 TABLET ORAL at 10:59

## 2025-01-07 RX ADMIN — FENTANYL CITRATE 25 MCG: 50 INJECTION INTRAMUSCULAR; INTRAVENOUS at 17:10

## 2025-01-07 RX ADMIN — FAMOTIDINE 40 MG: 20 TABLET, FILM COATED ORAL at 20:36

## 2025-01-07 RX ADMIN — HYDROMORPHONE HYDROCHLORIDE 1 MG: 1 INJECTION, SOLUTION INTRAMUSCULAR; INTRAVENOUS; SUBCUTANEOUS at 13:20

## 2025-01-07 RX ADMIN — ROCURONIUM BROMIDE 20 MG: 10 INJECTION, SOLUTION INTRAVENOUS at 12:45

## 2025-01-07 RX ADMIN — ONDANSETRON 4 MG: 2 INJECTION INTRAMUSCULAR; INTRAVENOUS at 12:55

## 2025-01-07 RX ADMIN — FENTANYL CITRATE 25 MCG: 50 INJECTION INTRAMUSCULAR; INTRAVENOUS at 15:57

## 2025-01-07 RX ADMIN — DOLUTEGRAVIR SODIUM 50 MG: 50 TABLET, FILM COATED ORAL at 18:13

## 2025-01-07 RX ADMIN — OXYCODONE 5 MG: 5 TABLET ORAL at 18:51

## 2025-01-07 RX ADMIN — FENTANYL CITRATE 25 MCG: 50 INJECTION INTRAMUSCULAR; INTRAVENOUS at 17:00

## 2025-01-07 RX ADMIN — ROCURONIUM BROMIDE 10 MG: 10 INJECTION, SOLUTION INTRAVENOUS at 12:27

## 2025-01-07 RX ADMIN — DEXAMETHASONE SODIUM PHOSPHATE 8 MG: 4 INJECTION INTRA-ARTICULAR; INTRALESIONAL; INTRAMUSCULAR; INTRAVENOUS; SOFT TISSUE at 12:45

## 2025-01-07 RX ADMIN — FENTANYL CITRATE 100 MCG: 50 INJECTION INTRAMUSCULAR; INTRAVENOUS at 12:27

## 2025-01-07 RX ADMIN — PROPOFOL 150 MG: 10 INJECTION, EMULSION INTRAVENOUS at 12:27

## 2025-01-07 RX ADMIN — AMLODIPINE BESYLATE 5 MG: 5 TABLET ORAL at 18:13

## 2025-01-07 RX ADMIN — SODIUM CHLORIDE, POTASSIUM CHLORIDE, SODIUM LACTATE AND CALCIUM CHLORIDE: 600; 310; 30; 20 INJECTION, SOLUTION INTRAVENOUS at 11:55

## 2025-01-07 RX ADMIN — HYDROMORPHONE HYDROCHLORIDE 0.5 MG: 1 INJECTION, SOLUTION INTRAMUSCULAR; INTRAVENOUS; SUBCUTANEOUS at 16:31

## 2025-01-07 RX ADMIN — ACETAMINOPHEN 1000 MG: 500 TABLET ORAL at 18:13

## 2025-01-07 RX ADMIN — EPHEDRINE SULFATE 10 MG: 50 INJECTION INTRAVENOUS at 13:15

## 2025-01-07 RX ADMIN — LEVETIRACETAM 500 MG: 500 TABLET, FILM COATED ORAL at 20:36

## 2025-01-07 RX ADMIN — HYDROMORPHONE HYDROCHLORIDE 0.5 MG: 1 INJECTION, SOLUTION INTRAMUSCULAR; INTRAVENOUS; SUBCUTANEOUS at 16:39

## 2025-01-07 RX ADMIN — MIDAZOLAM HYDROCHLORIDE 2 MG: 1 INJECTION, SOLUTION INTRAMUSCULAR; INTRAVENOUS at 12:27

## 2025-01-07 RX ADMIN — SUGAMMADEX 200 MG: 100 INJECTION, SOLUTION INTRAVENOUS at 13:26

## 2025-01-07 RX ADMIN — LIDOCAINE HYDROCHLORIDE 80 MG: 20 INJECTION, SOLUTION EPIDURAL; INFILTRATION; INTRACAUDAL; PERINEURAL at 12:27

## 2025-01-07 RX ADMIN — LOSARTAN POTASSIUM 100 MG: 50 TABLET, FILM COATED ORAL at 18:13

## 2025-01-07 ASSESSMENT — PAIN DESCRIPTION - DESCRIPTORS
DESCRIPTORS: ACHING;DISCOMFORT
DESCRIPTORS: ACHING
DESCRIPTORS: ACHING;SORE;TENDER
DESCRIPTORS: ACHING;SORE
DESCRIPTORS: ACHING;SORE
DESCRIPTORS: SORE
DESCRIPTORS: ACHING;DISCOMFORT

## 2025-01-07 ASSESSMENT — PAIN DESCRIPTION - LOCATION
LOCATION: RECTUM

## 2025-01-07 ASSESSMENT — PAIN DESCRIPTION - ORIENTATION
ORIENTATION: INNER
ORIENTATION: POSTERIOR

## 2025-01-07 ASSESSMENT — PAIN SCALES - GENERAL
PAINLEVEL_OUTOF10: 8
PAINLEVEL_OUTOF10: 5
PAINLEVEL_OUTOF10: 8
PAINLEVEL_OUTOF10: 4
PAINLEVEL_OUTOF10: 5
PAINLEVEL_OUTOF10: 8
PAINLEVEL_OUTOF10: 5
PAINLEVEL_OUTOF10: 8
PAINLEVEL_OUTOF10: 8
PAINLEVEL_OUTOF10: 5
PAINLEVEL_OUTOF10: 6

## 2025-01-07 ASSESSMENT — PAIN DESCRIPTION - PAIN TYPE
TYPE: SURGICAL PAIN
TYPE: SURGICAL PAIN

## 2025-01-07 ASSESSMENT — PAIN - FUNCTIONAL ASSESSMENT
PAIN_FUNCTIONAL_ASSESSMENT: NONE - DENIES PAIN
PAIN_FUNCTIONAL_ASSESSMENT: NONE - DENIES PAIN

## 2025-01-07 ASSESSMENT — PAIN DESCRIPTION - FREQUENCY: FREQUENCY: INTERMITTENT

## 2025-01-07 ASSESSMENT — PAIN DESCRIPTION - ONSET: ONSET: ON-GOING

## 2025-01-07 NOTE — ANESTHESIA PRE PROCEDURE
ritonavir (NORVIR) 100 MG capsule Take 1 capsule by mouth daily 7/23/24 10/21/24  Kirk Fish MD   HYDROcodone-acetaminophen (NORCO) 5-325 MG per tablet Take 1 tablet by mouth every 6 hours as needed for Pain. Max Daily Amount: 4 tablets  Patient not taking: Reported on 10/15/2024    Emily Ortiz MD   hydroCHLOROthiazide (HYDRODIURIL) 25 MG tablet Take 1 tablet by mouth daily  Patient not taking: Reported on 1/2/2025 5/1/24   Otis Allen MD   nitroGLYCERIN (NITROSTAT) 0.4 MG SL tablet place 1 tablet under the tongue if needed every 5 minutes for michelle...  (REFER TO PRESCRIPTION NOTES). 12/22/23   Emily Ortiz MD   aspirin 81 MG chewable tablet Take 1 tablet by mouth daily    Automatic Reconciliation, Ar       Current medications:    Current Facility-Administered Medications   Medication Dose Route Frequency Provider Last Rate Last Admin    BUPivacaine-EPINEPHrine PF (MARCAINE-w/EPINEPHrine) 0.25% -[id number] injection 20 mL  20 mL Injection Once Julien Nieto MD        naloxone 0.4 mg in 10 mL sodium chloride syringe   IntraVENous PRN Larisa Quinonesh I, DO        sodium chloride flush 0.9 % injection 5-40 mL  5-40 mL IntraVENous 2 times per day Estella Quinones I, DO        sodium chloride flush 0.9 % injection 5-40 mL  5-40 mL IntraVENous PRN Larisa Quinonesh I, DO        0.9 % sodium chloride infusion   IntraVENous PRN Larisa Quinonesh I, DO        fentaNYL (SUBLIMAZE) injection 25 mcg  25 mcg IntraVENous Q5 Min PRN Estella Quinones I, DO        HYDROmorphone HCl PF (DILAUDID) injection 0.5 mg  0.5 mg IntraVENous Q5 Min PRN Larisa Quinonesh I, DO        oxyCODONE (ROXICODONE) immediate release tablet 5 mg  5 mg Oral Once PRN Estella Quinones I, DO        ondansetron (ZOFRAN) injection 4 mg  4 mg IntraVENous Once PRN Estella Quinones I, DO        prochlorperazine (COMPAZINE) injection 5 mg  5 mg IntraVENous Once PRN Estella Quinones I, DO        hydrALAZINE (APRESOLINE)

## 2025-01-07 NOTE — H&P
History and Physical (outpatient)    Patient: Woodrow Madison 59 y.o. 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 That procedure was performed by Dr. Davila, who has left the area.  I  have taken over his follow-up for the disease, and he is overdue for an anprectal examinatio under anesthesia.      History:  Past Medical History:   Diagnosis Date    A-V fistula (HCC)     left arm    Anemia     CAD (coronary artery disease)     Chronic abdominal pain     Chronic kidney disease     dialysis pt. - M.W.[facility] - [facility]    Colon cancer ([facility]) 2021    Complication of AV dialysis fistula 2019    2 large pseudo-aneurysm    Esophagitis     ESRD on dialysis (HCC)     Gastritis     GERD (gastroesophageal reflux disease)     Gout     pt states not in 12 years    Hemodialysis patient (HCC)     High cholesterol     History of blood transfusion     at least 8 units over the years  - pt states last given June 2023    HIV (human immunodeficiency virus infection) ([facility]) 2011    Hypertension     MI (myocardial infarction) ([facility]) 2023    NSTEMI (non-ST elevated myocardial infarction) ([facility])     JUNE 2023    Other ill-defined conditions(799.89)     SMALL VEIN IN HEART BEING MONITORED    Pancreatitis     Renal insufficiency     Seizure ([facility]) 2012    one time and none sense    Sepsis ([facility]) 2011 & 8/2019       Past Surgical History:   Procedure Laterality Date    AV FISTULA CREATION Left 2012    & 2019    CARDIAC PROCEDURE N/A 06/13/2023    Left heart cath / coronary angiography performed by Adal Glover MD at [facility] CARDIAC CATH LAB and 2nd stent with 2nd cath at [facility] June 2023    CARDIAC PROCEDURE N/A 06/19/2023    Percutaneous coronary intervention  Minutes of Exercise per Session: 30 min   Stress: Not on file   Social Connections: Not on file   Intimate Partner Violence: Not on file   Housing Stability: Low Risk  (4/26/2024)    Housing Stability Vital Sign     Unable to Pay for Housing in the Last Year: No     Number of Places Lived in the Last Year: 1     Unstable Housing in the Last Year: No       Allergies:   Allergies   Allergen Reactions    Penicillin G Itching     Patient screened for any delayed non-IgE-mediated reaction to PCN.        Patient notes the following:    No delayed non-IgE-mediated reaction to PCN                Current Medications:  Prior to Admission Medications   Prescriptions Last Dose Informant Patient Reported? Taking?   HYDROcodone-acetaminophen (NORCO) 5-325 MG per tablet   Yes No   Sig: Take 1 tablet by mouth every 6 hours as needed for Pain. Max Daily Amount: 4 tablets   Patient not taking: Reported on 10/15/2024   Methoxy PEG-Epoetin Beta (MIRCERA IJ)   Yes No   Sig: Inject 150 mcg as directed every 14 days Given with dialysis   Tenapanor HCl, CKD, (XPHOZAH) 30 MG TABS   Yes No   Sig: Take by mouth daily   VEMLIDY 25 MG TABS   No No   Sig: take 1 tablet by mouth once daily   acetaminophen (TYLENOL 8 HOUR) 650 MG extended release tablet   No No   Sig: Take 1 tablet by mouth every 8 hours as needed for Pain   albuterol sulfate HFA (PROVENTIL;VENTOLIN;PROAIR) 108 (90 Base) MCG/ACT inhaler   No No   Sig: inhale 1 puff by mouth and INTO THE LUNGS every 4 hours if needed for wheezing   amLODIPine (NORVASC) 10 MG tablet   Yes No   Sig: Take 0.5 tablets by mouth daily   aspirin 81 MG chewable tablet   Yes No   Sig: Take 1 tablet by mouth daily   atazanavir (REYATAZ) 300 MG capsule   No No   Sig: TAKE 1 CAPSULE BY MOUTH EVERY DAY BEFORE BREAKFAST   Patient taking differently: Take 1 capsule by mouth daily (with breakfast)   atorvastatin (LIPITOR) 40 MG tablet   No No   Sig: take 1 tablet by mouth once daily   bismuth subsalicylate

## 2025-01-07 NOTE — BRIEF OP NOTE
Brief Postoperative Note      Patient: Woodrow Madison  YOB: 1965  MRN: [id number]    Date of Procedure: 1/7/2025    Pre-Op Diagnosis Codes: History of squamous cell carcinoma of the anal margin and condylomata [C44.520]  Post-Op Diagnosis:  Anal condylomata  Procedure:  Anorectal examinatio  Surgeon:  Julien Nieto MD  Assistant:  None  Anesthesia: General endotracheal  Specimens:   ID Type Source Tests Collected by Time Destination   1 : RIGHT ANTERIOR ANAL CANAL LESION Tissue Tissue SURGICAL PATHOLOGY Julien Nieto MD 1/7/2025 1302    2 : LEFT POSTERIOR ANAL CANAL LESION Tissue Tissue SURGICAL PATHOLOGY Julien Nieto MD 1/7/2025 1307    Drains:  None  Estimated Blood Loss: < 10 mL  Crystalloid:  350 mL  Complications:  None apparent  Implants:  None  Findings:  Infection Present At Time Of Surgery (PATOS) (choose all levels that have infection present):  No infection present  Other Findings:  Nearly flat 1.5 cm right anterior anal canal lesion.  Exophytic 2 mm left posterior anal canal lesion. Somewhat enlarged internal hemorrhoids.    Electronically signed by Julien Nieto MD  
stated

## 2025-01-07 NOTE — ANESTHESIA POSTPROCEDURE EVALUATION
Department of Anesthesiology  Postprocedure Note    Patient: Woodrow Madison  MRN: [id number]  YOB: 1965  Date of evaluation: 1/7/2025    Procedure Summary       Date: 01/07/25 Room / Location: [facility] MAIN OR [address] [place] [address] MAIN OR    Anesthesia Start: 1224 Anesthesia Stop: 1348    Procedure: Anorectal examination under anesthesia and excision of anal canal lesions (Anus) Diagnosis:       Squamous cell carcinoma of perianal region      (Squamous cell carcinoma of perianal region [C44.520])    Providers: Julien Nieto MD Responsible Provider: Estella Quinones DO    Anesthesia Type: General ASA Status: 4            Anesthesia Type: General    Jose Manuel Phase I: Jose Manuel Score: 8    Jose Manuel Phase II:      Anesthesia Post Evaluation    Patient location during evaluation: PACU  Level of consciousness: awake  Airway patency: patent  Nausea & Vomiting: no nausea  Cardiovascular status: hemodynamically stable  Respiratory status: acceptable  Hydration status: stable  Multimodal analgesia pain management approach  Pain management: adequate    No notable events documented.

## 2025-01-07 NOTE — PROGRESS NOTES
SURGICEL OPENED TO STERILE FIELD   REF 1972  LOT [id number]  EXP 06/10/2028    REF 1972  LOT [id number]  EXP 03/11/2028

## 2025-01-08 VITALS
OXYGEN SATURATION: 97 % | HEART RATE: 76 BPM | SYSTOLIC BLOOD PRESSURE: 148 MMHG | RESPIRATION RATE: 18 BRPM | DIASTOLIC BLOOD PRESSURE: 96 MMHG | TEMPERATURE: 97.9 F | BODY MASS INDEX: 28.99 KG/M2 | WEIGHT: 214 LBS | HEIGHT: 72 IN

## 2025-01-08 LAB
HBV SURFACE AB SER QL: REACTIVE
HBV SURFACE AB SER-ACNC: 536.33 MIU/ML
HBV SURFACE AG SER QL: <0.1 INDEX
HBV SURFACE AG SER QL: NEGATIVE

## 2025-01-08 PROCEDURE — 96374 THER/PROPH/DIAG INJ IV PUSH: CPT

## 2025-01-08 PROCEDURE — 87340 HEPATITIS B SURFACE AG IA: CPT

## 2025-01-08 PROCEDURE — 90935 HEMODIALYSIS ONE EVALUATION: CPT

## 2025-01-08 PROCEDURE — 86706 HEP B SURFACE ANTIBODY: CPT

## 2025-01-08 PROCEDURE — G0378 HOSPITAL OBSERVATION PER HR: HCPCS

## 2025-01-08 PROCEDURE — 6360000002 HC RX W HCPCS: Performed by: COLON & RECTAL SURGERY

## 2025-01-08 PROCEDURE — 6370000000 HC RX 637 (ALT 250 FOR IP): Performed by: COLON & RECTAL SURGERY

## 2025-01-08 PROCEDURE — 36415 COLL VENOUS BLD VENIPUNCTURE: CPT

## 2025-01-08 PROCEDURE — 96376 TX/PRO/DX INJ SAME DRUG ADON: CPT

## 2025-01-08 RX ORDER — HYDROMORPHONE HYDROCHLORIDE 2 MG/1
2-4 TABLET ORAL EVERY 4 HOURS PRN
Qty: 30 TABLET | Refills: 0 | Status: SHIPPED | OUTPATIENT
Start: 2025-01-08 | End: 2025-01-16

## 2025-01-08 RX ADMIN — LEVETIRACETAM 500 MG: 500 TABLET, FILM COATED ORAL at 09:33

## 2025-01-08 RX ADMIN — FAMOTIDINE 40 MG: 20 TABLET, FILM COATED ORAL at 09:33

## 2025-01-08 RX ADMIN — ACETAMINOPHEN 1000 MG: 500 TABLET ORAL at 13:29

## 2025-01-08 RX ADMIN — ACETAMINOPHEN 1000 MG: 500 TABLET ORAL at 05:36

## 2025-01-08 RX ADMIN — ACETAMINOPHEN 1000 MG: 500 TABLET ORAL at 00:13

## 2025-01-08 RX ADMIN — HYDROMORPHONE HYDROCHLORIDE 0.5 MG: 1 INJECTION, SOLUTION INTRAMUSCULAR; INTRAVENOUS; SUBCUTANEOUS at 00:19

## 2025-01-08 RX ADMIN — HYDROMORPHONE HYDROCHLORIDE 0.5 MG: 1 INJECTION, SOLUTION INTRAMUSCULAR; INTRAVENOUS; SUBCUTANEOUS at 12:01

## 2025-01-08 RX ADMIN — HYDROMORPHONE HYDROCHLORIDE 0.5 MG: 1 INJECTION, SOLUTION INTRAMUSCULAR; INTRAVENOUS; SUBCUTANEOUS at 05:36

## 2025-01-08 ASSESSMENT — PAIN DESCRIPTION - LOCATION
LOCATION: RECTUM
LOCATION: HIP;RECTUM
LOCATION: HIP;BUTTOCKS
LOCATION: RECTUM

## 2025-01-08 ASSESSMENT — PAIN SCALES - GENERAL
PAINLEVEL_OUTOF10: 7
PAINLEVEL_OUTOF10: 8
PAINLEVEL_OUTOF10: 9
PAINLEVEL_OUTOF10: 7
PAINLEVEL_OUTOF10: 10

## 2025-01-08 ASSESSMENT — PAIN DESCRIPTION - DESCRIPTORS
DESCRIPTORS: ACHING
DESCRIPTORS: SHARP
DESCRIPTORS: BURNING

## 2025-01-08 ASSESSMENT — PAIN DESCRIPTION - ORIENTATION
ORIENTATION: POSTERIOR
ORIENTATION: INNER

## 2025-01-08 ASSESSMENT — PAIN DESCRIPTION - PAIN TYPE: TYPE: SURGICAL PAIN

## 2025-01-08 NOTE — DISCHARGE INSTRUCTIONS
Post-Operative Instructions      Diet:  Consume a high fiber diet as tolerated.  Such a diet may include fresh fruits, vegetables, and whole grain cereals and breads.  You should also drink 8-10 glasses of water, juice, or other non-alcoholic beverages per day.     Fiber Supplements:  Take 1 dose of Metamucil or other over-the-counter fiber supplement (Citrucel, BeneFiber, etc.) as directed each morning and another dose each evening.    Medications:  Take prescription pain medication (hydromorphone) as prescribed.  Do not drive, drink alcohol, or operate machinery while taking the hydromorphone.  Take docusate (non-prescription stool softener) twice per day as directed.  Beginning today, take maximum doses of Tylenol (1000 mg every 6 hours) until you do not need pain medication anymore.  Doing so will help you to manage the pain and to use less of the hydromorphone.  Beginning on the second day after surgery, you may take ibuprofen as directed in addition to or instead of the prescription medication if there has been no significant bleeding.  Do not take pain medication on an empty stomach.  You may resume your usual blood thinning medications on Friday 1/10/2025 if you have not had any severe bleeding by then.    Activity:  Do not engage in any heavy lifting or other strenuous activity until you have been seen for follow-up.  You may walk as much as you want, and you may climb stairs.  Frequent walking will help prevent constipation.    Hygiene and Wound Care:  Remove the external dressing sometime today. Wipe gently after bowel movements.  Use baby wipes rather than dry toilet paper if possible.  Shower but do not bathe.  Do not soak your bottom.  Because the wounds are internal, it probably will not be necessary to re-apply a dressing.    Constipation:  If you have not had a bowel movement by sometime on Thursday 1/9/2025, take 1 Tablespoon of Milk of Magnesia.  Repeat in 6 hours if you have no results.  If

## 2025-01-08 NOTE — FLOWSHEET NOTE
Pre Treatment:  Primary RN SBAR: Vilma Pavon RN  Incapacitated Nurse [facility]. provided: to patient  Patient Education provided: HD Treatment  Preferred Education method and Primary language: Verbal, English  Hospital General Consent Verified: Yes  Hospital associated wait time; reason: none    Hepatitis B Surface Ag   Date/Time Value Ref Range Status   01/08/2025 12:28 PM <0.10 Index Final     Hep B S Ag Interp   Date/Time Value Ref Range Status   01/08/2025 12:28 PM Negative NEG   Final     Hep B S Ab   Date/Time Value Ref Range Status   01/08/2025 12:28 .33 mIU/mL Final     Hep B S Ab Interp   Date/Time Value Ref Range Status   01/08/2025 12:28 PM REACTIVE (A) NR   Final          01/08/25 1214   Treatment   Treatment Goal 2000 ml   Observations & Evaluations   Level of Consciousness 0   Oriented X 4   Heart Rhythm Regular   Respiratory Quality/Effort Unlabored   O2 Device None (Room air)   Bilateral Breath Sounds Clear   Skin Condition/Temp Dry   Appetite Good   Abdomen Inspection Soft   Bowel Sounds (All Quadrants) Active   Edema None   Vital Signs   BP (!) 143/94   Temp 97.8 °F (36.6 °C)   Pulse 68   Respirations 18   Pain Assessment   Pain Assessment 0-10   Pain Level 7  (Recently medicated by primary RN)   Technical Checks   Dialysis Machine No. 06   RO Machine Number [id number]   Dialyzer Lot No. [id number]   Tubing Lot Number [id number]   All Connections Secure Yes   NS Bag Yes   Saline Line Double Clamped Yes   Dialyzer Nipro ELISIO   Prime Volume (mL) 200 mL   ICEBOAT I;C;E;B;O;A;T   RO Machine Log Sheet Completed Yes   Machine Alarm Self Test Completed;Passed   Air Foam Detector Tested;Proper Function   Extracorporeal Circuit Tested for Integrity Yes   Machine Conductivity 13.7   Manual Ph 7.2   Bleach Test (Neg) Yes   Bath Temperature 98.6 °F (37 °C)   Treatment Initiation   Dialyze Hours 3.5   Treatment  Initiation Universal Precautions maintained;Lines secured to patient;Connections secured;Prime given;Venous

## 2025-01-08 NOTE — CONSULTS
Colon cancer (HCC) 2021    Complication of AV dialysis fistula 2019    2 large pseudo-aneurysm    Esophagitis     ESRD on dialysis (HCC)     Gastritis     GERD (gastroesophageal reflux disease)     Gout     pt states not in 12 years    Hemodialysis patient (HCC)     High cholesterol     History of blood transfusion     at least 8 units over the years  - pt states last given June 2023    HIV (human immunodeficiency virus infection) ([facility]) 2011    Hypertension     MI (myocardial infarction) ([facility]) 2023    NSTEMI (non-ST elevated myocardial infarction) ([facility])     JUNE 2023    Other ill-defined conditions(799.89)     SMALL VEIN IN HEART BEING MONITORED    Pancreatitis     Renal insufficiency     Seizure (HCC) 2012    one time and none sense    Sepsis ([facility]) 2011 & 8/2019     PSH:  Past Surgical History:   Procedure Laterality Date    ANUS SURGERY N/A 1/7/2025    Anorectal examination under anesthesia and excision of anal canal lesions performed by Julien Nieto MD at [facility] MAIN OR    AV FISTULA CREATION Left 2012    & 2019    CARDIAC PROCEDURE N/A 06/13/2023    Left heart cath / coronary angiography performed by Adal Glover MD at [facility] CARDIAC CATH LAB and 2nd stent with 2nd cath at [facility] June 2023    CARDIAC PROCEDURE N/A 06/19/2023    Percutaneous coronary intervention performed by Luis M Watson MD at [facility] CARDIAC CATH LAB    CARDIAC PROCEDURE N/A 06/19/2023    Atherectomy coronary performed by Luis M Watson MD at [facility] CARDIAC CATH LAB    CARDIAC PROCEDURE N/A 04/26/2024    Left heart cath / coronary angiography performed by Adal Glover MD at [facility] CARDIAC CATH LAB    CARDIAC PROCEDURE N/A 04/26/2024    Percutaneous coronary intervention performed by Adal Glover MD at [facility] CARDIAC CATH LAB    CARDIAC PROCEDURE N/A 04/26/2024    Insert stent jessenia coronary performed by Adal Glover MD at [facility] CARDIAC CATH LAB    CARDIAC PROCEDURE N/A 04/29/2024    Left heart cath / coronary angiography performed

## 2025-01-08 NOTE — PROGRESS NOTES
General Daily Progress Note    Admission Date: 1/7/2025  Hospital Day 2  Post-Op Day 1  Subjective:   No complaints.  No chest pain or dyspnea.  Pain control adequate.  Awaiting dialysis.      Objective:   Patient Vitals for the past 24 hrs:   BP Temp Temp src Pulse Resp SpO2 Height Weight   01/08/25 0808 (!) 150/93 97.7 °F (36.5 °C) Oral 63 18 94 % -- --   01/07/25 2031 (!) 140/74 97.5 °F (36.4 °C) Oral 65 17 95 % -- --   01/07/25 1920 (!) 144/92 98.1 °F (36.7 °C) Oral 66 20 93 % -- --   01/07/25 1851 (!) 140/92 98 °F (36.7 °C) Oral 58 22 92 % -- --   01/07/25 1730 (!) 142/94 97.3 °F (36.3 °C) Axillary 60 20 93 % -- --   01/07/25 1715 -- 98.1 °F (36.7 °C) Oral -- -- -- -- --   01/07/25 1700 (!) 144/98 -- -- 69 20 -- -- --   01/07/25 1645 (!) 140/98 -- -- 58 11 -- -- --   01/07/25 1630 (!) 145/98 -- -- 63 12 95 % -- --   01/07/25 1615 (!) 135/92 -- -- 60 15 94 % -- --   01/07/25 1600 138/89 -- -- 57 12 94 % -- --   01/07/25 1545 (!) 139/95 -- -- 66 17 -- -- --   01/07/25 1530 137/88 -- -- 57 13 -- -- --   01/07/25 1515 (!) 132/94 -- -- 61 14 96 % -- --   01/07/25 1500 133/86 -- -- 58 11 96 % -- --   01/07/25 1445 135/89 -- -- 58 13 95 % -- --   01/07/25 1430 131/88 -- -- 67 11 95 % -- --   01/07/25 1415 130/84 -- -- 62 18 97 % -- --   01/07/25 1410 (!) 135/94 -- -- 63 16 -- -- --   01/07/25 1405 (!) 135/91 -- -- 63 23 100 % -- --   01/07/25 1400 123/86 -- -- 66 21 -- -- --   01/07/25 1357 109/82 97.6 °F (36.4 °C) -- 58 17 97 % -- --   01/07/25 1355 (!) 133/94 -- -- 70 17 -- -- --   01/07/25 1353 (!) 119/95 98 °F (36.7 °C) Oral 73 12 99 % -- --   01/07/25 1346 109/82 -- -- 61 21 97 % -- --   01/07/25 1032 (!) 147/97 98.2 °F (36.8 °C) Oral 60 17 98 % 1.829 m (6') 97.1 kg (214 lb)     No intake/output data recorded.  01/06 1901 - 01/08 0700  In: 350 [I.V.:350]  Out: 5       Physical Examination:  No distress.   Normal sinus rhythm.        Data Review   Recent Results (from the past 24 hour(s))   POC CHEM 8

## 2025-01-08 NOTE — PLAN OF CARE
Problem: Chronic Conditions and Co-morbidities  Goal: Patient's chronic conditions and co-morbidity symptoms are monitored and maintained or improved  Outcome: Progressing  Flowsheets (Taken 1/7/2025 1730)  Care Plan - Patient's Chronic Conditions and Co-Morbidity Symptoms are Monitored and Maintained or Improved: Monitor and assess patient's chronic conditions and comorbid symptoms for stability, deterioration, or improvement     Problem: Pain  Goal: Verbalizes/displays adequate comfort level or baseline comfort level  Outcome: Progressing     Problem: Discharge Planning  Goal: Discharge to home or other facility with appropriate resources  Outcome: Progressing  Flowsheets (Taken 1/7/2025 1730)  Discharge to home or other facility with appropriate resources: Identify barriers to discharge with patient and caregiver

## 2025-01-11 NOTE — OP NOTE
[address]  [address]                            OPERATIVE REPORT      PATIENT NAME: MAITE BROWNING            : 1965  MED REC NO: [id number]                       ROOM: 522  ACCOUNT NO: [id number]                       ADMIT DATE: 2025  PROVIDER: Julien Nieto MD    DATE OF SERVICE:  2025    PREOPERATIVE DIAGNOSES:  History of squamous cell carcinoma of the anal margin and condylomata.    POSTOPERATIVE DIAGNOSES:  Anal condylomata.    PROCEDURES PERFORMED:  Anorectal examination under anesthesia and excision of anal canal lesions.    SURGEON:  Julien Nieto MD    ASSISTANT:  None.    ANESTHESIA:  General endotracheal.    ESTIMATED BLOOD LOSS:  Less than 10 mL.    SPECIMENS REMOVED:       1. Right anterior anal canal lesion.     2. Left posterior anal canal lesion.    INTRAOPERATIVE FINDINGS:  There were 2 visible lesions.  One was nearly flat and 1.5 cm in greatest dimension located in the right anterior anal canal.  The other was a 2 mm exophytic lesion of the left posterior anal canal.  Internal hemorrhoids were somewhat enlarged.     COMPLICATIONS:  None apparent.    IMPLANTS:  None.    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  That procedure was performed by Dr. Davila, who has left the area.  I have taken over his followup for the disease, and he is overdue for an anorectal examination under anesthesia.  The examination is for surveillance purposes and the patient was informed that any suspicious tissue would be excised, biopsied, and/or destroyed.  The risks of the procedure were discussed in detail and the patient agreed to proceed.    DESCRIPTION OF PROCEDURE:  After informed consent was obtained, the patient was taken to the

## 2025-01-16 RX ORDER — HYDROMORPHONE HYDROCHLORIDE 2 MG/1
2 TABLET ORAL EVERY 4 HOURS PRN
Qty: 30 TABLET | Refills: 0 | Status: SHIPPED | OUTPATIENT
Start: 2025-01-16 | End: 2025-01-23

## 2025-01-17 DIAGNOSIS — B20 CURRENTLY ASYMPTOMATIC HIV INFECTION, WITH HISTORY OF HIV-RELATED ILLNESS (HCC): Primary | ICD-10-CM

## 2025-01-21 RX ORDER — LEVETIRACETAM 500 MG/1
TABLET ORAL
Qty: 180 TABLET | Refills: 1 | Status: SHIPPED | OUTPATIENT
Start: 2025-01-21

## 2025-01-21 RX ORDER — ATORVASTATIN CALCIUM 40 MG/1
TABLET, FILM COATED ORAL
Qty: 90 TABLET | Refills: 1 | Status: SHIPPED | OUTPATIENT
Start: 2025-01-21

## 2025-01-22 RX ORDER — TENOFOVIR ALAFENAMIDE 25 MG/1
1 TABLET ORAL DAILY
Qty: 90 TABLET | Refills: 2 | Status: SHIPPED | OUTPATIENT
Start: 2025-01-22

## 2025-02-11 ENCOUNTER — HOSPITAL ENCOUNTER (EMERGENCY)
Facility: HOSPITAL | Age: 60
Discharge: HOME | End: 2025-02-11
Attending: EMERGENCY MEDICINE
Payer: MEDICARE

## 2025-02-11 ENCOUNTER — APPOINTMENT (OUTPATIENT)
Facility: HOSPITAL | Age: 60
End: 2025-02-11
Payer: MEDICARE

## 2025-02-11 VITALS
WEIGHT: 260 LBS | OXYGEN SATURATION: 96 % | HEIGHT: 72 IN | DIASTOLIC BLOOD PRESSURE: 92 MMHG | SYSTOLIC BLOOD PRESSURE: 147 MMHG | TEMPERATURE: 98.8 F | RESPIRATION RATE: 16 BRPM | BODY MASS INDEX: 35.21 KG/M2 | HEART RATE: 72 BPM

## 2025-02-11 DIAGNOSIS — S40.021A CONTUSION OF RIGHT UPPER EXTREMITY, INITIAL ENCOUNTER: ICD-10-CM

## 2025-02-11 DIAGNOSIS — N17.9 ACUTE RENAL FAILURE ON DIALYSIS (HCC): ICD-10-CM

## 2025-02-11 DIAGNOSIS — R19.00 MASS OF SOFT TISSUE OF ABDOMEN: ICD-10-CM

## 2025-02-11 DIAGNOSIS — S30.1XXA CONTUSION OF ABDOMINAL WALL, INITIAL ENCOUNTER: ICD-10-CM

## 2025-02-11 DIAGNOSIS — Z99.2 ACUTE RENAL FAILURE ON DIALYSIS (HCC): ICD-10-CM

## 2025-02-11 DIAGNOSIS — K42.9 UMBILICAL HERNIA WITHOUT OBSTRUCTION AND WITHOUT GANGRENE: ICD-10-CM

## 2025-02-11 DIAGNOSIS — R22.32 LOCALIZED SWELLING OF LEFT FOREARM: Primary | ICD-10-CM

## 2025-02-11 LAB
ALBUMIN SERPL-MCNC: 4.3 G/DL (ref 3.5–5.2)
ALBUMIN/GLOB SERPL: 1.4 (ref 1.1–2.2)
ALP SERPL-CCNC: 61 U/L (ref 40–129)
ALT SERPL-CCNC: 5 U/L (ref 10–50)
ANION GAP SERPL CALC-SCNC: 15 MMOL/L (ref 2–12)
AST SERPL-CCNC: 18 U/L (ref 10–50)
BASOPHILS # BLD: 0.03 K/UL (ref 0–0.1)
BASOPHILS NFR BLD: 0.4 % (ref 0–1)
BILIRUB SERPL-MCNC: 1.1 MG/DL (ref 0.2–1)
BUN SERPL-MCNC: 38 MG/DL (ref 6–20)
BUN/CREAT SERPL: 4 (ref 12–20)
CALCIUM SERPL-MCNC: 9.7 MG/DL (ref 8.6–10)
CHLORIDE SERPL-SCNC: 97 MMOL/L (ref 98–107)
CO2 SERPL-SCNC: 31 MMOL/L (ref 22–29)
CREAT SERPL-MCNC: 10.04 MG/DL (ref 0.7–1.2)
DIFFERENTIAL METHOD BLD: ABNORMAL
EOSINOPHIL # BLD: 0.14 K/UL (ref 0–0.4)
EOSINOPHIL NFR BLD: 1.8 % (ref 0–7)
ERYTHROCYTE [DISTWIDTH] IN BLOOD BY AUTOMATED COUNT: 14.1 % (ref 11.5–14.5)
GLOBULIN SER CALC-MCNC: 3.1 G/DL (ref 2–4)
GLUCOSE SERPL-MCNC: 98 MG/DL (ref 65–100)
HCT VFR BLD AUTO: 24.4 % (ref 36.6–50.3)
HGB BLD-MCNC: 7.9 G/DL (ref 12.1–17)
IMM GRANULOCYTES # BLD AUTO: 0.03 K/UL (ref 0–0.04)
IMM GRANULOCYTES NFR BLD AUTO: 0.4 % (ref 0–0.5)
INR PPP: 1 (ref 0.9–1.1)
LYMPHOCYTES # BLD: 1.21 K/UL (ref 0.8–3.5)
LYMPHOCYTES NFR BLD: 15.7 % (ref 12–49)
MCH RBC QN AUTO: 29 PG (ref 26–34)
MCHC RBC AUTO-ENTMCNC: 32.4 G/DL (ref 30–36.5)
MCV RBC AUTO: 89.7 FL (ref 80–99)
MONOCYTES # BLD: 0.62 K/UL (ref 0–1)
MONOCYTES NFR BLD: 8 % (ref 5–13)
NEUTS SEG # BLD: 5.69 K/UL (ref 1.8–8)
NEUTS SEG NFR BLD: 73.7 % (ref 32–75)
NRBC # BLD: 0 K/UL (ref 0–0.01)
NRBC BLD-RTO: 0 PER 100 WBC
PLATELET # BLD AUTO: 194 K/UL (ref 150–400)
PMV BLD AUTO: 10.4 FL (ref 8.9–12.9)
POTASSIUM SERPL-SCNC: 4.5 MMOL/L (ref 3.5–5.1)
PROT SERPL-MCNC: 7.4 G/DL (ref 6.4–8.3)
PROTHROMBIN TIME: 13.5 SEC (ref 11.9–14.6)
RBC # BLD AUTO: 2.72 M/UL (ref 4.1–5.7)
SODIUM SERPL-SCNC: 143 MMOL/L (ref 136–145)
WBC # BLD AUTO: 7.7 K/UL (ref 4.1–11.1)

## 2025-02-11 PROCEDURE — 80053 COMPREHEN METABOLIC PANEL: CPT

## 2025-02-11 PROCEDURE — 85610 PROTHROMBIN TIME: CPT

## 2025-02-11 PROCEDURE — 96372 THER/PROPH/DIAG INJ SC/IM: CPT

## 2025-02-11 PROCEDURE — 99284 EMERGENCY DEPT VISIT MOD MDM: CPT

## 2025-02-11 PROCEDURE — 36415 COLL VENOUS BLD VENIPUNCTURE: CPT

## 2025-02-11 PROCEDURE — 74176 CT ABD & PELVIS W/O CONTRAST: CPT

## 2025-02-11 PROCEDURE — 96374 THER/PROPH/DIAG INJ IV PUSH: CPT

## 2025-02-11 PROCEDURE — 6360000002 HC RX W HCPCS: Performed by: NURSE PRACTITIONER

## 2025-02-11 PROCEDURE — 85025 COMPLETE CBC W/AUTO DIFF WBC: CPT

## 2025-02-11 RX ORDER — DICYCLOMINE HYDROCHLORIDE 10 MG/ML
20 INJECTION INTRAMUSCULAR ONCE
Status: COMPLETED | OUTPATIENT
Start: 2025-02-11 | End: 2025-02-11

## 2025-02-11 RX ORDER — OXYCODONE AND ACETAMINOPHEN 5; 325 MG/1; MG/1
1 TABLET ORAL
Status: DISCONTINUED | OUTPATIENT
Start: 2025-02-11 | End: 2025-02-11

## 2025-02-11 RX ORDER — KETOROLAC TROMETHAMINE 30 MG/ML
30 INJECTION, SOLUTION INTRAMUSCULAR; INTRAVENOUS
Status: COMPLETED | OUTPATIENT
Start: 2025-02-11 | End: 2025-02-11

## 2025-02-11 RX ADMIN — DICYCLOMINE HYDROCHLORIDE 20 MG: 10 INJECTION, SOLUTION INTRAMUSCULAR at 16:25

## 2025-02-11 RX ADMIN — KETOROLAC TROMETHAMINE 30 MG: 30 INJECTION, SOLUTION INTRAMUSCULAR at 16:25

## 2025-02-11 ASSESSMENT — PAIN SCALES - GENERAL: PAINLEVEL_OUTOF10: 7

## 2025-02-11 ASSESSMENT — PAIN - FUNCTIONAL ASSESSMENT: PAIN_FUNCTIONAL_ASSESSMENT: 0-10

## 2025-02-11 NOTE — DISCHARGE INSTRUCTIONS
Discussed visit today.  Please follow-up with your vascular surgeon by calling and scheduling appointment with them.  Discussed if you are not able to get in with them sooner rather than later than you can always return to the emergency room or a different emergency room for a vascular duplex of the left upper extremity.  Please follow-up with your primary care provider for the 1.6 cm rounded soft tissue density within the anterior aspect of the abdominal wall.  Please monitor this area.    Return to the ER with any worsening of symptoms.

## 2025-02-11 NOTE — ED TRIAGE NOTES
Pt arrives with cc swelling in left forearm along with a bump under the skin. Pt has fistula in left AC for dialysis. Pt also reports bruising on right upper arm, and bump RUQ on abdomen x 2 weeks. Reports discomfort when applying pressure to the bumps. Reports swelling on arm started 7am today and fluctuates in size.     Hx of colon cancer , dialysis and use of blood thinners,

## 2025-02-11 NOTE — ED NOTES
Bedside and Verbal shift change report given to Megan RN (oncoming nurse) by Jimmie RN (offgoing nurse). Report included the following information Nurse Handoff Report, ED SBAR, MAR, and Recent Results.

## 2025-02-11 NOTE — ED PROVIDER NOTES
[facility] DEPART[facility]  EMERGENCY DEPARTMENT ENCOUNTER      Pt Name: Woodrow Madison  MRN: [id number]  Birthdate 1965  Date of evaluation: 2/11/2025  Provider: Phil Hinton PA-C    CHIEF COMPLAINT       Chief Complaint   Patient presents with    Arm Swelling    Nodule         HISTORY OF PRESENT ILLNESS    Patient is a 59-year-old male with history of coronary artery disease, chronic kidney disease on dialysis Monday Wednesday Friday, colon cancer, pancreatitis, gout, NSTEMI, seizure who presents to the ER with reports of swelling in the left forearm that he noticed this morning.  Patient reports that it was worse this morning whenever he woke up and it has gone down a little.  Patient also reports bruising to his right upper arm and unsure of how it got there.  Patient reports no injury.  Patient also reports a bump on his abdomen as well as bruising on his abdomen.  Patient also reports bloating in his abdomen since he had his rectal cancer removed in January.  Patient reports no nausea or vomiting.  Patient reports regular bowel movements.  Patient reports discomfort whenever you press on the bumps.  Patient has not missed a dialysis appointment.  Patient reports he does take blood thinners, but per my chart review patient is on Plavix. Patient denies chest pain, shortness of breath, abdominal pain, urinary symptoms, nausea or vomiting, diarrhea or constipation, headache, dizziness, lightheadedness, fever or chills.  Patient denies alcohol use, denies smoking/vaping or illicit drug use.          Nursing Notes were reviewed.    REVIEW OF SYSTEMS       Review of Systems      PAST MEDICAL HISTORY     Past Medical History:   Diagnosis Date    A-V fistula (HCC)     left arm    Anemia     CAD (coronary artery disease)     Chronic abdominal pain     Chronic kidney disease     dialysis pt. - [facility] - [facility]    Colon cancer (HCC) 2021    Complication of AV dialysis fistula 2019    2 large

## 2025-03-04 RX ORDER — FAMOTIDINE 40 MG/1
40 TABLET, FILM COATED ORAL 2 TIMES DAILY
Qty: 180 TABLET | Refills: 0 | Status: ON HOLD | OUTPATIENT
Start: 2025-03-04

## 2025-03-09 ENCOUNTER — HOSPITAL ENCOUNTER (INPATIENT)
Facility: HOSPITAL | Age: 60
LOS: 2 days | Discharge: HOME | DRG: 977 | End: 2025-03-12
Attending: EMERGENCY MEDICINE | Admitting: FAMILY MEDICINE
Payer: MEDICARE

## 2025-03-09 DIAGNOSIS — D64.9 SYMPTOMATIC ANEMIA: Primary | ICD-10-CM

## 2025-03-09 DIAGNOSIS — B20 CURRENTLY ASYMPTOMATIC HIV INFECTION, WITH HISTORY OF HIV-RELATED ILLNESS (HCC): ICD-10-CM

## 2025-03-09 LAB
ALBUMIN SERPL-MCNC: 3.7 G/DL (ref 3.5–5)
ALBUMIN/GLOB SERPL: 1.1 (ref 1.1–2.2)
ALP SERPL-CCNC: 63 U/L (ref 45–117)
ALT SERPL-CCNC: 11 U/L (ref 12–78)
ANION GAP SERPL CALC-SCNC: 7 MMOL/L (ref 2–12)
AST SERPL-CCNC: 17 U/L (ref 15–37)
BASOPHILS # BLD: 0.03 K/UL (ref 0–0.1)
BASOPHILS NFR BLD: 0.5 % (ref 0–1)
BILIRUB SERPL-MCNC: 0.3 MG/DL (ref 0.2–1)
BUN SERPL-MCNC: 29 MG/DL (ref 6–20)
BUN/CREAT SERPL: 2 (ref 12–20)
CALCIUM SERPL-MCNC: 9.4 MG/DL (ref 8.5–10.1)
CHLORIDE SERPL-SCNC: 100 MMOL/L (ref 97–108)
CO2 SERPL-SCNC: 33 MMOL/L (ref 21–32)
CREAT SERPL-MCNC: 11.8 MG/DL (ref 0.7–1.3)
DIFFERENTIAL METHOD BLD: ABNORMAL
EOSINOPHIL # BLD: 0.19 K/UL (ref 0–0.4)
EOSINOPHIL NFR BLD: 3.1 % (ref 0–7)
ERYTHROCYTE [DISTWIDTH] IN BLOOD BY AUTOMATED COUNT: 13.9 % (ref 11.5–14.5)
GLOBULIN SER CALC-MCNC: 3.4 G/DL (ref 2–4)
GLUCOSE SERPL-MCNC: 91 MG/DL (ref 65–100)
HCT VFR BLD AUTO: 18.5 % (ref 36.6–50.3)
HCT VFR BLD AUTO: 19.2 % (ref 36.6–50.3)
HGB BLD-MCNC: 6 G/DL (ref 12.1–17)
HGB BLD-MCNC: 6.3 G/DL (ref 12.1–17)
HISTORY CHECK: NORMAL
HISTORY CHECK: NORMAL
IMM GRANULOCYTES # BLD AUTO: 0.03 K/UL (ref 0–0.04)
IMM GRANULOCYTES NFR BLD AUTO: 0.5 % (ref 0–0.5)
LYMPHOCYTES # BLD: 1.11 K/UL (ref 0.8–3.5)
LYMPHOCYTES NFR BLD: 18.2 % (ref 12–49)
MCH RBC QN AUTO: 29.1 PG (ref 26–34)
MCHC RBC AUTO-ENTMCNC: 32.4 G/DL (ref 30–36.5)
MCV RBC AUTO: 89.8 FL (ref 80–99)
MONOCYTES # BLD: 0.56 K/UL (ref 0–1)
MONOCYTES NFR BLD: 9.1 % (ref 5–13)
NEUTS SEG # BLD: 4.18 K/UL (ref 1.8–8)
NEUTS SEG NFR BLD: 68.6 % (ref 32–75)
NRBC # BLD: 0 K/UL (ref 0–0.01)
NRBC BLD-RTO: 0 PER 100 WBC
PLATELET # BLD AUTO: 164 K/UL (ref 150–400)
PMV BLD AUTO: 10.7 FL (ref 8.9–12.9)
POTASSIUM SERPL-SCNC: 4.9 MMOL/L (ref 3.5–5.1)
PROT SERPL-MCNC: 7.1 G/DL (ref 6.4–8.2)
RBC # BLD AUTO: 2.06 M/UL (ref 4.1–5.7)
RBC MORPH BLD: ABNORMAL
SODIUM SERPL-SCNC: 140 MMOL/L (ref 136–145)
WBC # BLD AUTO: 6.1 K/UL (ref 4.1–11.1)

## 2025-03-09 PROCEDURE — 85025 COMPLETE CBC W/AUTO DIFF WBC: CPT

## 2025-03-09 PROCEDURE — 6360000002 HC RX W HCPCS: Performed by: FAMILY MEDICINE

## 2025-03-09 PROCEDURE — G0378 HOSPITAL OBSERVATION PER HR: HCPCS

## 2025-03-09 PROCEDURE — 96374 THER/PROPH/DIAG INJ IV PUSH: CPT

## 2025-03-09 PROCEDURE — 85018 HEMOGLOBIN: CPT

## 2025-03-09 PROCEDURE — 80053 COMPREHEN METABOLIC PANEL: CPT

## 2025-03-09 PROCEDURE — 86901 BLOOD TYPING SEROLOGIC RH(D): CPT

## 2025-03-09 PROCEDURE — 96376 TX/PRO/DX INJ SAME DRUG ADON: CPT

## 2025-03-09 PROCEDURE — 99285 EMERGENCY DEPT VISIT HI MDM: CPT

## 2025-03-09 PROCEDURE — 85014 HEMATOCRIT: CPT

## 2025-03-09 PROCEDURE — 6370000000 HC RX 637 (ALT 250 FOR IP): Performed by: FAMILY MEDICINE

## 2025-03-09 PROCEDURE — P9016 RBC LEUKOCYTES REDUCED: HCPCS

## 2025-03-09 PROCEDURE — 86850 RBC ANTIBODY SCREEN: CPT

## 2025-03-09 PROCEDURE — 86900 BLOOD TYPING SEROLOGIC ABO: CPT

## 2025-03-09 PROCEDURE — 36415 COLL VENOUS BLD VENIPUNCTURE: CPT

## 2025-03-09 PROCEDURE — 86923 COMPATIBILITY TEST ELECTRIC: CPT

## 2025-03-09 PROCEDURE — 30233N1 TRANSFUSION OF NONAUTOLOGOUS RED BLOOD CELLS INTO PERIPHERAL VEIN, PERCUTANEOUS APPROACH: ICD-10-PCS | Performed by: INTERNAL MEDICINE

## 2025-03-09 PROCEDURE — 6360000002 HC RX W HCPCS: Performed by: EMERGENCY MEDICINE

## 2025-03-09 PROCEDURE — 36430 TRANSFUSION BLD/BLD COMPNT: CPT

## 2025-03-09 RX ORDER — LANTHANUM CARBONATE 500 MG/1
1000 TABLET, CHEWABLE ORAL
Status: CANCELLED | OUTPATIENT
Start: 2025-03-10

## 2025-03-09 RX ORDER — SODIUM CHLORIDE 9 MG/ML
INJECTION, SOLUTION INTRAVENOUS PRN
Status: DISCONTINUED | OUTPATIENT
Start: 2025-03-09 | End: 2025-03-12 | Stop reason: HOSPADM

## 2025-03-09 RX ORDER — ATAZANAVIR 300 MG/1
300 CAPSULE ORAL
Status: DISCONTINUED | OUTPATIENT
Start: 2025-03-10 | End: 2025-03-10

## 2025-03-09 RX ORDER — SEVELAMER CARBONATE 800 MG/1
1600 TABLET, FILM COATED ORAL 2 TIMES DAILY
Status: CANCELLED | OUTPATIENT
Start: 2025-03-09

## 2025-03-09 RX ORDER — HYDROMORPHONE HYDROCHLORIDE 1 MG/ML
1 INJECTION, SOLUTION INTRAMUSCULAR; INTRAVENOUS; SUBCUTANEOUS ONCE
Status: COMPLETED | OUTPATIENT
Start: 2025-03-09 | End: 2025-03-09

## 2025-03-09 RX ORDER — NITROGLYCERIN 0.4 MG/1
0.4 TABLET SUBLINGUAL EVERY 10 MIN PRN
Status: DISCONTINUED | OUTPATIENT
Start: 2025-03-09 | End: 2025-03-12 | Stop reason: HOSPADM

## 2025-03-09 RX ORDER — LEVETIRACETAM 250 MG/1
500 TABLET ORAL 2 TIMES DAILY
Status: DISCONTINUED | OUTPATIENT
Start: 2025-03-09 | End: 2025-03-12 | Stop reason: HOSPADM

## 2025-03-09 RX ORDER — LOSARTAN POTASSIUM 50 MG/1
100 TABLET ORAL DAILY
Status: DISCONTINUED | OUTPATIENT
Start: 2025-03-10 | End: 2025-03-12 | Stop reason: HOSPADM

## 2025-03-09 RX ORDER — PREDNISONE 20 MG/1
20 TABLET ORAL DAILY
Status: DISCONTINUED | OUTPATIENT
Start: 2025-03-10 | End: 2025-03-10

## 2025-03-09 RX ORDER — SODIUM CHLORIDE 0.9 % (FLUSH) 0.9 %
5-40 SYRINGE (ML) INJECTION PRN
Status: DISCONTINUED | OUTPATIENT
Start: 2025-03-09 | End: 2025-03-12 | Stop reason: HOSPADM

## 2025-03-09 RX ORDER — POTASSIUM CHLORIDE 7.45 MG/ML
10 INJECTION INTRAVENOUS PRN
Status: DISCONTINUED | OUTPATIENT
Start: 2025-03-09 | End: 2025-03-12 | Stop reason: HOSPADM

## 2025-03-09 RX ORDER — TRAMADOL HYDROCHLORIDE 50 MG/1
50 TABLET ORAL EVERY 12 HOURS PRN
Status: DISCONTINUED | OUTPATIENT
Start: 2025-03-09 | End: 2025-03-12 | Stop reason: HOSPADM

## 2025-03-09 RX ORDER — AMLODIPINE BESYLATE 5 MG/1
5 TABLET ORAL DAILY
Status: DISCONTINUED | OUTPATIENT
Start: 2025-03-10 | End: 2025-03-12 | Stop reason: HOSPADM

## 2025-03-09 RX ORDER — ASPIRIN 81 MG/1
81 TABLET, CHEWABLE ORAL DAILY
Status: DISCONTINUED | OUTPATIENT
Start: 2025-03-10 | End: 2025-03-12 | Stop reason: HOSPADM

## 2025-03-09 RX ORDER — METOPROLOL TARTRATE 50 MG
50 TABLET ORAL 2 TIMES DAILY
Status: DISCONTINUED | OUTPATIENT
Start: 2025-03-09 | End: 2025-03-12 | Stop reason: HOSPADM

## 2025-03-09 RX ORDER — MAGNESIUM SULFATE IN WATER 40 MG/ML
2000 INJECTION, SOLUTION INTRAVENOUS PRN
Status: DISCONTINUED | OUTPATIENT
Start: 2025-03-09 | End: 2025-03-12 | Stop reason: HOSPADM

## 2025-03-09 RX ORDER — BISACODYL 5 MG/1
5 TABLET, DELAYED RELEASE ORAL DAILY PRN
Status: DISCONTINUED | OUTPATIENT
Start: 2025-03-09 | End: 2025-03-12 | Stop reason: HOSPADM

## 2025-03-09 RX ORDER — SODIUM CHLORIDE 0.9 % (FLUSH) 0.9 %
5-40 SYRINGE (ML) INJECTION EVERY 12 HOURS SCHEDULED
Status: DISCONTINUED | OUTPATIENT
Start: 2025-03-09 | End: 2025-03-12 | Stop reason: HOSPADM

## 2025-03-09 RX ORDER — PROCHLORPERAZINE EDISYLATE 5 MG/ML
10 INJECTION INTRAMUSCULAR; INTRAVENOUS EVERY 6 HOURS PRN
Status: DISCONTINUED | OUTPATIENT
Start: 2025-03-09 | End: 2025-03-12 | Stop reason: HOSPADM

## 2025-03-09 RX ORDER — POTASSIUM CHLORIDE 750 MG/1
40 TABLET, EXTENDED RELEASE ORAL PRN
Status: DISCONTINUED | OUTPATIENT
Start: 2025-03-09 | End: 2025-03-12 | Stop reason: HOSPADM

## 2025-03-09 RX ORDER — ACETAMINOPHEN 650 MG/1
650 SUPPOSITORY RECTAL EVERY 6 HOURS PRN
Status: DISCONTINUED | OUTPATIENT
Start: 2025-03-09 | End: 2025-03-12 | Stop reason: HOSPADM

## 2025-03-09 RX ORDER — ATORVASTATIN CALCIUM 20 MG/1
40 TABLET, FILM COATED ORAL DAILY
Status: DISCONTINUED | OUTPATIENT
Start: 2025-03-10 | End: 2025-03-12 | Stop reason: HOSPADM

## 2025-03-09 RX ORDER — FAMOTIDINE 20 MG/1
20 TABLET, FILM COATED ORAL 2 TIMES DAILY
Status: DISCONTINUED | OUTPATIENT
Start: 2025-03-09 | End: 2025-03-10

## 2025-03-09 RX ORDER — ATAZANAVIR 300 MG/1
300 CAPSULE ORAL
Status: CANCELLED | OUTPATIENT
Start: 2025-03-10

## 2025-03-09 RX ORDER — ACETAMINOPHEN 325 MG/1
650 TABLET ORAL EVERY 6 HOURS PRN
Status: DISCONTINUED | OUTPATIENT
Start: 2025-03-09 | End: 2025-03-12 | Stop reason: HOSPADM

## 2025-03-09 RX ORDER — HYDROMORPHONE HYDROCHLORIDE 1 MG/ML
1 INJECTION, SOLUTION INTRAMUSCULAR; INTRAVENOUS; SUBCUTANEOUS
Refills: 0 | Status: COMPLETED | OUTPATIENT
Start: 2025-03-09 | End: 2025-03-09

## 2025-03-09 RX ADMIN — Medication 3 MG: at 23:16

## 2025-03-09 RX ADMIN — HYDROMORPHONE HYDROCHLORIDE 1 MG: 1 INJECTION, SOLUTION INTRAMUSCULAR; INTRAVENOUS; SUBCUTANEOUS at 22:41

## 2025-03-09 RX ADMIN — HYDROMORPHONE HYDROCHLORIDE 1 MG: 1 INJECTION, SOLUTION INTRAMUSCULAR; INTRAVENOUS; SUBCUTANEOUS at 14:25

## 2025-03-09 RX ADMIN — LEVETIRACETAM 500 MG: 250 TABLET, FILM COATED ORAL at 23:16

## 2025-03-09 RX ADMIN — METOPROLOL TARTRATE 50 MG: 50 TABLET, FILM COATED ORAL at 23:06

## 2025-03-09 RX ADMIN — FAMOTIDINE 20 MG: 20 TABLET, FILM COATED ORAL at 23:06

## 2025-03-09 ASSESSMENT — PAIN SCALES - GENERAL: PAINLEVEL_OUTOF10: 7

## 2025-03-09 ASSESSMENT — PAIN DESCRIPTION - LOCATION: LOCATION: ABDOMEN

## 2025-03-09 ASSESSMENT — PAIN DESCRIPTION - ORIENTATION: ORIENTATION: UPPER

## 2025-03-09 ASSESSMENT — PAIN - FUNCTIONAL ASSESSMENT: PAIN_FUNCTIONAL_ASSESSMENT: NONE - DENIES PAIN

## 2025-03-09 NOTE — H&P
Hospitalist Admission Note    NAME: Woodrow Madison   :  1965   MRN:  [id number]     Date/Time:  3/9/2025 7:46 PM    Patient PCP: Maggy Daniel APRN - NP - Admission Date: 3/9/2025       Assessment & Plan:     Primary Problem:    # Symptomatic anemia  -Hemoglobin 6.0-->6.3  -Minimal improvement after 1 unit PRBC  -Etiology most likely secondary to ongoing ESRD and HIV  -Admit to observation on med/tele  -Give additional 1 unit PRBC  -Repeat H&H 1 hour after transfusion complete  -Continue to monitor hemoglobin with a.m. labs  -Transfuse if hemoglobin less than 7    Active Hospital Problems:    # RUQ/ Epigastric Pain  # History of GERD  -Persist  -Notable TTP in periumbilical, suprapubic, LLQ, RLQ.  No other significant abdominal exam findings.  -Patient declines further imaging studies at this time.    -Stool studies ordered  -As needed analgesics, antiemetics as above    # ESRD  -HD MWF  -Likely major contributor to patient's presentation  -Add on anemia studies to blood in lab  -Continue home ASCVD regimen  -Consult nephrology for HD management    # HIV  -May be contributing to patient's presentation  -Continue home antiviral regimen       Chronic Condition as below:  Hypertension, GERD, seizure  -No acute exacerbation of the above chronic conditions  -Continue all home medication regimens or formulary equivalents for the above    Diet: Renal/cardiac  Code: Full Code  IVF: -  VTE prophylaxis: SCDs   GI Prophylaxis: not indicated    Disposition: Anticipated LOS is less than or equal to two midnights.       Chief Complaint:     Abnormal labs    History of Present Illness:       Woodrow is a 59 y.o. male  with a medical history significant for ESRD (HD MWF), anemia, HIV, hypertension, GERD, who presents to the ER with abnormal labs.  Had recent blood work that showed hemoglobin 6.5.  Was instructed to go to the ER for further evaluation and  tablet under the tongue if needed every 5 minutes for michelle...  (REFER TO PRESCRIPTION NOTES). 12/22/23   ProviderEmily MD   dolutegravir sodium (TIVICAY) 50 MG tablet TAKE 1 TABLET BY MOUTH DAILY 2/13/24   Kirk Fish MD   metoprolol tartrate (LOPRESSOR) 50 MG tablet take 1 tablet by mouth twice a day 1/30/24   Maggy Daniel APRN - NP   atazanavir (REYATAZ) 300 MG capsule TAKE 1 CAPSULE BY MOUTH EVERY DAY BEFORE BREAKFAST  Patient taking differently: Take 1 capsule by mouth daily (with breakfast) 1/15/24   Kirk Fish MD   bismuth subsalicylate (BISMATROL) 262 MG/15ML suspension Take 15 mLs by mouth every 6 hours as needed for Indigestion or Heartburn 11/22/23   Thomas Lazar MD   albuterol sulfate HFA (PROVENTIL;VENTOLIN;PROAIR) 108 (90 Base) MCG/ACT inhaler inhale 1 puff by mouth and INTO THE LUNGS every 4 hours if needed for wheezing 8/23/23   Maggy Daniel APRN - NP   Methoxy PEG-Epoetin Beta (MIRCERA IJ) Inject 150 mcg as directed every 14 days Given with dialysis 6/7/23   ProviderEmily MD   aspirin 81 MG chewable tablet Take 1 tablet by mouth daily    Automatic Reconciliation, Ar   calcium carbonate (OS-MOON) 1250 (500 Ca) MG chewable tablet Take 1 tablet by mouth as needed    Automatic Reconciliation, Ar   lidocaine-prilocaine (EMLA) 2.5-2.5 % cream APPLY SMALL AMOUNT TO ACCESS SITE (AVF) 1 HOUR BEFORE DIALYSIS. COVER WITH OCCLUSIVE DRESSING (SARAN WRAP) 9/21/21   Automatic Reconciliation, Ar   sevelamer (RENVELA) 800 MG tablet Take 2 tablets by mouth 2 times daily    Automatic Reconciliation, Ar       Allergies:     Allergies   Allergen Reactions    Penicillin G Itching     Patient screened for any delayed non-IgE-mediated reaction to PCN.        Patient notes the following:    No delayed non-IgE-mediated reaction to PCN             Percocet [Oxycodone-Acetaminophen] Itching        Past Surgical History:     Past Surgical History:   Procedure Laterality

## 2025-03-09 NOTE — ED TRIAGE NOTES
Patient in to ED reporting he was told to come in by nephrologist on Friday but had to work. States his labs were drawn early last week and his hgb was 6.5. reports only symptom is general weakness. Dialysis patient M/W/F.

## 2025-03-09 NOTE — ED PROVIDER NOTES
[facility] B4 MULTI-SPECIALTY ORTHOPEDICS 2  EMERGENCY DEPARTMENT ENCOUNTER      Pt Name: Woodrow Madison  MRN: [id number]  Birthdate 1965  Date of evaluation: 3/9/2025  Provider: Bentley Alarcon MD      HISTORY OF PRESENT ILLNESS      60 yo AAM with ESRD (D- MWF) presenting to ER for anemia.  Patient states he had blood counts drawn last week and hgb was 6.5.  referred to ER for blood transfusion.  No CP/SOB.  Reports mild generalized weakness, no fevers.  Has hx of blood transfusions in the past.                Nursing Notes were reviewed.    REVIEW OF SYSTEMS         Review of Systems   Constitutional:  Negative for fatigue and fever.   HENT:  Negative for rhinorrhea and sore throat.    Respiratory:  Negative for cough and shortness of breath.    Cardiovascular:  Negative for chest pain.   Gastrointestinal:  Negative for abdominal pain and vomiting.   Musculoskeletal:  Negative for back pain and neck pain.   Skin:  Negative for rash.   Neurological:  Positive for weakness (mild, generalized). Negative for dizziness and headaches.           PAST MEDICAL HISTORY     Past Medical History:   Diagnosis Date    A-V fistula     left arm    Anemia     CAD (coronary artery disease)     Chronic abdominal pain     Chronic kidney disease     dialysis pt. - M.W.F - [facility]    Colon cancer (HCC) 2021    Complication of AV dialysis fistula 2019    2 large pseudo-aneurysm    Esophagitis     ESRD on dialysis (HCC)     Gastritis     GERD (gastroesophageal reflux disease)     Gout     pt states not in 12 years    Hemodialysis patient     High cholesterol     History of blood transfusion     at least 8 units over the years  - pt states last given June 2023    HIV (human immunodeficiency virus infection) ([facility]) 2011    Hypertension     MI (myocardial infarction) ([facility]) 2023    NSTEMI (non-ST elevated myocardial infarction) ([facility])     JUNE 2023    Other ill-defined conditions(799.89)     SMALL VEIN IN HEART     TempSrc:   Oral    SpO2: 98% 99% 96%    Weight:       Height:             Medical Decision Making  Amount and/or Complexity of Data Reviewed  Labs: ordered. Decision-making details documented in ED Course.    Risk  Prescription drug management.  Decision regarding hospitalization.            REASSESSMENT     ED Course as of 03/13/25 0953   Sun Mar 09, 2025   1359 Hemoglobin Quant(!): 6.0 [BN]   1359 PRBC 1 unit ordered [BN]   1918 Patient had repeat hemoglobin that is 6.3 after a unit of blood.  Since this is suboptimal I will contact the admitting hospitalist to admit him for symptomatic chronic anemia. [BN]      ED Course User Index  [BN] Bentley Alarcon MD         CONSULTS:  IP CONSULT TO NEPHROLOGY  IP CONSULT TO GI    PROCEDURES:     Procedures      Total critical care time (not including time spent performing separately reportable procedures): 32 mins      (Please note that portions of this note were completed with a voice recognition program.  Efforts were made to edit the dictations but occasionally words are mis-transcribed.)    Bentley Alarcon MD (electronically signed)  Emergency Attending Physician              Bentley Alarcon MD  03/13/25 0953

## 2025-03-09 NOTE — CONSENT
Informed Consent for Blood Component Transfusion Note    I have discussed with the patient the rationale for blood component transfusion; its benefits in treating or preventing fatigue, organ damage, or death; and its risk which includes mild transfusion reactions, rare risk of blood borne infection, or more serious but rare reactions. I have discussed the alternatives to transfusion, including the risk and consequences of not receiving transfusion. The patient had an opportunity to ask questions and had agreed to proceed with transfusion of blood components.    Electronically signed by Bentley Alarcon MD on 3/9/25 at 1:59 PM EDT

## 2025-03-10 PROBLEM — D64.9 ACUTE ANEMIA: Status: ACTIVE | Noted: 2025-03-10

## 2025-03-10 LAB
ABO + RH BLD: NORMAL
ANION GAP SERPL CALC-SCNC: 8 MMOL/L (ref 2–12)
BLD PROD TYP BPU: NORMAL
BLD PROD TYP BPU: NORMAL
BLOOD BANK BLOOD PRODUCT EXPIRATION DATE: NORMAL
BLOOD BANK BLOOD PRODUCT EXPIRATION DATE: NORMAL
BLOOD BANK DISPENSE STATUS: NORMAL
BLOOD BANK DISPENSE STATUS: NORMAL
BLOOD BANK ISBT PRODUCT BLOOD TYPE: 1700
BLOOD BANK ISBT PRODUCT BLOOD TYPE: 7300
BLOOD BANK PRODUCT CODE: NORMAL
BLOOD BANK PRODUCT CODE: NORMAL
BLOOD BANK UNIT TYPE AND RH: NORMAL
BLOOD BANK UNIT TYPE AND RH: NORMAL
BLOOD GROUP ANTIBODIES SERPL: NORMAL
BPU ID: NORMAL
BPU ID: NORMAL
BUN SERPL-MCNC: 35 MG/DL (ref 6–20)
BUN/CREAT SERPL: 3 (ref 12–20)
CALCIUM SERPL-MCNC: 9.1 MG/DL (ref 8.5–10.1)
CHLORIDE SERPL-SCNC: 100 MMOL/L (ref 97–108)
CO2 SERPL-SCNC: 30 MMOL/L (ref 21–32)
CREAT SERPL-MCNC: 12.4 MG/DL (ref 0.7–1.3)
CROSSMATCH RESULT: NORMAL
CROSSMATCH RESULT: NORMAL
ERYTHROCYTE [DISTWIDTH] IN BLOOD BY AUTOMATED COUNT: 14.2 % (ref 11.5–14.5)
FERRITIN SERPL-MCNC: 871 NG/ML (ref 26–388)
FOLATE SERPL-MCNC: 7 NG/ML (ref 5–21)
GLUCOSE SERPL-MCNC: 83 MG/DL (ref 65–100)
HCT VFR BLD AUTO: 22.3 % (ref 36.6–50.3)
HCT VFR BLD AUTO: 23.7 % (ref 36.6–50.3)
HGB BLD-MCNC: 7.2 G/DL (ref 12.1–17)
HGB BLD-MCNC: 7.8 G/DL (ref 12.1–17)
IRON SATN MFR SERPL: 26 % (ref 20–50)
IRON SERPL-MCNC: 44 UG/DL (ref 35–150)
MAGNESIUM SERPL-MCNC: 2.5 MG/DL (ref 1.6–2.4)
MCH RBC QN AUTO: 29 PG (ref 26–34)
MCHC RBC AUTO-ENTMCNC: 32.3 G/DL (ref 30–36.5)
MCV RBC AUTO: 89.9 FL (ref 80–99)
NRBC # BLD: 0 K/UL (ref 0–0.01)
NRBC BLD-RTO: 0 PER 100 WBC
PLATELET # BLD AUTO: 148 K/UL (ref 150–400)
PMV BLD AUTO: 10.5 FL (ref 8.9–12.9)
POTASSIUM SERPL-SCNC: 5.1 MMOL/L (ref 3.5–5.1)
RBC # BLD AUTO: 2.48 M/UL (ref 4.1–5.7)
SODIUM SERPL-SCNC: 138 MMOL/L (ref 136–145)
SPECIMEN EXP DATE BLD: NORMAL
TIBC SERPL-MCNC: 172 UG/DL (ref 250–450)
UNIT DIVISION: 0
UNIT DIVISION: 0
UNIT ISSUE DATE/TIME: NORMAL
UNIT ISSUE DATE/TIME: NORMAL
VIT B12 SERPL-MCNC: 295 PG/ML (ref 193–986)
WBC # BLD AUTO: 6.4 K/UL (ref 4.1–11.1)

## 2025-03-10 PROCEDURE — 6360000002 HC RX W HCPCS: Performed by: FAMILY MEDICINE

## 2025-03-10 PROCEDURE — 2500000003 HC RX 250 WO HCPCS: Performed by: FAMILY MEDICINE

## 2025-03-10 PROCEDURE — 83540 ASSAY OF IRON: CPT

## 2025-03-10 PROCEDURE — 82728 ASSAY OF FERRITIN: CPT

## 2025-03-10 PROCEDURE — 90935 HEMODIALYSIS ONE EVALUATION: CPT

## 2025-03-10 PROCEDURE — 80048 BASIC METABOLIC PNL TOTAL CA: CPT

## 2025-03-10 PROCEDURE — 82607 VITAMIN B-12: CPT

## 2025-03-10 PROCEDURE — 85027 COMPLETE CBC AUTOMATED: CPT

## 2025-03-10 PROCEDURE — 5A1D70Z PERFORMANCE OF URINARY FILTRATION, INTERMITTENT, LESS THAN 6 HOURS PER DAY: ICD-10-PCS | Performed by: INTERNAL MEDICINE

## 2025-03-10 PROCEDURE — 6360000002 HC RX W HCPCS: Performed by: INTERNAL MEDICINE

## 2025-03-10 PROCEDURE — 96376 TX/PRO/DX INJ SAME DRUG ADON: CPT

## 2025-03-10 PROCEDURE — 82746 ASSAY OF FOLIC ACID SERUM: CPT

## 2025-03-10 PROCEDURE — 85014 HEMATOCRIT: CPT

## 2025-03-10 PROCEDURE — 36415 COLL VENOUS BLD VENIPUNCTURE: CPT

## 2025-03-10 PROCEDURE — 6370000000 HC RX 637 (ALT 250 FOR IP): Performed by: FAMILY MEDICINE

## 2025-03-10 PROCEDURE — 83550 IRON BINDING TEST: CPT

## 2025-03-10 PROCEDURE — 83735 ASSAY OF MAGNESIUM: CPT

## 2025-03-10 PROCEDURE — 1100000000 HC RM PRIVATE

## 2025-03-10 PROCEDURE — 85018 HEMOGLOBIN: CPT

## 2025-03-10 RX ORDER — SEVELAMER CARBONATE 800 MG/1
1600 TABLET, FILM COATED ORAL
Status: DISCONTINUED | OUTPATIENT
Start: 2025-03-10 | End: 2025-03-12 | Stop reason: HOSPADM

## 2025-03-10 RX ORDER — FAMOTIDINE 20 MG/1
20 TABLET, FILM COATED ORAL NIGHTLY
Status: DISCONTINUED | OUTPATIENT
Start: 2025-03-10 | End: 2025-03-12 | Stop reason: HOSPADM

## 2025-03-10 RX ORDER — PREDNISONE 20 MG/1
20 TABLET ORAL DAILY
Status: DISCONTINUED | OUTPATIENT
Start: 2025-03-10 | End: 2025-03-10

## 2025-03-10 RX ORDER — BISACODYL 5 MG/1
10 TABLET, DELAYED RELEASE ORAL 2 TIMES DAILY
Status: DISCONTINUED | OUTPATIENT
Start: 2025-03-11 | End: 2025-03-11

## 2025-03-10 RX ORDER — ATAZANAVIR 300 MG/1
300 CAPSULE ORAL
Status: DISCONTINUED | OUTPATIENT
Start: 2025-03-11 | End: 2025-03-11

## 2025-03-10 RX ADMIN — EPOETIN ALFA-EPBX 10000 UNITS: 10000 INJECTION, SOLUTION INTRAVENOUS; SUBCUTANEOUS at 17:02

## 2025-03-10 RX ADMIN — HYDROMORPHONE HYDROCHLORIDE 0.5 MG: 1 INJECTION, SOLUTION INTRAMUSCULAR; INTRAVENOUS; SUBCUTANEOUS at 21:19

## 2025-03-10 RX ADMIN — HYDROMORPHONE HYDROCHLORIDE 0.5 MG: 1 INJECTION, SOLUTION INTRAMUSCULAR; INTRAVENOUS; SUBCUTANEOUS at 11:08

## 2025-03-10 RX ADMIN — LOSARTAN POTASSIUM 100 MG: 25 TABLET, FILM COATED ORAL at 15:42

## 2025-03-10 RX ADMIN — METOPROLOL TARTRATE 50 MG: 50 TABLET, FILM COATED ORAL at 21:19

## 2025-03-10 RX ADMIN — AMLODIPINE BESYLATE 5 MG: 5 TABLET ORAL at 15:42

## 2025-03-10 RX ADMIN — HYDROMORPHONE HYDROCHLORIDE 0.5 MG: 1 INJECTION, SOLUTION INTRAMUSCULAR; INTRAVENOUS; SUBCUTANEOUS at 01:52

## 2025-03-10 RX ADMIN — SODIUM CHLORIDE, PRESERVATIVE FREE 10 ML: 5 INJECTION INTRAVENOUS at 21:19

## 2025-03-10 RX ADMIN — HYDROMORPHONE HYDROCHLORIDE 0.5 MG: 1 INJECTION, SOLUTION INTRAMUSCULAR; INTRAVENOUS; SUBCUTANEOUS at 16:12

## 2025-03-10 RX ADMIN — LEVETIRACETAM 500 MG: 250 TABLET, FILM COATED ORAL at 21:19

## 2025-03-10 RX ADMIN — HYDROMORPHONE HYDROCHLORIDE 0.5 MG: 1 INJECTION, SOLUTION INTRAMUSCULAR; INTRAVENOUS; SUBCUTANEOUS at 06:04

## 2025-03-10 RX ADMIN — ATORVASTATIN CALCIUM 40 MG: 20 TABLET, FILM COATED ORAL at 15:42

## 2025-03-10 RX ADMIN — METOPROLOL TARTRATE 50 MG: 50 TABLET, FILM COATED ORAL at 15:42

## 2025-03-10 RX ADMIN — Medication 3 MG: at 21:19

## 2025-03-10 RX ADMIN — SODIUM CHLORIDE, PRESERVATIVE FREE 10 ML: 5 INJECTION INTRAVENOUS at 11:20

## 2025-03-10 ASSESSMENT — PAIN DESCRIPTION - LOCATION
LOCATION: ABDOMEN

## 2025-03-10 ASSESSMENT — PAIN DESCRIPTION - ORIENTATION
ORIENTATION: ANTERIOR;MID
ORIENTATION: RIGHT

## 2025-03-10 ASSESSMENT — PAIN SCALES - GENERAL
PAINLEVEL_OUTOF10: 5
PAINLEVEL_OUTOF10: 2
PAINLEVEL_OUTOF10: 8
PAINLEVEL_OUTOF10: 7
PAINLEVEL_OUTOF10: 7
PAINLEVEL_OUTOF10: 2
PAINLEVEL_OUTOF10: 2
PAINLEVEL_OUTOF10: 5
PAINLEVEL_OUTOF10: 8
PAINLEVEL_OUTOF10: 5
PAINLEVEL_OUTOF10: 8
PAINLEVEL_OUTOF10: 0

## 2025-03-10 ASSESSMENT — PAIN DESCRIPTION - DESCRIPTORS
DESCRIPTORS: ACHING
DESCRIPTORS: ACHING

## 2025-03-10 NOTE — FLOWSHEET NOTE
03/10/25 1145   Observations & Evaluations   Level of Consciousness 0   Oriented X 4   Respiratory Quality/Effort Unlabored   O2 Device None (Room air)   Edema None   Vital Signs   BP (!) 173/96   Temp 98 °F (36.7 °C)   Pulse 55   Respirations 18   Pain Assessment   Pain Assessment 0-10   Pain Level 2   Pain Location Abdomen   Access   Add Access? Yes   Technical Checks   Dialysis Machine No. 01   RO Machine Number [id number]   Dialyzer Lot No. [id number]   Tubing Lot Number [id number]   All Connections Secure Yes   NS Bag Yes   Saline Line Double Clamped Yes   Dialyzer Nipro ELISIO   Prime Volume (mL) 200 mL   ICEBOAT I;C;E;B;O;A;T   RO Machine Log Sheet Completed Yes   Machine Alarm Self Test Completed;Passed   Air Foam Detector Tested;Proper Function;pH Reading   Extracorporeal Circuit Tested for Integrity Yes   Machine Conductivity 13.7   Machine Ph 7.4   Bleach Test (Neg) Yes   Bath Temperature 98.6 °F (37 °C)   Treatment Initiation   Dialyze Hours 3.5   Treatment  Initiation Universal Precautions maintained;Lines secured to patient;Connections secured;Prime given;Venous Parameters set;Arterial Parameters set;Air foam detector engaged;Dialysate;Saline line double clamped;Dialyzer   Hemodialysis Fistula/Graft Superior Arm   No placement date or time found.   Orientation: Superior  Access Location: Arm   Site Assessment Clean, dry & intact   Thrill Present   Bruit Present   Status Accessed   Venous Needle Size 15 G   Arterial Needle Size 15 G   Accessed By: CLARIBEL Will RN   Access Attempts  1   Date of Last Dressing Change 03/10/25   Access Interventions Aseptic Technique;Needles taped to patient;Chlorhexidine   Dialysis Bath   K+ (Potassium) 2   Ca+ (Calcium) 2.5   Na+ (Sodium) 138   HCO3 (Bicarb) 35        03/10/25 1150   Treatment   Time On 1150   Treatment Goal 4 L   During Hemodialysis Assessment   Blood Flow Rate (ml/min) 400 ml/min   Arterial Pressure (mmHg) -130 mmHg   Venous Pressure (mmHg) 190   TMP 70     Ultrafiltration goal decreased   Primary RN SBAR: Roland Ron, RN  Comments: Pt c/o not feeling himself and requested to cut down on UF. Dr Rich notified. 30 minutes before the end pt wanted to be rinsed back because he stated that he didn't feel good. VS were stable, no other complications observed or reported

## 2025-03-10 NOTE — PROGRESS NOTES
Famotidine which was ordered and then placed on hold has been renally adjusted to famotidine 20 mg every 24 hours for HD and placed back on hold.    Thank you,      Kristine Monson, PharmD, BCPS

## 2025-03-10 NOTE — PROGRESS NOTES
[facility]  [address] [place] [address] [phone]        Hospitalist Progress Note      NAME: Woodrow Madison   :  1965  MRM:  [id number]    Date/Time of service: 3/10/2025  4:11 PM       Subjective:     Chief Complaint:  Patient was personally seen and examined by me during this time period.  Chart reviewed.      Pt was seen this am resting at edge of bed. Pt reports he would like to go home after dialysis today. States he has an apt scheduled in 1month for EGD/colonoscopy. Discussed his anemia, he states that he recently changed dialysis clinics and that he has missed doses of his HAYDE & IV iron infusions. He endorses that his RUQ pain has been ongoing for 'months'     Pt returned from HD this afternoon & is now reporting that he would like to stay & considering endoscopy while here. Relayed to [facility]        Objective:       Vitals:       Last 24hrs VS reviewed since prior progress note. Most recent are:    Vitals:    03/10/25 1450   BP: (!) 161/92   Pulse: 63   Resp: 18   Temp: 98.9 °F (37.2 °C)   SpO2: 97%     SpO2 Readings from Last 6 Encounters:   03/10/25 97%   25 96%   25 97%   25 96%   24 99%   10/15/24 96%          Intake/Output Summary (Last 24 hours) at 3/10/2025 1611  Last data filed at 3/10/2025 1450  Gross per 24 hour   Intake 813.75 ml   Output 2500 ml   Net -1686.25 ml        Exam:     Physical Exam:    Gen:  Well-developed, well-nourished, in no acute distress  HEENT:  Pink conjunctivae, hearing intact to voice, moist mucous membranes  Neck:  Supple, without masses, thyroid non-tender  Resp:  No accessory muscle use, clear breath sounds without wheezes rales or rhonchi  Card:  S1, S2 without thrills, bruits or peripheral edema  Abd:  Soft, non-tender, non-distended, normoactive bowel sounds are present  Musc:  No cyanosis or clubbing  Skin:  No rashes or ulcers, skin turgor is good  Neuro:  Cranial nerves    HIV, POA  -May be contributing to patient's presentation  -Continue home ARV, pharmacy consulted to assist  -Followed by [facility], reviewed last clinic note from 10/2024, CD4% stable     Hx hypertension  -Cont home meds including amlodipine, losartan, metoprolol    Hx seizures None in 5y  -Cont home Keppra    Hx Thoracic aortic aneurysm without rupture  Continues care with CT surgery o/p    Hx Rectal Cancer Biopsy 9/26/23 showed no evidence of dysplasia or malignancy.   Pt was to f/u w colorectal surgery per PCP documentation, unclear if this has occurred  -Pt taking prednisone, reports he was on this medication after a rectal surgery ? Pharmacy seeking clarification    **Prior records, notes, labs, radiology, and medications reviewed in electronic medical record**                 Care Plan discussed with: Patient, Care Manager, and Nursing Staff    Discussed:  Care Plan    Prophylaxis:  SCD's    Disposition:  Home w/Family           ___________________________________________________    JENELLE Torres - CNP

## 2025-03-10 NOTE — PLAN OF CARE
Problem: Safety - Adult  Goal: Free from fall injury  Outcome: Progressing  Flowsheets (Taken 3/10/2025 0213)  Free From Fall Injury: Instruct family/caregiver on patient safety     Problem: Chronic Conditions and Co-morbidities  Goal: Patient's chronic conditions and co-morbidity symptoms are monitored and maintained or improved  Outcome: Progressing  Flowsheets (Taken 3/10/2025 0000)  Care Plan - Patient's Chronic Conditions and Co-Morbidity Symptoms are Monitored and Maintained or Improved: Monitor and assess patient's chronic conditions and comorbid symptoms for stability, deterioration, or improvement     Problem: Discharge Planning  Goal: Discharge to home or other facility with appropriate resources  Outcome: Progressing  Flowsheets (Taken 3/10/2025 0000)  Discharge to home or other facility with appropriate resources: Identify barriers to discharge with patient and caregiver     Problem: Pain  Goal: Verbalizes/displays adequate comfort level or baseline comfort level  Outcome: Progressing

## 2025-03-10 NOTE — PROGRESS NOTES
Report given to ANN-MARIE Victoria on Ortho for patient. Transport request in for patient. Currently awaiting pickup.

## 2025-03-10 NOTE — CARE COORDINATION
3/10/2025 2:28 PM Attempted to meet with pt, pt off the floor for HD. CM will follow up at a later time as able.  SHREYA Bobby

## 2025-03-10 NOTE — ED NOTES
TRANSFER - OUT REPORT:    Verbal report given to Riya JACOBSEN on Woodrow Madison  being transferred to Observation for routine progression of patient care       Report consisted of patient's Situation, Background, Assessment and   Recommendations(SBAR).     Information from the following report(s) Nurse Handoff Report, ED Encounter Summary, and ED SBAR was reviewed with the receiving nurse.    [place] Fall Assessment:    Presents to emergency department  because of falls (Syncope, seizure, or loss of consciousness): No  Age > 70: No  Altered Mental Status, Intoxication with alcohol or substance confusion (Disorientation, impaired judgment, poor safety awaremess, or inability to follow instructions): No  Impaired Mobility: Ambulates or transfers with assistive devices or assistance; Unable to ambulate or transer.: No  Nursing Judgement: No          Lines:   Peripheral IV 03/09/25 Right Antecubital (Active)       Peripheral IV 03/09/25 Right;Anterior Cephalic (Active)        Opportunity for questions and clarification was provided.      Patient transported with:  Registered Nurse

## 2025-03-10 NOTE — CONSULTS
8/23/23   Maggy Daniel, APRN - NP   Methoxy PEG-Epoetin Beta (MIRCERA IJ) Inject 150 mcg as directed every 14 days Given with dialysis 6/7/23   Provider, MD Emily   aspirin 81 MG chewable tablet Take 1 tablet by mouth daily    Automatic Reconciliation, Ar   calcium carbonate (OS-MOON) 1250 (500 Ca) MG chewable tablet Take 1 tablet by mouth as needed    Automatic Reconciliation, Ar   lidocaine-prilocaine (EMLA) 2.5-2.5 % cream APPLY SMALL AMOUNT TO ACCESS SITE (AVF) 1 HOUR BEFORE DIALYSIS. COVER WITH OCCLUSIVE DRESSING (SARAN WRAP) 9/21/21   Automatic Reconciliation, Ar   sevelamer (RENVELA) 800 MG tablet Take 2 tablets by mouth 2 times daily    Automatic Reconciliation, Ar       Allergies   Allergen Reactions    Penicillin G Itching     Patient screened for any delayed non-IgE-mediated reaction to PCN.        Patient notes the following:    No delayed non-IgE-mediated reaction to PCN             Percocet [Oxycodone-Acetaminophen] Itching       Social Hx:  reports that he has never smoked. He has never been exposed to tobacco smoke. He has never used smokeless tobacco. He reports that he does not currently use alcohol. He reports that he does not use drugs.     Family History   Problem Relation Age of Onset    Heart Disease Mother     No Known Problems Father     Heart Disease Brother     Cancer Brother         colon    Heart Disease Brother     No Known Problems Brother     Heart Disease Nephew     Anesth Problems Neg Hx        Review of Systems:  A twelve point review of system was performed today. Pertinent positives and negatives are mentioned in the HPI. The reminder of the ROS is negative and noncontributory.     Objective:    Vitals:    Vitals:    03/10/25 0604 03/10/25 0634 03/10/25 0700 03/10/25 0749   BP:    (!) 169/75   Pulse:   61 53   Resp: 16   18   Temp:    97.5 °F (36.4 °C)   TempSrc:    Oral   SpO2:  93%  93%   Weight:       Height:         I&O's:  03/09 0701 - 03/10 0700  In: 413.8  [P.O.:120]  Out: -   BP (!) 169/75   Pulse 53   Temp 97.5 °F (36.4 °C) (Oral)   Resp 18   Ht 1.829 m (6')   Wt 96.2 kg (212 lb)   SpO2 93%   BMI 28.75 kg/m²     Physical Exam:    GENERAL : Lying down in bed with no acute distress  HEENT: AT NC PEERLA   NECK: Supple no JVP  CVS: S1 S2 RRR, no murmur or gallops heard  RS: CTABL, no rhonchi,or wheezing heard  ABDOMEN: soft NT ND positive BS  EXTREMITY: No edema clubbing or cyanosis, pedal pulse +  NEUROLOGY: AAA X3, no focal deficit or asterixis  Vascular access: AVF      Laboratory Results:      Lab Results   Component Value Date/Time     03/10/2025 01:36 AM    K 5.1 03/10/2025 01:36 AM     03/10/2025 01:36 AM    CO2 30 03/10/2025 01:36 AM    BUN 35 03/10/2025 01:36 AM    CREATININE 12.40 03/10/2025 01:36 AM    GLUCOSE 83 03/10/2025 01:36 AM    GLUCOSE 75 10/07/2024 09:09 AM    CALCIUM 9.1 03/10/2025 01:36 AM          @LASTPROCAMB([id number])@  No results found for: \"LABPROT\", \"LABALBU\"     Lab Results   Component Value Date    WBC 6.4 03/10/2025    HGB 7.8 (L) 03/10/2025    HCT 23.7 (L) 03/10/2025    MCV 89.9 03/10/2025     (L) 03/10/2025    LYMPHOPCT 18.2 03/09/2025    RBC 2.48 (L) 03/10/2025    MCH 29.0 03/10/2025    MCHC 32.3 03/10/2025    RDW 14.2 03/10/2025                   Earl Rich MD  3/10/2025    Thank you for consulting [facility] in the care of your patient.    [facility]:  [email or web address]  [email or web address]    [place] Office:  [address] [place] [address]  Phone: [phone]  Fax :     [phone]    [place] office:  [address] [place] [address]  Phone - [phone]  Fax - [phone]

## 2025-03-10 NOTE — CONSULTS
mg  650 mg Rectal Q6H PRN    0.9 % sodium chloride infusion   IntraVENous PRN    magnesium sulfate 2000 mg in 50 mL IVPB premix  2,000 mg IntraVENous PRN    potassium chloride (KLOR-CON) extended release tablet 40 mEq  40 mEq Oral PRN    Or    potassium bicarb-citric acid (EFFER-K) effervescent tablet 40 mEq  40 mEq Oral PRN    Or    potassium chloride 10 mEq/100 mL IVPB (Peripheral Line)  10 mEq IntraVENous PRN    melatonin tablet 3 mg  3 mg Oral Nightly    bisacodyl (DULCOLAX) EC tablet 5 mg  5 mg Oral Daily PRN    [Held by provider] aspirin chewable tablet 81 mg  81 mg Oral Daily    amLODIPine (NORVASC) tablet 5 mg  5 mg Oral Daily    atorvastatin (LIPITOR) tablet 40 mg  40 mg Oral Daily    dolutegravir sodium (TIVICAY) tablet 50 mg  50 mg Oral Daily    levETIRAcetam (KEPPRA) tablet 500 mg  500 mg Oral BID    losartan (COZAAR) tablet 100 mg  100 mg Oral Daily    metoprolol tartrate (LOPRESSOR) tablet 50 mg  50 mg Oral BID    nitroGLYCERIN (NITROSTAT) SL tablet 0.4 mg  0.4 mg SubLINGual Q10 Min PRN    predniSONE (DELTASONE) tablet 20 mg  20 mg Oral Daily    Tenofovir Alafenamide Fumarate TABS 25 mg (Patient Supplied)  1 tablet Oral Daily    HYDROmorphone (DILAUDID) injection 0.25 mg  0.25 mg IntraVENous Q3H PRN    Or    HYDROmorphone (DILAUDID) injection 0.5 mg  0.5 mg IntraVENous Q3H PRN    traMADol (ULTRAM) tablet 50 mg  50 mg Oral Q12H PRN    atazanavir (REYATAZ) capsule 300 mg (Patient Supplied)  300 mg Oral Daily with breakfast       Review of Systems: Admission ROS by Melanie Smith DO from 3/9/2025 were reviewed with the patient and changes (other than per HPI) include: none      Objective:     Physical Exam:  Vitals:    03/10/25 0749   BP: (!) 169/75   Pulse: 53   Resp: 18   Temp: 97.5 °F (36.4 °C)   SpO2: 93%     SpO2 Readings from Last 6 Encounters:   03/10/25 93%   02/11/25 96%   01/08/25 97%   01/02/25 96%   12/26/24 99%   10/15/24 96%          Intake/Output Summary (Last 24 hours) at 3/10/2025  1.30 MG/DL    BUN/Creatinine Ratio 2 (L) 12 - 20      Est, Glom Filt Rate 5 (L) >60 ml/min/1.73m2    Calcium 9.4 8.5 - 10.1 MG/DL    Total Bilirubin 0.3 0.2 - 1.0 MG/DL    ALT 11 (L) 12 - 78 U/L    AST 17 15 - 37 U/L    Alk Phosphatase 63 45 - 117 U/L    Total Protein 7.1 6.4 - 8.2 g/dL    Albumin 3.7 3.5 - 5.0 g/dL    Globulin 3.4 2.0 - 4.0 g/dL    Albumin/Globulin Ratio 1.1 1.1 - 2.2     TYPE AND SCREEN    Collection Time: 03/09/25  1:16 PM   Result Value Ref Range    Crossmatch expiration date 03/12/2025,2359     ABO/Rh B POSITIVE     Antibody Screen NEG     Unit Number [id number]     Product Code Blood Bank RC LR     Unit Divison 00     Dispense Status Blood Bank TRANSFUSED     Unit Issue Date/Time [id number]     Product Code Blood Bank [id number]     Blood Bank Unit Type and Rh B POS     Blood Bank ISBT Product Blood Type 7300     Blood Bank Blood Product Expiration Date 413166665211     Crossmatch Result Compatible     Unit Number [id number]     Product Code Blood Bank RC LR,2     Unit Divison 00     Dispense Status Blood Bank TRANSFUSED     Unit Issue Date/Time [id number]     Product Code Blood Bank [id number]     Blood Bank Unit Type and Rh B NEG     Blood Bank ISBT Product Blood Type 1700     Blood Bank Blood Product Expiration Date 852432748853     Crossmatch Result Compatible    PREPARE RBC (CROSSMATCH), 1 Units    Collection Time: 03/09/25  2:00 PM   Result Value Ref Range    History Check Historical check performed    Hemoglobin and Hematocrit    Collection Time: 03/09/25  6:37 PM   Result Value Ref Range    Hemoglobin 6.3 (L) 12.1 - 17.0 g/dL    Hematocrit 19.2 (L) 36.6 - 50.3 %   PREPARE RBC (CROSSMATCH), 1 Units    Collection Time: 03/09/25  8:00 PM   Result Value Ref Range    History Check Historical check performed    Basic Metabolic Panel    Collection Time: 03/10/25  1:36 AM   Result Value Ref Range    Sodium 138 136 - 145 mmol/L    Potassium 5.1 3.5 - 5.1 mmol/L    Chloride 100 97 - 108

## 2025-03-10 NOTE — PROGRESS NOTES
RN attempted to call report to 4th floor. Pt will be going into room 436. Advised that someone will be calling OBS RN back in 10 minutes

## 2025-03-11 LAB
HCT VFR BLD AUTO: 23.9 % (ref 36.6–50.3)
HCT VFR BLD AUTO: 24 % (ref 36.6–50.3)
HCT VFR BLD AUTO: 24 % (ref 36.6–50.3)
HGB BLD-MCNC: 7.8 G/DL (ref 12.1–17)
HGB BLD-MCNC: 8 G/DL (ref 12.1–17)
HGB BLD-MCNC: 8 G/DL (ref 12.1–17)

## 2025-03-11 PROCEDURE — 6370000000 HC RX 637 (ALT 250 FOR IP): Performed by: FAMILY MEDICINE

## 2025-03-11 PROCEDURE — 94761 N-INVAS EAR/PLS OXIMETRY MLT: CPT

## 2025-03-11 PROCEDURE — 2500000003 HC RX 250 WO HCPCS: Performed by: FAMILY MEDICINE

## 2025-03-11 PROCEDURE — 85018 HEMOGLOBIN: CPT

## 2025-03-11 PROCEDURE — 36415 COLL VENOUS BLD VENIPUNCTURE: CPT

## 2025-03-11 PROCEDURE — 1100000000 HC RM PRIVATE

## 2025-03-11 PROCEDURE — 6360000002 HC RX W HCPCS: Performed by: FAMILY MEDICINE

## 2025-03-11 PROCEDURE — 85014 HEMATOCRIT: CPT

## 2025-03-11 PROCEDURE — 6370000000 HC RX 637 (ALT 250 FOR IP): Performed by: INTERNAL MEDICINE

## 2025-03-11 RX ORDER — ATAZANAVIR 300 MG/1
300 CAPSULE ORAL
Status: DISCONTINUED | OUTPATIENT
Start: 2025-03-11 | End: 2025-03-12 | Stop reason: HOSPADM

## 2025-03-11 RX ADMIN — METOPROLOL TARTRATE 50 MG: 50 TABLET, FILM COATED ORAL at 20:38

## 2025-03-11 RX ADMIN — AMLODIPINE BESYLATE 5 MG: 5 TABLET ORAL at 08:25

## 2025-03-11 RX ADMIN — SEVELAMER CARBONATE 1600 MG: 800 TABLET, FILM COATED ORAL at 08:25

## 2025-03-11 RX ADMIN — HYDROMORPHONE HYDROCHLORIDE 0.5 MG: 1 INJECTION, SOLUTION INTRAMUSCULAR; INTRAVENOUS; SUBCUTANEOUS at 07:26

## 2025-03-11 RX ADMIN — SEVELAMER CARBONATE 1600 MG: 800 TABLET, FILM COATED ORAL at 11:40

## 2025-03-11 RX ADMIN — HYDROMORPHONE HYDROCHLORIDE 0.5 MG: 1 INJECTION, SOLUTION INTRAMUSCULAR; INTRAVENOUS; SUBCUTANEOUS at 20:37

## 2025-03-11 RX ADMIN — SODIUM CHLORIDE, PRESERVATIVE FREE 10 ML: 5 INJECTION INTRAVENOUS at 20:39

## 2025-03-11 RX ADMIN — SODIUM CHLORIDE, PRESERVATIVE FREE 5 ML: 5 INJECTION INTRAVENOUS at 08:26

## 2025-03-11 RX ADMIN — SODIUM CHLORIDE, PRESERVATIVE FREE 10 ML: 5 INJECTION INTRAVENOUS at 02:01

## 2025-03-11 RX ADMIN — METOPROLOL TARTRATE 50 MG: 50 TABLET, FILM COATED ORAL at 08:26

## 2025-03-11 RX ADMIN — LOSARTAN POTASSIUM 100 MG: 25 TABLET, FILM COATED ORAL at 08:25

## 2025-03-11 RX ADMIN — HYDROMORPHONE HYDROCHLORIDE 0.5 MG: 1 INJECTION, SOLUTION INTRAMUSCULAR; INTRAVENOUS; SUBCUTANEOUS at 11:40

## 2025-03-11 RX ADMIN — HYDROMORPHONE HYDROCHLORIDE 0.5 MG: 1 INJECTION, SOLUTION INTRAMUSCULAR; INTRAVENOUS; SUBCUTANEOUS at 15:33

## 2025-03-11 RX ADMIN — HYDROMORPHONE HYDROCHLORIDE 0.5 MG: 1 INJECTION, SOLUTION INTRAMUSCULAR; INTRAVENOUS; SUBCUTANEOUS at 02:01

## 2025-03-11 RX ADMIN — LEVETIRACETAM 500 MG: 250 TABLET, FILM COATED ORAL at 20:38

## 2025-03-11 RX ADMIN — Medication 3 MG: at 20:38

## 2025-03-11 RX ADMIN — ATORVASTATIN CALCIUM 40 MG: 20 TABLET, FILM COATED ORAL at 08:26

## 2025-03-11 RX ADMIN — LEVETIRACETAM 500 MG: 250 TABLET, FILM COATED ORAL at 08:26

## 2025-03-11 RX ADMIN — SEVELAMER CARBONATE 1600 MG: 800 TABLET, FILM COATED ORAL at 17:06

## 2025-03-11 ASSESSMENT — PAIN SCALES - GENERAL
PAINLEVEL_OUTOF10: 8
PAINLEVEL_OUTOF10: 2
PAINLEVEL_OUTOF10: 8

## 2025-03-11 ASSESSMENT — PAIN DESCRIPTION - DESCRIPTORS
DESCRIPTORS: SORE
DESCRIPTORS: SORE

## 2025-03-11 ASSESSMENT — PAIN DESCRIPTION - LOCATION
LOCATION: ABDOMEN

## 2025-03-11 ASSESSMENT — PAIN DESCRIPTION - ORIENTATION: ORIENTATION: MID

## 2025-03-11 NOTE — PROGRESS NOTES
NEPHROLOGY CONSULT NOTE     Patient: Woodrow Madison MRN: [id number]  PCP: Maggy Daniel APRN - RADHA   :     1965  Age:   59 y.o.  Sex:  male      Referring physician: Owen Ward MD  Reason for consultation:   Admission Date: 3/9/2025  1:17 PM  LOS: 1 day        ASSESSMENT and PLAN :   ESRD on HD MWF at [facility]  -AVF  Severe anemia  -HB stable 7.8  Hypertension  Secondary hyperparathyroidism    Plan:  HD - MWF  Increase HAYDE 15,000 unit with HD  cont Renvela 3 times daily with meal  Plan for EGD in am         Subjective:   HPI: ESRD  Had HD yesterday  Hemoglobin stable    Past Medical Hx:   Past Medical History:   Diagnosis Date    A-V fistula     left arm    Anemia     CAD (coronary artery disease)     Chronic abdominal pain     Chronic kidney disease     dialysis pt. - M.W.F - [facility]    Colon cancer (HCC)     Complication of AV dialysis fistula     2 large pseudo-aneurysm    Esophagitis     ESRD on dialysis (HCC)     Gastritis     GERD (gastroesophageal reflux disease)     Gout     pt states not in 12 years    Hemodialysis patient     High cholesterol     History of blood transfusion     at least 8 units over the years  - pt states last given 2023    HIV (human immunodeficiency virus infection) (HCC)     Hypertension     MI (myocardial infarction) (HCC)     NSTEMI (non-ST elevated myocardial infarction) ([facility])     2023    Other ill-defined conditions(799.89)     SMALL VEIN IN HEART BEING MONITORED    Pancreatitis     Renal insufficiency     Seizure (HCC)     one time and none sense    Sepsis ([facility])  & 2019        Past Surgical Hx:     Past Surgical History:   Procedure Laterality Date    ANUS SURGERY N/A 2025    Anorectal examination under anesthesia and excision of anal canal lesions performed by Julien Nieto MD at [facility] MAIN OR    AV FISTULA CREATION Left     &     CARDIAC PROCEDURE N/A 2023

## 2025-03-11 NOTE — PROGRESS NOTES
Morena Bowman PA-C                       [phone] office             Monday-Friday 8:00 am-4:30 pm  I am not permitted to use \"perfect serve\" use above for contact, thanks.        Gastroenterology Progress Note    March 11, 2025  Admit Date: 3/9/2025         Narrative Assessment and Plan   59 y.o. male being followed by GI for epigastric pain, anemia, no overt GI bleeding. Hgb stable but low at 7.8 today. CT imaging 2/11/25 reviewed with patient - he does have fat containing periumbilical hernia but no visible mass, erythema, or periumbilical TTP on exam. There was soft tissue density in R anterior abd wall on last CT not palpable on exam today.     Impression:  Epigastric abd pain    Plan:  Given the patient's history of CKD, epigastric abdominal pain, anemia, and nausea/vomiting prior to admission, EGD is indicated to assess for gastric causes of pain.   EGD scheduled for tomorrow 1430 with Dr. Zhang.   We have offered colonoscopy, but the patient declines this in favor of keeping his upcoming appt for colonoscopy within the next month with his colorectal surgeon.   Morena Bowman PA-C    Subjective:   Chief Complaint: follow up abd pain     HPI: Patient still with epigastric pain, nausea, and loss of appetite this morning. He is amenable to having EGD done but declines colonoscopy as he already has colonoscopy scheduled within the next month with his colorectal surgeon (he has history of anal squamous cell carcinoma s/p resection). We discussed his periumbilical hernia seen on CT imaging 2/11/25. He denies periumbilical pain but has felt minor protrusion around this area before. This does not seem likely to be the cause of his pain. Also discussed oft tissue density in R anterior abdominal wall on that CT - he does have some point tenderness in RLQ/R pelvis but no palpable abdominal wall abnormalities.

## 2025-03-11 NOTE — PLAN OF CARE
Problem: Safety - Adult  Goal: Free from fall injury  Outcome: Progressing     Problem: Chronic Conditions and Co-morbidities  Goal: Patient's chronic conditions and co-morbidity symptoms are monitored and maintained or improved  Outcome: Progressing  Flowsheets (Taken 3/10/2025 2327)  Care Plan - Patient's Chronic Conditions and Co-Morbidity Symptoms are Monitored and Maintained or Improved:   Monitor and assess patient's chronic conditions and comorbid symptoms for stability, deterioration, or improvement   Collaborate with multidisciplinary team to address chronic and comorbid conditions and prevent exacerbation or deterioration   Update acute care plan with appropriate goals if chronic or comorbid symptoms are exacerbated and prevent overall improvement and discharge     Problem: Discharge Planning  Goal: Discharge to home or other facility with appropriate resources  Outcome: Progressing  Flowsheets (Taken 3/10/2025 2327)  Discharge to home or other facility with appropriate resources:   Identify barriers to discharge with patient and caregiver   Arrange for needed discharge resources and transportation as appropriate     Problem: Pain  Goal: Verbalizes/displays adequate comfort level or baseline comfort level  Outcome: Progressing  Flowsheets (Taken 3/10/2025 2327)  Verbalizes/displays adequate comfort level or baseline comfort level:   Encourage patient to monitor pain and request assistance   Assess pain using appropriate pain scale   Administer analgesics based on type and severity of pain and evaluate response   Implement non-pharmacological measures as appropriate and evaluate response   Consider cultural and social influences on pain and pain management   Notify Licensed Independent Practitioner if interventions unsuccessful or patient reports new pain

## 2025-03-11 NOTE — CARE COORDINATION
Case Management Note          CM met with patient at bedside and completed initial CM assessment. Patient is independent PTA, does not have DME, and lives with his sister. Patient goes to [facility] for dialysis on M/W/F. CM to send clinical updates to pt's HD center. Complete CM assessment to follow.             ___________________________________________   Monalisa Oliveira RN Case Manager  3/11/2025   4:38 PM

## 2025-03-11 NOTE — PROGRESS NOTES
[facility]  [address] [place], [place] [id number] [phone]      Hospitalist  Progress Note      NAME:       Woodrow Madison   :        1965  MRM:        [id number]    Date of service: 3/11/2025      Subjective: Patient was personally seen and examined by me during this time period. Chart reviewed.     Patient was seen resting comfortably in bed, he expressed he did not want to drink the ordered Golytley due to his kidney issues and is already scheduled for a colonoscopy for next month. He is agreeable to an  EGD. He reports improvement in abdominal pain and nausea. Denies noticing any blood in stools.       Objective: Hgb stable at 7.8    Vital Signs:    BP (!) 178/96   Pulse 57   Temp 98.2 °F (36.8 °C)   Resp 18   Ht 1.829 m (6')   Wt 96.2 kg (212 lb)   SpO2 93%   BMI 28.75 kg/m²        Intake/Output Summary (Last 24 hours) at 3/11/2025 1109  Last data filed at 3/11/2025 0201  Gross per 24 hour   Intake 410 ml   Output 2500 ml   Net -2090 ml        Current inpatient medications reviewed:  Current Facility-Administered Medications   Medication Dose Route Frequency    [START ON 3/12/2025] epoetin rosalia-epbx (RETACRIT) injection 15,000 Units  15,000 Units IntraVENous Once per day on     sevelamer (RENVELA) tablet 1,600 mg  1,600 mg Oral TID WC    [Held by provider] famotidine (PEPCID) tablet 20 mg  20 mg Oral Nightly    atazanavir (REYATAZ) capsule 300 mg (Patient Supplied)  300 mg Oral Daily with breakfast    dolutegravir sodium (TIVICAY) tablet 50 mg  50 mg Oral Daily    Tenofovir Alafenamide Fumarate TABS 25 mg (Patient Supplied)  1 tablet Oral Daily    0.9 % sodium chloride infusion   IntraVENous PRN    prochlorperazine (COMPAZINE) injection 10 mg  10 mg IntraVENous Q6H PRN    sodium chloride flush 0.9 % injection 5-40 mL  5-40 mL IntraVENous 2 times per day    sodium chloride  infusions to nephrology as pt receives them w HD  -GI following, scheduled for EGD 3/12     #Acute on chronic RUQ/ Epigastric Pain. CT abdomen showed small anterior abdominal wall hernia.   -History of GERD  -Cont as needed analgesics, antiemetics  -GI consulted, pt now agreeable to endoscopy while here. Declined Colonoscopy. EGD scheduled for 1430 with Dr. Zhang tomorrow.      #ESRD, POA HD MWF.   -Consult nephrology for HD management.   -Noted pt is seeking renal transplant, denied by [facility]. Seeking other options.     #HIV, POA  -May be contributing to patient's presentation  -Continue home ARV, pharmacy consulted to assist  -Followed by [facility], reviewed last clinic note from 10/2024, CD4% stable     #Hx hypertension  -Cont home meds including amlodipine, losartan, metoprolol     #Hx seizures None in 5y  -Cont home Keppra     #Hx Thoracic aortic aneurysm without rupture  Continues care with CT surgery o/p     #Hx Rectal Cancer Biopsy 9/26/23 showed no evidence of dysplasia or malignancy.   Pt was to f/u w colorectal surgery per PCP documentation, unclear if this has occurred  -Pt taking prednisone, reports he was on this medication after a rectal surgery ? Pharmacy seeking clarification    Care Plan discussed with: Patient, Family, Nursing, CM     Prophylaxis:  SCD's                Expected Disposition:  Home w/Family    PCP:      Maggy Daniel APRN - NP     I have personally examined and treated the patient at bedside during this period. To assist coordination of care and communication with nursing and staff, this note may be preliminary early in the day, but finalized by end of the day.         ___________________________________________________    Attending Physician:   JENELLE Engle - CNP

## 2025-03-12 ENCOUNTER — ANESTHESIA EVENT (OUTPATIENT)
Facility: HOSPITAL | Age: 60
DRG: 811 | End: 2025-03-12
Payer: MEDICARE

## 2025-03-12 ENCOUNTER — ANESTHESIA (OUTPATIENT)
Facility: HOSPITAL | Age: 60
DRG: 811 | End: 2025-03-12
Payer: MEDICARE

## 2025-03-12 VITALS
HEIGHT: 72 IN | SYSTOLIC BLOOD PRESSURE: 163 MMHG | RESPIRATION RATE: 17 BRPM | BODY MASS INDEX: 28.71 KG/M2 | OXYGEN SATURATION: 96 % | HEART RATE: 74 BPM | DIASTOLIC BLOOD PRESSURE: 89 MMHG | TEMPERATURE: 97.5 F | WEIGHT: 212 LBS

## 2025-03-12 LAB
ALBUMIN SERPL-MCNC: 3.3 G/DL (ref 3.5–5)
ALBUMIN/GLOB SERPL: 1.1 (ref 1.1–2.2)
ALP SERPL-CCNC: 59 U/L (ref 45–117)
ALT SERPL-CCNC: 8 U/L (ref 12–78)
ANION GAP SERPL CALC-SCNC: 11 MMOL/L (ref 2–12)
AST SERPL-CCNC: 10 U/L (ref 15–37)
BILIRUB SERPL-MCNC: 0.5 MG/DL (ref 0.2–1)
BUN SERPL-MCNC: 32 MG/DL (ref 6–20)
BUN/CREAT SERPL: 3 (ref 12–20)
CALCIUM SERPL-MCNC: 9.5 MG/DL (ref 8.5–10.1)
CHLORIDE SERPL-SCNC: 97 MMOL/L (ref 97–108)
CO2 SERPL-SCNC: 30 MMOL/L (ref 21–32)
CREAT SERPL-MCNC: 12.3 MG/DL (ref 0.7–1.3)
GLOBULIN SER CALC-MCNC: 3 G/DL (ref 2–4)
GLUCOSE SERPL-MCNC: 91 MG/DL (ref 65–100)
HCT VFR BLD AUTO: 24.5 % (ref 36.6–50.3)
HGB BLD-MCNC: 8.1 G/DL (ref 12.1–17)
POTASSIUM SERPL-SCNC: 4.4 MMOL/L (ref 3.5–5.1)
PROT SERPL-MCNC: 6.3 G/DL (ref 6.4–8.2)
SODIUM SERPL-SCNC: 138 MMOL/L (ref 136–145)

## 2025-03-12 PROCEDURE — 80053 COMPREHEN METABOLIC PANEL: CPT

## 2025-03-12 PROCEDURE — 3700000001 HC ADD 15 MINUTES (ANESTHESIA): Performed by: INTERNAL MEDICINE

## 2025-03-12 PROCEDURE — 6360000002 HC RX W HCPCS: Performed by: FAMILY MEDICINE

## 2025-03-12 PROCEDURE — 88305 TISSUE EXAM BY PATHOLOGIST: CPT

## 2025-03-12 PROCEDURE — 6360000002 HC RX W HCPCS: Performed by: NURSE ANESTHETIST, CERTIFIED REGISTERED

## 2025-03-12 PROCEDURE — 0DB98ZX EXCISION OF DUODENUM, VIA NATURAL OR ARTIFICIAL OPENING ENDOSCOPIC, DIAGNOSTIC: ICD-10-PCS | Performed by: INTERNAL MEDICINE

## 2025-03-12 PROCEDURE — 6370000000 HC RX 637 (ALT 250 FOR IP): Performed by: FAMILY MEDICINE

## 2025-03-12 PROCEDURE — 7100000010 HC PHASE II RECOVERY - FIRST 15 MIN: Performed by: INTERNAL MEDICINE

## 2025-03-12 PROCEDURE — 94761 N-INVAS EAR/PLS OXIMETRY MLT: CPT

## 2025-03-12 PROCEDURE — 90935 HEMODIALYSIS ONE EVALUATION: CPT

## 2025-03-12 PROCEDURE — 0DB78ZX EXCISION OF STOMACH, PYLORUS, VIA NATURAL OR ARTIFICIAL OPENING ENDOSCOPIC, DIAGNOSTIC: ICD-10-PCS | Performed by: INTERNAL MEDICINE

## 2025-03-12 PROCEDURE — 6370000000 HC RX 637 (ALT 250 FOR IP): Performed by: INTERNAL MEDICINE

## 2025-03-12 PROCEDURE — 3600007512: Performed by: INTERNAL MEDICINE

## 2025-03-12 PROCEDURE — 2500000003 HC RX 250 WO HCPCS: Performed by: FAMILY MEDICINE

## 2025-03-12 PROCEDURE — 7100000011 HC PHASE II RECOVERY - ADDTL 15 MIN: Performed by: INTERNAL MEDICINE

## 2025-03-12 PROCEDURE — 2709999900 HC NON-CHARGEABLE SUPPLY: Performed by: INTERNAL MEDICINE

## 2025-03-12 PROCEDURE — 3700000000 HC ANESTHESIA ATTENDED CARE: Performed by: INTERNAL MEDICINE

## 2025-03-12 PROCEDURE — 85018 HEMOGLOBIN: CPT

## 2025-03-12 PROCEDURE — 3600007502: Performed by: INTERNAL MEDICINE

## 2025-03-12 PROCEDURE — 6360000002 HC RX W HCPCS: Performed by: NURSE PRACTITIONER

## 2025-03-12 PROCEDURE — 6360000002 HC RX W HCPCS: Performed by: INTERNAL MEDICINE

## 2025-03-12 PROCEDURE — 85014 HEMATOCRIT: CPT

## 2025-03-12 RX ORDER — GLYCOPYRROLATE 0.2 MG/ML
INJECTION INTRAMUSCULAR; INTRAVENOUS
Status: DISCONTINUED | OUTPATIENT
Start: 2025-03-12 | End: 2025-03-12 | Stop reason: SDUPTHER

## 2025-03-12 RX ORDER — HYDRALAZINE HYDROCHLORIDE 20 MG/ML
10 INJECTION INTRAMUSCULAR; INTRAVENOUS ONCE
Status: COMPLETED | OUTPATIENT
Start: 2025-03-12 | End: 2025-03-12

## 2025-03-12 RX ORDER — ATAZANAVIR 300 MG/1
300 CAPSULE ORAL
Qty: 30 CAPSULE | Refills: 3 | Status: SHIPPED | OUTPATIENT
Start: 2025-03-12

## 2025-03-12 RX ORDER — FAMOTIDINE 20 MG/1
20 TABLET, FILM COATED ORAL NIGHTLY
Qty: 60 TABLET | Refills: 3 | Status: SHIPPED | OUTPATIENT
Start: 2025-03-12

## 2025-03-12 RX ORDER — PROPOFOL 10 MG/ML
INJECTION, EMULSION INTRAVENOUS
Status: DISCONTINUED | OUTPATIENT
Start: 2025-03-12 | End: 2025-03-12 | Stop reason: SDUPTHER

## 2025-03-12 RX ORDER — SODIUM CHLORIDE 0.9 % (FLUSH) 0.9 %
5-40 SYRINGE (ML) INJECTION PRN
Status: DISCONTINUED | OUTPATIENT
Start: 2025-03-12 | End: 2025-03-12 | Stop reason: HOSPADM

## 2025-03-12 RX ORDER — LIDOCAINE HYDROCHLORIDE 20 MG/ML
INJECTION, SOLUTION INTRAVENOUS
Status: DISCONTINUED | OUTPATIENT
Start: 2025-03-12 | End: 2025-03-12 | Stop reason: SDUPTHER

## 2025-03-12 RX ORDER — SODIUM CHLORIDE 0.9 % (FLUSH) 0.9 %
5-40 SYRINGE (ML) INJECTION EVERY 12 HOURS SCHEDULED
Status: DISCONTINUED | OUTPATIENT
Start: 2025-03-12 | End: 2025-03-12 | Stop reason: HOSPADM

## 2025-03-12 RX ORDER — SODIUM CHLORIDE 9 MG/ML
25 INJECTION, SOLUTION INTRAVENOUS PRN
Status: DISCONTINUED | OUTPATIENT
Start: 2025-03-12 | End: 2025-03-12 | Stop reason: HOSPADM

## 2025-03-12 RX ADMIN — ATORVASTATIN CALCIUM 40 MG: 20 TABLET, FILM COATED ORAL at 08:37

## 2025-03-12 RX ADMIN — GLYCOPYRROLATE 0.2 MG: 0.2 INJECTION INTRAMUSCULAR; INTRAVENOUS at 15:28

## 2025-03-12 RX ADMIN — LEVETIRACETAM 500 MG: 250 TABLET, FILM COATED ORAL at 08:37

## 2025-03-12 RX ADMIN — PROPOFOL 40 MG: 10 INJECTION, EMULSION INTRAVENOUS at 15:37

## 2025-03-12 RX ADMIN — SEVELAMER CARBONATE 1600 MG: 800 TABLET, FILM COATED ORAL at 17:15

## 2025-03-12 RX ADMIN — LIDOCAINE HYDROCHLORIDE 100 MG: 20 INJECTION, SOLUTION INTRAVENOUS at 15:28

## 2025-03-12 RX ADMIN — PROPOFOL 40 MG: 10 INJECTION, EMULSION INTRAVENOUS at 15:35

## 2025-03-12 RX ADMIN — SODIUM CHLORIDE, PRESERVATIVE FREE 5 ML: 5 INJECTION INTRAVENOUS at 08:38

## 2025-03-12 RX ADMIN — PROPOFOL 30 MG: 10 INJECTION, EMULSION INTRAVENOUS at 15:39

## 2025-03-12 RX ADMIN — EPOETIN ALFA-EPBX 15000 UNITS: 10000 INJECTION, SOLUTION INTRAVENOUS; SUBCUTANEOUS at 17:14

## 2025-03-12 RX ADMIN — HYDROMORPHONE HYDROCHLORIDE 0.5 MG: 1 INJECTION, SOLUTION INTRAMUSCULAR; INTRAVENOUS; SUBCUTANEOUS at 17:14

## 2025-03-12 RX ADMIN — HYDROMORPHONE HYDROCHLORIDE 0.5 MG: 1 INJECTION, SOLUTION INTRAMUSCULAR; INTRAVENOUS; SUBCUTANEOUS at 01:52

## 2025-03-12 RX ADMIN — HYDRALAZINE HYDROCHLORIDE 10 MG: 20 INJECTION INTRAMUSCULAR; INTRAVENOUS at 02:53

## 2025-03-12 RX ADMIN — HYDROMORPHONE HYDROCHLORIDE 0.5 MG: 1 INJECTION, SOLUTION INTRAMUSCULAR; INTRAVENOUS; SUBCUTANEOUS at 08:37

## 2025-03-12 RX ADMIN — PROPOFOL 160 MG: 10 INJECTION, EMULSION INTRAVENOUS at 15:31

## 2025-03-12 ASSESSMENT — PAIN SCALES - GENERAL
PAINLEVEL_OUTOF10: 0
PAINLEVEL_OUTOF10: 5
PAINLEVEL_OUTOF10: 5
PAINLEVEL_OUTOF10: 7
PAINLEVEL_OUTOF10: 5
PAINLEVEL_OUTOF10: 8
PAINLEVEL_OUTOF10: 8
PAINLEVEL_OUTOF10: 5
PAINLEVEL_OUTOF10: 7
PAINLEVEL_OUTOF10: 8

## 2025-03-12 ASSESSMENT — PAIN DESCRIPTION - LOCATION
LOCATION: ABDOMEN

## 2025-03-12 ASSESSMENT — PAIN DESCRIPTION - DESCRIPTORS
DESCRIPTORS: SORE
DESCRIPTORS: SORE

## 2025-03-12 ASSESSMENT — PAIN - FUNCTIONAL ASSESSMENT: PAIN_FUNCTIONAL_ASSESSMENT: 0-10

## 2025-03-12 ASSESSMENT — PAIN DESCRIPTION - ORIENTATION: ORIENTATION: MID

## 2025-03-12 NOTE — PROGRESS NOTES
COLONOSCOPY DIAGNOSTIC performed by Chin Felix MD at [facility] ENDOSCOPY    INVASIVE VASCULAR N/A 07/02/2024    Ultrasound guided vascular access performed by Raman Fink MD at [facility] CARDIAC CATH LAB    RECTAL EXAM N/A 09/26/2023    ANAL EXAM UNDER ANESTHESIA performed by Natalia Davila MD at [facility] MAIN OR    UPPER GASTROINTESTINAL ENDOSCOPY N/A 01/02/2024    EGD DIAGNOSTIC ONLY performed by Chin Felix MD at [facility] ENDOSCOPY    UPPER GASTROINTESTINAL ENDOSCOPY N/A 01/02/2024    EGD BIOPSY performed by Chin Felix MD at [facility] ENDOSCOPY    VASCULAR SURGERY Left 2012    AV FISTULA    VASCULAR SURGERY  2011    DIALYSIS CATHETER    VASCULAR SURGERY  2012    DIALYSIS CATHETER REMOVED       Medications:  Prior to Admission medications    Medication Sig Start Date End Date Taking? Authorizing Provider   famotidine (PEPCID) 40 MG tablet take 1 tablet by mouth twice a day 3/4/25   Maggy Daniel APRN - NP   Tenofovir Alafenamide Fumarate (VEMLIDY) 25 MG TABS TAKE 1 TABLET BY MOUTH ONCE DAILY 1/22/25   Kirk Fish MD   levETIRAcetam (KEPPRA) 500 MG tablet take 1 tablet by mouth twice a day 1/21/25   Maggy Daniel APRN - NP   atorvastatin (LIPITOR) 40 MG tablet take 1 tablet by mouth once daily 1/21/25   Maggy Daniel APRN - NP   naloxone 4 MG/0.1ML LIQD nasal spray 1 spray by Nasal route as needed for Opioid Reversal  Patient not taking: Reported on 1/9/2025 1/8/25   Julien Nieto MD   amLODIPine (NORVASC) 10 MG tablet Take 0.5 tablets by mouth daily    ProviderEmily MD   Tenapanor HCl, CKD, (XPHOZAH) 30 MG TABS Take by mouth daily    ProviderEmily MD   lanthanum (FOSRENOL) 1000 MG chewable tablet Take 1 tablet by mouth 3 times daily (with meals)    Emily Ortiz MD   clopidogrel (PLAVIX) 75 MG tablet Take 1 tablet by mouth daily  Patient not taking: Reported on 1/9/2025    Emily Ortiz MD   losartan (COZAAR) 100 MG tablet TAKE 1 TABLET BY  MOUTH DAILY 12/27/24   Maggy Daniel APRN - NP   acetaminophen (TYLENOL 8 HOUR) 650 MG extended release tablet Take 1 tablet by mouth every 8 hours as needed for Pain 12/26/24   Gustavo Larios PA-C   dolutegravir sodium (TIVICAY) 50 MG tablet Take 1 tablet by mouth daily 10/17/24 4/15/25  Kirk Fish MD   dolutegravir sodium (TIVICAY) 50 MG tablet Take 1 tablet by mouth daily 7/23/24 1/9/25  Kirk Fish MD   ritonavir (NORVIR) 100 MG capsule Take 1 capsule by mouth daily 7/23/24 1/9/25  Kirk Fish MD   predniSONE (DELTASONE) 20 MG tablet Take 1 tablet by mouth daily    ProviderEmily MD   sevelamer hcl (RENAGEL) 800 MG tablet Take 1 tablet by mouth 3 times daily (with meals)    Emily Ortiz MD   nitroGLYCERIN (NITROSTAT) 0.4 MG SL tablet place 1 tablet under the tongue if needed every 5 minutes for michelle...  (REFER TO PRESCRIPTION NOTES). 12/22/23   Emily Ortiz MD   dolutegravir sodium (TIVICAY) 50 MG tablet TAKE 1 TABLET BY MOUTH DAILY 2/13/24   Kirk Fish MD   metoprolol tartrate (LOPRESSOR) 50 MG tablet take 1 tablet by mouth twice a day 1/30/24   Maggy Daniel APRN - NP   atazanavir (REYATAZ) 300 MG capsule TAKE 1 CAPSULE BY MOUTH EVERY DAY BEFORE BREAKFAST  Patient taking differently: Take 1 capsule by mouth daily (with breakfast) 1/15/24   Kirk Fish MD   bismuth subsalicylate (BISMATROL) 262 MG/15ML suspension Take 15 mLs by mouth every 6 hours as needed for Indigestion or Heartburn 11/22/23   Thomas Lazar MD   albuterol sulfate HFA (PROVENTIL;VENTOLIN;PROAIR) 108 (90 Base) MCG/ACT inhaler inhale 1 puff by mouth and INTO THE LUNGS every 4 hours if needed for wheezing 8/23/23   Maggy Daniel APRN - NP   Methoxy PEG-Epoetin Beta (MIRCERA IJ) Inject 150 mcg as directed every 14 days Given with dialysis 6/7/23   Provider, MD Emily   aspirin 81 MG chewable tablet Take 1 tablet by mouth daily    [employer]

## 2025-03-12 NOTE — DISCHARGE INSTRUCTIONS
Hospital Medicine DISCHARGE INSTRUCTIONS    NAME: Woodrow Madison   :  1965   MRN:  [id number]     Date:     3/12/2025    Admission date: 3/9/2025     Discharge date:  3/12/2025     Reason for your admission:  Symptomatic anemia [D64.9]  Currently asymptomatic HIV infection, with history of HIV-related illness (HCC) [B20]  Acute anemia [D64.9]    Discharge Diagnoses:    DISCHARGE INSTRUCTIONS:    Thank you for allowing us to participate in your care. Your discharging Hospitalist is JENELLE Engle CNP. You were admitted for evaluation and treatment for the above diagnoses. Please follow up as scheduled for your colonoscopy. Please increase your Pepcid dose from once a day to twice daily.          Anemia: Care Instructions  Overview     Anemia is a low level of red blood cells, which carry oxygen throughout your body. Many things can cause anemia. Lack of iron is one of the most common causes. Your body needs iron to make hemoglobin. This is a substance in red blood cells that carries oxygen from the lungs to your body's cells. Without enough iron, the body produces fewer and smaller red blood cells. As a result, your body's cells do not get enough oxygen, and you feel tired and weak. And you may have trouble concentrating.  Bleeding is the most common cause of a lack of iron. You may have heavy menstrual bleeding or bleeding caused by conditions such as ulcers or cancer. Regular use of aspirin or other anti-inflammatory medicines (such as ibuprofen) also can cause bleeding in some people. A lack of iron in your diet also can cause anemia, especially at times when the body needs more iron. This includes during pregnancy, infancy, and the teen years.  Your doctor may have prescribed iron pills. It may take several months of treatment for your iron levels to return to normal. Your doctor also may suggest that you eat foods that are rich in iron, such as meat and beans.  There are many other causes of  call if:    You have symptoms of a heart attack. These may include:  Chest pain or pressure, or a strange feeling in the chest.  Sweating.  Shortness of breath.  Nausea or vomiting.  Pain, pressure, or a strange feeling in the back, neck, jaw, or upper belly or in one or both shoulders or arms.  Lightheadedness or sudden weakness.  A fast or irregular heartbeat.  After you call 911, the  may tell you to chew 1 adult-strength or 2 to 4 low-dose aspirin. Wait for an ambulance. Do not try to drive yourself.     You passed out (lost consciousness).     You have severe shortness of breath.   Call your doctor now or seek immediate medical care if:    You have new or increased shortness of breath.     You are dizzy or lightheaded, or you feel like you may faint.     You have new or worse nausea and vomiting.     Your fatigue and weakness continue or get worse.     You have any abnormal bleeding, such as:  Nosebleeds.  Vaginal bleeding that is different (heavier, more frequent, at a different time of the month) than what you are used to.  Bloody or black stools, or rectal bleeding.  Bloody or pink urine.   Watch closely for changes in your health, and be sure to contact your doctor if:    You do not get better as expected.   Where can you learn more?  Go to https://[email or web address].net/patientEd and enter R301 to learn more about \"Anemia: Care Instructions.\"  Current as of: October 7, 2024  Content Version: 14.4  © 2024-2025 [employer].   Care instructions adapted under license by [employer]. If you have questions about a medical condition or this instruction, always ask your healthcare professional. [employer] disclaims any warranty or liability for your use of this information.    Medications:     It is important that medications are taken exactly as they are prescribed on the discharge medication instructions and keep them your  in the bottles provided by the pharmacist.   Keep a list of

## 2025-03-12 NOTE — DISCHARGE SUMMARY
[facility]  [address] [place] [address] [phone]          Hospitalist Discharge Summary     Patient ID:  Woodrow Madison  [id number]  59 y.o.  1965    Admit date: 3/9/2025    Discharge date and time: 3/12/2025 4:26 PM    Admission Diagnoses: Symptomatic anemia [D64.9]  Currently asymptomatic HIV infection, with history of HIV-related illness (HCC) [B20]  Acute anemia [D64.9]    Discharge Diagnoses:  Principal Diagnosis Symptomatic anemia                                            Principal Problem:    Symptomatic anemia  Active Problems:    Acute anemia  Resolved Problems:    * No resolved hospital problems. *         Hospital Course:     #Acute symptomatic anemia, POA. Likely in setting of anemia of chronic disease. Pt reports missing doses of HAYDE + iron infusions d/t switching HD clinics. Hgb 6.3-> s/p 2uPRBCs-> stable at 8.1 today.     -Nephrology consult. Receives HD MWF. Continue  HAYDE to 15,000 unit with HD. Continue Renvela TID with meals.  -GI consult. EGD today with no evidence of active or recent bleeding on upper endoscopy.     #Acute on chronic RUQ/ Epigastric Pain. CT abdomen showed small anterior abdominal wall hernia.   -History of GERD  -GI consult, Endoscopy done today.   Declined Colonoscopy as he is scheduled for one next month with his colorectal surgeon   - F/U with GI outpatient    #ESRD, POA HD MWF.   - Received dialysis today. He only completed 1.5hrs of treatment with UF 2L d/t arequesting to be taken off the machine.   -Noted pt is seeking renal transplant, denied by [facility]. Seeking other options.     #HIV, POA  -May be contributing to patient's presentation  -Continue home ARV, pharmacy consulted to assist  -Followed by [facility], reviewed last clinic note from 10/2024, CD4% stable     #Hx hypertension  -Cont home meds including amlodipine, losartan, metoprolol     #Hx seizures None in 5y  -Cont home Keppra     #Hx  carbonate 1250 (500 Ca) MG chewable tablet  Commonly known as: OS-MOON     clopidogrel 75 MG tablet  Commonly known as: PLAVIX     famotidine 40 MG tablet  Commonly known as: PEPCID  take 1 tablet by mouth twice a day     lanthanum 1000 MG chewable tablet  Commonly known as: FOSRENOL     levETIRAcetam 500 MG tablet  Commonly known as: KEPPRA  take 1 tablet by mouth twice a day     lidocaine-prilocaine 2.5-2.5 % cream  Commonly known as: EMLA     losartan 100 MG tablet  Commonly known as: COZAAR  TAKE 1 TABLET BY MOUTH DAILY     metoprolol tartrate 50 MG tablet  Commonly known as: LOPRESSOR  take 1 tablet by mouth twice a day     MIRCERA IJ     naloxone 4 MG/0.1ML Liqd nasal spray  1 spray by Nasal route as needed for Opioid Reversal     nitroGLYCERIN 0.4 MG SL tablet  Commonly known as: NITROSTAT     Norvir 100 MG capsule  Generic drug: ritonavir  Take 1 capsule by mouth daily     predniSONE 20 MG tablet  Commonly known as: DELTASONE     sevelamer 800 MG tablet  Commonly known as: RENVELA     sevelamer hcl 800 MG tablet  Commonly known as: RENAGEL     * Tivicay 50 MG tablet  Generic drug: dolutegravir sodium  TAKE 1 TABLET BY MOUTH DAILY     * dolutegravir sodium 50 MG tablet  Commonly known as: Tivicay  Take 1 tablet by mouth daily     * dolutegravir sodium 50 MG tablet  Commonly known as: Tivicay  Take 1 tablet by mouth daily     Vemlidy 25 MG Tabs  Generic drug: Tenofovir Alafenamide Fumarate  TAKE 1 TABLET BY MOUTH ONCE DAILY     Xphozah 30 MG Tabs  Generic drug: Tenapanor HCl (CKD)           * This list has 3 medication(s) that are the same as other medications prescribed for you. Read the directions carefully, and ask your doctor or other care provider to review them with you.                Activity: activity as tolerated  Diet: regular diet  Wound Care: none needed    Follow-up with  No follow-up provider specified.    Follow-up tests/labs Follow up as scheduled for a colonoscopy     Signed:  Sal Kerr

## 2025-03-12 NOTE — OP NOTE
.                         [facility]  [facility]  Corrie Zhang MD  [phone]      2025    Esophagogastroduodenoscopy (EGD) Procedure Note  Woodrow Madison  : 1965  [facility] Medical Record Number: [id number]      Indications:   Acute on chronic anemia, history of anal cancer status post local treatment scheduled for outpatient colonoscopy next month with colorectal surgery, history of low-grade reflux esophagitis and nonspecific gastroduodenitis on prior EGD , on aspirin and Plavix and prednisone, on Pepcid once daily  Endorses chronic nausea  ESRD on hemodialysis  Referring Physician:  Maggy Daniel APRN - NP  Anesthesia/Sedation: Monitored anesthesia care, see separate note  Endoscopist:  Corrie Zhang MD   Complications:  None  Estimated Blood Loss:  None    Permit:  The indications, risks, benefits and alternatives were reviewed with the patient or their decision maker who was provided an opportunity to ask questions and all questions were answered.  The specific risks of esophagogastroduodenoscopy with conscious sedation were reviewed, including but not limited to anesthetic complication, bleeding, adverse drug reaction, missed lesion, infection, IV site reactions, and intestinal perforation which would lead to the need for surgical repair.  Alternatives to EGD including radiographic imaging, observation without testing, or laboratory testing were reviewed as well as the limitations of those alternatives discussed.  After considering the options and having all their questions answered, the patient or their decision maker provided both verbal and written consent to proceed.       Procedure in Detail:  After obtaining informed consent, positioning of the patient in the left lateral decubitus position, and conduction of a pre-procedure pause or \"time out\" the endoscope was introduced into the mouth and advanced to the

## 2025-03-12 NOTE — PROGRESS NOTES
TRANSFER - IN REPORT:    Verbal report received from ANN-MARIE Farrar on Woodrow Madison  being received from [phone] for ordered procedure      Report consisted of patient's Situation, Background, Assessment and   Recommendations(SBAR).     Information from the following report(s) Nurse Handoff Report was reviewed with the receiving nurse.    Opportunity for questions and clarification was provided.      Assessment completed upon patient's arrival to unit and care assumed.

## 2025-03-12 NOTE — FLOWSHEET NOTE
HD tx started   Access Visible Yes   Ultrafiltration Rate (ml/hr) 840 ml/hr   Ultrafiltration Removed (ml) 0 ml     Primary RN SBAR: Leni Saucedo, RN    Incapacitated Nurse [facility]. provided: yes  Patient Education provided: yes  Preferred Education method and Primary language: verbal/english  Hospital General Consent Verified: yes  Hospital associated wait time; reason: N/A  Hepatitis B Surface Ag   Date/Time Value Ref Range Status   01/08/2025 12:28 PM <0.10 Index Final     Hep B S Ag Interp   Date/Time Value Ref Range Status   01/08/2025 12:28 PM Negative NEG   Final     HEP B SURF AB   Date/Time Value Ref Range Status   08/15/2022 05:28 AM 61.11 mIU/mL Final     Hep B S Ab   Date/Time Value Ref Range Status   01/08/2025 12:28 .33 mIU/mL Final     Hep B S Ab Interp   Date/Time Value Ref Range Status   01/08/2025 12:28 PM REACTIVE (A) NR   Final      03/12/25 1300   Treatment   Time Off 1250   Observations & Evaluations   Level of Consciousness 0   Oriented X 4   Respiratory Quality/Effort Unlabored   O2 Device None (Room air)   Skin Condition/Temp Dry;Warm   Edema None   Vital Signs   BP (!) 149/87   Temp 98.7 °F (37.1 °C)   Pulse 61   Respirations 16   Pain Assessment   Pain Assessment 0-10   Pain Level 7   Pain Location Abdomen   Hemodialysis Fistula/Graft Superior Arm   No placement date or time found.   Orientation: Superior  Access Location: Arm   Site Assessment Clean, dry & intact   Thrill Present   Bruit Present   Status Deaccessed   Date of Last Dressing Change 03/12/25   Post-Hemodialysis Assessment   Post-Treatment Procedures Blood returned;Access bleeding time < 10 minutes   Machine Disinfection Process Acid/Vinegar Clean;Heat Disinfect;Exterior Machine Disinfection   Rinseback Volume (ml) 300 ml   Blood Volume Processed (Liters) 58.7 L   Dialyzer Clearance Clear   Duration of Treatment (minutes) 150 minutes   Hemodialysis Intake (ml) 500 ml   Hemodialysis Output (ml) 2100 ml   NET Removed  (ml) 1600   Tolerated Treatment Good   Primary RN SBAR: Lewis Farrar, RN  Comments: At the beginning of the tx pt requested to shorten his tx to 3 hrs and UF to 1L. Dr Rich notified and ordered UF 2 L, pt agreed to UF 2L.   2.5 hrs into the tx pt requested to be taken of the machine. This RN told him that we need to notify the doctor and pt stated\" it is my decision, not his\".  When asked what is the reason for not finishing the tx pt stated  \"ma'am I'm 59 years old you don't have to ask me every damn question\"    Dr Rich notified

## 2025-03-12 NOTE — ANESTHESIA PRE PROCEDURE
Department of Anesthesiology  Preprocedure Note       Name:  Woodrow Madison   Age:  59 y.o.  :  1965                                          MRN:  [id number]         Date:  3/12/2025      Surgeon: Surgeon(s):  Corrie Zhang MD    Procedure: Procedure(s):  ESOPHAGOGASTRODUODENOSCOPY    Medications prior to admission:   Prior to Admission medications    Medication Sig Start Date End Date Taking? Authorizing Provider   famotidine (PEPCID) 40 MG tablet take 1 tablet by mouth twice a day 3/4/25   Maggy Daniel APRN - NP   Tenofovir Alafenamide Fumarate (VEMLIDY) 25 MG TABS TAKE 1 TABLET BY MOUTH ONCE DAILY 25   Kirk Fish MD   levETIRAcetam (KEPPRA) 500 MG tablet take 1 tablet by mouth twice a day 25   Maggy Daniel APRN - NP   atorvastatin (LIPITOR) 40 MG tablet take 1 tablet by mouth once daily 25   Maggy Daniel APRN - NP   naloxone 4 MG/0.1ML LIQD nasal spray 1 spray by Nasal route as needed for Opioid Reversal  Patient not taking: Reported on 2025   Julien Nieto MD   amLODIPine (NORVASC) 10 MG tablet Take 0.5 tablets by mouth daily    ProviderEmily MD   Tenapanor HCl, CKD, (XPHOZAH) 30 MG TABS Take by mouth daily    Emily Ortiz MD   lanthanum (FOSRENOL) 1000 MG chewable tablet Take 1 tablet by mouth 3 times daily (with meals)    Emily Ortiz MD   clopidogrel (PLAVIX) 75 MG tablet Take 1 tablet by mouth daily  Patient not taking: Reported on 2025    Emily Ortiz MD   losartan (COZAAR) 100 MG tablet TAKE 1 TABLET BY MOUTH DAILY 24   Maggy Daniel APRN - NP   acetaminophen (TYLENOL 8 HOUR) 650 MG extended release tablet Take 1 tablet by mouth every 8 hours as needed for Pain 24   Gustavo Larios PA-C   dolutegravir sodium (TIVICAY) 50 MG tablet Take 1 tablet by mouth daily 10/17/24 4/15/25  Kirk Fish MD   dolutegravir sodium (TIVICAY) 50 MG tablet Take 1 tablet by

## 2025-03-12 NOTE — ANESTHESIA POSTPROCEDURE EVALUATION
Department of Anesthesiology  Postprocedure Note    Patient: Woodrow Madison  MRN: [id number]  YOB: 1965  Date of evaluation: 3/12/2025    Procedure Summary       Date: 03/12/25 Room / Location: [facility] 03 / [facility] ENDOSCOPY    Anesthesia Start: 1526 Anesthesia Stop: 1541    Procedures:       ESOPHAGOGASTRODUODENOSCOPY (Upper GI Region)      ESOPHAGOGASTRODUODENOSCOPY BIOPSY (Upper GI Region) Diagnosis:       Anemia, unspecified type      (Anemia, unspecified type [D64.9])    Surgeons: Corrie Zhang MD Responsible Provider: Tomy Davila MD    Anesthesia Type: MAC, TIVA ASA Status: 3            Anesthesia Type: MAC, TIVA    Jose Manuel Phase I: Jose Manuel Score: 10    Jose Manuel Phase II: Jose Manuel Score: 10    Anesthesia Post Evaluation    Patient location during evaluation: PACU  Patient participation: complete - patient participated  Level of consciousness: awake and alert  Airway patency: patent  Nausea & Vomiting: no vomiting and no nausea  Cardiovascular status: hemodynamically stable  Respiratory status: room air and spontaneous ventilation  Hydration status: stable  There was medical reason for not using a multimodal analgesia pain management approach.    No notable events documented.

## 2025-03-12 NOTE — PROGRESS NOTES
3/12/2025      RE: Woodrow Madison (YOB: 1965)    To Whom it May Concern:    This is to certify that Woodrow Madison was admitted to [facility] from 3/9/2025 to 3/12/2025. He  may return to work on 03/13/2025.    Please feel free to contact my office if you have any questions or concerns.  Thank you for your assistance in this matter.        Sal Kerr, [facility] Medicine          Pt presents with ongoing episodic SOB/ wheezing, this morning pw chest discomfort, SOB and diaphoresis; now resolved; Labs reviewed and negative; cardiology consulted for concerning symptoms; recommending Echo/ STress; will place on observation to complete studies.

## 2025-03-12 NOTE — PROGRESS NOTES
TRANSFER - OUT REPORT:    Verbal report given to Talia izzy Madison  being transferred to room 436 for routine progression of care     Report consisted of patient's Situation, Background, Assessment and   Recommendations(SBAR).     Information from the following report(s) Nurse Handoff Report was reviewed with the receiving nurse.           Lines:   Peripheral IV 03/09/25 Right;Anterior Cephalic (Active)   Site Assessment Clean, dry & intact 03/12/25 1412   Line Status Normal saline locked 03/12/25 1412   Line Care Connections checked and tightened 03/12/25 1412   Phlebitis Assessment No symptoms 03/12/25 1412   Infiltration Assessment 0 03/12/25 1412   Alcohol Cap Used Yes 03/12/25 1412   Dressing Status Clean, dry & intact 03/12/25 1412   Dressing Type Transparent 03/12/25 1412        Opportunity for questions and clarification was provided.      Patient transported with:  Pt chart

## 2025-03-12 NOTE — PROGRESS NOTES
Initial RN admission and assessment performed and documented in Endoscopy navigator.     Patient evaluated by anesthesia in pre-procedure holding.     All procedural vital signs, airway assessment, and level of consciousness information monitored and recorded by anesthesia staff on the anesthesia record.     Report received from CRNA post procedure.  Patient transported to recovery area by RN.    Endoscopy post procedure time out was performed and specimens were verified by physician.    Endoscope was pre-cleaned at bedside immediately following procedure by [place].

## 2025-03-12 NOTE — CARE COORDINATION
Care Management Initial Assessment  3/12/2025 3:29 PM  If patient is discharged prior to next notation, then this note serves as note for discharge by case management.        Reason for Admission:   Symptomatic anemia [D64.9]  Currently asymptomatic HIV infection, with history of HIV-related illness (HCC) [B20]  Acute anemia [D64.9]  Procedure(s) (LRB):  ESOPHAGOGASTRODUODENOSCOPY (N/A)  * Day of Surgery *      Patient Admission Status: Inpatient  Date Admitted to INP: 3/10/25  RUR: Readmission Risk Score: 20.6      Hospitalization in the last 30 days (Readmission):  No          Advance Care Planning:  Code Status: Full Code  Primary Healthcare Decision Maker: (P) Legal Next of Kin  Primary Decision Maker: Jerry Madison - Other - [phone]    Primary Decision Maker: Dayanna Nieves - Other - [phone]   Advance Directive: legal NOK       __________________________________________________________________________  Assessment:        03/11/25 1636   Service Assessment   Patient Orientation Alert and Oriented   Cognition Alert   History Provided By Patient   Primary Caregiver Self   Support Systems Family Members   Patient's Healthcare Decision Maker is: Legal Next of Kin   PCP Verified by CM Yes  (Maggy Daniel NP)   Last Visit to PCP Within last 6 months   Prior Functional Level Independent in ADLs/IADLs   Can patient return to prior living arrangement Yes   Ability to make needs known: Good   Family able to assist with home care needs: Yes   Financial Resources Other (Comment)  ([facility] medicare)   Community Resources None   Social/Functional History   Lives With Family  (sister dayanna)   Type of Home House   Home Layout One level   Home Access Stairs to enter with rails   Entrance Stairs - Number of Steps 4   Home Equipment None   Prior Level of Assist for ADLs Independent   Prior Level of Assist for Homemaking Independent   Ambulation Assistance Independent   Prior Level of Assist for Transfers  [facility] [address]  Phone: [phone] Fax: [phone]    [id number] - [place], [place] - [address] - P [phone] - F [phone]  [address] [address]  Phone: [phone] Fax: [phone]      DC Transport: (P) Family         Transition of care plan:    [x] Home with family assistance as needed, and outpatient follow-up.          Monalisa Oliveira RN  Case Management Department  For questions or concerns, please Perfect[employer]erve

## 2025-03-12 NOTE — H&P
.Pre-Endoscopy H&P Update  Chief complaint/HPI/ROS:  The indication for the procedure, the patient's history and the patient's current medications are reviewed prior to the procedure and that data is reported on the H&P to which this document is attached.  Any significant complaints with regard to organ systems will be noted, and if not mentioned then a review of systems is not contributory.  Past Medical History:   Diagnosis Date    A-V fistula     left arm    Anemia     CAD (coronary artery disease)     Chronic abdominal pain     Chronic kidney disease     dialysis pt. - M.W.F - [facility] [place]    Colon cancer (HCC) 2021    Complication of AV dialysis fistula 2019    2 large pseudo-aneurysm    Esophagitis     ESRD on dialysis (HCC)     Gastritis     GERD (gastroesophageal reflux disease)     Gout     pt states not in 12 years    Hemodialysis patient     High cholesterol     History of blood transfusion     at least 8 units over the years  - pt states last given June 2023    HIV (human immunodeficiency virus infection) ([facility]) 2011    Hypertension     MI (myocardial infarction) ([facility]) 2023    NSTEMI (non-ST elevated myocardial infarction) ([facility])     JUNE 2023    Other ill-defined conditions(799.89)     SMALL VEIN IN HEART BEING MONITORED    Pancreatitis     Renal insufficiency     Seizure ([facility]) 2012    one time and none sense    Sepsis ([facility]) 2011 & 8/2019      Past Surgical History:   Procedure Laterality Date    ANUS SURGERY N/A 1/7/2025    Anorectal examination under anesthesia and excision of anal canal lesions performed by Julien Nieto MD at [facility] MAIN OR    AV FISTULA CREATION Left 2012    & 2019    CARDIAC PROCEDURE N/A 06/13/2023    Left heart cath / coronary angiography performed by Adal Glover MD at [facility] CARDIAC CATH LAB and 2nd stent with 2nd cath at [facility] June 2023    CARDIAC PROCEDURE N/A 06/19/2023    Percutaneous coronary intervention performed by Luis M Watson MD at [facility]

## 2025-04-16 ENCOUNTER — HOSPITAL ENCOUNTER (OUTPATIENT)
Facility: HOSPITAL | Age: 60
Setting detail: OUTPATIENT SURGERY
Discharge: HOME | End: 2025-04-16
Attending: COLON & RECTAL SURGERY | Admitting: COLON & RECTAL SURGERY
Payer: MEDICARE

## 2025-04-16 ENCOUNTER — ANESTHESIA (OUTPATIENT)
Facility: HOSPITAL | Age: 60
End: 2025-04-16
Payer: MEDICARE

## 2025-04-16 ENCOUNTER — ANESTHESIA EVENT (OUTPATIENT)
Facility: HOSPITAL | Age: 60
End: 2025-04-16
Payer: MEDICARE

## 2025-04-16 VITALS
DIASTOLIC BLOOD PRESSURE: 95 MMHG | HEIGHT: 72 IN | WEIGHT: 217 LBS | TEMPERATURE: 97.6 F | RESPIRATION RATE: 16 BRPM | OXYGEN SATURATION: 96 % | SYSTOLIC BLOOD PRESSURE: 155 MMHG | HEART RATE: 60 BPM | BODY MASS INDEX: 29.39 KG/M2

## 2025-04-16 PROCEDURE — 7100000011 HC PHASE II RECOVERY - ADDTL 15 MIN: Performed by: COLON & RECTAL SURGERY

## 2025-04-16 PROCEDURE — C1713 ANCHOR/SCREW BN/BN,TIS/BN: HCPCS | Performed by: COLON & RECTAL SURGERY

## 2025-04-16 PROCEDURE — 3600007512: Performed by: COLON & RECTAL SURGERY

## 2025-04-16 PROCEDURE — 88305 TISSUE EXAM BY PATHOLOGIST: CPT

## 2025-04-16 PROCEDURE — 3600007502: Performed by: COLON & RECTAL SURGERY

## 2025-04-16 PROCEDURE — 3700000001 HC ADD 15 MINUTES (ANESTHESIA): Performed by: COLON & RECTAL SURGERY

## 2025-04-16 PROCEDURE — 2709999900 HC NON-CHARGEABLE SUPPLY: Performed by: COLON & RECTAL SURGERY

## 2025-04-16 PROCEDURE — 6360000002 HC RX W HCPCS

## 2025-04-16 PROCEDURE — 7100000010 HC PHASE II RECOVERY - FIRST 15 MIN: Performed by: COLON & RECTAL SURGERY

## 2025-04-16 PROCEDURE — C1889 IMPLANT/INSERT DEVICE, NOC: HCPCS | Performed by: COLON & RECTAL SURGERY

## 2025-04-16 PROCEDURE — 3700000000 HC ANESTHESIA ATTENDED CARE: Performed by: COLON & RECTAL SURGERY

## 2025-04-16 PROCEDURE — 2580000003 HC RX 258: Performed by: COLON & RECTAL SURGERY

## 2025-04-16 DEVICE — OPEN WIDTH11±1MM THE MAX OD OF INSERTION PART2.6MM WORKING LENGTH2350MM COATED
Type: IMPLANTABLE DEVICE | Site: TRANSVERSE COLON | Status: FUNCTIONAL
Brand: DURACLIP(TM) REPOSITIONABLE HEMOSTASIS CLIP

## 2025-04-16 DEVICE — CLIP
Type: IMPLANTABLE DEVICE | Site: TRANSVERSE COLON | Status: FUNCTIONAL
Brand: RESOLUTION 360™ ULTRA CLIP

## 2025-04-16 DEVICE — WORKING LENGTH 235CM, WORKING CHANNEL 2.8MM
Type: IMPLANTABLE DEVICE | Site: TRANSVERSE COLON | Status: FUNCTIONAL
Brand: RESOLUTION 360 CLIP

## 2025-04-16 RX ORDER — LIDOCAINE HYDROCHLORIDE 20 MG/ML
INJECTION, SOLUTION EPIDURAL; INFILTRATION; INTRACAUDAL; PERINEURAL
Status: DISCONTINUED | OUTPATIENT
Start: 2025-04-16 | End: 2025-04-16 | Stop reason: SDUPTHER

## 2025-04-16 RX ORDER — SODIUM CHLORIDE 9 MG/ML
INJECTION, SOLUTION INTRAVENOUS CONTINUOUS
Status: DISCONTINUED | OUTPATIENT
Start: 2025-04-16 | End: 2025-04-16 | Stop reason: HOSPADM

## 2025-04-16 RX ORDER — SODIUM CHLORIDE 0.9 % (FLUSH) 0.9 %
5-40 SYRINGE (ML) INJECTION EVERY 12 HOURS SCHEDULED
Status: DISCONTINUED | OUTPATIENT
Start: 2025-04-16 | End: 2025-04-16 | Stop reason: HOSPADM

## 2025-04-16 RX ORDER — SODIUM CHLORIDE 0.9 % (FLUSH) 0.9 %
5-40 SYRINGE (ML) INJECTION PRN
Status: DISCONTINUED | OUTPATIENT
Start: 2025-04-16 | End: 2025-04-16 | Stop reason: HOSPADM

## 2025-04-16 RX ORDER — SODIUM CHLORIDE 9 MG/ML
INJECTION, SOLUTION INTRAVENOUS PRN
Status: DISCONTINUED | OUTPATIENT
Start: 2025-04-16 | End: 2025-04-16 | Stop reason: HOSPADM

## 2025-04-16 RX ORDER — PHENYLEPHRINE HCL IN 0.9% NACL 0.4MG/10ML
SYRINGE (ML) INTRAVENOUS
Status: DISCONTINUED | OUTPATIENT
Start: 2025-04-16 | End: 2025-04-16 | Stop reason: SDUPTHER

## 2025-04-16 RX ADMIN — SODIUM CHLORIDE: 9 INJECTION, SOLUTION INTRAVENOUS at 10:51

## 2025-04-16 RX ADMIN — PROPOFOL 30 MG: 10 INJECTION, EMULSION INTRAVENOUS at 12:38

## 2025-04-16 RX ADMIN — PROPOFOL 40 MG: 10 INJECTION, EMULSION INTRAVENOUS at 11:08

## 2025-04-16 RX ADMIN — PROPOFOL 40 MG: 10 INJECTION, EMULSION INTRAVENOUS at 11:12

## 2025-04-16 RX ADMIN — PROPOFOL 30 MG: 10 INJECTION, EMULSION INTRAVENOUS at 12:29

## 2025-04-16 RX ADMIN — PROPOFOL 20 MG: 10 INJECTION, EMULSION INTRAVENOUS at 12:31

## 2025-04-16 RX ADMIN — PROPOFOL 50 MG: 10 INJECTION, EMULSION INTRAVENOUS at 11:27

## 2025-04-16 RX ADMIN — PROPOFOL 50 MG: 10 INJECTION, EMULSION INTRAVENOUS at 11:49

## 2025-04-16 RX ADMIN — PROPOFOL 50 MG: 10 INJECTION, EMULSION INTRAVENOUS at 11:30

## 2025-04-16 RX ADMIN — PROPOFOL 40 MG: 10 INJECTION, EMULSION INTRAVENOUS at 11:20

## 2025-04-16 RX ADMIN — PROPOFOL 40 MG: 10 INJECTION, EMULSION INTRAVENOUS at 11:04

## 2025-04-16 RX ADMIN — PROPOFOL 40 MG: 10 INJECTION, EMULSION INTRAVENOUS at 12:07

## 2025-04-16 RX ADMIN — PROPOFOL 100 MG: 10 INJECTION, EMULSION INTRAVENOUS at 11:01

## 2025-04-16 RX ADMIN — PROPOFOL 20 MG: 10 INJECTION, EMULSION INTRAVENOUS at 12:13

## 2025-04-16 RX ADMIN — PROPOFOL 40 MG: 10 INJECTION, EMULSION INTRAVENOUS at 12:00

## 2025-04-16 RX ADMIN — LIDOCAINE HYDROCHLORIDE 50 MG: 20 INJECTION, SOLUTION EPIDURAL; INFILTRATION; INTRACAUDAL; PERINEURAL at 11:00

## 2025-04-16 RX ADMIN — PROPOFOL 50 MG: 10 INJECTION, EMULSION INTRAVENOUS at 12:35

## 2025-04-16 RX ADMIN — PROPOFOL 50 MG: 10 INJECTION, EMULSION INTRAVENOUS at 11:44

## 2025-04-16 RX ADMIN — PROPOFOL 30 MG: 10 INJECTION, EMULSION INTRAVENOUS at 12:16

## 2025-04-16 RX ADMIN — Medication 80 MCG: at 12:25

## 2025-04-16 RX ADMIN — PROPOFOL 50 MG: 10 INJECTION, EMULSION INTRAVENOUS at 11:25

## 2025-04-16 RX ADMIN — PROPOFOL 50 MG: 10 INJECTION, EMULSION INTRAVENOUS at 11:39

## 2025-04-16 RX ADMIN — PROPOFOL 40 MG: 10 INJECTION, EMULSION INTRAVENOUS at 11:16

## 2025-04-16 RX ADMIN — PROPOFOL 50 MG: 10 INJECTION, EMULSION INTRAVENOUS at 11:31

## 2025-04-16 RX ADMIN — PROPOFOL 50 MG: 10 INJECTION, EMULSION INTRAVENOUS at 11:35

## 2025-04-16 RX ADMIN — PROPOFOL 40 MG: 10 INJECTION, EMULSION INTRAVENOUS at 12:26

## 2025-04-16 RX ADMIN — PROPOFOL 30 MG: 10 INJECTION, EMULSION INTRAVENOUS at 12:18

## 2025-04-16 RX ADMIN — SODIUM CHLORIDE: 9 INJECTION, SOLUTION INTRAVENOUS at 12:21

## 2025-04-16 ASSESSMENT — PAIN - FUNCTIONAL ASSESSMENT
PAIN_FUNCTIONAL_ASSESSMENT: 0-10
PAIN_FUNCTIONAL_ASSESSMENT: NONE - DENIES PAIN

## 2025-04-16 NOTE — DISCHARGE INSTRUCTIONS
[facility].  [address]  [place] [address] [phone]      Post-Colonoscopy Instructions    Do not drive, operate dangerous machinery, sign legal documents, or conduct any important business for the rest of the day.    Avoid strenuous exercise for the next 10 days.    Avoid straining when you move your bowels.    Do not take any aspirin-containing products, ibuprofen (Advil, Motrin, etc.), or Aleve for the next two weeks.  You may take Tylenol as directed on the bottle if needed.    You may begin with a light diet today then advance to your usual diet as tolerated.  Do not drink alcohol for the rest of the day.    If you notice blood in your stool for more than one or two bowel movements following the procedure, if you develop abdominal pain (aside from gas cramps on the day of the procedure), or if you develop a fever with a temperature of 101.0 or higher, call my office.    If you do not have a bowel movement within the next two days, call my office.    If you have any other problems or questions, please call my office.    Findings and Follow-up:  There was no cancer.  There were two small polyps that we removed, and we had some bleeding problems with one of them.  We placed multiple clips on the bleeding site to control it, but you will need to remain off Plavix and aspirin until at least Saturday 4/19/2025.  If you have not had any bleeding by then, then you may resume them.  If you have been bleeding, please contact me before you restart them.  Unfortunately, the bowel preparation was inadequate again and you should probably have another colonoscopy performed in about 6 months.  I will discuss the pathology results with you when they are available, and I will make a follow-up recommendation at that time.  Please call my office if you have not heard from me by Monday 4/21/2025.

## 2025-04-16 NOTE — PROGRESS NOTES
Verified patient name and date of birth, scheduled procedure, and informed consent. Reviewed general discharge instructions and  information.  Assessed patient. Awake, alert, and oriented per baseline. Vital signs stable (see vital sign flowsheet). Respiratory status within defined limits, abdomen soft and non tender. Skin with in defined limits.     Initial RN admission and assessment performed and documented in Endoscopy navigator.     Patient evaluated by anesthesia in pre-procedure holding.     All procedural vital signs, airway assessment, and level of consciousness information monitored and recorded by anesthesia staff on the anesthesia record.     Report received from CRNA post procedure.  Patient transported to recovery area by RN.    Endoscopy post procedure time out was performed and specimens were verified with physician.    Endoscope was pre-cleaned at bedside immediately following procedure by CT.

## 2025-04-16 NOTE — OP NOTE
Woodrow Madison  [id number]  1965    Colonoscopy Operative Report    Procedure Type:   Colonoscopy with hot forceps polypectomies and placement of multiple clips     Pre-operative Diagnosis:  Anemia    Post-operative Diagnosis:  Colonic polyps    Surgeon:  Julien Nieto MD    Intra-operative Consultant:  FADI Chung MD    Assistant:  Circulator: Uzma Baird RN; Ren Gutierrez RN    Referring Provider: Maggy Daniel APRN - NP    Sedation and Analgesia:  MAC anesthesia Propofol (1030 mg)    Specimens Removed:  Polyps (2)    EBL:  < 10 mL    Complications:  Excessive bleeding from a polypectomy wound    Implants:  None    Indication For Procedure:  The patient is a 59-year-old HIV-positive male dialysis patient with a 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  His hemoglobin was as low as 6.0 g/dL on 3/9/2025, and he received the transfusion of 3 units of PRBCs before being discharged.  An EGD was performed and was negative.  A colonoscopy was recommended, but he did not want to have it during that hospitalization. His last previous colonoscopy was performed on 1/2/2024 but he preparation was inadequate.  He denies appreciating any recent rectal bleeding.  A colonoscopy is indicated for further evaluation of the patient's anemia.     Findings:  There were two subcentimeter polyps identified in the colon.  One was located in the hepatic flexure and the other appeared to have been located in the transverse colon.  There was a broad tattoo in the descending colon.  There was a significant amount of retained stool including seeds and vegetable skins.    Procedure Details:  After informed consent was obtained, the patient was taken to the endoscopy suite where standard monitoring devices were attached and intravenous access was established.  Sedation was administered by the anesthetist

## 2025-04-16 NOTE — ANESTHESIA PRE PROCEDURE
Department of Anesthesiology  Preprocedure Note       Name:  Woodrow Madison   Age:  59 y.o.  :  1965                                          MRN:  [id number]         Date:  2025      Surgeon: Surgeon(s):  Julien Nieto MD    Procedure: Procedure(s):  COLONOSCOPY    Medications prior to admission:   Prior to Admission medications    Medication Sig Start Date End Date Taking? Authorizing Provider   atazanavir (REYATAZ) 300 MG capsule Take 1 capsule by mouth daily (with breakfast) 3/12/25  Yes Sal Kerr APRN - CNP   famotidine (PEPCID) 20 MG tablet Take 1 tablet by mouth at bedtime 3/12/25  Yes Sal Kerr APRN - CNP   Tenofovir Alafenamide Fumarate (VEMLIDY) 25 MG TABS TAKE 1 TABLET BY MOUTH ONCE DAILY 25  Yes Kirk Fish MD   levETIRAcetam (KEPPRA) 500 MG tablet take 1 tablet by mouth twice a day 25  Yes Maggy Daniel APRN - NP   atorvastatin (LIPITOR) 40 MG tablet take 1 tablet by mouth once daily 25  Yes Maggy Daniel APRN - NP   amLODIPine (NORVASC) 10 MG tablet Take 0.5 tablets by mouth daily   Yes Provider, MD Emily   Tenapanor HCl, CKD, (XPHOZAH) 30 MG TABS Take by mouth daily   Yes Provider, MD Emily   lanthanum (FOSRENOL) 1000 MG chewable tablet Take 1 tablet by mouth 3 times daily (with meals)   Yes ProviderEmily MD   clopidogrel (PLAVIX) 75 MG tablet Take 1 tablet by mouth daily   Yes Provider, MD Emily   losartan (COZAAR) 100 MG tablet TAKE 1 TABLET BY MOUTH DAILY 24  Yes Maggy Daniel APRN - NP   predniSONE (DELTASONE) 20 MG tablet Take 1 tablet by mouth daily   Yes ProviderEmily MD   sevelamer hcl (RENAGEL) 800 MG tablet Take 1 tablet by mouth 3 times daily (with meals)   Yes ProviderEmily MD   dolutegravir sodium (TIVICAY) 50 MG tablet TAKE 1 TABLET BY MOUTH DAILY 24  Yes Kirk Fish MD   metoprolol tartrate (LOPRESSOR) 50 MG tablet take 1 tablet by mouth

## 2025-04-16 NOTE — H&P
History and Physical (outpatient)    Patient: Woodrow Madison 59 y.o. 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  His hemoglobin was as low as 6.0 g/dL on 3/9/2025, and he received the transfusion of 3 units of PRBCs before being discharged.  An EGD was performed and was negative.  A colonoscopy was recommended, but he did not want to have it during that hospitalization.  He denies appreciating any recent rectal bleeding.      History:  Past Medical History:   Diagnosis Date    A-V fistula     left arm    Anemia     CAD (coronary artery disease)     Chronic abdominal pain     Chronic kidney disease     dialysis pt. - M.W.F - [facility]    Colon cancer ([facility]) 2021    Complication of AV dialysis fistula 2019    2 large pseudo-aneurysm    Esophagitis     ESRD on dialysis (HCC)     Gastritis     GERD (gastroesophageal reflux disease)     Gout     pt states not in 12 years    Hemodialysis patient     High cholesterol     History of blood transfusion     at least 8 units over the years  - pt states last given June 2023    HIV (human immunodeficiency virus infection) (HCC) 2011    Hypertension     MI (myocardial infarction) ([facility]) 2023    NSTEMI (non-ST elevated myocardial infarction) ([facility])     JUNE 2023    Other ill-defined conditions(799.89)     SMALL VEIN IN HEART BEING MONITORED    Pancreatitis     Renal insufficiency     Seizure ([facility]) 2012    one time and none sense    Sepsis ([facility]) 2011 & 8/2019       Past Surgical History:   Procedure Laterality Date    ANUS SURGERY N/A 01/07/2025    Anorectal examination under anesthesia and excision of anal canal lesions performed by Julien Nieto MD at [facility] MAIN OR    AV FISTULA CREATION Left 2012    & 2019    CARDIAC PROCEDURE

## 2025-04-16 NOTE — ANESTHESIA POSTPROCEDURE EVALUATION
Department of Anesthesiology  Postprocedure Note    Patient: Woodrow Madison  MRN: [id number]  YOB: 1965  Date of evaluation: 4/16/2025    Procedure Summary       Date: 04/16/25 Room / Location: [facility] ENDO 03 / [facility] ENDOSCOPY    Anesthesia Start: 1050 Anesthesia Stop: 1244    Procedure: COLONOSCOPY (Lower GI Region) Diagnosis:       Anemia, unspecified type      (Anemia, unspecified type [D64.9])    Surgeons: Julien Nieto MD Responsible Provider: Gabriele Cee MD    Anesthesia Type: MAC ASA Status: 3            Anesthesia Type: MAC    Jose Manuel Phase I: Jose Manuel Score: 10    Jose Manuel Phase II: Jose Manuel Score: 9    Anesthesia Post Evaluation    Patient location during evaluation: bedside  Nausea & Vomiting: no nausea  Cardiovascular status: blood pressure returned to baseline  Respiratory status: acceptable  Hydration status: euvolemic    No notable events documented.

## 2025-04-22 ENCOUNTER — APPOINTMENT (OUTPATIENT)
Facility: HOSPITAL | Age: 60
DRG: 919 | End: 2025-04-22
Payer: MEDICARE

## 2025-04-22 ENCOUNTER — HOSPITAL ENCOUNTER (INPATIENT)
Facility: HOSPITAL | Age: 60
LOS: 2 days | Discharge: HOME | DRG: 919 | End: 2025-04-24
Attending: EMERGENCY MEDICINE | Admitting: COLON & RECTAL SURGERY
Payer: MEDICARE

## 2025-04-22 DIAGNOSIS — B20 CURRENTLY ASYMPTOMATIC HIV INFECTION, WITH HISTORY OF HIV-RELATED ILLNESS (HCC): ICD-10-CM

## 2025-04-22 DIAGNOSIS — K62.5 RECTAL BLEEDING: Primary | ICD-10-CM

## 2025-04-22 PROBLEM — K91.840 COLONOSCOPY CAUSING POST-PROCEDURAL BLEEDING: Status: ACTIVE | Noted: 2025-04-22

## 2025-04-22 LAB
ALBUMIN SERPL-MCNC: 3.7 G/DL (ref 3.5–5)
ALBUMIN/GLOB SERPL: 0.9 (ref 1.1–2.2)
ALP SERPL-CCNC: 56 U/L (ref 45–117)
ALT SERPL-CCNC: 10 U/L (ref 12–78)
ANION GAP SERPL CALC-SCNC: 9 MMOL/L (ref 2–12)
AST SERPL-CCNC: 28 U/L (ref 15–37)
BASOPHILS # BLD: 0.03 K/UL (ref 0–0.1)
BASOPHILS NFR BLD: 0.4 % (ref 0–1)
BILIRUB SERPL-MCNC: 1.2 MG/DL (ref 0.2–1)
BUN SERPL-MCNC: 42 MG/DL (ref 6–20)
BUN/CREAT SERPL: 4 (ref 12–20)
CALCIUM SERPL-MCNC: 9.9 MG/DL (ref 8.5–10.1)
CHLORIDE SERPL-SCNC: 101 MMOL/L (ref 97–108)
CO2 SERPL-SCNC: 30 MMOL/L (ref 21–32)
COMMENT:: NORMAL
CREAT SERPL-MCNC: 11.7 MG/DL (ref 0.7–1.3)
DIFFERENTIAL METHOD BLD: ABNORMAL
EOSINOPHIL # BLD: 0.16 K/UL (ref 0–0.4)
EOSINOPHIL NFR BLD: 2.1 % (ref 0–7)
ERYTHROCYTE [DISTWIDTH] IN BLOOD BY AUTOMATED COUNT: 15.7 % (ref 11.5–14.5)
GLOBULIN SER CALC-MCNC: 4.1 G/DL (ref 2–4)
GLUCOSE SERPL-MCNC: 94 MG/DL (ref 65–100)
HCT VFR BLD AUTO: 28.6 % (ref 36.6–50.3)
HGB BLD-MCNC: 8.8 G/DL (ref 12.1–17)
IMM GRANULOCYTES # BLD AUTO: 0.04 K/UL (ref 0–0.04)
IMM GRANULOCYTES NFR BLD AUTO: 0.5 % (ref 0–0.5)
LYMPHOCYTES # BLD: 1.47 K/UL (ref 0.8–3.5)
LYMPHOCYTES NFR BLD: 19.5 % (ref 12–49)
MCH RBC QN AUTO: 28.9 PG (ref 26–34)
MCHC RBC AUTO-ENTMCNC: 30.8 G/DL (ref 30–36.5)
MCV RBC AUTO: 94.1 FL (ref 80–99)
MONOCYTES # BLD: 0.64 K/UL (ref 0–1)
MONOCYTES NFR BLD: 8.5 % (ref 5–13)
NEUTS SEG # BLD: 5.19 K/UL (ref 1.8–8)
NEUTS SEG NFR BLD: 69 % (ref 32–75)
NRBC # BLD: 0 K/UL (ref 0–0.01)
NRBC BLD-RTO: 0 PER 100 WBC
PLATELET # BLD AUTO: 234 K/UL (ref 150–400)
PMV BLD AUTO: 9.7 FL (ref 8.9–12.9)
POTASSIUM SERPL-SCNC: 5.8 MMOL/L (ref 3.5–5.1)
PROT SERPL-MCNC: 7.8 G/DL (ref 6.4–8.2)
RBC # BLD AUTO: 3.04 M/UL (ref 4.1–5.7)
SODIUM SERPL-SCNC: 140 MMOL/L (ref 136–145)
SPECIMEN HOLD: NORMAL
WBC # BLD AUTO: 7.5 K/UL (ref 4.1–11.1)

## 2025-04-22 PROCEDURE — 80053 COMPREHEN METABOLIC PANEL: CPT

## 2025-04-22 PROCEDURE — 2580000003 HC RX 258: Performed by: COLON & RECTAL SURGERY

## 2025-04-22 PROCEDURE — 2060000000 HC ICU INTERMEDIATE R&B

## 2025-04-22 PROCEDURE — 85025 COMPLETE CBC W/AUTO DIFF WBC: CPT

## 2025-04-22 PROCEDURE — 5A1D70Z PERFORMANCE OF URINARY FILTRATION, INTERMITTENT, LESS THAN 6 HOURS PER DAY: ICD-10-PCS | Performed by: INTERNAL MEDICINE

## 2025-04-22 PROCEDURE — 74018 RADEX ABDOMEN 1 VIEW: CPT

## 2025-04-22 PROCEDURE — 86923 COMPATIBILITY TEST ELECTRIC: CPT

## 2025-04-22 PROCEDURE — 86900 BLOOD TYPING SEROLOGIC ABO: CPT

## 2025-04-22 PROCEDURE — 86850 RBC ANTIBODY SCREEN: CPT

## 2025-04-22 PROCEDURE — 30233N1 TRANSFUSION OF NONAUTOLOGOUS RED BLOOD CELLS INTO PERIPHERAL VEIN, PERCUTANEOUS APPROACH: ICD-10-PCS | Performed by: COLON & RECTAL SURGERY

## 2025-04-22 PROCEDURE — 86901 BLOOD TYPING SEROLOGIC RH(D): CPT

## 2025-04-22 PROCEDURE — 99285 EMERGENCY DEPT VISIT HI MDM: CPT

## 2025-04-22 RX ORDER — ATAZANAVIR 300 MG/1
300 CAPSULE ORAL
Status: DISCONTINUED | OUTPATIENT
Start: 2025-04-24 | End: 2025-04-24 | Stop reason: HOSPADM

## 2025-04-22 RX ORDER — FAMOTIDINE 20 MG/1
20 TABLET, FILM COATED ORAL DAILY
Status: DISCONTINUED | OUTPATIENT
Start: 2025-04-23 | End: 2025-04-23

## 2025-04-22 RX ORDER — SODIUM CHLORIDE 0.9 % (FLUSH) 0.9 %
5-40 SYRINGE (ML) INJECTION PRN
Status: DISCONTINUED | OUTPATIENT
Start: 2025-04-22 | End: 2025-04-24 | Stop reason: HOSPADM

## 2025-04-22 RX ORDER — LOSARTAN POTASSIUM 50 MG/1
100 TABLET ORAL DAILY
Status: DISCONTINUED | OUTPATIENT
Start: 2025-04-22 | End: 2025-04-24 | Stop reason: HOSPADM

## 2025-04-22 RX ORDER — ONDANSETRON 2 MG/ML
4 INJECTION INTRAMUSCULAR; INTRAVENOUS EVERY 6 HOURS PRN
Status: DISCONTINUED | OUTPATIENT
Start: 2025-04-22 | End: 2025-04-24 | Stop reason: HOSPADM

## 2025-04-22 RX ORDER — SODIUM CHLORIDE 9 MG/ML
INJECTION, SOLUTION INTRAVENOUS CONTINUOUS
Status: DISCONTINUED | OUTPATIENT
Start: 2025-04-22 | End: 2025-04-23

## 2025-04-22 RX ORDER — ALBUTEROL SULFATE 0.83 MG/ML
2.5 SOLUTION RESPIRATORY (INHALATION) EVERY 4 HOURS PRN
Status: DISCONTINUED | OUTPATIENT
Start: 2025-04-22 | End: 2025-04-24 | Stop reason: HOSPADM

## 2025-04-22 RX ORDER — NITROGLYCERIN 0.4 MG/1
0.4 TABLET SUBLINGUAL EVERY 5 MIN PRN
Status: DISCONTINUED | OUTPATIENT
Start: 2025-04-22 | End: 2025-04-24 | Stop reason: HOSPADM

## 2025-04-22 RX ORDER — SEVELAMER CARBONATE 800 MG/1
800 TABLET, FILM COATED ORAL
Status: DISCONTINUED | OUTPATIENT
Start: 2025-04-23 | End: 2025-04-24

## 2025-04-22 RX ORDER — METOPROLOL TARTRATE 50 MG
50 TABLET ORAL 2 TIMES DAILY
Status: DISCONTINUED | OUTPATIENT
Start: 2025-04-22 | End: 2025-04-24 | Stop reason: HOSPADM

## 2025-04-22 RX ORDER — ACETAMINOPHEN 325 MG/1
650 TABLET ORAL EVERY 8 HOURS PRN
Status: DISCONTINUED | OUTPATIENT
Start: 2025-04-22 | End: 2025-04-24

## 2025-04-22 RX ORDER — SODIUM CHLORIDE 9 MG/ML
INJECTION, SOLUTION INTRAVENOUS PRN
Status: DISCONTINUED | OUTPATIENT
Start: 2025-04-22 | End: 2025-04-24 | Stop reason: HOSPADM

## 2025-04-22 RX ORDER — ALBUTEROL SULFATE 90 UG/1
2 INHALANT RESPIRATORY (INHALATION) EVERY 4 HOURS PRN
Status: DISCONTINUED | OUTPATIENT
Start: 2025-04-22 | End: 2025-04-22 | Stop reason: CLARIF

## 2025-04-22 RX ORDER — ONDANSETRON 4 MG/1
4 TABLET, ORALLY DISINTEGRATING ORAL EVERY 8 HOURS PRN
Status: DISCONTINUED | OUTPATIENT
Start: 2025-04-22 | End: 2025-04-24 | Stop reason: HOSPADM

## 2025-04-22 RX ORDER — LANTHANUM CARBONATE 1000 MG/1
1000 TABLET, CHEWABLE ORAL
Status: DISCONTINUED | OUTPATIENT
Start: 2025-04-23 | End: 2025-04-23

## 2025-04-22 RX ORDER — AMLODIPINE BESYLATE 5 MG/1
5 TABLET ORAL DAILY
Status: DISCONTINUED | OUTPATIENT
Start: 2025-04-22 | End: 2025-04-24 | Stop reason: HOSPADM

## 2025-04-22 RX ORDER — SODIUM CHLORIDE 0.9 % (FLUSH) 0.9 %
5-40 SYRINGE (ML) INJECTION EVERY 12 HOURS SCHEDULED
Status: DISCONTINUED | OUTPATIENT
Start: 2025-04-22 | End: 2025-04-24 | Stop reason: HOSPADM

## 2025-04-22 RX ADMIN — SODIUM CHLORIDE: 9 INJECTION, SOLUTION INTRAVENOUS at 23:29

## 2025-04-22 ASSESSMENT — PAIN DESCRIPTION - LOCATION
LOCATION: ABDOMEN
LOCATION: ABDOMEN

## 2025-04-22 ASSESSMENT — PAIN DESCRIPTION - ORIENTATION
ORIENTATION: LOWER
ORIENTATION: LOWER

## 2025-04-22 ASSESSMENT — PAIN DESCRIPTION - DESCRIPTORS
DESCRIPTORS: ACHING;DISCOMFORT
DESCRIPTORS: ACHING;DISCOMFORT

## 2025-04-22 ASSESSMENT — PAIN SCALES - GENERAL
PAINLEVEL_OUTOF10: 8
PAINLEVEL_OUTOF10: 7

## 2025-04-22 ASSESSMENT — PAIN DESCRIPTION - PAIN TYPE
TYPE: ACUTE PAIN
TYPE: ACUTE PAIN

## 2025-04-22 ASSESSMENT — PAIN DESCRIPTION - ONSET
ONSET: ON-GOING
ONSET: ON-GOING

## 2025-04-22 ASSESSMENT — PAIN DESCRIPTION - FREQUENCY
FREQUENCY: CONTINUOUS
FREQUENCY: CONTINUOUS

## 2025-04-22 ASSESSMENT — PAIN - FUNCTIONAL ASSESSMENT
PAIN_FUNCTIONAL_ASSESSMENT: PREVENTS OR INTERFERES SOME ACTIVE ACTIVITIES AND ADLS
PAIN_FUNCTIONAL_ASSESSMENT: 0-10
PAIN_FUNCTIONAL_ASSESSMENT: PREVENTS OR INTERFERES SOME ACTIVE ACTIVITIES AND ADLS

## 2025-04-22 NOTE — ED TRIAGE NOTES
Patient arrives to the ED for complaints of rectal bleeding that began Sunday. Patient has a history of colon cancer and had a colonoscopy last Friday. Patient endorses lower abdominal pain.

## 2025-04-22 NOTE — ED PROVIDER NOTES
[facility] EMERGENCY DEPARTMENT  EMERGENCY DEPARTMENT ENCOUNTER        CHIEF COMPLAINT       Chief Complaint   Patient presents with    Rectal Bleeding         HISTORY OF PRESENT ILLNESS      59y M here with rectal bleeding. Had colonoscopy about a week ago and removal of polyps. Had bleeding at the time but subsided. It started again at home and hasn't really stopped. Is on plavix but held several days prior to procedure and hasn't started back up. Sent in by his surgeon for admission.    Review of External Medical Records:     Nursing Notes were reviewed.    REVIEW OF SYSTEMS         Review of Systems   Constitutional: (-) weight loss.   HEENT: (-) stiff neck   Eyes: (-) discharge.   Respiratory: (-) cough.    Cardiovascular: (-) syncope.   Gastrointestinal: (+) blood in stool.   Genitourinary: (-) hematuria.  Musculoskeletal: (-) myalgias.   Neurological: (-) seizure.   Skin: (-) petechiae  Lymph/Immunologic: (-) enlarged lymph nodes  All other systems reviewed and are negative.          PAST MEDICAL HISTORY     Past Medical History:   Diagnosis Date    A-V fistula     left arm    Anemia     CAD (coronary artery disease)     Chronic abdominal pain     Chronic kidney disease     dialysis pt. - M.WRickyF - [facility]    Colon cancer (HCC) 2021    Complication of AV dialysis fistula 2019    2 large pseudo-aneurysm    Esophagitis     ESRD on dialysis (HCC)     Gastritis     GERD (gastroesophageal reflux disease)     Gout     pt states not in 12 years    Hemodialysis patient     High cholesterol     History of blood transfusion     at least 8 units over the years  - pt states last given June 2023    HIV (human immunodeficiency virus infection) ([facility]) 2011    Hypertension     MI (myocardial infarction) ([facility]) 2023    NSTEMI (non-ST elevated myocardial infarction) ([facility])     JUNE 2023    Other ill-defined conditions(799.89)     SMALL VEIN IN HEART BEING MONITORED    Pancreatitis     Renal insufficiency

## 2025-04-22 NOTE — H&P
SURGERY Left 2012    AV FISTULA    VASCULAR SURGERY  2011    DIALYSIS CATHETER    VASCULAR SURGERY  2012    DIALYSIS CATHETER REMOVED       Home Medications:  Prior to Admission Medications   Prescriptions Last Dose Informant Patient Reported? Taking?   Methoxy PEG-Epoetin Beta (MIRCERA IJ)   Yes No   Sig: Inject 150 mcg as directed every 14 days Given with dialysis   Tenapanor HCl, CKD, (XPHOZAH) 30 MG TABS   Yes No   Sig: Take by mouth daily   Tenofovir Alafenamide Fumarate (VEMLIDY) 25 MG TABS   No No   Sig: TAKE 1 TABLET BY MOUTH ONCE DAILY   acetaminophen (TYLENOL 8 HOUR) 650 MG extended release tablet   No No   Sig: Take 1 tablet by mouth every 8 hours as needed for Pain   albuterol sulfate HFA (PROVENTIL;VENTOLIN;PROAIR) 108 (90 Base) MCG/ACT inhaler   No No   Sig: inhale 1 puff by mouth and INTO THE LUNGS every 4 hours if needed for wheezing   amLODIPine (NORVASC) 10 MG tablet   Yes No   Sig: Take 0.5 tablets by mouth daily   aspirin 81 MG chewable tablet   Yes No   Sig: Take 1 tablet by mouth daily   atazanavir (REYATAZ) 300 MG capsule   No No   Sig: Take 1 capsule by mouth daily (with breakfast)   atorvastatin (LIPITOR) 40 MG tablet   No No   Sig: take 1 tablet by mouth once daily   bismuth subsalicylate (BISMATROL) 262 MG/15ML suspension   No No   Sig: Take 15 mLs by mouth every 6 hours as needed for Indigestion or Heartburn   calcium carbonate (OS-MOON) 1250 (500 Ca) MG chewable tablet   Yes No   Sig: Take 1 tablet by mouth as needed   clopidogrel (PLAVIX) 75 MG tablet   Yes No   Sig: Take 1 tablet by mouth daily   dolutegravir sodium (TIVICAY) 50 MG tablet   No No   Sig: TAKE 1 TABLET BY MOUTH DAILY   famotidine (PEPCID) 20 MG tablet   No No   Sig: Take 1 tablet by mouth at bedtime   famotidine (PEPCID) 40 MG tablet   No No   Sig: take 1 tablet by mouth twice a day   lanthanum (FOSRENOL) 1000 MG chewable tablet   Yes No   Sig: Take 1 tablet by mouth 3 times daily (with meals)   levETIRAcetam (KEPPRA) 500

## 2025-04-22 NOTE — ED NOTES
5:57 PM  I have evaluated the patient as the Provider in Rapid Medical Evaluation (RME). I have reviewed his vital signs and the triage nurse assessment. I have talked with the patient and any available family and advised that I am the provider in triage and have ordered the appropriate study to initiate their work up based on the clinical presentation during my assessment. I have advised that the patient will be accommodated in the Main ED as soon as possible. I have also requested to contact the triage nurse or myself immediately if the patient experiences any changes in their condition during this brief waiting period.    Had colonoscopy for known colorectal CA on Wednesday.  Started bleeding on Sunday.  Called Dr. RONQUILLO yesterday.  Dialysis patient - MWF - was able to go yesterday.    HPI from Dr. 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 His hemoglobin was as low as 6.0 g/dL on 3/9/2025, and he received the transfusion of 3 units of PRBCs before being discharged. An EGD was performed and was negative. A colonoscopy was recommended, but he did not want to have it during that hospitalization. He denies appreciating any recent rectal bleeding. \"    Takes plavix but has been off it for the last week or so.      SUSIE Franco Lindsay H, PA  04/22/25 3683

## 2025-04-23 ENCOUNTER — ANESTHESIA EVENT (OUTPATIENT)
Facility: HOSPITAL | Age: 60
DRG: 919 | End: 2025-04-23
Payer: MEDICARE

## 2025-04-23 ENCOUNTER — TELEPHONE (OUTPATIENT)
Age: 60
End: 2025-04-23

## 2025-04-23 ENCOUNTER — APPOINTMENT (OUTPATIENT)
Facility: HOSPITAL | Age: 60
DRG: 919 | End: 2025-04-23
Payer: MEDICARE

## 2025-04-23 ENCOUNTER — ANESTHESIA (OUTPATIENT)
Facility: HOSPITAL | Age: 60
DRG: 919 | End: 2025-04-23
Payer: MEDICARE

## 2025-04-23 LAB
ANION GAP SERPL CALC-SCNC: 8 MMOL/L (ref 2–12)
BASOPHILS # BLD: 0.02 K/UL (ref 0–0.1)
BASOPHILS NFR BLD: 0.4 % (ref 0–1)
BUN SERPL-MCNC: 48 MG/DL (ref 6–20)
BUN/CREAT SERPL: 4 (ref 12–20)
CALCIUM SERPL-MCNC: 9 MG/DL (ref 8.5–10.1)
CHLORIDE SERPL-SCNC: 103 MMOL/L (ref 97–108)
CO2 SERPL-SCNC: 25 MMOL/L (ref 21–32)
CREAT SERPL-MCNC: 12.5 MG/DL (ref 0.7–1.3)
DIFFERENTIAL METHOD BLD: ABNORMAL
EOSINOPHIL # BLD: 0.13 K/UL (ref 0–0.4)
EOSINOPHIL NFR BLD: 2.3 % (ref 0–7)
ERYTHROCYTE [DISTWIDTH] IN BLOOD BY AUTOMATED COUNT: 15.6 % (ref 11.5–14.5)
GLUCOSE SERPL-MCNC: 99 MG/DL (ref 65–100)
HCT VFR BLD AUTO: 20.9 % (ref 36.6–50.3)
HCT VFR BLD AUTO: 22.9 % (ref 36.6–50.3)
HCT VFR BLD AUTO: 23.1 % (ref 36.6–50.3)
HGB BLD-MCNC: 6.5 G/DL (ref 12.1–17)
HGB BLD-MCNC: 7.1 G/DL (ref 12.1–17)
HGB BLD-MCNC: 7.4 G/DL (ref 12.1–17)
HISTORY CHECK: NORMAL
HISTORY CHECK: NORMAL
IMM GRANULOCYTES # BLD AUTO: 0.03 K/UL (ref 0–0.04)
IMM GRANULOCYTES NFR BLD AUTO: 0.5 % (ref 0–0.5)
LIPASE SERPL-CCNC: 79 U/L (ref 13–75)
LYMPHOCYTES # BLD: 1.25 K/UL (ref 0.8–3.5)
LYMPHOCYTES NFR BLD: 22.3 % (ref 12–49)
MCH RBC QN AUTO: 28.5 PG (ref 26–34)
MCHC RBC AUTO-ENTMCNC: 31.1 G/DL (ref 30–36.5)
MCV RBC AUTO: 91.7 FL (ref 80–99)
MONOCYTES # BLD: 0.45 K/UL (ref 0–1)
MONOCYTES NFR BLD: 8 % (ref 5–13)
NEUTS SEG # BLD: 3.72 K/UL (ref 1.8–8)
NEUTS SEG NFR BLD: 66.5 % (ref 32–75)
NRBC # BLD: 0 K/UL (ref 0–0.01)
NRBC BLD-RTO: 0 PER 100 WBC
PHOSPHATE SERPL-MCNC: 8.6 MG/DL (ref 2.6–4.7)
PLATELET # BLD AUTO: 173 K/UL (ref 150–400)
PMV BLD AUTO: 9.6 FL (ref 8.9–12.9)
POTASSIUM SERPL-SCNC: 5 MMOL/L (ref 3.5–5.1)
RBC # BLD AUTO: 2.28 M/UL (ref 4.1–5.7)
RBC MORPH BLD: ABNORMAL
SODIUM SERPL-SCNC: 136 MMOL/L (ref 136–145)
WBC # BLD AUTO: 5.6 K/UL (ref 4.1–11.1)

## 2025-04-23 PROCEDURE — 85014 HEMATOCRIT: CPT

## 2025-04-23 PROCEDURE — 2580000003 HC RX 258: Performed by: COLON & RECTAL SURGERY

## 2025-04-23 PROCEDURE — 74176 CT ABD & PELVIS W/O CONTRAST: CPT

## 2025-04-23 PROCEDURE — 0W3P8ZZ CONTROL BLEEDING IN GASTROINTESTINAL TRACT, VIA NATURAL OR ARTIFICIAL OPENING ENDOSCOPIC: ICD-10-PCS | Performed by: INTERNAL MEDICINE

## 2025-04-23 PROCEDURE — 85018 HEMOGLOBIN: CPT

## 2025-04-23 PROCEDURE — 3600007502: Performed by: INTERNAL MEDICINE

## 2025-04-23 PROCEDURE — 36430 TRANSFUSION BLD/BLD COMPNT: CPT

## 2025-04-23 PROCEDURE — 84100 ASSAY OF PHOSPHORUS: CPT

## 2025-04-23 PROCEDURE — 2580000003 HC RX 258: Performed by: NURSE ANESTHETIST, CERTIFIED REGISTERED

## 2025-04-23 PROCEDURE — 2709999900 HC NON-CHARGEABLE SUPPLY: Performed by: INTERNAL MEDICINE

## 2025-04-23 PROCEDURE — 3700000001 HC ADD 15 MINUTES (ANESTHESIA): Performed by: INTERNAL MEDICINE

## 2025-04-23 PROCEDURE — 7100000011 HC PHASE II RECOVERY - ADDTL 15 MIN: Performed by: INTERNAL MEDICINE

## 2025-04-23 PROCEDURE — 2500000003 HC RX 250 WO HCPCS: Performed by: COLON & RECTAL SURGERY

## 2025-04-23 PROCEDURE — 6360000002 HC RX W HCPCS: Performed by: NURSE ANESTHETIST, CERTIFIED REGISTERED

## 2025-04-23 PROCEDURE — 7100000010 HC PHASE II RECOVERY - FIRST 15 MIN: Performed by: INTERNAL MEDICINE

## 2025-04-23 PROCEDURE — 83690 ASSAY OF LIPASE: CPT

## 2025-04-23 PROCEDURE — P9016 RBC LEUKOCYTES REDUCED: HCPCS

## 2025-04-23 PROCEDURE — 6370000000 HC RX 637 (ALT 250 FOR IP): Performed by: COLON & RECTAL SURGERY

## 2025-04-23 PROCEDURE — 2060000000 HC ICU INTERMEDIATE R&B

## 2025-04-23 PROCEDURE — 6360000002 HC RX W HCPCS: Performed by: COLON & RECTAL SURGERY

## 2025-04-23 PROCEDURE — 3700000000 HC ANESTHESIA ATTENDED CARE: Performed by: INTERNAL MEDICINE

## 2025-04-23 PROCEDURE — 80048 BASIC METABOLIC PNL TOTAL CA: CPT

## 2025-04-23 PROCEDURE — 3600007512: Performed by: INTERNAL MEDICINE

## 2025-04-23 PROCEDURE — C1889 IMPLANT/INSERT DEVICE, NOC: HCPCS | Performed by: INTERNAL MEDICINE

## 2025-04-23 PROCEDURE — 85025 COMPLETE CBC W/AUTO DIFF WBC: CPT

## 2025-04-23 RX ORDER — PHENYLEPHRINE HCL IN 0.9% NACL 0.4MG/10ML
SYRINGE (ML) INTRAVENOUS
Status: DISCONTINUED | OUTPATIENT
Start: 2025-04-23 | End: 2025-04-23 | Stop reason: SDUPTHER

## 2025-04-23 RX ORDER — SODIUM CHLORIDE 9 MG/ML
INJECTION, SOLUTION INTRAVENOUS PRN
Status: DISCONTINUED | OUTPATIENT
Start: 2025-04-23 | End: 2025-04-24 | Stop reason: SDUPTHER

## 2025-04-23 RX ORDER — LEVETIRACETAM 500 MG/1
500 TABLET ORAL 2 TIMES DAILY
Status: DISCONTINUED | OUTPATIENT
Start: 2025-04-23 | End: 2025-04-23 | Stop reason: SDUPTHER

## 2025-04-23 RX ORDER — LIDOCAINE HYDROCHLORIDE 20 MG/ML
INJECTION, SOLUTION EPIDURAL; INFILTRATION; INTRACAUDAL; PERINEURAL
Status: DISCONTINUED | OUTPATIENT
Start: 2025-04-23 | End: 2025-04-23 | Stop reason: SDUPTHER

## 2025-04-23 RX ORDER — LEVETIRACETAM 500 MG/1
500 TABLET ORAL 2 TIMES DAILY
Status: DISCONTINUED | OUTPATIENT
Start: 2025-04-23 | End: 2025-04-24 | Stop reason: HOSPADM

## 2025-04-23 RX ORDER — SODIUM CHLORIDE 9 MG/ML
INJECTION, SOLUTION INTRAVENOUS PRN
Status: CANCELLED | OUTPATIENT
Start: 2025-04-23

## 2025-04-23 RX ORDER — DIPHENHYDRAMINE HCL 50 MG
50 CAPSULE ORAL NIGHTLY PRN
Status: DISCONTINUED | OUTPATIENT
Start: 2025-04-23 | End: 2025-04-24 | Stop reason: HOSPADM

## 2025-04-23 RX ORDER — RITONAVIR 100 MG/1
100 TABLET ORAL
Status: DISCONTINUED | OUTPATIENT
Start: 2025-04-24 | End: 2025-04-24 | Stop reason: HOSPADM

## 2025-04-23 RX ORDER — SODIUM CHLORIDE 0.9 % (FLUSH) 0.9 %
5-40 SYRINGE (ML) INJECTION EVERY 12 HOURS SCHEDULED
Status: CANCELLED | OUTPATIENT
Start: 2025-04-23

## 2025-04-23 RX ORDER — SODIUM CHLORIDE 9 MG/ML
INJECTION, SOLUTION INTRAVENOUS
Status: DISCONTINUED | OUTPATIENT
Start: 2025-04-23 | End: 2025-04-23 | Stop reason: SDUPTHER

## 2025-04-23 RX ORDER — SODIUM CHLORIDE 0.9 % (FLUSH) 0.9 %
5-40 SYRINGE (ML) INJECTION PRN
Status: CANCELLED | OUTPATIENT
Start: 2025-04-23

## 2025-04-23 RX ORDER — SODIUM CHLORIDE 9 MG/ML
INJECTION, SOLUTION INTRAVENOUS CONTINUOUS
Status: CANCELLED | OUTPATIENT
Start: 2025-04-23

## 2025-04-23 RX ORDER — HYDROMORPHONE HYDROCHLORIDE 4 MG/1
2 TABLET ORAL EVERY 6 HOURS PRN
Refills: 0 | Status: DISCONTINUED | OUTPATIENT
Start: 2025-04-23 | End: 2025-04-24 | Stop reason: HOSPADM

## 2025-04-23 RX ORDER — FAMOTIDINE 20 MG/1
20 TABLET, FILM COATED ORAL DAILY PRN
Status: DISCONTINUED | OUTPATIENT
Start: 2025-04-23 | End: 2025-04-24 | Stop reason: HOSPADM

## 2025-04-23 RX ADMIN — Medication 10 ML: at 21:45

## 2025-04-23 RX ADMIN — SEVELAMER CARBONATE 800 MG: 800 TABLET, FILM COATED ORAL at 19:57

## 2025-04-23 RX ADMIN — Medication 80 MCG: at 14:02

## 2025-04-23 RX ADMIN — SODIUM CHLORIDE: 9 INJECTION, SOLUTION INTRAVENOUS at 13:15

## 2025-04-23 RX ADMIN — ONDANSETRON 4 MG: 2 INJECTION, SOLUTION INTRAMUSCULAR; INTRAVENOUS at 01:35

## 2025-04-23 RX ADMIN — PROPOFOL 50 MG: 10 INJECTION, EMULSION INTRAVENOUS at 13:55

## 2025-04-23 RX ADMIN — Medication 80 MCG: at 13:59

## 2025-04-23 RX ADMIN — LEVETIRACETAM 500 MG: 500 TABLET, FILM COATED ORAL at 21:48

## 2025-04-23 RX ADMIN — FAMOTIDINE 20 MG: 10 INJECTION, SOLUTION INTRAVENOUS at 01:59

## 2025-04-23 RX ADMIN — EPOETIN ALFA 20000 UNITS: 20000 SOLUTION INTRAVENOUS; SUBCUTANEOUS at 19:58

## 2025-04-23 RX ADMIN — DIPHENHYDRAMINE HYDROCHLORIDE 50 MG: 50 CAPSULE ORAL at 21:48

## 2025-04-23 RX ADMIN — Medication 10 ML: at 00:10

## 2025-04-23 RX ADMIN — FAMOTIDINE 20 MG: 20 TABLET, FILM COATED ORAL at 08:40

## 2025-04-23 RX ADMIN — PROPOFOL 50 MG: 10 INJECTION, EMULSION INTRAVENOUS at 13:50

## 2025-04-23 RX ADMIN — SODIUM CHLORIDE 40 MG: 9 INJECTION INTRAMUSCULAR; INTRAVENOUS; SUBCUTANEOUS at 00:07

## 2025-04-23 RX ADMIN — Medication 80 MCG: at 13:56

## 2025-04-23 RX ADMIN — PROPOFOL 50 MG: 10 INJECTION, EMULSION INTRAVENOUS at 13:39

## 2025-04-23 RX ADMIN — METOPROLOL TARTRATE 50 MG: 50 TABLET, FILM COATED ORAL at 21:48

## 2025-04-23 RX ADMIN — PROPOFOL 100 MG: 10 INJECTION, EMULSION INTRAVENOUS at 13:37

## 2025-04-23 RX ADMIN — SEVELAMER CARBONATE 800 MG: 800 TABLET, FILM COATED ORAL at 08:40

## 2025-04-23 RX ADMIN — PROPOFOL 50 MG: 10 INJECTION, EMULSION INTRAVENOUS at 14:01

## 2025-04-23 RX ADMIN — PROPOFOL 50 MG: 10 INJECTION, EMULSION INTRAVENOUS at 13:41

## 2025-04-23 RX ADMIN — HYDROMORPHONE HYDROCHLORIDE 0.5 MG: 1 INJECTION, SOLUTION INTRAMUSCULAR; INTRAVENOUS; SUBCUTANEOUS at 03:42

## 2025-04-23 RX ADMIN — LIDOCAINE HYDROCHLORIDE 50 MG: 20 INJECTION, SOLUTION EPIDURAL; INFILTRATION; INTRACAUDAL; PERINEURAL at 13:37

## 2025-04-23 RX ADMIN — METOPROLOL TARTRATE 50 MG: 50 TABLET, FILM COATED ORAL at 08:40

## 2025-04-23 ASSESSMENT — PAIN SCALES - GENERAL
PAINLEVEL_OUTOF10: 0
PAINLEVEL_OUTOF10: 8
PAINLEVEL_OUTOF10: 2
PAINLEVEL_OUTOF10: 0
PAINLEVEL_OUTOF10: 0
PAINLEVEL_OUTOF10: 8
PAINLEVEL_OUTOF10: 0
PAINLEVEL_OUTOF10: 8
PAINLEVEL_OUTOF10: 8
PAINLEVEL_OUTOF10: 0

## 2025-04-23 ASSESSMENT — PAIN DESCRIPTION - ORIENTATION
ORIENTATION: OTHER (COMMENT)
ORIENTATION: LOWER
ORIENTATION: OTHER (COMMENT)

## 2025-04-23 ASSESSMENT — PAIN DESCRIPTION - DESCRIPTORS
DESCRIPTORS: SHARP
DESCRIPTORS: SHARP
DESCRIPTORS: ACHING

## 2025-04-23 ASSESSMENT — PAIN - FUNCTIONAL ASSESSMENT: PAIN_FUNCTIONAL_ASSESSMENT: PREVENTS OR INTERFERES SOME ACTIVE ACTIVITIES AND ADLS

## 2025-04-23 ASSESSMENT — PAIN DESCRIPTION - LOCATION
LOCATION: ABDOMEN

## 2025-04-23 ASSESSMENT — PAIN DESCRIPTION - PAIN TYPE
TYPE: ACUTE PAIN

## 2025-04-23 NOTE — TELEPHONE ENCOUNTER
HIV patient admitted to [facility] for rectal bleeding. Pharmacy calling to confirm his HIV medications.

## 2025-04-23 NOTE — CONSULTS
[facility]   [address], [place] [address]  Phone: [phone]   [phone]    [email or web address]     Nephrology Progress Note    Patient Name : Woodrow Madison      : 1965     MRN : [id number]  Date of Admission : 2025  Date of Servive : 25    CC:  Follow up for ESRD       Assessment and Plan   ESRD on HD MWF at [facility] w/ [facility]  HIV  Anemia of CKD  Secondary HPT  HTN  GI bleed - post polypectomy    Plan:  HD today per routine  No UF  D/c IVF  PRBCs as needed  HAYDE MWF     Interval History:  60 yo AAM w/ ESRD on HD MWF, has LUE AVF, HIV, HTN, presented for rectal bleeding.  He had a colonosocpy and polypectomy on .  Presented with rectal bleeding, hgb down to 6.5.  CT scan w/o neg for acute bleeding.  Received PRBCs and will get 2nd unit today.  Plan for colonosocpy with GI.  No cp, sob reported. Some epigastric pain.  No vomiting or diarrhea    Review of Systems: Pertinent items are noted in HPI.    Current Medications:   Current Facility-Administered Medications   Medication Dose Route Frequency    0.9 % sodium chloride infusion   IntraVENous PRN    0.9 % sodium chloride infusion   IntraVENous PRN    acetaminophen (TYLENOL) tablet 650 mg  650 mg Oral Q8H PRN    [Held by provider] amLODIPine (NORVASC) tablet 5 mg  5 mg Oral Daily    atazanavir (REYATAZ) capsule 300 mg  300 mg Oral Daily with breakfast    dolutegravir sodium (TIVICAY) tablet 50 mg  50 mg Oral Daily    [Held by provider] losartan (COZAAR) tablet 100 mg  100 mg Oral Daily    metoprolol tartrate (LOPRESSOR) tablet 50 mg  50 mg Oral BID    nitroGLYCERIN (NITROSTAT) SL tablet 0.4 mg  0.4 mg SubLINGual Q5 Min PRN    sevelamer (RENVELA) tablet 800 mg  800 mg Oral TID WC    sodium chloride flush 0.9 % injection 5-40 mL  5-40 mL IntraVENous 2 times per day    sodium chloride flush 0.9 % injection 5-40 mL  5-40 mL IntraVENous PRN    0.9 % sodium chloride infusion

## 2025-04-23 NOTE — ED NOTES
Rn informed Md that pt still having 8/10 abdominal pain. Md states to provide the pt with Pepcid and check H&H at 0200. After the pt receives the pepcid Md wants to be notified if the pt is still have abdominal pain. Tristian informed TRISTIAN Koroma about the pt's treatment plan.

## 2025-04-23 NOTE — ANESTHESIA PRE PROCEDURE
famotidine (PEPCID) 20 MG tablet Take 1 tablet by mouth at bedtime 3/12/25   Sal Kerr APRN - CNP   famotidine (PEPCID) 40 MG tablet take 1 tablet by mouth twice a day 3/4/25   Maggy Daniel APRN - NP   Tenapanor HCl, CKD, (XPHOZAH) 30 MG TABS Take by mouth daily    Emily Ortiz MD   clopidogrel (PLAVIX) 75 MG tablet Take 1 tablet by mouth daily    Emily Ortiz MD   acetaminophen (TYLENOL 8 HOUR) 650 MG extended release tablet Take 1 tablet by mouth every 8 hours as needed for Pain 12/26/24   Gustavo Larios PA-C   nitroGLYCERIN (NITROSTAT) 0.4 MG SL tablet place 1 tablet under the tongue if needed every 5 minutes for michelle...  (REFER TO PRESCRIPTION NOTES). 12/22/23   Emily Ortiz MD   bismuth subsalicylate (BISMATROL) 262 MG/15ML suspension Take 15 mLs by mouth every 6 hours as needed for Indigestion or Heartburn 11/22/23   Thomas Lazar MD   albuterol sulfate HFA (PROVENTIL;VENTOLIN;PROAIR) 108 (90 Base) MCG/ACT inhaler inhale 1 puff by mouth and INTO THE LUNGS every 4 hours if needed for wheezing 8/23/23   Maggy Daniel APRN - NP   calcium carbonate (OS-MOON) 1250 (500 Ca) MG chewable tablet Take 1 tablet by mouth as needed    Automatic Reconciliation, Ar   lidocaine-prilocaine (EMLA) 2.5-2.5 % cream APPLY SMALL AMOUNT TO ACCESS SITE (AVF) 1 HOUR BEFORE DIALYSIS. COVER WITH OCCLUSIVE DRESSING (SARAN WRAP) 9/21/21   Automatic Reconciliation, Ar       Current medications:    Current Facility-Administered Medications   Medication Dose Route Frequency Provider Last Rate Last Admin   • 0.9 % sodium chloride infusion   IntraVENous PRN Julien Nieto MD       • 0.9 % sodium chloride infusion   IntraVENous PRN Julien Nieto MD       • epoetin rosalai (EPOGEN;PROCRIT) injection 20,000 Units  20,000 Units SubCUTAneous Once per day on Monday Wednesday Friday Julien Nieto MD       • acetaminophen (TYLENOL) tablet 650 mg  650 mg Oral Q8H

## 2025-04-23 NOTE — CONSENT
Informed Consent for Blood Component Transfusion Note    The patient has been informed of the rationale for blood component transfusion; its benefits in treating or preventing fatigue, organ damage, or death; and its risk which includes mild transfusion reactions, rare risk of blood borne infection, or more serious but rare reactions. The patient had an opportunity to ask questions and had agreed to proceed with transfusion of blood components.    Electronically signed by Julien Nieto MD on 4/23/25 at 3:03 AM EDT

## 2025-04-23 NOTE — ED NOTES
TRANSFER - OUT REPORT:    Verbal report given to Tristian Koroma  on Woodrow Madison  being transferred to [facility] for routine progression of patient care       Report consisted of patient's Situation, Background, Assessment and   Recommendations(SBAR).     Information from the following report(s) Nurse Handoff Report, ED Encounter Summary, Adult Overview, Intake/Output, MAR, Recent Results, Cardiac Rhythm Sinus Carlos Alberto, and Neuro Assessment was reviewed with the receiving nurse.    Rowland Fall Assessment:    Presents to emergency department  because of falls (Syncope, seizure, or loss of consciousness): No  Age > 70: No  Altered Mental Status, Intoxication with alcohol or substance confusion (Disorientation, impaired judgment, poor safety awaremess, or inability to follow instructions): No  Impaired Mobility: Ambulates or transfers with assistive devices or assistance; Unable to ambulate or transer.: No  Nursing Judgement: No          Lines:   Peripheral IV 04/22/25 Proximal;Right;Ventral Forearm (Active)        Opportunity for questions and clarification was provided.      Patient transported with:  Monitor and Registered Nurse

## 2025-04-23 NOTE — PROGRESS NOTES
0225: Morning labs collected. Scheduled H/H sent for processing.    0305: Morning hemoglobin 6.5. MD notified. Patient down in CT.   0330: Provider consent in electronic chart. Patient has complaints of 8/10 sharp abdominal pain.  0345: Additional IV access placed. 20G in the right antecubital. One time dose of 0.5mg dilaudid administered.   0415: Consent signed by patient, witnessed by the primary nurse. Consent placed in patients chart.     0425: Initial vital signs collected. Patient denies pain. Blood verification completed with Crissy Lubin at patients bedside. Initial infusion rate 60mL/hr. Blood hit vein at 0430.     0445: Vital signs recollected. Patient denies pain, no changes. Infusion rate increased to 135 mL/hr. Patient resting in bed, respirations even and unlabored.

## 2025-04-23 NOTE — TELEPHONE ENCOUNTER
[facility] calling to confirming patient recent medications for HIV advised provider was not in clinic would send a message over     [phone]

## 2025-04-23 NOTE — OP NOTE
[facility]  [address]  [place] [address] [phone]                              Colonoscopy Procedure Note      Indications:    Lower GI bleeding     :  Eder Kincaid MD    Surgical Assistant: Jadenulator: Moise Orozco RN  Endoscopy Technician: Nicole Conner    Implants: 2 resolution clips ultra were placed at a post polypectomy ulcer in ascending colon ([employer])  2 resolution clips ultra were placed in transverse colon where ulcer and oozing was noted ([employer])    Referring Provider: Maggy Daniel APRN - NP    Sedation:  MAC anesthesia    Procedure Details:  After informed consent was obtained with all risks and benefits of procedure explained and preoperative exam completed, the patient was taken to the endoscopy suite and placed in the left lateral decubitus position.  Upon sequential sedation as per above, a digital rectal exam was performed  And was normal.  The Olympus videocolonoscope  was inserted in the rectum and carefully advanced to the cecum, which was identified by the ileocecal valve and appendiceal orifice.  The quality of preparation was inadequate.  The colonoscope was slowly withdrawn with careful evaluation between folds. Retroflexion in the rectum was performed and was normal..     Findings:   Rectum: no mucosal lesion appreciated  Blood and stool present ;  Sigmoid: no mucosal lesion appreciated  Blood and stool present ;  Diverticulosis without active bleeding was noted.  Descending Colon: no mucosal lesion appreciated  Fresh blood was noted. ;  Transverse Colon: An ulcer with oozing was noted in the distal transverse colon.  1 resolution clip was noted at the edge of the ulcer.  2 additional resolution clips were noted about 1 cm away from the ulcer.  Ulcer was about 8 mm in size with mild oozing.  Fresh blood was noted proximal and distal to this ulcerated area.  Ascending Colon: An ulcer measuring

## 2025-04-23 NOTE — ANESTHESIA POSTPROCEDURE EVALUATION
Department of Anesthesiology  Postprocedure Note    Patient: Woodrow Madison  MRN: [id number]  YOB: 1965  Date of evaluation: 4/23/2025    Procedure Summary       Date: 04/23/25 Room / Location: [facility] ENDO 01 / [facility] ENDOSCOPY    Anesthesia Start: 1326 Anesthesia Stop: 1409    Procedure: COLONOSCOPY DIAGNOSTIC Diagnosis:       Colonic hemorrhage      (Colonic hemorrhage [K92.2])    Surgeons: Eder Kincaid MD Responsible Provider: Gabriele Cee MD    Anesthesia Type: MAC ASA Status: 3            Anesthesia Type: MAC    Jose Manuel Phase I: Jose Manuel Score: 10    Jose Manuel Phase II: Jose Manuel Score: 9    Anesthesia Post Evaluation    Patient location during evaluation: bedside  Nausea & Vomiting: no nausea  Cardiovascular status: blood pressure returned to baseline  Respiratory status: acceptable  Hydration status: euvolemic    No notable events documented.

## 2025-04-23 NOTE — PROGRESS NOTES
TRANSFER - IN REPORT:    Verbal report received from ANN-MARIE Payan RUSTY Bennington  being received from [facility] for ordered procedure      Report consisted of patient's Situation, Background, Assessment and   Recommendations(SBAR).     Information from the following report(s) Nurse Handoff Report was reviewed with the receiving nurse.    Opportunity for questions and clarification was provided.      Assessment completed upon patient's arrival to unit and care assumed.     Verified patient name and date of birth, scheduled procedure, and informed consent. Reviewed general discharge instructions and  information.  Assessed patient. Awake, alert, and oriented per baseline. Vital signs stable (see vital sign flowsheet). Respiratory status within defined limits, abdomen soft and non tender. Skin with in defined limits.     Initial RN admission and assessment performed and documented in Endoscopy navigator.     Patient evaluated by anesthesia in pre-procedure holding.     All procedural vital signs, airway assessment, and level of consciousness information monitored and recorded by anesthesia staff on the anesthesia record.     Report received from CRNA post procedure.  Patient transported to recovery area by RN.    Endoscopy post procedure time out was performed and specimens were verified with physician.    Endoscope was pre-cleaned at bedside immediately following procedure by Vinod.    TRANSFER - OUT REPORT:    Verbal report given to ANN-MARIE Payan  being transferred to [facility] for routine post-op       Report consisted of patient's Situation, Background, Assessment and   Recommendations(SBAR).     Information from the following report(s) Nurse Handoff Report was reviewed with the receiving nurse.           Lines:   Peripheral IV 04/22/25 Proximal;Right;Ventral Forearm (Active)   Site Assessment Other (Comment) 04/23/25 0400   Line Status Infusing 04/23/25 0400   Line Care Connections checked

## 2025-04-23 NOTE — PLAN OF CARE
Problem: Chronic Conditions and Co-morbidities  Goal: Patient's chronic conditions and co-morbidity symptoms are monitored and maintained or improved  Flowsheets (Taken 4/23/2025 0125)  Care Plan - Patient's Chronic Conditions and Co-Morbidity Symptoms are Monitored and Maintained or Improved: Monitor and assess patient's chronic conditions and comorbid symptoms for stability, deterioration, or improvement     Problem: Discharge Planning  Goal: Discharge to home or other facility with appropriate resources  Flowsheets (Taken 4/23/2025 0125)  Discharge to home or other facility with appropriate resources: Identify barriers to discharge with patient and caregiver     Problem: Pain  Goal: Verbalizes/displays adequate comfort level or baseline comfort level  Flowsheets (Taken 4/23/2025 0832)  Verbalizes/displays adequate comfort level or baseline comfort level:   Assess pain using appropriate pain scale   Encourage patient to monitor pain and request assistance     Problem: Safety - Adult  Goal: Free from fall injury  Flowsheets (Taken 4/23/2025 0832)  Free From Fall Injury: Instruct family/caregiver on patient safety

## 2025-04-24 VITALS
DIASTOLIC BLOOD PRESSURE: 79 MMHG | WEIGHT: 234 LBS | HEART RATE: 71 BPM | OXYGEN SATURATION: 96 % | SYSTOLIC BLOOD PRESSURE: 131 MMHG | TEMPERATURE: 98.2 F | BODY MASS INDEX: 31.69 KG/M2 | RESPIRATION RATE: 17 BRPM | HEIGHT: 72 IN

## 2025-04-24 LAB
ERYTHROCYTE [DISTWIDTH] IN BLOOD BY AUTOMATED COUNT: 16 % (ref 11.5–14.5)
HCT VFR BLD AUTO: 21.1 % (ref 36.6–50.3)
HCT VFR BLD AUTO: 24.5 % (ref 36.6–50.3)
HGB BLD-MCNC: 6.7 G/DL (ref 12.1–17)
HGB BLD-MCNC: 8 G/DL (ref 12.1–17)
HISTORY CHECK: NORMAL
MCH RBC QN AUTO: 28.3 PG (ref 26–34)
MCHC RBC AUTO-ENTMCNC: 31.8 G/DL (ref 30–36.5)
MCV RBC AUTO: 89 FL (ref 80–99)
NRBC # BLD: 0 K/UL (ref 0–0.01)
NRBC BLD-RTO: 0 PER 100 WBC
PLATELET # BLD AUTO: 154 K/UL (ref 150–400)
PMV BLD AUTO: 9.9 FL (ref 8.9–12.9)
RBC # BLD AUTO: 2.37 M/UL (ref 4.1–5.7)
WBC # BLD AUTO: 5.7 K/UL (ref 4.1–11.1)

## 2025-04-24 PROCEDURE — 36430 TRANSFUSION BLD/BLD COMPNT: CPT

## 2025-04-24 PROCEDURE — 85027 COMPLETE CBC AUTOMATED: CPT

## 2025-04-24 PROCEDURE — 6370000000 HC RX 637 (ALT 250 FOR IP): Performed by: COLON & RECTAL SURGERY

## 2025-04-24 PROCEDURE — 90935 HEMODIALYSIS ONE EVALUATION: CPT

## 2025-04-24 PROCEDURE — P9016 RBC LEUKOCYTES REDUCED: HCPCS

## 2025-04-24 PROCEDURE — 2500000003 HC RX 250 WO HCPCS: Performed by: COLON & RECTAL SURGERY

## 2025-04-24 PROCEDURE — 85014 HEMATOCRIT: CPT

## 2025-04-24 PROCEDURE — 85018 HEMOGLOBIN: CPT

## 2025-04-24 RX ORDER — SODIUM CHLORIDE 9 MG/ML
INJECTION, SOLUTION INTRAVENOUS PRN
Status: DISCONTINUED | OUTPATIENT
Start: 2025-04-24 | End: 2025-04-24 | Stop reason: HOSPADM

## 2025-04-24 RX ORDER — ACETAMINOPHEN 325 MG/1
650 TABLET ORAL EVERY 8 HOURS PRN
Status: DISCONTINUED | OUTPATIENT
Start: 2025-04-24 | End: 2025-04-24 | Stop reason: HOSPADM

## 2025-04-24 RX ORDER — SEVELAMER CARBONATE 800 MG/1
1600 TABLET, FILM COATED ORAL
Status: DISCONTINUED | OUTPATIENT
Start: 2025-04-24 | End: 2025-04-24 | Stop reason: HOSPADM

## 2025-04-24 RX ADMIN — SEVELAMER CARBONATE 1600 MG: 800 TABLET, FILM COATED ORAL at 12:02

## 2025-04-24 RX ADMIN — Medication 10 ML: at 08:57

## 2025-04-24 RX ADMIN — HYDROMORPHONE HYDROCHLORIDE 2 MG: 4 TABLET ORAL at 03:19

## 2025-04-24 RX ADMIN — HYDROMORPHONE HYDROCHLORIDE 2 MG: 4 TABLET ORAL at 10:32

## 2025-04-24 RX ADMIN — Medication 10 ML: at 08:58

## 2025-04-24 ASSESSMENT — PAIN SCALES - GENERAL
PAINLEVEL_OUTOF10: 6
PAINLEVEL_OUTOF10: 8
PAINLEVEL_OUTOF10: 0

## 2025-04-24 ASSESSMENT — PAIN DESCRIPTION - LOCATION
LOCATION: ABDOMEN
LOCATION: ABDOMEN

## 2025-04-24 ASSESSMENT — PAIN DESCRIPTION - ORIENTATION
ORIENTATION: MID
ORIENTATION: LEFT

## 2025-04-24 ASSESSMENT — PAIN DESCRIPTION - DESCRIPTORS
DESCRIPTORS: ACHING
DESCRIPTORS: SHARP

## 2025-04-24 ASSESSMENT — PAIN DESCRIPTION - FREQUENCY: FREQUENCY: INTERMITTENT

## 2025-04-24 NOTE — PROGRESS NOTES
[address], [place] [address]  Phone: [phone]   [phone]    [email or web address]     Nephrology Progress Note    Patient Name : Woodrow Madison      : 1965     MRN : [id number]  Date of Admission : 2025  Date of Servive : 25    CC:  Follow up for ESRD       Assessment and Plan   ESRD on HD MWF at [facility] w/ [facility]  HIV  Anemia of CKD  Secondary HPT  HTN  GI bleed - post polypectomy s/p clipping     Plan:  HD today per routine  PRBCs with dialysis  HAYDE today       Interval History:  Seen and examined on dialysis.  Getting PRBCs.  Hgb dropped with AM labs.  No c/o rectal bleeding.  Had clipping of post polypectomy ulcer bleeding. No cp, sob, n/v/d    Review of Systems: Pertinent items are noted in HPI.    Current Medications:   Current Facility-Administered Medications   Medication Dose Route Frequency    sevelamer (RENVELA) tablet 1,600 mg  1,600 mg Oral TID WC    0.9 % sodium chloride infusion   IntraVENous PRN    epoetin rosalia (EPOGEN;PROCRIT) injection 20,000 Units  20,000 Units SubCUTAneous Once per day on     ritonavir (NORVIR) tablet 100 mg  100 mg Oral Daily with breakfast    dolutegravir sodium (TIVICAY) tablet 50 mg  50 mg Oral Daily    famotidine (PEPCID) tablet 20 mg  20 mg Oral Daily PRN    levETIRAcetam (KEPPRA) tablet 500 mg  500 mg Oral BID    HYDROmorphone (DILAUDID) tablet 2 mg  2 mg Oral Q6H PRN    diphenhydrAMINE (BENADRYL) capsule 50 mg  50 mg Oral Nightly PRN    acetaminophen (TYLENOL) tablet 650 mg  650 mg Oral Q8H PRN    [Held by provider] amLODIPine (NORVASC) tablet 5 mg  5 mg Oral Daily    atazanavir (REYATAZ) capsule 300 mg  300 mg Oral Daily with breakfast    [Held by provider] losartan (COZAAR) tablet 100 mg  100 mg Oral Daily    metoprolol tartrate (LOPRESSOR) tablet 50 mg  50 mg Oral BID    nitroGLYCERIN (NITROSTAT) SL tablet 0.4 mg  0.4 mg SubLINGual Q5

## 2025-04-24 NOTE — FLOWSHEET NOTE
04/24/25 0940   Treatment   Time Off 0930   Observations & Evaluations   Level of Consciousness 0   Oriented X 4   Heart Rhythm Regular   Respiratory Quality/Effort Unlabored   O2 Device None (Room air)   Skin Condition/Temp Dry;Warm   Edema None   Vital Signs   /77   Temp 98.4 °F (36.9 °C)   Pain Assessment   Pain Assessment 0-10   Pain Level 6   Pain Location Abdomen   Pain Frequency Intermittent   Hemodialysis Fistula/Graft Superior Arm   No placement date or time found.   Orientation: Superior  Access Location: Arm   Site Assessment Clean, dry & intact   Thrill Present   Bruit Present   Status Deaccessed   Date of Last Dressing Change 04/24/25   Post-Hemodialysis Assessment   Post-Treatment Procedures Blood returned;Access bleeding time < 10 minutes   Machine Disinfection Process Exterior Machine Disinfection   Rinseback Volume (ml) 300 ml   Blood Volume Processed (Liters) 75 L   Dialyzer Clearance Lightly streaked   Duration of Treatment (minutes) 210 minutes   Hemodialysis Intake (ml) 800 ml   Hemodialysis Output (ml) 1800 ml   NET Removed (ml) 1000   Tolerated Treatment Good     Primary RN SBAR: Celine Cline RN    Comments: HD tx completed, 3.5 hrs.  Unit of PRBC transfused during the tx, tolerated well, no adverse reaction noted/reported.

## 2025-04-24 NOTE — PROGRESS NOTES
General Daily Progress Note    Admission Date: 4/22/2025  Hospital Day 3  Post-Op Day Not Applicable  Subjective:   No new complaints.  Abdominal discomfort about the same.  No passage of blood since the procedure yesterday.      Objective:   Patient Vitals for the past 24 hrs:   BP Temp Temp src Pulse Resp SpO2 Weight   04/24/25 0900 122/65 97.7 °F (36.5 °C) Oral -- -- -- --   04/24/25 0850 129/68 -- -- -- -- -- --   04/24/25 0845 119/73 98 °F (36.7 °C) -- 74 13 96 % --   04/24/25 0830 133/73 -- -- -- -- -- --   04/24/25 0815 125/69 -- -- -- -- -- --   04/24/25 0800 (!) 144/71 -- -- -- -- -- --   04/24/25 0745 (!) 118/56 -- -- -- -- -- --   04/24/25 0730 (!) 109/59 -- -- -- -- -- --   04/24/25 0715 (!) 125/57 -- -- -- -- -- --   04/24/25 0700 122/66 -- -- -- -- -- --   04/24/25 0645 130/60 -- -- -- -- -- --   04/24/25 0630 (!) 120/55 -- -- -- -- -- --   04/24/25 0615 132/71 -- -- 63 -- -- --   04/24/25 0600 133/70 98.2 °F (36.8 °C) -- -- -- -- --   04/24/25 0319 134/76 -- -- 61 19 96 % --   04/24/25 0313 131/75 98.2 °F (36.8 °C) Temporal 67 16 -- 106.1 kg (234 lb)   04/23/25 2259 (!) 144/72 97.1 °F (36.2 °C) Temporal 58 18 96 % --   04/23/25 2148 129/65 -- -- 66 -- -- --   04/23/25 2145 -- -- -- -- -- 97 % --   04/23/25 1838 (!) 155/74 97.6 °F (36.4 °C) Oral 64 21 98 % --   04/23/25 1800 -- -- -- 63 -- -- --   04/23/25 1602 133/68 -- -- 59 12 99 % --   04/23/25 1500 116/71 -- -- 56 12 99 % --   04/23/25 1456 114/67 97.4 °F (36.3 °C) Oral 55 10 100 % --   04/23/25 1430 (!) 116/55 -- -- 57 10 98 % --   04/23/25 1425 (!) 110/49 97.7 °F (36.5 °C) Temporal 60 18 96 % --   04/23/25 1420 (!) 100/42 -- -- 58 22 96 % --   04/23/25 1415 (!) 97/45 -- -- 59 20 97 % --   04/23/25 1413 (!) 99/49 97.7 °F (36.5 °C) Temporal 66 21 97 % --   04/23/25 1409 (!) 112/57 -- -- 62 18 94 % --   04/23/25 1318 (!) 148/72 98 °F (36.7 °C) Temporal 58 14 99 % --   04/23/25 1230 122/74 -- -- 54 10 100 % --   04/23/25 1215 125/73 97.3 °F (36.3

## 2025-04-24 NOTE — PROGRESS NOTES
0755  Verbal report given to Nurse ANN-MARIE Berger by ANN-MARIE Brown. Report included Nurse Handoff Report, Index, Adult overview, MAR, Intake and Output, and Cardiac Rhythm      0715  No new orders    0657  Informed Dr. Nieto via [employer] regarding patient being dialyzed early this morning instead of last night  Dr. Nieto acknowledged     0652  Informed Dr. Nieto via [employer] regarding Hgb- 6.7    2214  Informed patient of new schedule    2210  Received call from [facility] that patient is scheduled at 7am    2204  Paged on call    2148  Given Benadryl with night medication. Please See MAR    2034  Ordered Benadryl 50mg PO QHS PRN for Insomnia    2032  Messaged Dr. Mcdaniel via [employer] regarding patients request for benadryl    2005  Received verbal report from Nurse ANN-MARIE Payan  Noted patient in Sinus Rhythm on telemeter

## 2025-04-24 NOTE — PROGRESS NOTES
A Spiritual Care Partner Volunteer visited Woodrow Madison at [facility] in [address] on 4/24/2025     Documented by: Chaplain Leni Rose

## 2025-04-24 NOTE — CARE COORDINATION
Care Management Initial Assessment       RUR: 24%  Readmission? No  1st IM letter given? Yes - 4/22/25  1st  letter given: No      04/24/25 1144   Service Assessment   Patient Orientation Alert and Oriented   Cognition Alert   History Provided By Patient   Primary Caregiver Self   Support Systems Family Members   Patient's Healthcare Decision Maker is: Legal Next of Kin   PCP Verified by CM Yes   Last Visit to PCP Within last 6 months   Prior Functional Level Independent in ADLs/IADLs   Current Functional Level Independent in ADLs/IADLs   Can patient return to prior living arrangement Yes   Ability to make needs known: Good   Family able to assist with home care needs: Yes   Financial Resources Medicare   Social/Functional History   Lives With Family   Type of Home House   Home Layout One level   Home Access Stairs to enter with rails   Entrance Stairs - Number of Steps 4   Prior Level of Assist for ADLs Independent   Prior Level of Assist for Homemaking Independent   Homemaking Responsibilities Yes   Ambulation Assistance Independent   Prior Level of Assist for Transfers Independent   Active  Yes   Mode of Transportation Car   Occupation On disability   Services At/After Discharge   Mode of Transport at Discharge Other (see comment)  (Family to transport)   Confirm Follow Up Transport Family   Condition of Participation: Discharge Planning   The Plan for Transition of Care is related to the following treatment goals: Return home with outpatient dialysis   The Patient and/or Patient Representative was provided with a Choice of Provider? Patient   The Patient and/Or Patient Representative agree with the Discharge Plan? Yes   Freedom of Choice list was provided with basic dialogue that supports the patient's individualized plan of care/goals, treatment preferences, and shares the quality data associated with the providers?  Yes     CM met with patient at bedside to introduce self and role, and to confirm

## 2025-04-24 NOTE — PROGRESS NOTES
Spiritual Health History and Assessment/Progress Note  [facility]    Grief, Loss, and Adjustments,  ,  ,      Name: Woodrow Madison MRN: [id number]    Age: 59 y.o.     Sex: male   Language: English   Restoration: Zoroastrianism   Colonoscopy causing post-procedural bleeding     Date: 4/24/2025            Total Time Calculated: 15 min              Spiritual Assessment began in [facility] 4 IMCU        Referral/Consult From: Nurse   Encounter Overview/Reason: Grief, Loss, and Adjustments  Service Provided For: Patient    Halle, Belief, Meaning:   Patient unable to assess at this time  Family/Friends No family/friends present      Importance and Influence:  Patient unable to assess at this time  Family/Friends No family/friends present    Community:  Patient unable to assess at this time  Family/Friends No family/friends present    Assessment and Plan of Care:   Family departed prior to chaplains arrival.    Patient Interventions include: unable to assess at this time  Family/Friends Interventions include: No family/friends present    Patient Plan of Care: unable to assess at this time  Family/Friends Plan of Care: No family/friends present    Electronically signed by ASHLIE SORIANO on 4/24/2025 at 9:12 AM

## 2025-04-24 NOTE — FLOWSHEET NOTE
04/24/25 0520 04/24/25 0600   Treatment   Time On  --  0600   Treatment Goal  --  3.5 hours, 1 L UF   Observations & Evaluations   Level of Consciousness  --  0   Oriented X  --  4   Heart Rhythm  --  Regular   Respiratory Quality/Effort  --  Unlabored   O2 Device  --  None (Room air)   Skin Color  --  Other (comment)  (Appropriate for ethnicity)   Skin Condition/Temp  --  Warm;Dry   Edema  --  None   Vital Signs   BP  --  133/70   Temp  --  98.2 °F (36.8 °C)   Pain Assessment   Pain Assessment  --  None - Denies Pain   Technical Checks   Dialysis Machine No.  --  [facility] 01   RO Machine Number  --  RO1   Dialyzer Lot No.  --  [id number]   Tubing Lot Number  --  [id number]   All Connections Secure  --  Yes   NS Bag  --  Yes   Saline Line Double Clamped  --  Yes   Dialyzer  --  Nipro ELISIO   Prime Volume (mL)  --  200 mL   ICEBOAT  --  I;C;E;B;O;A;T   RO Machine Log Sheet Completed  --  Yes   Machine Alarm Self Test  --  Completed;Passed   Air Foam Detector  --  Tested;Proper Function   Extracorporeal Circuit Tested for Integrity  --  Yes   Machine Conductivity  --  13.5   Manual Ph  --  7.4   Bleach Test (Neg)  --  Yes   Bath Temperature  --  98.6 °F (37 °C)   Treatment Initiation   Dialyze Hours  --  3.5   Treatment  Initiation  --  Universal Precautions maintained;Lines secured to patient;Connections secured;Prime given;Venous Parameters set;Arterial Parameters set;Air foam detector engaged;Saline line double clamped   During Hemodialysis Assessment   Blood Flow Rate (ml/min)  --  400 ml/min   Arterial Pressure (mmHg)  --  -140 mmHg   Venous Pressure (mmHg)  --  170   TMP  --  70   DFR  --  700   Access Visible  --  Yes   Ultrafiltration Rate (ml/hr)  --  500 ml/hr   Ultrafiltration Removed (ml)  --  0 ml   Hemodialysis Fistula/Graft Superior Arm   No placement date or time found.   Orientation: Superior  Access Location: Arm   Site Assessment Clean, dry & intact  --    Thrill Present  --    Bruit Present  --    Status

## 2025-04-25 LAB
ABO + RH BLD: NORMAL
BLD PROD TYP BPU: NORMAL
BLOOD BANK BLOOD PRODUCT EXPIRATION DATE: NORMAL
BLOOD BANK DISPENSE STATUS: NORMAL
BLOOD BANK ISBT PRODUCT BLOOD TYPE: 7300
BLOOD BANK PRODUCT CODE: NORMAL
BLOOD BANK UNIT TYPE AND RH: NORMAL
BLOOD GROUP ANTIBODIES SERPL: NORMAL
BPU ID: NORMAL
CROSSMATCH RESULT: NORMAL
SPECIMEN EXP DATE BLD: NORMAL
UNIT DIVISION: 0
UNIT ISSUE DATE/TIME: NORMAL

## 2025-05-15 ENCOUNTER — TELEPHONE (OUTPATIENT)
Age: 60
End: 2025-05-15

## 2025-05-15 DIAGNOSIS — Z21 HIV INFECTION, UNSPECIFIED SYMPTOM STATUS (HCC): Primary | ICD-10-CM

## 2025-05-15 NOTE — TELEPHONE ENCOUNTER
Spoke to [facility] and clarified that Tivicay was not discontinued but simply needed to be reordered, which was done. Left voicemail message for patient.

## 2025-05-16 LAB
ALBUMIN SERPL-MCNC: 4.6 G/DL (ref 3.8–4.9)
ALP SERPL-CCNC: 58 IU/L (ref 44–121)
ALT SERPL-CCNC: 5 IU/L (ref 0–44)
AST SERPL-CCNC: 18 IU/L (ref 0–40)
BASOPHILS # BLD AUTO: 0 X10E3/UL (ref 0–0.2)
BASOPHILS NFR BLD AUTO: 1 %
BILIRUB SERPL-MCNC: 1 MG/DL (ref 0–1.2)
BUN SERPL-MCNC: 25 MG/DL (ref 6–24)
BUN/CREAT SERPL: 2 (ref 9–20)
CALCIUM SERPL-MCNC: 9.8 MG/DL (ref 8.7–10.2)
CD3+CD4+ CELLS # BLD: 227 /UL (ref 359–1519)
CD3+CD4+ CELLS NFR BLD: 22.7 % (ref 30.8–58.5)
CHLORIDE SERPL-SCNC: 98 MMOL/L (ref 96–106)
CO2 SERPL-SCNC: 25 MMOL/L (ref 20–29)
CREAT SERPL-MCNC: 10.14 MG/DL (ref 0.76–1.27)
EGFRCR SERPLBLD CKD-EPI 2021: 5 ML/MIN/1.73
EOSINOPHIL # BLD AUTO: 0.2 X10E3/UL (ref 0–0.4)
EOSINOPHIL NFR BLD AUTO: 3 %
ERYTHROCYTE [DISTWIDTH] IN BLOOD BY AUTOMATED COUNT: 15.4 % (ref 11.6–15.4)
GLOBULIN SER CALC-MCNC: 2.7 G/DL (ref 1.5–4.5)
GLUCOSE SERPL-MCNC: 83 MG/DL (ref 70–99)
HCT VFR BLD AUTO: 33 % (ref 37.5–51)
HGB BLD-MCNC: 10.3 G/DL (ref 13–17.7)
IMM GRANULOCYTES # BLD AUTO: 0 X10E3/UL (ref 0–0.1)
IMM GRANULOCYTES NFR BLD AUTO: 1 %
LYMPHOCYTES # BLD AUTO: 1 X10E3/UL (ref 0.7–3.1)
LYMPHOCYTES NFR BLD AUTO: 16 %
MCH RBC QN AUTO: 29.1 PG (ref 26.6–33)
MCHC RBC AUTO-ENTMCNC: 31.2 G/DL (ref 31.5–35.7)
MCV RBC AUTO: 93 FL (ref 79–97)
MONOCYTES # BLD AUTO: 0.5 X10E3/UL (ref 0.1–0.9)
MONOCYTES NFR BLD AUTO: 7 %
NEUTROPHILS # BLD AUTO: 4.5 X10E3/UL (ref 1.4–7)
NEUTROPHILS NFR BLD AUTO: 72 %
PLATELET # BLD AUTO: 210 X10E3/UL (ref 150–450)
POTASSIUM SERPL-SCNC: 5 MMOL/L (ref 3.5–5.2)
PROT SERPL-MCNC: 7.3 G/DL (ref 6–8.5)
RBC # BLD AUTO: 3.54 X10E6/UL (ref 4.14–5.8)
SODIUM SERPL-SCNC: 144 MMOL/L (ref 134–144)
WBC # BLD AUTO: 6.2 X10E3/UL (ref 3.4–10.8)

## 2025-05-22 ENCOUNTER — OFFICE VISIT (OUTPATIENT)
Facility: CLINIC | Age: 60
End: 2025-05-22
Payer: MEDICARE

## 2025-05-22 VITALS
TEMPERATURE: 97.5 F | HEIGHT: 72 IN | WEIGHT: 222 LBS | HEART RATE: 66 BPM | SYSTOLIC BLOOD PRESSURE: 149 MMHG | RESPIRATION RATE: 20 BRPM | OXYGEN SATURATION: 97 % | BODY MASS INDEX: 30.07 KG/M2 | DIASTOLIC BLOOD PRESSURE: 79 MMHG

## 2025-05-22 DIAGNOSIS — K62.5 RECTAL BLEEDING: ICD-10-CM

## 2025-05-22 DIAGNOSIS — D63.1 ANEMIA DUE TO CHRONIC KIDNEY DISEASE, ON CHRONIC DIALYSIS (HCC): ICD-10-CM

## 2025-05-22 DIAGNOSIS — I10 PRIMARY HYPERTENSION: ICD-10-CM

## 2025-05-22 DIAGNOSIS — G89.29 CHRONIC ABDOMINAL PAIN: ICD-10-CM

## 2025-05-22 DIAGNOSIS — C20 RECTAL CANCER (HCC): ICD-10-CM

## 2025-05-22 DIAGNOSIS — R10.9 CHRONIC ABDOMINAL PAIN: ICD-10-CM

## 2025-05-22 DIAGNOSIS — K21.9 GASTRO-ESOPHAGEAL REFLUX DISEASE WITHOUT ESOPHAGITIS: ICD-10-CM

## 2025-05-22 DIAGNOSIS — Z87.898 HISTORY OF SEIZURES: ICD-10-CM

## 2025-05-22 DIAGNOSIS — E78.2 MIXED HYPERLIPIDEMIA: ICD-10-CM

## 2025-05-22 DIAGNOSIS — I71.20 THORACIC AORTIC ANEURYSM WITHOUT RUPTURE, UNSPECIFIED PART: ICD-10-CM

## 2025-05-22 DIAGNOSIS — I25.83 CORONARY ARTERY DISEASE DUE TO LIPID RICH PLAQUE: Primary | ICD-10-CM

## 2025-05-22 DIAGNOSIS — I25.10 CORONARY ARTERY DISEASE DUE TO LIPID RICH PLAQUE: Primary | ICD-10-CM

## 2025-05-22 DIAGNOSIS — M1A.9XX0 CHRONIC GOUT WITHOUT TOPHUS, UNSPECIFIED CAUSE, UNSPECIFIED SITE: ICD-10-CM

## 2025-05-22 DIAGNOSIS — N18.6 ANEMIA DUE TO CHRONIC KIDNEY DISEASE, ON CHRONIC DIALYSIS (HCC): ICD-10-CM

## 2025-05-22 DIAGNOSIS — Z12.5 SCREENING FOR PROSTATE CANCER: ICD-10-CM

## 2025-05-22 DIAGNOSIS — Z21 HIV INFECTION, UNSPECIFIED SYMPTOM STATUS (HCC): ICD-10-CM

## 2025-05-22 DIAGNOSIS — Z99.2 ANEMIA DUE TO CHRONIC KIDNEY DISEASE, ON CHRONIC DIALYSIS (HCC): ICD-10-CM

## 2025-05-22 DIAGNOSIS — N18.6 ESRD (END STAGE RENAL DISEASE) (HCC): ICD-10-CM

## 2025-05-22 PROCEDURE — 3078F DIAST BP <80 MM HG: CPT | Performed by: NURSE PRACTITIONER

## 2025-05-22 PROCEDURE — 3077F SYST BP >= 140 MM HG: CPT | Performed by: NURSE PRACTITIONER

## 2025-05-22 PROCEDURE — 99214 OFFICE O/P EST MOD 30 MIN: CPT | Performed by: NURSE PRACTITIONER

## 2025-05-22 RX ORDER — ATORVASTATIN CALCIUM 40 MG/1
40 TABLET, FILM COATED ORAL DAILY
Qty: 90 TABLET | Refills: 1 | Status: SHIPPED | OUTPATIENT
Start: 2025-05-22

## 2025-05-22 RX ORDER — HYDRALAZINE HYDROCHLORIDE 50 MG/1
50 TABLET, FILM COATED ORAL 2 TIMES DAILY
COMMUNITY
End: 2025-05-22 | Stop reason: SDUPTHER

## 2025-05-22 RX ORDER — TENAPANOR 21.3 MG/1
TABLET, FILM COATED ORAL
COMMUNITY

## 2025-05-22 RX ORDER — HYDRALAZINE HYDROCHLORIDE 50 MG/1
50 TABLET, FILM COATED ORAL 3 TIMES DAILY
Qty: 270 TABLET | Refills: 1 | Status: SHIPPED | OUTPATIENT
Start: 2025-05-22 | End: 2025-08-20

## 2025-05-22 RX ORDER — LOSARTAN POTASSIUM 100 MG/1
100 TABLET ORAL DAILY
Qty: 90 TABLET | Refills: 1 | Status: SHIPPED | OUTPATIENT
Start: 2025-05-22

## 2025-05-22 ASSESSMENT — PATIENT HEALTH QUESTIONNAIRE - PHQ9
SUM OF ALL RESPONSES TO PHQ QUESTIONS 1-9: 0
1. LITTLE INTEREST OR PLEASURE IN DOING THINGS: NOT AT ALL
SUM OF ALL RESPONSES TO PHQ QUESTIONS 1-9: 0
2. FEELING DOWN, DEPRESSED OR HOPELESS: NOT AT ALL
SUM OF ALL RESPONSES TO PHQ QUESTIONS 1-9: 0
SUM OF ALL RESPONSES TO PHQ QUESTIONS 1-9: 0

## 2025-05-22 NOTE — PROGRESS NOTES
Chief Complaint   Patient presents with    Follow-Up from Hospital    Anemia     1. Have you been to the ER, urgent care clinic since your last visit?  Hospitalized since your last visit?No    2. Have you seen or consulted any other health care providers outside of the [facility] since your last visit?  Include any pap smears or colon screening. No

## 2025-05-22 NOTE — PROGRESS NOTES
Subjective  Chief Complaint   Patient presents with    Follow-Up from Hospital    Anemia     HPI:  Woodrow Madison is a 59 y.o. male with PMH of  ESRD, HIV, Anemia,CAD, rectal cancer,hypertension, seizures, thoracic aneurysm,chronic abdominal pain and obesity who presents for follow up of chronic conditions and for hospital follow up. Was recently admitted to hospital due to post-polypectomy bleeding. Reports that he underwent colonoscopy and following biopsy, later developed rectal bleeding. Required multiple blood transfusions and bleeding is now stopped. Is being followed by GI who is managing. Blood counts stable on discharge 4/24/25 and reports his most recent hemoglobin level, checked last week, was 8.17.    He has not yet taken his afternoon dose of hydralazine, which he typically administers at 6:00 PM. He does not monitor his blood pressure at home but does so during his dialysis sessions on Monday, Wednesday, and Friday. He abstains from taking his medications in the morning prior to dialysis, opting to take them post-dialysis instead. His current medication regimen includes amlodipine, losartan, metoprolol, and hydralazine.    He continues to take aspirin and Plavix.         Past Medical History:   Diagnosis Date    A-V fistula     left arm    Anemia     CAD (coronary artery disease)     CAD: NSTEMI (non-ST elevated myocardial infarction) ([facility])     JUNE 2023    Chronic abdominal pain     Colon cancer ([facility]) 2021    Complication of AV dialysis fistula 2019    2 large pseudo-aneurysm    Esophagitis     Gastritis     GERD (gastroesophageal reflux disease)     Gout     pt states not in 12 years    Hemodialysis pt.     dialysis pt. - [facility]icky[facility] - [facility]    High cholesterol     History of blood transfusion     at least 8 units over the years  - pt states last given June 2023    HIV (human immunodeficiency virus infection) ([facility]) 2011    Hypertension     Pancreatitis     Platelet inhibition due

## 2025-05-23 LAB
CHOLEST SERPL-MCNC: 142 MG/DL (ref 100–199)
HDLC SERPL-MCNC: 69 MG/DL
LDLC SERPL CALC-MCNC: 60 MG/DL (ref 0–99)
PSA SERPL-MCNC: 0.6 NG/ML (ref 0–4)
REFLEX CRITERIA: NORMAL
TRIGL SERPL-MCNC: 62 MG/DL (ref 0–149)
VLDLC SERPL CALC-MCNC: 13 MG/DL (ref 5–40)

## 2025-05-26 ENCOUNTER — RESULTS FOLLOW-UP (OUTPATIENT)
Facility: CLINIC | Age: 60
End: 2025-05-26

## 2025-05-27 ENCOUNTER — OFFICE VISIT (OUTPATIENT)
Age: 60
End: 2025-05-27

## 2025-05-27 VITALS
RESPIRATION RATE: 16 BRPM | OXYGEN SATURATION: 98 % | WEIGHT: 219.8 LBS | HEART RATE: 60 BPM | DIASTOLIC BLOOD PRESSURE: 70 MMHG | TEMPERATURE: 97.5 F | SYSTOLIC BLOOD PRESSURE: 122 MMHG | HEIGHT: 72 IN | BODY MASS INDEX: 29.77 KG/M2

## 2025-05-27 DIAGNOSIS — Z21 HIV INFECTION, UNSPECIFIED SYMPTOM STATUS (HCC): Primary | ICD-10-CM

## 2025-05-27 NOTE — PROGRESS NOTES
Have you been to the ER, urgent care clinic since your last visit?  Hospitalized since your last visit?   YES - When: approximately 1 month ago.  Where and Why: [facility] for rectal bleeding.    Have you seen or consulted any other health care providers outside our system since your last visit?   NO    Chief Complaint   Patient presents with    Follow-up     HIV     /70 (BP Site: Right Upper Arm, Patient Position: Sitting, BP Cuff Size: Large Adult)   Pulse 60   Temp 97.5 °F (36.4 °C) (Temporal)   Resp 16   Ht 1.829 m (6')   Wt 99.7 kg (219 lb 12.8 oz)   SpO2 98%   BMI 29.81 kg/m²

## 2025-05-27 NOTE — PROGRESS NOTES
Subjective    Woodrow Madison is a 59 y.o. male    HPI  Patient with ESRD on hemodialysis, with long standing HIV infection, initially found to have CD4 count of only 4/mm3 and VL of 51,700 while on Reyataz/Lamivudine/ Ziagen. He was switched to Tivicay, Reyataz/ritonavir and Vemlidy with subsequent improvement. He is followed at least annually with last CD4 in 10/2024 of 205/mm3 (22.8%) and with VL <20 copies/ml.   He was continued on same ART and is here today for routine follow-up. Interim period eventful for polypectomy complicated by post-op bleeding which was corrected.  No symptoms at this time.      Review of Systems   Constitutional:  Negative for appetite change, chills, diaphoresis, fatigue and fever.   HENT: Negative.     Eyes: Negative.    Respiratory: Negative.     Cardiovascular: Negative.    Gastrointestinal: Negative.    Endocrine: Negative.    Genitourinary: Negative.    Musculoskeletal: Negative.    Skin: Negative.    Allergic/Immunologic: Negative.    Neurological: Negative.    Hematological: Negative.    Psychiatric/Behavioral: Negative.           Past Medical History:   Diagnosis Date    A-V fistula     left arm    Anemia     CAD (coronary artery disease)     CAD: NSTEMI (non-ST elevated myocardial infarction) ([facility])     JUNE 2023    Chronic abdominal pain     Colon cancer ([facility]) 2021    Complication of AV dialysis fistula 2019    2 large pseudo-aneurysm    Esophagitis     Gastritis     GERD (gastroesophageal reflux disease)     Gout     pt states not in 12 years    Hemodialysis pt.     dialysis pt. - [facility] - [facility]    High cholesterol     History of blood transfusion     at least 8 units over the years  - pt states last given June 2023    HIV (human immunodeficiency virus infection) ([facility]) 2011    Hypertension     Pancreatitis     Platelet inhibition due to Plavix     Renal insufficiency     Seizure ([facility]) 2012    single episode    Sepsis ([facility]) 2011 & 8/2019    Squamous cell

## 2025-06-02 ASSESSMENT — ENCOUNTER SYMPTOMS
GASTROINTESTINAL NEGATIVE: 1
RESPIRATORY NEGATIVE: 1
EYES NEGATIVE: 1

## 2025-07-28 ENCOUNTER — HOSPITAL ENCOUNTER (INPATIENT)
Facility: HOSPITAL | Age: 60
LOS: 5 days | Discharge: HOME | DRG: 286 | End: 2025-08-02
Attending: STUDENT IN AN ORGANIZED HEALTH CARE EDUCATION/TRAINING PROGRAM | Admitting: INTERNAL MEDICINE
Payer: MEDICARE

## 2025-07-28 ENCOUNTER — APPOINTMENT (OUTPATIENT)
Facility: HOSPITAL | Age: 60
DRG: 286 | End: 2025-07-28
Payer: MEDICARE

## 2025-07-28 DIAGNOSIS — R79.89 ELEVATED TROPONIN: ICD-10-CM

## 2025-07-28 DIAGNOSIS — I16.1 HYPERTENSIVE EMERGENCY: Primary | ICD-10-CM

## 2025-07-28 DIAGNOSIS — Z99.2 DIALYSIS PATIENT: ICD-10-CM

## 2025-07-28 DIAGNOSIS — R07.9 CHEST PAIN: ICD-10-CM

## 2025-07-28 DIAGNOSIS — R07.9 CHEST PAIN, UNSPECIFIED TYPE: ICD-10-CM

## 2025-07-28 LAB
ALBUMIN SERPL-MCNC: 4.6 G/DL (ref 3.5–5.2)
ALBUMIN/GLOB SERPL: 1.3 (ref 1.1–2.2)
ALP SERPL-CCNC: 67 U/L (ref 40–129)
ALT SERPL-CCNC: <5 U/L (ref 10–50)
ANION GAP SERPL CALC-SCNC: 16 MMOL/L (ref 2–12)
APTT PPP: 34.9 SEC (ref 21.2–34.1)
AST SERPL-CCNC: 29 U/L (ref 10–50)
BASOPHILS # BLD: 0.02 K/UL (ref 0–0.1)
BASOPHILS NFR BLD: 0.3 % (ref 0–1)
BILIRUB SERPL-MCNC: 0.6 MG/DL (ref 0.2–1)
BUN SERPL-MCNC: 32 MG/DL (ref 6–20)
BUN/CREAT SERPL: 4 (ref 12–20)
CALCIUM SERPL-MCNC: 9.9 MG/DL (ref 8.6–10)
CHLORIDE SERPL-SCNC: 94 MMOL/L (ref 98–107)
CO2 SERPL-SCNC: 29 MMOL/L (ref 22–29)
CREAT SERPL-MCNC: 7.99 MG/DL (ref 0.7–1.2)
DIFFERENTIAL METHOD BLD: ABNORMAL
EKG ATRIAL RATE: 50 BPM
EKG DIAGNOSIS: NORMAL
EKG P AXIS: 27 DEGREES
EKG P-R INTERVAL: 150 MS
EKG Q-T INTERVAL: 462 MS
EKG QRS DURATION: 86 MS
EKG QTC CALCULATION (BAZETT): 421 MS
EKG R AXIS: -3 DEGREES
EKG T AXIS: 18 DEGREES
EKG VENTRICULAR RATE: 50 BPM
EOSINOPHIL # BLD: 0.14 K/UL (ref 0–0.4)
EOSINOPHIL NFR BLD: 2.2 % (ref 0–7)
ERYTHROCYTE [DISTWIDTH] IN BLOOD BY AUTOMATED COUNT: 14.6 % (ref 11.5–14.5)
ERYTHROCYTE [DISTWIDTH] IN BLOOD BY AUTOMATED COUNT: 14.8 % (ref 11.5–14.5)
GLOBULIN SER CALC-MCNC: 3.5 G/DL (ref 2–4)
GLUCOSE SERPL-MCNC: 93 MG/DL (ref 65–100)
HCT VFR BLD AUTO: 28.1 % (ref 36.6–50.3)
HCT VFR BLD AUTO: 30.3 % (ref 36.6–50.3)
HGB BLD-MCNC: 9.2 G/DL (ref 12.1–17)
HGB BLD-MCNC: 9.8 G/DL (ref 12.1–17)
IMM GRANULOCYTES # BLD AUTO: 0.03 K/UL (ref 0–0.04)
IMM GRANULOCYTES NFR BLD AUTO: 0.5 % (ref 0–0.5)
INR PPP: 1.1 (ref 0.9–1.1)
LYMPHOCYTES # BLD: 0.96 K/UL (ref 0.8–3.5)
LYMPHOCYTES NFR BLD: 15.1 % (ref 12–49)
MAGNESIUM SERPL-MCNC: 2.1 MG/DL (ref 1.6–2.6)
MCH RBC QN AUTO: 27.9 PG (ref 26–34)
MCH RBC QN AUTO: 28.3 PG (ref 26–34)
MCHC RBC AUTO-ENTMCNC: 32.3 G/DL (ref 30–36.5)
MCHC RBC AUTO-ENTMCNC: 32.7 G/DL (ref 30–36.5)
MCV RBC AUTO: 86.3 FL (ref 80–99)
MCV RBC AUTO: 86.5 FL (ref 80–99)
MONOCYTES # BLD: 0.41 K/UL (ref 0–1)
MONOCYTES NFR BLD: 6.5 % (ref 5–13)
NEUTS SEG # BLD: 4.79 K/UL (ref 1.8–8)
NEUTS SEG NFR BLD: 75.4 % (ref 32–75)
NRBC # BLD: 0 K/UL (ref 0–0.01)
NRBC # BLD: 0 K/UL (ref 0–0.01)
NRBC BLD-RTO: 0 PER 100 WBC
NRBC BLD-RTO: 0 PER 100 WBC
NT PRO BNP: ABNORMAL PG/ML
PLATELET # BLD AUTO: 145 K/UL (ref 150–400)
PLATELET # BLD AUTO: 169 K/UL (ref 150–400)
PMV BLD AUTO: 9.3 FL (ref 8.9–12.9)
PMV BLD AUTO: 9.7 FL (ref 8.9–12.9)
POTASSIUM SERPL-SCNC: 4.5 MMOL/L (ref 3.5–5.1)
PROT SERPL-MCNC: 8.1 G/DL (ref 6.4–8.3)
PROTHROMBIN TIME: 13.8 SEC (ref 11.9–14.1)
RBC # BLD AUTO: 3.25 M/UL (ref 4.1–5.7)
RBC # BLD AUTO: 3.51 M/UL (ref 4.1–5.7)
SODIUM SERPL-SCNC: 139 MMOL/L (ref 136–145)
THERAPEUTIC RANGE: ABNORMAL SECS (ref 82–109)
TROPONIN T SERPL HS-MCNC: 135.1 NG/L (ref 0–22)
TROPONIN T SERPL HS-MCNC: 140.4 NG/L (ref 0–22)
TSH SERPL DL<=0.05 MIU/L-ACNC: 2.24 UIU/ML (ref 0.27–4.2)
UFH PPP CHRO-ACNC: 0.3 IU/ML
UFH PPP CHRO-ACNC: <0.1 IU/ML
WBC # BLD AUTO: 5.4 K/UL (ref 4.1–11.1)
WBC # BLD AUTO: 6.4 K/UL (ref 4.1–11.1)

## 2025-07-28 PROCEDURE — 99285 EMERGENCY DEPT VISIT HI MDM: CPT

## 2025-07-28 PROCEDURE — 6360000002 HC RX W HCPCS: Performed by: NURSE PRACTITIONER

## 2025-07-28 PROCEDURE — 85610 PROTHROMBIN TIME: CPT

## 2025-07-28 PROCEDURE — 85730 THROMBOPLASTIN TIME PARTIAL: CPT

## 2025-07-28 PROCEDURE — 6360000002 HC RX W HCPCS: Performed by: STUDENT IN AN ORGANIZED HEALTH CARE EDUCATION/TRAINING PROGRAM

## 2025-07-28 PROCEDURE — 2060000000 HC ICU INTERMEDIATE R&B

## 2025-07-28 PROCEDURE — 96374 THER/PROPH/DIAG INJ IV PUSH: CPT

## 2025-07-28 PROCEDURE — 83735 ASSAY OF MAGNESIUM: CPT

## 2025-07-28 PROCEDURE — 83880 ASSAY OF NATRIURETIC PEPTIDE: CPT

## 2025-07-28 PROCEDURE — 84484 ASSAY OF TROPONIN QUANT: CPT

## 2025-07-28 PROCEDURE — 6370000000 HC RX 637 (ALT 250 FOR IP): Performed by: INTERNAL MEDICINE

## 2025-07-28 PROCEDURE — 80053 COMPREHEN METABOLIC PANEL: CPT

## 2025-07-28 PROCEDURE — 6370000000 HC RX 637 (ALT 250 FOR IP): Performed by: STUDENT IN AN ORGANIZED HEALTH CARE EDUCATION/TRAINING PROGRAM

## 2025-07-28 PROCEDURE — 36415 COLL VENOUS BLD VENIPUNCTURE: CPT

## 2025-07-28 PROCEDURE — 96375 TX/PRO/DX INJ NEW DRUG ADDON: CPT

## 2025-07-28 PROCEDURE — 93010 ELECTROCARDIOGRAM REPORT: CPT | Performed by: STUDENT IN AN ORGANIZED HEALTH CARE EDUCATION/TRAINING PROGRAM

## 2025-07-28 PROCEDURE — 2580000003 HC RX 258: Performed by: STUDENT IN AN ORGANIZED HEALTH CARE EDUCATION/TRAINING PROGRAM

## 2025-07-28 PROCEDURE — 2500000003 HC RX 250 WO HCPCS: Performed by: INTERNAL MEDICINE

## 2025-07-28 PROCEDURE — 84443 ASSAY THYROID STIM HORMONE: CPT

## 2025-07-28 PROCEDURE — 85025 COMPLETE CBC W/AUTO DIFF WBC: CPT

## 2025-07-28 PROCEDURE — 71046 X-RAY EXAM CHEST 2 VIEWS: CPT

## 2025-07-28 PROCEDURE — 85520 HEPARIN ASSAY: CPT

## 2025-07-28 PROCEDURE — 85027 COMPLETE CBC AUTOMATED: CPT

## 2025-07-28 PROCEDURE — 93005 ELECTROCARDIOGRAM TRACING: CPT | Performed by: STUDENT IN AN ORGANIZED HEALTH CARE EDUCATION/TRAINING PROGRAM

## 2025-07-28 RX ORDER — SODIUM CHLORIDE 0.9 % (FLUSH) 0.9 %
5-40 SYRINGE (ML) INJECTION EVERY 12 HOURS SCHEDULED
Status: DISCONTINUED | OUTPATIENT
Start: 2025-07-28 | End: 2025-08-02 | Stop reason: HOSPADM

## 2025-07-28 RX ORDER — HEPARIN SODIUM 1000 [USP'U]/ML
4000 INJECTION, SOLUTION INTRAVENOUS; SUBCUTANEOUS ONCE
Status: COMPLETED | OUTPATIENT
Start: 2025-07-28 | End: 2025-07-28

## 2025-07-28 RX ORDER — HYDROMORPHONE HYDROCHLORIDE 1 MG/ML
1 INJECTION, SOLUTION INTRAMUSCULAR; INTRAVENOUS; SUBCUTANEOUS ONCE
Status: COMPLETED | OUTPATIENT
Start: 2025-07-28 | End: 2025-07-28

## 2025-07-28 RX ORDER — SODIUM CHLORIDE 9 MG/ML
INJECTION, SOLUTION INTRAVENOUS PRN
Status: DISCONTINUED | OUTPATIENT
Start: 2025-07-28 | End: 2025-08-02 | Stop reason: HOSPADM

## 2025-07-28 RX ORDER — POLYETHYLENE GLYCOL 3350 17 G
2 POWDER IN PACKET (EA) ORAL
Status: DISCONTINUED | OUTPATIENT
Start: 2025-07-28 | End: 2025-08-02 | Stop reason: HOSPADM

## 2025-07-28 RX ORDER — POLYETHYLENE GLYCOL 3350 17 G/17G
17 POWDER, FOR SOLUTION ORAL DAILY PRN
Status: DISCONTINUED | OUTPATIENT
Start: 2025-07-28 | End: 2025-08-02 | Stop reason: HOSPADM

## 2025-07-28 RX ORDER — NITROGLYCERIN 0.4 MG/1
0.4 TABLET SUBLINGUAL EVERY 5 MIN PRN
Status: DISCONTINUED | OUTPATIENT
Start: 2025-07-28 | End: 2025-08-02 | Stop reason: HOSPADM

## 2025-07-28 RX ORDER — ONDANSETRON 2 MG/ML
4 INJECTION INTRAMUSCULAR; INTRAVENOUS EVERY 6 HOURS PRN
Status: DISCONTINUED | OUTPATIENT
Start: 2025-07-28 | End: 2025-08-02 | Stop reason: HOSPADM

## 2025-07-28 RX ORDER — HEPARIN SODIUM 1000 [USP'U]/ML
4000 INJECTION, SOLUTION INTRAVENOUS; SUBCUTANEOUS PRN
Status: DISCONTINUED | OUTPATIENT
Start: 2025-07-28 | End: 2025-07-29

## 2025-07-28 RX ORDER — HEPARIN SODIUM 1000 [USP'U]/ML
2000 INJECTION, SOLUTION INTRAVENOUS; SUBCUTANEOUS PRN
Status: DISCONTINUED | OUTPATIENT
Start: 2025-07-28 | End: 2025-07-29

## 2025-07-28 RX ORDER — ACETAMINOPHEN 650 MG/1
650 SUPPOSITORY RECTAL EVERY 6 HOURS PRN
Status: DISCONTINUED | OUTPATIENT
Start: 2025-07-28 | End: 2025-08-02 | Stop reason: HOSPADM

## 2025-07-28 RX ORDER — ATORVASTATIN CALCIUM 20 MG/1
40 TABLET, FILM COATED ORAL NIGHTLY
Status: DISCONTINUED | OUTPATIENT
Start: 2025-07-28 | End: 2025-08-02 | Stop reason: HOSPADM

## 2025-07-28 RX ORDER — HYDROMORPHONE HYDROCHLORIDE 1 MG/ML
1 INJECTION, SOLUTION INTRAMUSCULAR; INTRAVENOUS; SUBCUTANEOUS EVERY 4 HOURS PRN
Status: DISCONTINUED | OUTPATIENT
Start: 2025-07-28 | End: 2025-07-29

## 2025-07-28 RX ORDER — HEPARIN SODIUM 10000 [USP'U]/100ML
5-30 INJECTION, SOLUTION INTRAVENOUS CONTINUOUS
Status: DISCONTINUED | OUTPATIENT
Start: 2025-07-28 | End: 2025-07-29

## 2025-07-28 RX ORDER — ONDANSETRON 2 MG/ML
4 INJECTION INTRAMUSCULAR; INTRAVENOUS ONCE
Status: COMPLETED | OUTPATIENT
Start: 2025-07-28 | End: 2025-07-28

## 2025-07-28 RX ORDER — ASPIRIN 81 MG/1
324 TABLET, CHEWABLE ORAL
Status: COMPLETED | OUTPATIENT
Start: 2025-07-28 | End: 2025-07-28

## 2025-07-28 RX ORDER — ONDANSETRON 4 MG/1
4 TABLET, ORALLY DISINTEGRATING ORAL EVERY 8 HOURS PRN
Status: DISCONTINUED | OUTPATIENT
Start: 2025-07-28 | End: 2025-08-02 | Stop reason: HOSPADM

## 2025-07-28 RX ORDER — SODIUM CHLORIDE 0.9 % (FLUSH) 0.9 %
5-40 SYRINGE (ML) INJECTION PRN
Status: DISCONTINUED | OUTPATIENT
Start: 2025-07-28 | End: 2025-08-02 | Stop reason: HOSPADM

## 2025-07-28 RX ORDER — MAGNESIUM HYDROXIDE/ALUMINUM HYDROXICE/SIMETHICONE 120; 1200; 1200 MG/30ML; MG/30ML; MG/30ML
30 SUSPENSION ORAL EVERY 6 HOURS PRN
Status: DISCONTINUED | OUTPATIENT
Start: 2025-07-28 | End: 2025-08-02 | Stop reason: HOSPADM

## 2025-07-28 RX ORDER — ASPIRIN 81 MG/1
81 TABLET, CHEWABLE ORAL DAILY
Status: DISCONTINUED | OUTPATIENT
Start: 2025-07-28 | End: 2025-08-02 | Stop reason: HOSPADM

## 2025-07-28 RX ORDER — ACETAMINOPHEN 325 MG/1
650 TABLET ORAL EVERY 6 HOURS PRN
Status: DISCONTINUED | OUTPATIENT
Start: 2025-07-28 | End: 2025-08-02 | Stop reason: HOSPADM

## 2025-07-28 RX ADMIN — SODIUM CHLORIDE, PRESERVATIVE FREE 10 ML: 5 INJECTION INTRAVENOUS at 20:02

## 2025-07-28 RX ADMIN — HYDROMORPHONE HYDROCHLORIDE 1 MG: 1 INJECTION, SOLUTION INTRAMUSCULAR; INTRAVENOUS; SUBCUTANEOUS at 22:26

## 2025-07-28 RX ADMIN — NICARDIPINE HYDROCHLORIDE 5 MG/HR: 2.5 INJECTION, SOLUTION INTRAVENOUS at 13:03

## 2025-07-28 RX ADMIN — NICARDIPINE HYDROCHLORIDE 5 MG/HR: 2.5 INJECTION, SOLUTION INTRAVENOUS at 18:31

## 2025-07-28 RX ADMIN — HEPARIN SODIUM 4000 UNITS: 1000 INJECTION, SOLUTION INTRAVENOUS; SUBCUTANEOUS at 12:57

## 2025-07-28 RX ADMIN — ATORVASTATIN CALCIUM 40 MG: 20 TABLET, FILM COATED ORAL at 20:02

## 2025-07-28 RX ADMIN — ONDANSETRON 4 MG: 2 INJECTION, SOLUTION INTRAMUSCULAR; INTRAVENOUS at 12:44

## 2025-07-28 RX ADMIN — HYDROMORPHONE HYDROCHLORIDE 1 MG: 1 INJECTION, SOLUTION INTRAMUSCULAR; INTRAVENOUS; SUBCUTANEOUS at 12:45

## 2025-07-28 RX ADMIN — HYDROMORPHONE HYDROCHLORIDE 1 MG: 1 INJECTION, SOLUTION INTRAMUSCULAR; INTRAVENOUS; SUBCUTANEOUS at 20:01

## 2025-07-28 RX ADMIN — HEPARIN SODIUM 10 UNITS/KG/HR: 10000 INJECTION, SOLUTION INTRAVENOUS at 13:01

## 2025-07-28 RX ADMIN — ASPIRIN 324 MG: 81 TABLET, CHEWABLE ORAL at 09:38

## 2025-07-28 ASSESSMENT — PAIN DESCRIPTION - DESCRIPTORS: DESCRIPTORS: ACHING;DISCOMFORT

## 2025-07-28 ASSESSMENT — PAIN SCALES - GENERAL
PAINLEVEL_OUTOF10: 8
PAINLEVEL_OUTOF10: 5
PAINLEVEL_OUTOF10: 8
PAINLEVEL_OUTOF10: 8

## 2025-07-28 ASSESSMENT — ENCOUNTER SYMPTOMS: ABDOMINAL PAIN: 0

## 2025-07-28 ASSESSMENT — PAIN DESCRIPTION - LOCATION
LOCATION: CHEST
LOCATION: CHEST
LOCATION: MEDIASTINUM;CHEST
LOCATION: CHEST
LOCATION: CHEST

## 2025-07-28 ASSESSMENT — PAIN - FUNCTIONAL ASSESSMENT: PAIN_FUNCTIONAL_ASSESSMENT: 0-10

## 2025-07-28 NOTE — ED TRIAGE NOTES
Pt reports to ED w/ cc of chest pain that started today during dialysis, pt was able to receive full treatment but then after he become hypotensive and bradycardiac     Currently patient feels a little weak    Echo scheduled for tomorrow.

## 2025-07-28 NOTE — PROGRESS NOTES
Consult received  Happy to see at [facility] if he gets here before 430 PM  Otherwise we will see him tomorrow morning  Will plan for HD MWF (Already had HD today) and lytes are stable

## 2025-07-28 NOTE — ED NOTES
TRANSFER - OUT REPORT:    Verbal report given to Genesis izzy Madison  being transferred to [facility] for routine progression of patient care       Report consisted of patient's Situation, Background, Assessment and   Recommendations(SBAR).     Information from the following report(s) Nurse Handoff Report, ED Encounter Summary, ED SBAR, MAR, and Recent Results was reviewed with the receiving nurse.    [place] Fall Assessment:    Presents to emergency department  because of falls (Syncope, seizure, or loss of consciousness): No  Age > 70: No  Altered Mental Status, Intoxication with alcohol or substance confusion (Disorientation, impaired judgment, poor safety awaremess, or inability to follow instructions): No  Impaired Mobility: Ambulates or transfers with assistive devices or assistance; Unable to ambulate or transer.: No             Lines:   Peripheral IV 07/28/25 Distal;Right Cephalic (Active)       Peripheral IV 07/28/25 Right Forearm (Active)   Site Assessment Clean, dry & intact 07/28/25 1257   Line Status Normal saline locked 07/28/25 1257   Phlebitis Assessment No symptoms 07/28/25 1257   Infiltration Assessment 0 07/28/25 1257   Alcohol Cap Used Yes 07/28/25 1257   Dressing Status Clean, dry & intact 07/28/25 1257   Dressing Type Transparent 07/28/25 1257        Opportunity for questions and clarification was provided.      Patient transported with:  MISTI    Verbal report given to MISTI at the same time as [facility], all paperwork given to MISTI

## 2025-07-28 NOTE — H&P
History and Physical    Date of Service:  7/28/2025  Primary Care Provider: Maggy Daniel APRN - NP  Source of information: Patient, Family, External Medical Records    Chief Complaint: Chest Pain      History of Presenting Illness:   Woodrow Madison is a very pleasant 59 y.o. male with a past med history of ESRD on hemodialysis, CAD, NSTEMI (2023) GERD, HTN, HLD, multiple blood transfusions, HIV, who presents to the emergency room due to chest pain and hypertensive emergency.    Patient was getting his dialysis when he developed severe chest pain, blood pressure to 220/110, bradycardia to the 40s, and was transferred to the ER for further evaluation.  In the emergency room patient states he has a history of MI with 2 prior stents, and is on aspirin and Plavix but he forgot to take his medications this morning.  In the ER he was profoundly hypertensive, had chest pain and fatigue.  Lab workup was significant for an elevated troponin T (expected in ESRD) and elevated proBNP which is higher than his prior BNP on dialysis, and anemia consistent with prior checks.  His chest x-ray was unremarkable, and his EKG was significant for long QTc of 462, and sinus bradycardia with a rate of 50.  He remained hypertensive and bradycardic despite as needed meds, and was started on a heparin and Cardene drip.  At this time his chest pain has resolved as he is currently on a heparin and Cardene drip,    Internal medicine was consulted for chest pain/unstable angina and hypertensive emergency     REVIEW OF SYSTEMS:  A comprehensive review of systems was negative except for that written in the History of Present Illness.     Past Medical History:   Diagnosis Date    A-V fistula     left arm    Anemia     CAD (coronary artery disease)     CAD: NSTEMI (non-ST elevated myocardial infarction) ([facility])     JUNE 2023    Chronic abdominal pain     Colon cancer (HCC) 2021    Complication of AV dialysis fistula 2019    2

## 2025-07-28 NOTE — ED PROVIDER NOTES
[place] EMERGENCY DEPARTMENT  EMERGENCY DEPARTMENT ENCOUNTER      Pt Name: Woodrow Madison  MRN: [id number]  Birthdate 1965  Date of evaluation: 7/28/2025  Provider: Bry Black DO    CHIEF COMPLAINT       Chief Complaint   Patient presents with    Chest Pain         HISTORY OF PRESENT ILLNESS   (Location/Symptom, Timing/Onset, Context/Setting, Quality, Duration, Modifying Factors, Severity)  Note limiting factors.   59-year-old male presents ED for evaluation of chest discomfort happening while he was at dialysis.  Patient finished his dialysis, has a history of MI with 2 previous stents is on aspirin Plavix which she did not take today.  Patient reported to be hypotensive and bradycardic at the dialysis center.  Still currently having some mid chest discomfort.  Additionally has some generalized weakness.  Follows with cardiology supposed to have an echocardiogram tomorrow            Review of External Medical Records:     Nursing Notes were reviewed.    REVIEW OF SYSTEMS    (2-9 systems for level 4, 10 or more for level 5)     Review of Systems   Constitutional:  Positive for fatigue.   Cardiovascular:  Positive for chest pain.   Gastrointestinal:  Negative for abdominal pain.       Except as noted above the remainder of the review of systems was reviewed and negative.       PAST MEDICAL HISTORY     Past Medical History:   Diagnosis Date    A-V fistula     left arm    Anemia     CAD (coronary artery disease)     CAD: NSTEMI (non-ST elevated myocardial infarction) ([facility])     JUNE 2023    Chronic abdominal pain     Colon cancer (HCC) 2021    Complication of AV dialysis fistula 2019    2 large pseudo-aneurysm    Esophagitis     Gastritis     GERD (gastroesophageal reflux disease)     Gout     pt states not in 12 years    Hemodialysis pt.     dialysis pt. - [facility]icky[facility] - [facility]    High cholesterol     History of blood transfusion     at least 8 units over the years  - pt states last   Rate and Rhythm: Normal rate and regular rhythm.      Pulses: Normal pulses.      Heart sounds: Normal heart sounds. No murmur heard.  Pulmonary:      Effort: Pulmonary effort is normal. No respiratory distress.      Breath sounds: Normal breath sounds.   Abdominal:      General: Abdomen is flat. Bowel sounds are normal.      Palpations: Abdomen is soft.      Tenderness: There is no guarding or rebound.   Musculoskeletal:      Right lower leg: No edema.      Left lower leg: No edema.   Skin:     General: Skin is warm.      Capillary Refill: Capillary refill takes less than 2 seconds.   Neurological:      Mental Status: He is alert.         DIAGNOSTIC RESULTS     EKG: All EKG's are interpreted by the Emergency Department Physician who either signs or Co-signs this chart in the absence of a cardiologist.        RADIOLOGY:   Non-plain film images such as CT, Ultrasound and MRI are read by the radiologist. Plain radiographic images are visualized and preliminarily interpreted by the emergency physician with the below findings:        Interpretation per the Radiologist below, if available at the time of this note:    XR CHEST (2 VW)   Final Result      Normal PA and lateral chest views.         Electronically signed by RADHA FREGOSO           LABS:  Labs Reviewed   CBC WITH AUTO DIFFERENTIAL - Abnormal; Notable for the following components:       Result Value    RBC 3.51 (*)     Hemoglobin 9.8 (*)     Hematocrit 30.3 (*)     RDW 14.8 (*)     Neutrophils % 75.4 (*)     All other components within normal limits   COMPREHENSIVE METABOLIC PANEL - Abnormal; Notable for the following components:    Chloride 94 (*)     Anion Gap 16 (*)     BUN 32 (*)     Creatinine 7.99 (*)     BUN/Creatinine Ratio 4 (*)     Est, Glom Filt Rate 7 (*)     ALT <5 (*)     All other components within normal limits   TROPONIN T - Abnormal; Notable for the following components:    Troponin T 140.4 (*)     All other components within normal limits

## 2025-07-29 ENCOUNTER — APPOINTMENT (OUTPATIENT)
Facility: HOSPITAL | Age: 60
DRG: 286 | End: 2025-07-29
Attending: INTERNAL MEDICINE
Payer: MEDICARE

## 2025-07-29 LAB
ANION GAP SERPL CALC-SCNC: 7 MMOL/L (ref 2–12)
BASOPHILS # BLD: 0.01 K/UL (ref 0–0.1)
BASOPHILS NFR BLD: 0.2 % (ref 0–1)
BUN SERPL-MCNC: 40 MG/DL (ref 6–20)
BUN/CREAT SERPL: 4 (ref 12–20)
CALCIUM SERPL-MCNC: 8.6 MG/DL (ref 8.5–10.1)
CHLORIDE SERPL-SCNC: 99 MMOL/L (ref 97–108)
CHOLEST SERPL-MCNC: 114 MG/DL
CO2 SERPL-SCNC: 31 MMOL/L (ref 21–32)
CREAT SERPL-MCNC: 10.8 MG/DL (ref 0.7–1.3)
DIFFERENTIAL METHOD BLD: ABNORMAL
ECHO AO ARCH DIAM: 3.6 CM
ECHO AO ASC DIAM: 3.8 CM
ECHO AO ASCENDING AORTA INDEX: 1.74 CM/M2
ECHO AV AREA PEAK VELOCITY: 2.8 CM2
ECHO AV AREA VTI: 3 CM2
ECHO AV AREA/BSA PEAK VELOCITY: 1.3 CM2/M2
ECHO AV AREA/BSA VTI: 1.4 CM2/M2
ECHO AV MEAN GRADIENT: 10 MMHG
ECHO AV MEAN VELOCITY: 1.5 M/S
ECHO AV PEAK GRADIENT: 18 MMHG
ECHO AV PEAK VELOCITY: 2.1 M/S
ECHO AV VELOCITY RATIO: 0.81
ECHO AV VTI: 50.8 CM
ECHO BSA: 2.2 M2
ECHO EST RA PRESSURE: 3 MMHG
ECHO LA DIAMETER INDEX: 1.38 CM/M2
ECHO LA DIAMETER: 3 CM
ECHO LA VOL A-L A2C: 75 ML (ref 18–58)
ECHO LA VOL A-L A4C: 78 ML (ref 18–58)
ECHO LA VOL BP: 72 ML (ref 18–58)
ECHO LA VOL MOD A2C: 70 ML (ref 18–58)
ECHO LA VOL MOD A4C: 73 ML (ref 18–58)
ECHO LA VOL/BSA BIPLANE: 33 ML/M2 (ref 16–34)
ECHO LA VOLUME AREA LENGTH: 77 ML
ECHO LA VOLUME INDEX A-L A2C: 34 ML/M2 (ref 16–34)
ECHO LA VOLUME INDEX A-L A4C: 36 ML/M2 (ref 16–34)
ECHO LA VOLUME INDEX AREA LENGTH: 35 ML/M2 (ref 16–34)
ECHO LA VOLUME INDEX MOD A2C: 32 ML/M2 (ref 16–34)
ECHO LA VOLUME INDEX MOD A4C: 33 ML/M2 (ref 16–34)
ECHO LV E' LATERAL VELOCITY: 9.64 CM/S
ECHO LV E' SEPTAL VELOCITY: 8.33 CM/S
ECHO LV EDV A2C: 175 ML
ECHO LV EDV A4C: 180 ML
ECHO LV EDV BP: 185 ML (ref 67–155)
ECHO LV EDV INDEX A4C: 83 ML/M2
ECHO LV EDV INDEX BP: 85 ML/M2
ECHO LV EDV NDEX A2C: 80 ML/M2
ECHO LV EF PHYSICIAN: 64 %
ECHO LV EJECTION FRACTION A2C: 70 %
ECHO LV EJECTION FRACTION A4C: 57 %
ECHO LV EJECTION FRACTION BIPLANE: 64 % (ref 55–100)
ECHO LV ESV A2C: 52 ML
ECHO LV ESV A4C: 77 ML
ECHO LV ESV BP: 66 ML (ref 22–58)
ECHO LV ESV INDEX A2C: 24 ML/M2
ECHO LV ESV INDEX A4C: 35 ML/M2
ECHO LV ESV INDEX BP: 30 ML/M2
ECHO LV FRACTIONAL SHORTENING: 31 % (ref 28–44)
ECHO LV INTERNAL DIMENSION DIASTOLE INDEX: 2.8 CM/M2
ECHO LV INTERNAL DIMENSION DIASTOLIC: 6.1 CM (ref 4.2–5.9)
ECHO LV INTERNAL DIMENSION SYSTOLIC INDEX: 1.93 CM/M2
ECHO LV INTERNAL DIMENSION SYSTOLIC: 4.2 CM
ECHO LV IVSD: 0.9 CM (ref 0.6–1)
ECHO LV MASS 2D: 222 G (ref 88–224)
ECHO LV MASS INDEX 2D: 101.8 G/M2 (ref 49–115)
ECHO LV POSTERIOR WALL DIASTOLIC: 0.9 CM (ref 0.6–1)
ECHO LV RELATIVE WALL THICKNESS RATIO: 0.3
ECHO LVOT AREA: 3.5 CM2
ECHO LVOT AV VTI INDEX: 0.85
ECHO LVOT DIAM: 2.1 CM
ECHO LVOT MEAN GRADIENT: 7 MMHG
ECHO LVOT PEAK GRADIENT: 11 MMHG
ECHO LVOT PEAK VELOCITY: 1.7 M/S
ECHO LVOT STROKE VOLUME INDEX: 68.4 ML/M2
ECHO LVOT SV: 149.2 ML
ECHO LVOT VTI: 43.1 CM
ECHO MV A VELOCITY: 1.46 M/S
ECHO MV E DECELERATION TIME (DT): 271.4 MS
ECHO MV E VELOCITY: 1.05 M/S
ECHO MV E/A RATIO: 0.72
ECHO MV E/E' LATERAL: 10.89
ECHO MV E/E' RATIO (AVERAGED): 11.75
ECHO MV E/E' SEPTAL: 12.61
ECHO MV REGURGITANT PEAK GRADIENT: 169 MMHG
ECHO MV REGURGITANT PEAK VELOCITY: 6.5 M/S
ECHO PULMONARY ARTERY END DIASTOLIC PRESSURE: 6 MMHG
ECHO PV MAX VELOCITY: 1.4 M/S
ECHO PV PEAK GRADIENT: 8 MMHG
ECHO PV REGURGITANT MAX VELOCITY: 1.3 M/S
ECHO RIGHT VENTRICULAR SYSTOLIC PRESSURE (RVSP): 44 MMHG
ECHO RV FREE WALL PEAK S': 9.7 CM/S
ECHO RV INTERNAL DIMENSION: 4.3 CM
ECHO RV TAPSE: 3.4 CM (ref 1.7–?)
ECHO RVOT MEAN GRADIENT: 3 MMHG
ECHO RVOT PEAK GRADIENT: 4 MMHG
ECHO RVOT PEAK VELOCITY: 1.1 M/S
ECHO RVOT VTI: 28.8 CM
ECHO TV REGURGITANT MAX VELOCITY: 3.19 M/S
ECHO TV REGURGITANT PEAK GRADIENT: 41 MMHG
EOSINOPHIL # BLD: 0.11 K/UL (ref 0–0.4)
EOSINOPHIL NFR BLD: 2.1 % (ref 0–7)
ERYTHROCYTE [DISTWIDTH] IN BLOOD BY AUTOMATED COUNT: 14.7 % (ref 11.5–14.5)
GLUCOSE SERPL-MCNC: 81 MG/DL (ref 65–100)
HCT VFR BLD AUTO: 25.5 % (ref 36.6–50.3)
HDLC SERPL-MCNC: 63 MG/DL
HDLC SERPL: 1.8 (ref 0–5)
HGB BLD-MCNC: 8.3 G/DL (ref 12.1–17)
IMM GRANULOCYTES # BLD AUTO: 0.01 K/UL (ref 0–0.04)
IMM GRANULOCYTES NFR BLD AUTO: 0.2 % (ref 0–0.5)
LDLC SERPL CALC-MCNC: 41.6 MG/DL (ref 0–100)
LYMPHOCYTES # BLD: 0.85 K/UL (ref 0.8–3.5)
LYMPHOCYTES NFR BLD: 16 % (ref 12–49)
MCH RBC QN AUTO: 27.8 PG (ref 26–34)
MCHC RBC AUTO-ENTMCNC: 32.5 G/DL (ref 30–36.5)
MCV RBC AUTO: 85.3 FL (ref 80–99)
MONOCYTES # BLD: 0.45 K/UL (ref 0–1)
MONOCYTES NFR BLD: 8.5 % (ref 5–13)
NEUTS SEG # BLD: 3.88 K/UL (ref 1.8–8)
NEUTS SEG NFR BLD: 73 % (ref 32–75)
NRBC # BLD: 0 K/UL (ref 0–0.01)
NRBC BLD-RTO: 0 PER 100 WBC
PHOSPHATE SERPL-MCNC: 7 MG/DL (ref 2.6–4.7)
PLATELET # BLD AUTO: 133 K/UL (ref 150–400)
PMV BLD AUTO: 9.1 FL (ref 8.9–12.9)
POTASSIUM SERPL-SCNC: 4.2 MMOL/L (ref 3.5–5.1)
RBC # BLD AUTO: 2.99 M/UL (ref 4.1–5.7)
SODIUM SERPL-SCNC: 137 MMOL/L (ref 136–145)
TRIGL SERPL-MCNC: 47 MG/DL
TROPONIN T SERPL HS-MCNC: 128 NG/L (ref 0–22)
UFH PPP CHRO-ACNC: 0.29 IU/ML
UFH PPP CHRO-ACNC: 0.45 IU/ML
UFH PPP CHRO-ACNC: 0.53 IU/ML
VLDLC SERPL CALC-MCNC: 9.4 MG/DL
WBC # BLD AUTO: 5.3 K/UL (ref 4.1–11.1)

## 2025-07-29 PROCEDURE — 85520 HEPARIN ASSAY: CPT

## 2025-07-29 PROCEDURE — 93306 TTE W/DOPPLER COMPLETE: CPT

## 2025-07-29 PROCEDURE — 6370000000 HC RX 637 (ALT 250 FOR IP)

## 2025-07-29 PROCEDURE — 93306 TTE W/DOPPLER COMPLETE: CPT | Performed by: INTERNAL MEDICINE

## 2025-07-29 PROCEDURE — 99223 1ST HOSP IP/OBS HIGH 75: CPT | Performed by: INTERNAL MEDICINE

## 2025-07-29 PROCEDURE — 6360000002 HC RX W HCPCS: Performed by: STUDENT IN AN ORGANIZED HEALTH CARE EDUCATION/TRAINING PROGRAM

## 2025-07-29 PROCEDURE — 6360000002 HC RX W HCPCS

## 2025-07-29 PROCEDURE — 94761 N-INVAS EAR/PLS OXIMETRY MLT: CPT

## 2025-07-29 PROCEDURE — 84484 ASSAY OF TROPONIN QUANT: CPT

## 2025-07-29 PROCEDURE — 6370000000 HC RX 637 (ALT 250 FOR IP): Performed by: INTERNAL MEDICINE

## 2025-07-29 PROCEDURE — 85025 COMPLETE CBC W/AUTO DIFF WBC: CPT

## 2025-07-29 PROCEDURE — 2500000003 HC RX 250 WO HCPCS: Performed by: INTERNAL MEDICINE

## 2025-07-29 PROCEDURE — 93005 ELECTROCARDIOGRAM TRACING: CPT

## 2025-07-29 PROCEDURE — 2060000000 HC ICU INTERMEDIATE R&B

## 2025-07-29 PROCEDURE — 6360000002 HC RX W HCPCS: Performed by: NURSE PRACTITIONER

## 2025-07-29 PROCEDURE — 84100 ASSAY OF PHOSPHORUS: CPT

## 2025-07-29 PROCEDURE — 80048 BASIC METABOLIC PNL TOTAL CA: CPT

## 2025-07-29 PROCEDURE — 36415 COLL VENOUS BLD VENIPUNCTURE: CPT

## 2025-07-29 PROCEDURE — 80061 LIPID PANEL: CPT

## 2025-07-29 PROCEDURE — 2580000003 HC RX 258: Performed by: STUDENT IN AN ORGANIZED HEALTH CARE EDUCATION/TRAINING PROGRAM

## 2025-07-29 PROCEDURE — 2700000000 HC OXYGEN THERAPY PER DAY

## 2025-07-29 RX ORDER — METOPROLOL TARTRATE 50 MG
50 TABLET ORAL 2 TIMES DAILY
Status: DISCONTINUED | OUTPATIENT
Start: 2025-07-29 | End: 2025-08-02 | Stop reason: HOSPADM

## 2025-07-29 RX ORDER — HYDRALAZINE HYDROCHLORIDE 25 MG/1
50 TABLET, FILM COATED ORAL 3 TIMES DAILY
Status: DISCONTINUED | OUTPATIENT
Start: 2025-07-29 | End: 2025-07-30

## 2025-07-29 RX ORDER — FAMOTIDINE 20 MG/1
20 TABLET, FILM COATED ORAL NIGHTLY
Status: DISCONTINUED | OUTPATIENT
Start: 2025-07-29 | End: 2025-08-02 | Stop reason: HOSPADM

## 2025-07-29 RX ORDER — LEVETIRACETAM 500 MG/1
500 TABLET ORAL 2 TIMES DAILY
Status: DISCONTINUED | OUTPATIENT
Start: 2025-07-29 | End: 2025-08-02 | Stop reason: HOSPADM

## 2025-07-29 RX ORDER — ISOSORBIDE MONONITRATE 30 MG/1
30 TABLET, EXTENDED RELEASE ORAL DAILY
Status: DISCONTINUED | OUTPATIENT
Start: 2025-07-29 | End: 2025-07-31

## 2025-07-29 RX ORDER — LOSARTAN POTASSIUM 50 MG/1
100 TABLET ORAL DAILY
Status: DISCONTINUED | OUTPATIENT
Start: 2025-07-29 | End: 2025-08-02 | Stop reason: HOSPADM

## 2025-07-29 RX ORDER — HYDROMORPHONE HYDROCHLORIDE 1 MG/ML
1 INJECTION, SOLUTION INTRAMUSCULAR; INTRAVENOUS; SUBCUTANEOUS
Status: DISCONTINUED | OUTPATIENT
Start: 2025-07-29 | End: 2025-08-02 | Stop reason: HOSPADM

## 2025-07-29 RX ORDER — LANTHANUM CARBONATE 500 MG/1
1000 TABLET, CHEWABLE ORAL
Status: DISCONTINUED | OUTPATIENT
Start: 2025-07-29 | End: 2025-08-02 | Stop reason: HOSPADM

## 2025-07-29 RX ORDER — CLOPIDOGREL BISULFATE 75 MG/1
75 TABLET ORAL DAILY
Status: DISCONTINUED | OUTPATIENT
Start: 2025-07-30 | End: 2025-08-02 | Stop reason: HOSPADM

## 2025-07-29 RX ORDER — HEPARIN SODIUM 5000 [USP'U]/ML
5000 INJECTION, SOLUTION INTRAVENOUS; SUBCUTANEOUS EVERY 8 HOURS SCHEDULED
Status: DISCONTINUED | OUTPATIENT
Start: 2025-07-29 | End: 2025-07-31

## 2025-07-29 RX ORDER — ATAZANAVIR 300 MG/1
300 CAPSULE ORAL
Status: DISCONTINUED | OUTPATIENT
Start: 2025-07-29 | End: 2025-08-02 | Stop reason: HOSPADM

## 2025-07-29 RX ORDER — AMLODIPINE BESYLATE 5 MG/1
10 TABLET ORAL DAILY
Status: DISCONTINUED | OUTPATIENT
Start: 2025-07-29 | End: 2025-08-02 | Stop reason: HOSPADM

## 2025-07-29 RX ADMIN — HEPARIN SODIUM 12 UNITS/KG/HR: 10000 INJECTION, SOLUTION INTRAVENOUS at 12:32

## 2025-07-29 RX ADMIN — LEVETIRACETAM 500 MG: 500 TABLET, FILM COATED ORAL at 11:20

## 2025-07-29 RX ADMIN — ISOSORBIDE MONONITRATE 30 MG: 30 TABLET, EXTENDED RELEASE ORAL at 15:43

## 2025-07-29 RX ADMIN — METOPROLOL 50 MG: 50 TABLET ORAL at 20:47

## 2025-07-29 RX ADMIN — SODIUM CHLORIDE, PRESERVATIVE FREE 10 ML: 5 INJECTION INTRAVENOUS at 20:47

## 2025-07-29 RX ADMIN — HYDROMORPHONE HYDROCHLORIDE 0.5 MG: 1 INJECTION, SOLUTION INTRAMUSCULAR; INTRAVENOUS; SUBCUTANEOUS at 16:59

## 2025-07-29 RX ADMIN — METOPROLOL 50 MG: 50 TABLET ORAL at 11:20

## 2025-07-29 RX ADMIN — HYDROMORPHONE HYDROCHLORIDE 1 MG: 1 INJECTION, SOLUTION INTRAMUSCULAR; INTRAVENOUS; SUBCUTANEOUS at 03:34

## 2025-07-29 RX ADMIN — HYDROMORPHONE HYDROCHLORIDE 1 MG: 1 INJECTION, SOLUTION INTRAMUSCULAR; INTRAVENOUS; SUBCUTANEOUS at 13:26

## 2025-07-29 RX ADMIN — HYDRALAZINE HYDROCHLORIDE 50 MG: 25 TABLET ORAL at 13:26

## 2025-07-29 RX ADMIN — HYDRALAZINE HYDROCHLORIDE 50 MG: 25 TABLET ORAL at 20:47

## 2025-07-29 RX ADMIN — HYDRALAZINE HYDROCHLORIDE 50 MG: 25 TABLET ORAL at 11:20

## 2025-07-29 RX ADMIN — HEPARIN SODIUM 5000 UNITS: 5000 INJECTION, SOLUTION INTRAVENOUS; SUBCUTANEOUS at 22:47

## 2025-07-29 RX ADMIN — NICARDIPINE HYDROCHLORIDE 2.5 MG/HR: 2.5 INJECTION, SOLUTION INTRAVENOUS at 04:11

## 2025-07-29 RX ADMIN — NITROGLYCERIN 0.4 MG: 0.4 TABLET SUBLINGUAL at 18:37

## 2025-07-29 RX ADMIN — LANTHANUM CARBONATE 1000 MG: 500 TABLET, CHEWABLE ORAL at 16:59

## 2025-07-29 RX ADMIN — SODIUM CHLORIDE, PRESERVATIVE FREE 10 ML: 5 INJECTION INTRAVENOUS at 08:20

## 2025-07-29 RX ADMIN — HYDROMORPHONE HYDROCHLORIDE 1 MG: 1 INJECTION, SOLUTION INTRAMUSCULAR; INTRAVENOUS; SUBCUTANEOUS at 22:53

## 2025-07-29 RX ADMIN — FAMOTIDINE 20 MG: 20 TABLET, FILM COATED ORAL at 20:47

## 2025-07-29 RX ADMIN — HEPARIN SODIUM 2000 UNITS: 1000 INJECTION, SOLUTION INTRAVENOUS; SUBCUTANEOUS at 02:19

## 2025-07-29 RX ADMIN — HYDROMORPHONE HYDROCHLORIDE 1 MG: 1 INJECTION, SOLUTION INTRAMUSCULAR; INTRAVENOUS; SUBCUTANEOUS at 10:14

## 2025-07-29 RX ADMIN — NICARDIPINE HYDROCHLORIDE 1.5 MG/HR: 2.5 INJECTION, SOLUTION INTRAVENOUS at 23:27

## 2025-07-29 RX ADMIN — LOSARTAN POTASSIUM 100 MG: 50 TABLET, FILM COATED ORAL at 11:19

## 2025-07-29 RX ADMIN — ATORVASTATIN CALCIUM 40 MG: 20 TABLET, FILM COATED ORAL at 20:47

## 2025-07-29 RX ADMIN — LEVETIRACETAM 500 MG: 500 TABLET, FILM COATED ORAL at 20:47

## 2025-07-29 RX ADMIN — ASPIRIN 81 MG: 81 TABLET, CHEWABLE ORAL at 08:20

## 2025-07-29 ASSESSMENT — PAIN DESCRIPTION - DESCRIPTORS
DESCRIPTORS: SHARP

## 2025-07-29 ASSESSMENT — PAIN DESCRIPTION - LOCATION
LOCATION: CHEST;STERNUM
LOCATION: CHEST
LOCATION: CHEST;STERNUM
LOCATION: CHEST;STERNUM
LOCATION: STERNUM;CHEST
LOCATION: CHEST;STERNUM
LOCATION: CHEST
LOCATION: STERNUM;CHEST
LOCATION: CHEST

## 2025-07-29 ASSESSMENT — PAIN SCALES - GENERAL
PAINLEVEL_OUTOF10: 2
PAINLEVEL_OUTOF10: 6
PAINLEVEL_OUTOF10: 8
PAINLEVEL_OUTOF10: 5
PAINLEVEL_OUTOF10: 6
PAINLEVEL_OUTOF10: 7
PAINLEVEL_OUTOF10: 6
PAINLEVEL_OUTOF10: 6
PAINLEVEL_OUTOF10: 7
PAINLEVEL_OUTOF10: 6
PAINLEVEL_OUTOF10: 3
PAINLEVEL_OUTOF10: 7

## 2025-07-29 ASSESSMENT — PAIN - FUNCTIONAL ASSESSMENT: PAIN_FUNCTIONAL_ASSESSMENT: ACTIVITIES ARE NOT PREVENTED

## 2025-07-29 ASSESSMENT — PAIN DESCRIPTION - ORIENTATION
ORIENTATION: MID
ORIENTATION: MID

## 2025-07-29 ASSESSMENT — PAIN DESCRIPTION - ONSET: ONSET: ON-GOING

## 2025-07-29 ASSESSMENT — PAIN DESCRIPTION - PAIN TYPE: TYPE: ACUTE PAIN

## 2025-07-29 ASSESSMENT — PAIN DESCRIPTION - FREQUENCY: FREQUENCY: INTERMITTENT

## 2025-07-29 NOTE — CARE COORDINATION
Care Management Initial Assessment  7/29/2025 2:05 PM  If patient is discharged prior to next notation, then this note serves as note for discharge by case management.    Reason for Admission:   Elevated troponin [R79.89]  Dialysis patient [Z99.2]  Hypertensive emergency [I16.1]  Chest pain, unspecified type [R07.9]         Patient Admission Status: Inpatient  Date Admitted to INP: 7/28/2025  RUR: Readmission Risk Score: 18    Hospitalization in the last 30 days (Readmission):  No        Advance Care Planning:  Code Status: Full Code  Primary Healthcare Decision Maker: Legal Next of Kin (Jerry Los [phone])  Primary Decision Maker: Jerry Madison - Nimesh - [phone]    Primary Decision Maker: Zaria Nieves - Nimesh - [phone]   Advance Directive: has NO advanced directive - not interested in additional information     __________________________________________________________________________  Assessment:     Pt was admitted on 7/28/2025 for chest pain. CM met with Pt to complete initial assessment. CM introduced self, CM role, and verified demographics.  Pt requested for CM to come back, as he wanted to see the nurse.    Pt has no hx of HH, home O2, or equipment. Pt is independent with ADLs and ambulation. Denies problems with either.          07/29/25 1349   Service Assessment   Patient Orientation Alert and Oriented   Cognition Alert   History Provided By Medical Record   Primary Caregiver Self   Support Systems Family Members  (Jerry Madison [phone])   Patient's Healthcare Decision Maker is: Legal Next of Kin  (Jerry Madison [phone])   PCP Verified by CM Yes  (Maggy Daniel [phone])   Prior Functional Level Independent in ADLs/IADLs   Current Functional Level Independent in ADLs/IADLs   Can patient return to prior living arrangement Yes   Ability to make needs known: Good   Family able to assist with home care needs: Yes   Would you like for me to discuss

## 2025-07-29 NOTE — PLAN OF CARE
Problem: Chronic Conditions and Co-morbidities  Goal: Patient's chronic conditions and co-morbidity symptoms are monitored and maintained or improved  7/29/2025 1317 by Monica Polk RN  Outcome: Progressing  Flowsheets (Taken 7/29/2025 0740)  Care Plan - Patient's Chronic Conditions and Co-Morbidity Symptoms are Monitored and Maintained or Improved: Monitor and assess patient's chronic conditions and comorbid symptoms for stability, deterioration, or improvement  7/29/2025 0017 by Alix Magana RN  Outcome: Progressing     Problem: Discharge Planning  Goal: Discharge to home or other facility with appropriate resources  7/29/2025 1317 by Monica Polk RN  Outcome: Progressing  Flowsheets (Taken 7/29/2025 0740)  Discharge to home or other facility with appropriate resources:   Identify barriers to discharge with patient and caregiver   Arrange for needed discharge resources and transportation as appropriate  7/29/2025 0017 by Alix Magana RN  Outcome: Progressing     Problem: Pain  Goal: Verbalizes/displays adequate comfort level or baseline comfort level  7/29/2025 1317 by Monica Polk RN  Outcome: Progressing  Flowsheets  Taken 7/29/2025 1117  Verbalizes/displays adequate comfort level or baseline comfort level: Assess pain using appropriate pain scale  Taken 7/29/2025 0815  Verbalizes/displays adequate comfort level or baseline comfort level: Assess pain using appropriate pain scale  7/29/2025 0017 by Alix Magana RN  Outcome: Progressing     Problem: Safety - Adult  Goal: Free from fall injury  7/29/2025 1317 by Monica Polk RN  Outcome: Progressing  7/29/2025 0017 by Alix Magana RN  Outcome: Progressing     Problem: ABCDS Injury Assessment  Goal: Absence of physical injury  7/29/2025 1317 by Monica Polk RN  Outcome: Progressing  7/29/2025 0017 by Alix Magana RN  Outcome: Progressing

## 2025-07-29 NOTE — SIGNIFICANT EVENT
Notified by nursing of pt with dips in HR in to 40's at time, rebounds to 50's. BP stable in 150-160's. Will continue to monitor at this time. Review of EKG from this am at OSH with Hr in 50's.

## 2025-07-29 NOTE — PROGRESS NOTES
Hospitalist Progress Note      NAME:  Woodrow Madison   :  1965  MRM:  [id number]    Date/Time: 2025  7:02 AM           Assessment / Plan:     Woodrow Madison is a very pleasant 59 y.o. male with a past med history of ESRD on hemodialysis, CAD, NSTEMI () GERD, HTN, HLD, multiple blood transfusions, HIV, who was admitted for chest pain and hypertensive emergency.     Hypertensive emergency, POA  Poorly controlled chronic hypertension  Blood pressures in the 200-220 systolic range on presentation.  Patient admits he did not take his blood pressure meds on the day of admission.  Symptoms include chest pain and fatigue.  Signs of endorgan damage with elevated troponin.  -Continue Cardene drip, titrate per protocol  -Will resume home meds of amlodipine, hydralazine, losartan, metoprolol  -Start Imdur per cardiology recommendation     Chest pain with elevated troponin concerning for possible NSTEMI, POA - NSTEMI ruled out per cardiology, states this is not an ACS elevated troponin.  Troponin down trended.  EKG does not show ST changes.  Troponin can also be falsely elevated in the setting of ESRD.  CAD, status post PCI x 2  PCI to LAD mid stent and POBA to distal LAD 7/10/24.   - Continue amlodipine, hydralazine, losartan, metoprolol, Imdur, aspirin, Plavix, Lipitor  - Heparin drip started, will plan to discontinue this and order subcutaneous heparin  - Cardiology following, recommend medical management of CAD and addition of Imdur  - Continuous telemetry  - Routine labs  - IV Dilaudid as needed for pain    HFpEF  POA, chronic.  Echo shows EF 64%, LV dilation, diastolic dysfunction.  -Continue amlodipine, hydralazine, losartan, metoprolol, Imdur, aspirin, Plavix, Lipitor     Hyperlipidemia  POA, chronic.  LDL at goal.  -Continue Lipitor     Chronic anemia  POA, anemia of chronic disease in setting of ESRD.  Has required multiple blood transfusions in the past.  Hemoglobin slightly below baseline

## 2025-07-29 NOTE — PROGRESS NOTES
0700 : Bedside and Verbal shift change report given to Morenita RN (oncoming nurse) by Alix RN (offgoing nurse). Report included the following information Nurse Handoff Report, Recent Results, Cardiac Rhythm NSR / Carlos Alberto, and Event Log.       1900 : Bedside and Verbal shift change report given to Megan  RN  (oncoming nurse) by Morenita RN (offgoing nurse). Report included the following information Nurse Handoff Report, Recent Results, Cardiac Rhythm NSR / Carlos Alberto, and Event Log.

## 2025-07-29 NOTE — CONSULTS
[facility] CARDIOLOGY                    Cardiology Care Note     [x]Initial Encounter     []Follow-up    Patient Name: Woodrow Madison - :1965 - MRN:[id number]  Primary Cardiologist: None  Consulting Cardiologist: Gee Richards MD     Reason for encounter:    HPI:   Woodrow Madison is a 59 y.o. male with PMH significant for CAD, PCI to LAD stent, hypertension, end-stage renal disease, HIV, dyslipidemia who is admitted with symptoms of chest pain while getting his dialysis.  His blood pressure at that time was 220/110.  He was also bradycardic with heart rate in 40s.  He was sent over to the ER.      EKG demonstrated sinus rhythm with normal ST segment with normal axis.    Most recent HS troponins:  No results for input(s): \"TROPHS\", \"CKMB\" in the last 72 hours.     Assessment and Plan     Elevated troponin T: Presentation troponin was 140 and thereafter it has been trending down.  Likely related to end-stage renal disease, severe hypertension.  Not true non-ST elevation MI.  EKG does not demonstrate ST changes.  Continue medical management of residual CAD.  Chest pain: He has known history of CAD with PCI to LAD mid stent and POBA to distal LAD 7/10/24. Continue aspirin, Plavix, losartan, metoprolol, amlodipine and atorvastatin.  EKG does not demonstrate any ischemia and echo does not demonstrate any wall motion abnormalities.  Will add isosorbide mononitrate 30 mg p.o. daily.  Hypertension: Uncontrolled hypertension.  Continue home medications and titrate as needed.  Adding isosorbide mononitrate 30 mg p.o. daily.       ____________________________________________________________    Cardiac testing  25    ECHO (TTE) COMPLETE (PRN CONTRAST/BUBBLE/STRAIN/3D) 2025 12:31 PM (Final)    Interpretation Summary    Left Ventricle: Normal left ventricular systolic function. EF by 2D Simpsons Biplane is 64%. Left ventricle is mildly dilated. Normal wall thickness. Normal wall motion. Diastolic  Minnie Tompkins MD, 10 mL at 07/29/25 0820    sodium chloride flush 0.9 % injection 5-40 mL, 5-40 mL, IntraVENous, PRN, Minnie Barrera MD    0.9 % sodium chloride infusion, , IntraVENous, PRN, Minnie Barrera MD    ondansetron (ZOFRAN-ODT) disintegrating tablet 4 mg, 4 mg, Oral, Q8H PRN **OR** ondansetron (ZOFRAN) injection 4 mg, 4 mg, IntraVENous, Q6H PRN, Minnie Barrera MD    melatonin tablet 3 mg, 3 mg, Oral, Nightly PRN, Minnie Barrera MD    polyethylene glycol (GLYCOLAX) packet 17 g, 17 g, Oral, Daily PRN, Minnie Barrera MD    aluminum & magnesium hydroxide-simethicone (MAALOX PLUS) 200-200-20 MG/5ML suspension 30 mL, 30 mL, Oral, Q6H PRN, Minnie Barrera MD    nicotine polacrilex (COMMIT) lozenge 2 mg, 2 mg, Oral, Q1H PRN, Minnie Barrera MD    acetaminophen (TYLENOL) tablet 650 mg, 650 mg, Oral, Q6H PRN **OR** acetaminophen (TYLENOL) suppository 650 mg, 650 mg, Rectal, Q6H PRN, Minnie Barrera MD    nitroGLYCERIN (NITROSTAT) SL tablet 0.4 mg, 0.4 mg, SubLINGual, Q5 Min PRN, Minnie Barrera MD    aspirin chewable tablet 81 mg, 81 mg, Oral, Daily, Minnie Barrera MD, 81 mg at 07/29/25 0820    atorvastatin (LIPITOR) tablet 40 mg, 40 mg, Oral, Nightly, Minnie Barrera MD, 40 mg at 07/28/25 2002    JOSE ANTONIO BRYANT MD    [facility] Cardiology  Call center: (P) [phone]  (F) [phone]      CC:Maggy Daniel, APRN - NP

## 2025-07-29 NOTE — PROGRESS NOTES
2215- Reached out to lab regarding Anti-Xa not resulting. Per lab, will call this RN back once they have identified why lab is not resulting.

## 2025-07-29 NOTE — CONSULTS
[facility]    Woodrow Madison  YOB: 1965          Assessment & Plan:     [place]RD on [place] [facility]  CP  HTN  Braycardia  Anemia    Rec:  No acute indication HD  HD tomorrow and MWF  [facility] MWF  On IV cardene but no PO BP meds. Can hold metorool due to bradycardia for now but losartan, hydralazine and amlodipine could be resumed       Subjective:   CC: ESRD  HPI: 59 AAM is seen for ESRD. He is on HD MWF at [facility]. Had HD yesterday with about 2 kg off but developed HTN and CP and was admitted here. He is on cardene gtt. Pain is improved. No sob.   ROS: as per hpi  PMH: HTN, ESRD, CAD, Colon Ca, HIV  SH: nonsmoker  Current Facility-Administered Medications   Medication Dose Route Frequency    niCARdipine (CARDENE) 25 mg in sodium chloride 0.9 % 250 mL infusion ([id number])  0.5-15 mg/hr IntraVENous Continuous    heparin (porcine) injection 4,000 Units  4,000 Units IntraVENous PRN    heparin (porcine) injection 2,000 Units  2,000 Units IntraVENous PRN    heparin 25,000 units in dextrose 5% 250 mL (premix) infusion  5-30 Units/kg/hr IntraVENous Continuous    sodium chloride flush 0.9 % injection 5-40 mL  5-40 mL IntraVENous 2 times per day    sodium chloride flush 0.9 % injection 5-40 mL  5-40 mL IntraVENous PRN    0.9 % sodium chloride infusion   IntraVENous PRN    ondansetron (ZOFRAN-ODT) disintegrating tablet 4 mg  4 mg Oral Q8H PRN    Or    ondansetron (ZOFRAN) injection 4 mg  4 mg IntraVENous Q6H PRN    melatonin tablet 3 mg  3 mg Oral Nightly PRN    polyethylene glycol (GLYCOLAX) packet 17 g  17 g Oral Daily PRN    aluminum & magnesium hydroxide-simethicone (MAALOX PLUS) 200-200-20 MG/5ML suspension 30 mL  30 mL Oral Q6H PRN    nicotine polacrilex (COMMIT) lozenge 2 mg  2 mg Oral Q1H PRN    acetaminophen (TYLENOL) tablet 650 mg  650 mg Oral Q6H PRN    Or    acetaminophen (TYLENOL) suppository 650 mg  650 mg Rectal Q6H PRN

## 2025-07-30 LAB
ANION GAP SERPL CALC-SCNC: 16 MMOL/L (ref 2–14)
BASOPHILS # BLD: 0.02 K/UL (ref 0–0.1)
BASOPHILS NFR BLD: 0.4 % (ref 0–1)
BUN SERPL-MCNC: 47 MG/DL (ref 6–20)
BUN/CREAT SERPL: 4 (ref 12–20)
CALCIUM SERPL-MCNC: 9.2 MG/DL (ref 8.6–10)
CHLORIDE SERPL-SCNC: 96 MMOL/L (ref 98–107)
CO2 SERPL-SCNC: 26 MMOL/L (ref 20–29)
CREAT SERPL-MCNC: 12.6 MG/DL (ref 0.7–1.2)
DIFFERENTIAL METHOD BLD: ABNORMAL
EOSINOPHIL # BLD: 0.13 K/UL (ref 0–0.4)
EOSINOPHIL NFR BLD: 2.6 % (ref 0–7)
ERYTHROCYTE [DISTWIDTH] IN BLOOD BY AUTOMATED COUNT: 14.8 % (ref 11.5–14.5)
GLUCOSE SERPL-MCNC: 79 MG/DL (ref 65–100)
HCT VFR BLD AUTO: 27.1 % (ref 36.6–50.3)
HGB BLD-MCNC: 8.6 G/DL (ref 12.1–17)
IMM GRANULOCYTES # BLD AUTO: 0.02 K/UL (ref 0–0.04)
IMM GRANULOCYTES NFR BLD AUTO: 0.4 % (ref 0–0.5)
LYMPHOCYTES # BLD: 0.86 K/UL (ref 0.8–3.5)
LYMPHOCYTES NFR BLD: 17.2 % (ref 12–49)
MCH RBC QN AUTO: 27.3 PG (ref 26–34)
MCHC RBC AUTO-ENTMCNC: 31.7 G/DL (ref 30–36.5)
MCV RBC AUTO: 86 FL (ref 80–99)
MONOCYTES # BLD: 0.44 K/UL (ref 0–1)
MONOCYTES NFR BLD: 8.8 % (ref 5–13)
NEUTS SEG # BLD: 3.54 K/UL (ref 1.8–8)
NEUTS SEG NFR BLD: 70.6 % (ref 32–75)
NRBC # BLD: 0 K/UL (ref 0–0.01)
NRBC BLD-RTO: 0 PER 100 WBC
PHOSPHATE SERPL-MCNC: 8.6 MG/DL (ref 2.5–4.5)
PLATELET # BLD AUTO: 146 K/UL (ref 150–400)
PMV BLD AUTO: 10.1 FL (ref 8.9–12.9)
POTASSIUM SERPL-SCNC: 5 MMOL/L (ref 3.5–5.1)
RBC # BLD AUTO: 3.15 M/UL (ref 4.1–5.7)
SODIUM SERPL-SCNC: 137 MMOL/L (ref 136–145)
TROPONIN T SERPL HS-MCNC: 118 NG/L (ref 0–22)
TROPONIN T SERPL HS-MCNC: 123 NG/L (ref 0–22)
WBC # BLD AUTO: 5 K/UL (ref 4.1–11.1)

## 2025-07-30 PROCEDURE — 90935 HEMODIALYSIS ONE EVALUATION: CPT

## 2025-07-30 PROCEDURE — 93005 ELECTROCARDIOGRAM TRACING: CPT | Performed by: NURSE PRACTITIONER

## 2025-07-30 PROCEDURE — 6370000000 HC RX 637 (ALT 250 FOR IP): Performed by: INTERNAL MEDICINE

## 2025-07-30 PROCEDURE — 36415 COLL VENOUS BLD VENIPUNCTURE: CPT

## 2025-07-30 PROCEDURE — 84100 ASSAY OF PHOSPHORUS: CPT

## 2025-07-30 PROCEDURE — 80048 BASIC METABOLIC PNL TOTAL CA: CPT

## 2025-07-30 PROCEDURE — 84484 ASSAY OF TROPONIN QUANT: CPT

## 2025-07-30 PROCEDURE — 6360000002 HC RX W HCPCS: Performed by: INTERNAL MEDICINE

## 2025-07-30 PROCEDURE — 5A1D70Z PERFORMANCE OF URINARY FILTRATION, INTERMITTENT, LESS THAN 6 HOURS PER DAY: ICD-10-PCS | Performed by: INTERNAL MEDICINE

## 2025-07-30 PROCEDURE — 6360000002 HC RX W HCPCS

## 2025-07-30 PROCEDURE — 93005 ELECTROCARDIOGRAM TRACING: CPT | Performed by: EMERGENCY MEDICINE

## 2025-07-30 PROCEDURE — 2500000003 HC RX 250 WO HCPCS: Performed by: INTERNAL MEDICINE

## 2025-07-30 PROCEDURE — 94761 N-INVAS EAR/PLS OXIMETRY MLT: CPT

## 2025-07-30 PROCEDURE — 6370000000 HC RX 637 (ALT 250 FOR IP)

## 2025-07-30 PROCEDURE — 2060000000 HC ICU INTERMEDIATE R&B

## 2025-07-30 PROCEDURE — 85025 COMPLETE CBC W/AUTO DIFF WBC: CPT

## 2025-07-30 PROCEDURE — 2700000000 HC OXYGEN THERAPY PER DAY

## 2025-07-30 RX ORDER — HYDRALAZINE HYDROCHLORIDE 25 MG/1
100 TABLET, FILM COATED ORAL 3 TIMES DAILY
Status: DISCONTINUED | OUTPATIENT
Start: 2025-07-30 | End: 2025-08-02 | Stop reason: HOSPADM

## 2025-07-30 RX ADMIN — EPOETIN ALFA-EPBX 10000 UNITS: 10000 INJECTION, SOLUTION INTRAVENOUS; SUBCUTANEOUS at 17:40

## 2025-07-30 RX ADMIN — METOPROLOL 50 MG: 50 TABLET ORAL at 22:40

## 2025-07-30 RX ADMIN — HYDRALAZINE HYDROCHLORIDE 100 MG: 25 TABLET ORAL at 13:33

## 2025-07-30 RX ADMIN — HEPARIN SODIUM 5000 UNITS: 5000 INJECTION, SOLUTION INTRAVENOUS; SUBCUTANEOUS at 13:33

## 2025-07-30 RX ADMIN — ISOSORBIDE MONONITRATE 30 MG: 30 TABLET, EXTENDED RELEASE ORAL at 08:33

## 2025-07-30 RX ADMIN — HYDROMORPHONE HYDROCHLORIDE 1 MG: 1 INJECTION, SOLUTION INTRAMUSCULAR; INTRAVENOUS; SUBCUTANEOUS at 13:38

## 2025-07-30 RX ADMIN — NITROGLYCERIN 0.4 MG: 0.4 TABLET SUBLINGUAL at 13:37

## 2025-07-30 RX ADMIN — HEPARIN SODIUM 5000 UNITS: 5000 INJECTION, SOLUTION INTRAVENOUS; SUBCUTANEOUS at 06:35

## 2025-07-30 RX ADMIN — ASPIRIN 81 MG: 81 TABLET, CHEWABLE ORAL at 08:32

## 2025-07-30 RX ADMIN — NITROGLYCERIN 0.4 MG: 0.4 TABLET SUBLINGUAL at 20:20

## 2025-07-30 RX ADMIN — LEVETIRACETAM 500 MG: 500 TABLET, FILM COATED ORAL at 08:33

## 2025-07-30 RX ADMIN — HYDROMORPHONE HYDROCHLORIDE 1 MG: 1 INJECTION, SOLUTION INTRAMUSCULAR; INTRAVENOUS; SUBCUTANEOUS at 20:11

## 2025-07-30 RX ADMIN — HYDRALAZINE HYDROCHLORIDE 100 MG: 25 TABLET ORAL at 22:42

## 2025-07-30 RX ADMIN — LOSARTAN POTASSIUM 100 MG: 50 TABLET, FILM COATED ORAL at 08:33

## 2025-07-30 RX ADMIN — NITROGLYCERIN 0.4 MG: 0.4 TABLET SUBLINGUAL at 20:10

## 2025-07-30 RX ADMIN — LANTHANUM CARBONATE 1000 MG: 500 TABLET, CHEWABLE ORAL at 08:16

## 2025-07-30 RX ADMIN — SODIUM CHLORIDE, PRESERVATIVE FREE 10 ML: 5 INJECTION INTRAVENOUS at 08:33

## 2025-07-30 RX ADMIN — CLOPIDOGREL BISULFATE 75 MG: 75 TABLET, FILM COATED ORAL at 08:33

## 2025-07-30 RX ADMIN — HEPARIN SODIUM 5000 UNITS: 5000 INJECTION, SOLUTION INTRAVENOUS; SUBCUTANEOUS at 22:41

## 2025-07-30 RX ADMIN — HYDRALAZINE HYDROCHLORIDE 50 MG: 25 TABLET ORAL at 08:33

## 2025-07-30 RX ADMIN — FAMOTIDINE 20 MG: 20 TABLET, FILM COATED ORAL at 20:16

## 2025-07-30 RX ADMIN — AMLODIPINE BESYLATE 10 MG: 5 TABLET ORAL at 08:33

## 2025-07-30 RX ADMIN — LEVETIRACETAM 500 MG: 500 TABLET, FILM COATED ORAL at 20:15

## 2025-07-30 RX ADMIN — ATORVASTATIN CALCIUM 40 MG: 20 TABLET, FILM COATED ORAL at 20:15

## 2025-07-30 RX ADMIN — NITROGLYCERIN 0.4 MG: 0.4 TABLET SUBLINGUAL at 08:22

## 2025-07-30 RX ADMIN — HYDROMORPHONE HYDROCHLORIDE 1 MG: 1 INJECTION, SOLUTION INTRAMUSCULAR; INTRAVENOUS; SUBCUTANEOUS at 08:32

## 2025-07-30 RX ADMIN — LANTHANUM CARBONATE 1000 MG: 500 TABLET, CHEWABLE ORAL at 17:40

## 2025-07-30 RX ADMIN — ACETAMINOPHEN 650 MG: 325 TABLET ORAL at 08:33

## 2025-07-30 RX ADMIN — LANTHANUM CARBONATE 1000 MG: 500 TABLET, CHEWABLE ORAL at 12:42

## 2025-07-30 ASSESSMENT — PAIN DESCRIPTION - DESCRIPTORS
DESCRIPTORS: SHARP
DESCRIPTORS: ACHING

## 2025-07-30 ASSESSMENT — PAIN DESCRIPTION - ORIENTATION
ORIENTATION: MID

## 2025-07-30 ASSESSMENT — PAIN SCALES - GENERAL
PAINLEVEL_OUTOF10: 7
PAINLEVEL_OUTOF10: 2
PAINLEVEL_OUTOF10: 8
PAINLEVEL_OUTOF10: 7
PAINLEVEL_OUTOF10: 8
PAINLEVEL_OUTOF10: 8
PAINLEVEL_OUTOF10: 3

## 2025-07-30 ASSESSMENT — PAIN DESCRIPTION - LOCATION
LOCATION: CHEST
LOCATION: CHEST;STERNUM
LOCATION: CHEST
LOCATION: CHEST;STERNUM
LOCATION: CHEST

## 2025-07-30 ASSESSMENT — PAIN DESCRIPTION - FREQUENCY: FREQUENCY: INTERMITTENT

## 2025-07-30 NOTE — PROGRESS NOTES
0700 : Bedside and Verbal shift change report given to Morenita RN  (oncoming nurse) by Megan RN (offgoing nurse). Report included the following information Nurse Handoff Report, Recent Results, Cardiac Rhythm Nsr / Carlos Alberto, and Event Log.       0818 : Pt report new onset chest palpitations with pre existing chest pain. RN called RR. MD at bedside. VS EKG and trop drawn. Nitroglycerin given. No other orders at this time.     1000 : report given to marina Jeffery RN.       1900 : Bedside and Verbal shift change report given to Melissa RN (oncoming nurse) by Morenita RN (offgoing nurse). Report included the following information Nurse Handoff Report, Recent Results, Cardiac Rhythm Nsr / Carlos Alberto, and Event Log.

## 2025-07-30 NOTE — PLAN OF CARE
Problem: Chronic Conditions and Co-morbidities  Goal: Patient's chronic conditions and co-morbidity symptoms are monitored and maintained or improved  Outcome: Progressing  Flowsheets (Taken 7/29/2025 2030)  Care Plan - Patient's Chronic Conditions and Co-Morbidity Symptoms are Monitored and Maintained or Improved:   Monitor and assess patient's chronic conditions and comorbid symptoms for stability, deterioration, or improvement   Collaborate with multidisciplinary team to address chronic and comorbid conditions and prevent exacerbation or deterioration   Update acute care plan with appropriate goals if chronic or comorbid symptoms are exacerbated and prevent overall improvement and discharge     Problem: Discharge Planning  Goal: Discharge to home or other facility with appropriate resources  Outcome: Progressing  Flowsheets (Taken 7/29/2025 2030)  Discharge to home or other facility with appropriate resources:   Identify barriers to discharge with patient and caregiver   Arrange for needed discharge resources and transportation as appropriate   Identify discharge learning needs (meds, wound care, etc)   Refer to discharge planning if patient needs post-hospital services based on physician order or complex needs related to functional status, cognitive ability or social support system     Problem: Pain  Goal: Verbalizes/displays adequate comfort level or baseline comfort level  Outcome: Progressing  Flowsheets  Taken 7/29/2025 1930 by Solomon, Megan, RN  Verbalizes/displays adequate comfort level or baseline comfort level:   Encourage patient to monitor pain and request assistance   Assess pain using appropriate pain scale   Administer analgesics based on type and severity of pain and evaluate response   Implement non-pharmacological measures as appropriate and evaluate response   Consider cultural and social influences on pain and pain management   Notify Licensed Independent Practitioner if interventions

## 2025-07-30 NOTE — PROGRESS NOTES
[facility]    Woodrow Madison  YOB: 1965          Assessment & Plan:     [place] [facility]  CP  HTN  Braycardia  Anemia    Rec:  For HD today  HD MWF  [facility] MWF  Increase hydralazine to 100 tid       Subjective:   CC: ESRD  HPI: BP improving. Off cardene. For HD today. No sob/n/v  Current Facility-Administered Medications   Medication Dose Route Frequency    HYDROmorphone HCl PF (DILAUDID) injection 1 mg  1 mg IntraVENous Q3H PRN    Or    HYDROmorphone (DILAUDID) injection 0.5 mg  0.5 mg IntraVENous Q3H PRN    [Held by provider] atazanavir (REYATAZ) capsule 300 mg  300 mg Oral Daily with breakfast    clopidogrel (PLAVIX) tablet 75 mg  75 mg Oral Daily    [Held by provider] dolutegravir sodium (TIVICAY) tablet 50 mg  50 mg Oral Daily    famotidine (PEPCID) tablet 20 mg  20 mg Oral Nightly    hydrALAZINE (APRESOLINE) tablet 50 mg  50 mg Oral TID    lanthanum (FOSRENOL) chewable tablet 1,000 mg  1,000 mg Oral TID WC    levETIRAcetam (KEPPRA) tablet 500 mg  500 mg Oral BID    losartan (COZAAR) tablet 100 mg  100 mg Oral Daily    metoprolol tartrate (LOPRESSOR) tablet 50 mg  50 mg Oral BID    [Held by provider] Tenapanor HCl (CKD) TABS 20 mg  20 mg Oral Daily    [Held by provider] Tenofovir Alafenamide Fumarate TABS 25 mg  1 tablet Oral Daily    amLODIPine (NORVASC) tablet 10 mg  10 mg Oral Daily    epoetin rosalia-epbx (RETACRIT) injection 10,000 Units  10,000 Units SubCUTAneous Once per day on Monday Wednesday Friday    isosorbide mononitrate (IMDUR) extended release tablet 30 mg  30 mg Oral Daily    heparin (porcine) injection 5,000 Units  5,000 Units SubCUTAneous 3 times per day    niCARdipine (CARDENE) 25 mg in sodium chloride 0.9 % 250 mL infusion ([id number])  0.5-15 mg/hr IntraVENous Continuous    sodium chloride flush 0.9 % injection 5-40 mL  5-40 mL IntraVENous 2 times per day    sodium chloride flush 0.9 % injection 5-40 mL  5-40 mL

## 2025-07-30 NOTE — CARE COORDINATION
Care Management Progress Note    Reason for Admission:   Elevated troponin [R79.89]  Dialysis patient [Z99.2]  Hypertensive emergency [I16.1]  Chest pain, unspecified type [R07.9]         Patient Admission Status: Inpatient  RUR: 19%  Hospitalization in the last 30 days (Readmission):  No        Transition of care plan:  Ongoing medical management.  Still c/o CP.  Cards following.  Nephro following.  On HD.      Discharge Plan:    A. Anticipate no CM needs.    B.  [facility]  [phone]  Fax [phone]    Date last IMM letter given: Pending    Outpatient follow-up:  TBD    Transport at discharge: Family      Please reach out to CM if any discharge needs arises.     ______________________________________  KYLAH Alfonso, RN-CM  [facility]- Care Management  Available via [employer]  7/30/2025  2:32 PM

## 2025-07-30 NOTE — PLAN OF CARE
Problem: Chronic Conditions and Co-morbidities  Goal: Patient's chronic conditions and co-morbidity symptoms are monitored and maintained or improved  7/30/2025 1630 by Monica Polk RN  Outcome: Progressing  Flowsheets (Taken 7/30/2025 0740)  Care Plan - Patient's Chronic Conditions and Co-Morbidity Symptoms are Monitored and Maintained or Improved: Monitor and assess patient's chronic conditions and comorbid symptoms for stability, deterioration, or improvement  7/30/2025 0351 by Megan Carbajal, RN  Outcome: Progressing  Flowsheets (Taken 7/29/2025 2030)  Care Plan - Patient's Chronic Conditions and Co-Morbidity Symptoms are Monitored and Maintained or Improved:   Monitor and assess patient's chronic conditions and comorbid symptoms for stability, deterioration, or improvement   Collaborate with multidisciplinary team to address chronic and comorbid conditions and prevent exacerbation or deterioration   Update acute care plan with appropriate goals if chronic or comorbid symptoms are exacerbated and prevent overall improvement and discharge     Problem: Discharge Planning  Goal: Discharge to home or other facility with appropriate resources  7/30/2025 1630 by Monica Polk RN  Outcome: Progressing  Flowsheets (Taken 7/30/2025 0740)  Discharge to home or other facility with appropriate resources:   Identify barriers to discharge with patient and caregiver   Arrange for needed discharge resources and transportation as appropriate  7/30/2025 0351 by Megan Carbajal RN  Outcome: Progressing  Flowsheets (Taken 7/29/2025 2030)  Discharge to home or other facility with appropriate resources:   Identify barriers to discharge with patient and caregiver   Arrange for needed discharge resources and transportation as appropriate   Identify discharge learning needs (meds, wound care, etc)   Refer to discharge planning if patient needs post-hospital services based on physician order or complex needs related to

## 2025-07-30 NOTE — PROGRESS NOTES
Rapid Called at 0820  Responded to RRT at 0820 for Chest Pain  Provider at bedside: Yes Dr. Weinstein  Interventions ordered: Labs and EKG  Sepsis Suspected: No  Transfer to Higher Level of Care: No    RRT RN responded to RRT w/ CP. Patient complaining of mid sternal chest palpitations/ chest pain. Nitro given, see MAR. Troponin sent to lab for processing. No other RRT interventions needed at this time.     Vitals:    07/30/25 0822   BP: (!) 169/87   Pulse: 58   Resp: 14   Temp: 98.4 °F (36.9 °C)   SpO2: 94%      Rapid Ended at 0830

## 2025-07-30 NOTE — DIALYSIS
Hep B verification performed for (RN): Fpr Oncoming RN  Hep B results, dates, and Primary Source:   Negative 01/08/2025  Immune 01/08/2025  Epic  Hepatitis B Surface Ag   Date/Time Value Ref Range Status   01/08/2025 12:28 PM <0.10 Index Final     Hep B S Ag Interp   Date/Time Value Ref Range Status   01/08/2025 12:28 PM Negative NEG   Final     HEP B SURF AB   Date/Time Value Ref Range Status   08/15/2022 05:28 AM 61.11 mIU/mL Final     Hep B S Ab   Date/Time Value Ref Range Status   01/08/2025 12:28 .33 mIU/mL Final     Hep B S Ab Interp   Date/Time Value Ref Range Status   01/08/2025 12:28 PM REACTIVE (A) NR   Final     Machine disinfect process:Standard Heat/Vinegar

## 2025-07-30 NOTE — PROGRESS NOTES
Hospitalist Progress Note      NAME:  Woodrow Madison   :  1965  MRM:  [id number]    Date/Time: 2025  2:11 PM           Assessment / Plan:     Woodrow Madison is a very pleasant 59 y.o. male with a past med history of ESRD on hemodialysis, CAD, NSTEMI () GERD, HTN, HLD, multiple blood transfusions, HIV, who was admitted for chest pain and hypertensive emergency.    Patient had a rapid response called  a.m. due to heart fluttering and worsening chest pain.  EKG was unchanged from prior.  Troponin collected which was down from the last 1.  Discussed trying nitroglycerin which patient said helped before.  Will see if cardiology wants to do any testing.     Hypertensive emergency, POA  Poorly controlled chronic hypertension  Blood pressures in the 200-220 systolic range on presentation.  Patient admits he did not take his blood pressure meds on the day of admission.  Symptoms include chest pain and fatigue.  Signs of endorgan damage with elevated troponin.  - Trial off of Cardene drip, blood pressure still very elevated, may need this turned back on  - Continue home meds of amlodipine, hydralazine, losartan, metoprolol, hydralazine increased to 100 mg 3 times daily by nephrology  - Continue Imdur per cardiology recommendation     Chest pain with elevated troponin concerning for possible NSTEMI, POA - NSTEMI ruled out per cardiology, states this is not an ACS elevated troponin.  Troponin down trended.  EKG does not show ST changes.  Troponin can also be falsely elevated in the setting of ESRD.  CAD, status post PCI x 2  PCI to LAD mid stent and POBA to distal LAD 7/10/24.   - Continue amlodipine, hydralazine, losartan, metoprolol, Imdur, aspirin, Plavix, Lipitor  - Heparin drip off given cardiology input that this is not consistent with ACS, subcutaneous heparin ordered  - Cardiology following, recommend medical management of CAD and addition of Imdur  - Continuous telemetry  - Routine  acetaminophen (TYLENOL) suppository 650 mg  650 mg Rectal Q6H PRN    nitroGLYCERIN (NITROSTAT) SL tablet 0.4 mg  0.4 mg SubLINGual Q5 Min PRN    aspirin chewable tablet 81 mg  81 mg Oral Daily    atorvastatin (LIPITOR) tablet 40 mg  40 mg Oral Nightly            Lab Review:     Recent Labs     07/28/25  1233 07/29/25  0143 07/30/25  0127   WBC 5.4 5.3 5.0   HGB 9.2* 8.3* 8.6*   HCT 28.1* 25.5* 27.1*   * 133* 146*     Recent Labs     07/28/25  0916 07/28/25  1233 07/29/25  0143 07/30/25  0127     --  137 137   K 4.5  --  4.2 5.0   CL 94*  --  99 96*   CO2 29  --  31 26   BUN 32*  --  40* 47*   MG 2.1  --   --   --    PHOS  --   --  7.0* 8.6*   ALT <5*  --   --   --    INR  --  1.1  --   --      No components found for: \"GLPOC\"

## 2025-07-30 NOTE — FLOWSHEET NOTE
Primary RN SBAR: SHIRA Polk, RN  Incapacitated Nurse [facility]. provided: yes  Patient Education provided: procedural, access care  Preferred Education method and Primary language: verbal, english  Dialysis consent: yes  Hospital General Consent Verified: yes  Hospital associated wait time; reason: none  Hepatitis B Surface Ag   Date/Time Value Ref Range Status   01/08/2025 12:28 PM <0.10 Index Final     Hep B S Ag Interp   Date/Time Value Ref Range Status   01/08/2025 12:28 PM Negative NEG   Final     HEP B SURF AB   Date/Time Value Ref Range Status   08/15/2022 05:28 AM 61.11 mIU/mL Final     Hep B S Ab   Date/Time Value Ref Range Status   01/08/2025 12:28 .33 mIU/mL Final     Hep B S Ab Interp   Date/Time Value Ref Range Status   01/08/2025 12:28 PM REACTIVE (A) NR   Final     Hep B results, dates, and Primary Source: Negative / Immune 1/8/25 EPIC  Hep B dual verification performed by (RN): ROSSI Rosenbaum RN  Machine disinfect process:heat     PRETREATMENT   07/30/25 1500   Observations & Evaluations   Level of Consciousness 0   Oriented X 4   Heart Rhythm Regular   Respiratory Quality/Effort Unlabored   O2 Device Nasal cannula   Skin Color Other (comment)  (WDL)   Skin Condition/Temp Dry;Warm   Abdomen Inspection Soft   Edema None   Vital Signs   BP (!) 163/74   Temp 98.4 °F (36.9 °C)   Pulse 56   Respirations 16   SpO2 94 %   Pain Assessment   Pain Assessment None - Denies Pain   Technical Checks   Dialysis Machine No. [id number]   RO Machine Number [id number]   Dialyzer Lot No. [id number]   Tubing Lot Number [id number]   All Connections Secure Yes   NS Bag Yes   Saline Line Double Clamped Yes   Dialyzer Nipro ELISIO   Prime Volume (mL) 200 mL   ICEBOAT I;C;E;B;O;A;T   RO Machine Log Sheet Completed Yes   Machine Alarm Self Test Completed;Passed   Air Foam Detector Tested;Proper Function;pH Reading   Extracorporeal Circuit Tested for Integrity Yes   Machine Conductivity 13.7   Manual Ph 7.2   Bleach Test (Neg) Yes   Bath

## 2025-07-30 NOTE — PLAN OF CARE
Problem: Chronic Conditions and Co-morbidities  Goal: Patient's chronic conditions and co-morbidity symptoms are monitored and maintained or improved  Recent Flowsheet Documentation  Taken 7/29/2025 2030 by Megan Carbajal RN  Care Plan - Patient's Chronic Conditions and Co-Morbidity Symptoms are Monitored and Maintained or Improved:   Monitor and assess patient's chronic conditions and comorbid symptoms for stability, deterioration, or improvement   Collaborate with multidisciplinary team to address chronic and comorbid conditions and prevent exacerbation or deterioration   Update acute care plan with appropriate goals if chronic or comorbid symptoms are exacerbated and prevent overall improvement and discharge     Problem: Discharge Planning  Goal: Discharge to home or other facility with appropriate resources  Recent Flowsheet Documentation  Taken 7/29/2025 2030 by Megan Carbajal RN  Discharge to home or other facility with appropriate resources:   Identify barriers to discharge with patient and caregiver   Arrange for needed discharge resources and transportation as appropriate   Identify discharge learning needs (meds, wound care, etc)   Refer to discharge planning if patient needs post-hospital services based on physician order or complex needs related to functional status, cognitive ability or social support system     Problem: Pain  Goal: Verbalizes/displays adequate comfort level or baseline comfort level  Recent Flowsheet Documentation  Taken 7/29/2025 1930 by Megan Carbajal RN  Verbalizes/displays adequate comfort level or baseline comfort level:   Encourage patient to monitor pain and request assistance   Assess pain using appropriate pain scale   Administer analgesics based on type and severity of pain and evaluate response   Implement non-pharmacological measures as appropriate and evaluate response   Consider cultural and social influences on pain and pain management   Notify

## 2025-07-31 ENCOUNTER — APPOINTMENT (OUTPATIENT)
Facility: HOSPITAL | Age: 60
DRG: 286 | End: 2025-07-31
Payer: MEDICARE

## 2025-07-31 LAB
ANION GAP SERPL CALC-SCNC: 11 MMOL/L (ref 2–14)
APTT PPP: 27.7 SEC (ref 22.1–31)
BASOPHILS # BLD: 0.02 K/UL (ref 0–0.1)
BASOPHILS NFR BLD: 0.4 % (ref 0–1)
BUN SERPL-MCNC: 24 MG/DL (ref 6–20)
BUN/CREAT SERPL: 3 (ref 12–20)
CALCIUM SERPL-MCNC: 9.3 MG/DL (ref 8.6–10)
CHLORIDE SERPL-SCNC: 100 MMOL/L (ref 98–107)
CO2 SERPL-SCNC: 29 MMOL/L (ref 20–29)
CREAT SERPL-MCNC: 7.99 MG/DL (ref 0.7–1.2)
DIFFERENTIAL METHOD BLD: ABNORMAL
EOSINOPHIL # BLD: 0.14 K/UL (ref 0–0.4)
EOSINOPHIL NFR BLD: 3.1 % (ref 0–7)
ERYTHROCYTE [DISTWIDTH] IN BLOOD BY AUTOMATED COUNT: 14.8 % (ref 11.5–14.5)
ERYTHROCYTE [DISTWIDTH] IN BLOOD BY AUTOMATED COUNT: 14.9 % (ref 11.5–14.5)
GLUCOSE SERPL-MCNC: 88 MG/DL (ref 65–100)
HCT VFR BLD AUTO: 25.7 % (ref 36.6–50.3)
HCT VFR BLD AUTO: 26.8 % (ref 36.6–50.3)
HGB BLD-MCNC: 8.2 G/DL (ref 12.1–17)
HGB BLD-MCNC: 8.7 G/DL (ref 12.1–17)
IMM GRANULOCYTES # BLD AUTO: 0.01 K/UL (ref 0–0.04)
IMM GRANULOCYTES NFR BLD AUTO: 0.2 % (ref 0–0.5)
INR PPP: 1 (ref 0.9–1.1)
LYMPHOCYTES # BLD: 0.59 K/UL (ref 0.8–3.5)
LYMPHOCYTES NFR BLD: 13 % (ref 12–49)
MCH RBC QN AUTO: 27.6 PG (ref 26–34)
MCH RBC QN AUTO: 27.6 PG (ref 26–34)
MCHC RBC AUTO-ENTMCNC: 31.9 G/DL (ref 30–36.5)
MCHC RBC AUTO-ENTMCNC: 32.5 G/DL (ref 30–36.5)
MCV RBC AUTO: 85.1 FL (ref 80–99)
MCV RBC AUTO: 86.5 FL (ref 80–99)
MONOCYTES # BLD: 0.36 K/UL (ref 0–1)
MONOCYTES NFR BLD: 8.1 % (ref 5–13)
NEUTS SEG # BLD: 3.38 K/UL (ref 1.8–8)
NEUTS SEG NFR BLD: 75.2 % (ref 32–75)
NRBC # BLD: 0 K/UL (ref 0–0.01)
NRBC # BLD: 0 K/UL (ref 0–0.01)
NRBC BLD-RTO: 0 PER 100 WBC
NRBC BLD-RTO: 0 PER 100 WBC
PHOSPHATE SERPL-MCNC: 5.7 MG/DL (ref 2.5–4.5)
PLATELET # BLD AUTO: 130 K/UL (ref 150–400)
PLATELET # BLD AUTO: 139 K/UL (ref 150–400)
PMV BLD AUTO: 9.6 FL (ref 8.9–12.9)
PMV BLD AUTO: 9.8 FL (ref 8.9–12.9)
POTASSIUM SERPL-SCNC: 4.4 MMOL/L (ref 3.5–5.1)
PROTHROMBIN TIME: 10.3 SEC (ref 9.2–11.2)
RBC # BLD AUTO: 2.97 M/UL (ref 4.1–5.7)
RBC # BLD AUTO: 3.15 M/UL (ref 4.1–5.7)
RBC MORPH BLD: ABNORMAL
SODIUM SERPL-SCNC: 139 MMOL/L (ref 136–145)
THERAPEUTIC RANGE: NORMAL SECS (ref 58–77)
TROPONIN T SERPL HS-MCNC: 101 NG/L (ref 0–22)
TROPONIN T SERPL HS-MCNC: 104 NG/L (ref 0–22)
UFH PPP CHRO-ACNC: <0.1 IU/ML
WBC # BLD AUTO: 3.9 K/UL (ref 4.1–11.1)
WBC # BLD AUTO: 4.5 K/UL (ref 4.1–11.1)

## 2025-07-31 PROCEDURE — 6360000002 HC RX W HCPCS

## 2025-07-31 PROCEDURE — 6360000002 HC RX W HCPCS: Performed by: INTERNAL MEDICINE

## 2025-07-31 PROCEDURE — 2060000000 HC ICU INTERMEDIATE R&B

## 2025-07-31 PROCEDURE — 6370000000 HC RX 637 (ALT 250 FOR IP): Performed by: INTERNAL MEDICINE

## 2025-07-31 PROCEDURE — 85027 COMPLETE CBC AUTOMATED: CPT

## 2025-07-31 PROCEDURE — 6360000002 HC RX W HCPCS: Performed by: STUDENT IN AN ORGANIZED HEALTH CARE EDUCATION/TRAINING PROGRAM

## 2025-07-31 PROCEDURE — 94761 N-INVAS EAR/PLS OXIMETRY MLT: CPT

## 2025-07-31 PROCEDURE — 85520 HEPARIN ASSAY: CPT

## 2025-07-31 PROCEDURE — 85610 PROTHROMBIN TIME: CPT

## 2025-07-31 PROCEDURE — 85025 COMPLETE CBC W/AUTO DIFF WBC: CPT

## 2025-07-31 PROCEDURE — 93005 ELECTROCARDIOGRAM TRACING: CPT | Performed by: EMERGENCY MEDICINE

## 2025-07-31 PROCEDURE — 84484 ASSAY OF TROPONIN QUANT: CPT

## 2025-07-31 PROCEDURE — 2580000003 HC RX 258: Performed by: STUDENT IN AN ORGANIZED HEALTH CARE EDUCATION/TRAINING PROGRAM

## 2025-07-31 PROCEDURE — 2500000003 HC RX 250 WO HCPCS: Performed by: INTERNAL MEDICINE

## 2025-07-31 PROCEDURE — 2700000000 HC OXYGEN THERAPY PER DAY

## 2025-07-31 PROCEDURE — 99233 SBSQ HOSP IP/OBS HIGH 50: CPT | Performed by: INTERNAL MEDICINE

## 2025-07-31 PROCEDURE — 84100 ASSAY OF PHOSPHORUS: CPT

## 2025-07-31 PROCEDURE — 36415 COLL VENOUS BLD VENIPUNCTURE: CPT

## 2025-07-31 PROCEDURE — 6370000000 HC RX 637 (ALT 250 FOR IP)

## 2025-07-31 PROCEDURE — 80048 BASIC METABOLIC PNL TOTAL CA: CPT

## 2025-07-31 PROCEDURE — 6360000004 HC RX CONTRAST MEDICATION

## 2025-07-31 PROCEDURE — 71275 CT ANGIOGRAPHY CHEST: CPT

## 2025-07-31 PROCEDURE — 85730 THROMBOPLASTIN TIME PARTIAL: CPT

## 2025-07-31 RX ORDER — ISOSORBIDE MONONITRATE 60 MG/1
120 TABLET, EXTENDED RELEASE ORAL DAILY
Status: DISCONTINUED | OUTPATIENT
Start: 2025-08-01 | End: 2025-08-02 | Stop reason: HOSPADM

## 2025-07-31 RX ORDER — HEPARIN SODIUM 1000 [USP'U]/ML
80 INJECTION, SOLUTION INTRAVENOUS; SUBCUTANEOUS ONCE
Status: COMPLETED | OUTPATIENT
Start: 2025-07-31 | End: 2025-07-31

## 2025-07-31 RX ORDER — ISOSORBIDE MONONITRATE 60 MG/1
60 TABLET, EXTENDED RELEASE ORAL DAILY
Status: DISCONTINUED | OUTPATIENT
Start: 2025-08-01 | End: 2025-07-31

## 2025-07-31 RX ORDER — IOPAMIDOL 755 MG/ML
100 INJECTION, SOLUTION INTRAVASCULAR
Status: COMPLETED | OUTPATIENT
Start: 2025-07-31 | End: 2025-07-31

## 2025-07-31 RX ORDER — HEPARIN SODIUM 1000 [USP'U]/ML
40 INJECTION, SOLUTION INTRAVENOUS; SUBCUTANEOUS PRN
Status: DISCONTINUED | OUTPATIENT
Start: 2025-07-31 | End: 2025-08-01

## 2025-07-31 RX ORDER — HEPARIN SODIUM 10000 [USP'U]/100ML
5-30 INJECTION, SOLUTION INTRAVENOUS CONTINUOUS
Status: DISCONTINUED | OUTPATIENT
Start: 2025-07-31 | End: 2025-08-01

## 2025-07-31 RX ORDER — SPIRONOLACTONE 25 MG/1
50 TABLET ORAL DAILY
Status: DISCONTINUED | OUTPATIENT
Start: 2025-07-31 | End: 2025-08-02 | Stop reason: HOSPADM

## 2025-07-31 RX ORDER — HEPARIN SODIUM 1000 [USP'U]/ML
80 INJECTION, SOLUTION INTRAVENOUS; SUBCUTANEOUS PRN
Status: DISCONTINUED | OUTPATIENT
Start: 2025-07-31 | End: 2025-08-01

## 2025-07-31 RX ORDER — NITROGLYCERIN 20 MG/100ML
5-200 INJECTION INTRAVENOUS CONTINUOUS
Status: DISCONTINUED | OUTPATIENT
Start: 2025-07-31 | End: 2025-08-02

## 2025-07-31 RX ADMIN — NICARDIPINE HYDROCHLORIDE 8.5 MG/HR: 2.5 INJECTION, SOLUTION INTRAVENOUS at 13:49

## 2025-07-31 RX ADMIN — HYDROMORPHONE HYDROCHLORIDE 1 MG: 1 INJECTION, SOLUTION INTRAMUSCULAR; INTRAVENOUS; SUBCUTANEOUS at 02:38

## 2025-07-31 RX ADMIN — ALUMINUM HYDROXIDE, MAGNESIUM HYDROXIDE, AND SIMETHICONE 30 ML: 200; 200; 20 SUSPENSION ORAL at 19:13

## 2025-07-31 RX ADMIN — HYDROMORPHONE HYDROCHLORIDE 0.5 MG: 1 INJECTION, SOLUTION INTRAMUSCULAR; INTRAVENOUS; SUBCUTANEOUS at 18:28

## 2025-07-31 RX ADMIN — NITROGLYCERIN 0.4 MG: 0.4 TABLET SUBLINGUAL at 18:07

## 2025-07-31 RX ADMIN — HEPARIN SODIUM 5000 UNITS: 5000 INJECTION, SOLUTION INTRAVENOUS; SUBCUTANEOUS at 13:40

## 2025-07-31 RX ADMIN — SODIUM CHLORIDE, PRESERVATIVE FREE 10 ML: 5 INJECTION INTRAVENOUS at 08:19

## 2025-07-31 RX ADMIN — HYDROMORPHONE HYDROCHLORIDE 0.5 MG: 1 INJECTION, SOLUTION INTRAMUSCULAR; INTRAVENOUS; SUBCUTANEOUS at 11:37

## 2025-07-31 RX ADMIN — ACETAMINOPHEN 650 MG: 325 TABLET ORAL at 08:16

## 2025-07-31 RX ADMIN — ASPIRIN 81 MG: 81 TABLET, CHEWABLE ORAL at 08:17

## 2025-07-31 RX ADMIN — ONDANSETRON 4 MG: 2 INJECTION, SOLUTION INTRAMUSCULAR; INTRAVENOUS at 18:06

## 2025-07-31 RX ADMIN — METOPROLOL 50 MG: 50 TABLET ORAL at 20:52

## 2025-07-31 RX ADMIN — SPIRONOLACTONE 50 MG: 25 TABLET ORAL at 11:31

## 2025-07-31 RX ADMIN — LANTHANUM CARBONATE 1000 MG: 500 TABLET, CHEWABLE ORAL at 08:15

## 2025-07-31 RX ADMIN — AMLODIPINE BESYLATE 10 MG: 5 TABLET ORAL at 08:18

## 2025-07-31 RX ADMIN — NITROGLYCERIN 0.4 MG: 0.4 TABLET SUBLINGUAL at 10:08

## 2025-07-31 RX ADMIN — LOSARTAN POTASSIUM 100 MG: 50 TABLET, FILM COATED ORAL at 08:18

## 2025-07-31 RX ADMIN — LEVETIRACETAM 500 MG: 500 TABLET, FILM COATED ORAL at 20:52

## 2025-07-31 RX ADMIN — HEPARIN SODIUM 18 UNITS/KG/HR: 10000 INJECTION, SOLUTION INTRAVENOUS at 19:55

## 2025-07-31 RX ADMIN — HEPARIN SODIUM 7700 UNITS: 1000 INJECTION, SOLUTION INTRAVENOUS; SUBCUTANEOUS at 19:48

## 2025-07-31 RX ADMIN — NITROGLYCERIN 0.4 MG: 0.4 TABLET SUBLINGUAL at 02:34

## 2025-07-31 RX ADMIN — HYDROMORPHONE HYDROCHLORIDE 0.5 MG: 1 INJECTION, SOLUTION INTRAMUSCULAR; INTRAVENOUS; SUBCUTANEOUS at 08:16

## 2025-07-31 RX ADMIN — HYDROMORPHONE HYDROCHLORIDE 0.5 MG: 1 INJECTION, SOLUTION INTRAMUSCULAR; INTRAVENOUS; SUBCUTANEOUS at 19:39

## 2025-07-31 RX ADMIN — NICARDIPINE HYDROCHLORIDE 5 MG/HR: 2.5 INJECTION, SOLUTION INTRAVENOUS at 05:00

## 2025-07-31 RX ADMIN — NITROGLYCERIN 0.4 MG: 0.4 TABLET SUBLINGUAL at 09:21

## 2025-07-31 RX ADMIN — ISOSORBIDE MONONITRATE 30 MG: 30 TABLET, EXTENDED RELEASE ORAL at 08:18

## 2025-07-31 RX ADMIN — NICARDIPINE HYDROCHLORIDE 5 MG/HR: 2.5 INJECTION, SOLUTION INTRAVENOUS at 01:15

## 2025-07-31 RX ADMIN — HYDRALAZINE HYDROCHLORIDE 100 MG: 25 TABLET ORAL at 13:40

## 2025-07-31 RX ADMIN — IOPAMIDOL 100 ML: 755 INJECTION, SOLUTION INTRAVENOUS at 19:01

## 2025-07-31 RX ADMIN — SODIUM CHLORIDE, PRESERVATIVE FREE 10 ML: 5 INJECTION INTRAVENOUS at 20:59

## 2025-07-31 RX ADMIN — HEPARIN SODIUM 5000 UNITS: 5000 INJECTION, SOLUTION INTRAVENOUS; SUBCUTANEOUS at 05:53

## 2025-07-31 RX ADMIN — HYDRALAZINE HYDROCHLORIDE 100 MG: 25 TABLET ORAL at 20:52

## 2025-07-31 RX ADMIN — CLOPIDOGREL BISULFATE 75 MG: 75 TABLET, FILM COATED ORAL at 08:18

## 2025-07-31 RX ADMIN — HYDRALAZINE HYDROCHLORIDE 100 MG: 25 TABLET ORAL at 08:17

## 2025-07-31 RX ADMIN — NITROGLYCERIN 0.4 MG: 0.4 TABLET SUBLINGUAL at 08:03

## 2025-07-31 RX ADMIN — ATORVASTATIN CALCIUM 40 MG: 20 TABLET, FILM COATED ORAL at 20:52

## 2025-07-31 RX ADMIN — FAMOTIDINE 20 MG: 20 TABLET, FILM COATED ORAL at 19:39

## 2025-07-31 RX ADMIN — NICARDIPINE HYDROCHLORIDE 8.5 MG/HR: 2.5 INJECTION, SOLUTION INTRAVENOUS at 09:21

## 2025-07-31 RX ADMIN — LEVETIRACETAM 500 MG: 500 TABLET, FILM COATED ORAL at 08:18

## 2025-07-31 RX ADMIN — NITROGLYCERIN 5 MCG/MIN: 20 INJECTION INTRAVENOUS at 18:34

## 2025-07-31 RX ADMIN — LANTHANUM CARBONATE 1000 MG: 500 TABLET, CHEWABLE ORAL at 16:39

## 2025-07-31 RX ADMIN — HYDROMORPHONE HYDROCHLORIDE 0.5 MG: 1 INJECTION, SOLUTION INTRAMUSCULAR; INTRAVENOUS; SUBCUTANEOUS at 16:44

## 2025-07-31 ASSESSMENT — PAIN SCALES - GENERAL
PAINLEVEL_OUTOF10: 0
PAINLEVEL_OUTOF10: 6
PAINLEVEL_OUTOF10: 6
PAINLEVEL_OUTOF10: 3
PAINLEVEL_OUTOF10: 0
PAINLEVEL_OUTOF10: 3
PAINLEVEL_OUTOF10: 6
PAINLEVEL_OUTOF10: 5
PAINLEVEL_OUTOF10: 5
PAINLEVEL_OUTOF10: 6
PAINLEVEL_OUTOF10: 9
PAINLEVEL_OUTOF10: 10
PAINLEVEL_OUTOF10: 6

## 2025-07-31 ASSESSMENT — PAIN DESCRIPTION - LOCATION
LOCATION: CHEST
LOCATION: CHEST;STERNUM
LOCATION: CHEST
LOCATION: CHEST;STERNUM
LOCATION: CHEST
LOCATION: CHEST;STERNUM
LOCATION: CHEST
LOCATION: CHEST;STERNUM

## 2025-07-31 ASSESSMENT — PAIN DESCRIPTION - DESCRIPTORS
DESCRIPTORS: PRESSURE;SHARP
DESCRIPTORS: ACHING
DESCRIPTORS: ACHING

## 2025-07-31 ASSESSMENT — PAIN DESCRIPTION - ORIENTATION
ORIENTATION: MID

## 2025-07-31 NOTE — PROGRESS NOTES
Notified by RN that pt had chest pain that was relieved with Nitro. VSS. EKG and troponin ordered. EKG normal sinus with rate of 63. Will follow troponin results. RN to contact if further symptoms occur.     Addendum: 2155: Trop 118- down trending from previous labs      Addendum: 0110: Cardene drip restarted due to continued HTN

## 2025-07-31 NOTE — PROGRESS NOTES
[facility]    Woodrow Madison  YOB: 1965          Assessment & Plan:     ESRD on HD MWF at [facility]  CP  HTN  Braycardia  Anemia    Rec:  HD yest -2kg dory well  HD MWF  HADYE MWF  Add spironolactone for HTN       Subjective:   CC: ESRD  HPI: HD yest -2kg dory well. Back on cardene. Still having CP  Current Facility-Administered Medications   Medication Dose Route Frequency    [START ON 8/1/2025] isosorbide mononitrate (IMDUR) extended release tablet 60 mg  60 mg Oral Daily    hydrALAZINE (APRESOLINE) tablet 100 mg  100 mg Oral TID    HYDROmorphone HCl PF (DILAUDID) injection 1 mg  1 mg IntraVENous Q3H PRN    Or    HYDROmorphone (DILAUDID) injection 0.5 mg  0.5 mg IntraVENous Q3H PRN    [Held by provider] atazanavir (REYATAZ) capsule 300 mg  300 mg Oral Daily with breakfast    clopidogrel (PLAVIX) tablet 75 mg  75 mg Oral Daily    [Held by provider] dolutegravir sodium (TIVICAY) tablet 50 mg  50 mg Oral Daily    famotidine (PEPCID) tablet 20 mg  20 mg Oral Nightly    lanthanum (FOSRENOL) chewable tablet 1,000 mg  1,000 mg Oral TID WC    levETIRAcetam (KEPPRA) tablet 500 mg  500 mg Oral BID    losartan (COZAAR) tablet 100 mg  100 mg Oral Daily    metoprolol tartrate (LOPRESSOR) tablet 50 mg  50 mg Oral BID    [Held by provider] Tenapanor HCl (CKD) TABS 20 mg  20 mg Oral Daily    [Held by provider] Tenofovir Alafenamide Fumarate TABS 25 mg  1 tablet Oral Daily    amLODIPine (NORVASC) tablet 10 mg  10 mg Oral Daily    epoetin rosalia-epbx (RETACRIT) injection 10,000 Units  10,000 Units SubCUTAneous Once per day on Monday Wednesday Friday    heparin (porcine) injection 5,000 Units  5,000 Units SubCUTAneous 3 times per day    niCARdipine (CARDENE) 25 mg in sodium chloride 0.9 % 250 mL infusion ([id number])  0.5-15 mg/hr IntraVENous Continuous    sodium chloride flush 0.9 % injection 5-40 mL  5-40 mL IntraVENous 2 times per day    sodium chloride flush 0.9 %

## 2025-07-31 NOTE — PROGRESS NOTES
Spiritual Care Partner Volunteer visited patient at [facility] in [facility] UN[facility] on 7/31/2025     Documented by:  Chaplain Estuardo Pitts M.Div., [facility].  For  support, please call Spiritual Health Team @ [phone])

## 2025-07-31 NOTE — PROGRESS NOTES
0700 : Bedside and Verbal shift change report given to Morenita JACOBSEN (oncoming nurse) by Melissa JACOBSEN (offgoing nurse). Report included the following information Nurse Handoff Report, Recent Results, Cardiac Rhythm NSR / Carlos Alberto, and Event Log.       1825 : RN called to bedside. Pt is very nauseous, throwing up and now reporting 10/10 chest pain. Pt is reporting it is a new quality that he cannot describe at this moment. RN called RRT. RR RN, primary RN and charge RN at bedside. Pt is SOB, diaphoretic and HR in 120's. VS, and EKG done. MD ordered troponin, and CTA of chest. MD discontinued cardene drip at this time and ordered a one time dose of dilaudid IV as well as Nitro ggt.       1845 : pt leaving floor to got to CT. Taken with Farhan RR RN and Tech. Pt is on lifepack and 2 L NC.       1900 : Bedside and Verbal shift change report given to Maggy JACOBSEN (oncoming nurse) by Morenita RN (offgoing nurse). Report included the following information Nurse Handoff Report, Recent Results, Cardiac Rhythm pt is currently NSR, intermittently carlos alberto and tachy during RR, and Event Log.

## 2025-07-31 NOTE — SIGNIFICANT EVENT
Called to rapid response with chest pain.  Briefly reviewed chart.  Patient with history of ESRD, hypertension, and CAD with PCI to LAD and POBA to distal LAD 1 year ago, medically managed since then, who presented with chest pain and slightly elevated troponin in the setting of hypertensive urgency requiring nicardipine drip.  Blood pressure has been controlled, but this afternoon he had sudden onset substernal chest pain which is pressure-like, and different than prior chest pains, patient says.  He is requesting something for pain.  He is ill-appearing uncomfortable and restless.  Blood pressures 150 systolic, tachycardic to 110s, sinus.  EKG reviewed and compared to prior.  Very subtle changes, including slight ST elevation in 1 box and V1, without continuous elevation; TWI aVL.  Reviewed the EKG yesterday, though he is not tachycardic he has \"S1Q3T3\" sign so will obtain CTA chest.  Chest pain minimally improved with sublingual nitro.  Will go ahead and transition from nicardipine gtt. to nitro gtt., and also start heparin GTT.  Will check troponin for trend x 2      Total critical care time exclusive of procedures: 40 minutes      7:52 PM  CTA neg for PE; ok to stop hep gtt if trop not increasing

## 2025-07-31 NOTE — CARE COORDINATION
Care Management Progress Note    Reason for Admission:   Elevated troponin [R79.89]  Dialysis patient [Z99.2]  Hypertensive emergency [I16.1]  Chest pain, unspecified type [R07.9]         Patient Admission Status: Inpatient  RUR: 17%  Hospitalization in the last 30 days (Readmission):  No        Transition of care plan:  Ongoing medical management.  Started on Cardene gtt.  2L NC (not on home O2).  ESRD on HD MWF.     Discharge Plan:    Anticipate no CM needs.    B.  Dialysis:  [facility] MWF (CM called & confirmed).   Chair Time: 0530  [phone]  Fax [phone]    Date 1st IMM letter given: Still pending.  I sent an email to [facility].    Outpatient follow-up:  TBD    Transport at discharge: Family    Please reach out to CM if any discharge needs arise.    ______________________________________  KYLAH Alfonso, RN-CM  [facility]- Care Management  Available via [employer]  7/31/2025  11:03 AM

## 2025-07-31 NOTE — PROGRESS NOTES
[facility] CARDIOLOGY                    Cardiology Care Note     []Initial Encounter     [x]Follow-up    Patient Name: Woodrow Madison - :1965 - MRN:[id number]  Primary Cardiologist: None  Consulting Cardiologist: Gee Richards MD     Reason for encounter: Chest pain    HPI:   Woodrow Madison is a 59 y.o. male with PMH significant for CAD, PCI to LAD stent, hypertension, end-stage renal disease, HIV, dyslipidemia who is admitted with symptoms of chest pain while getting his dialysis.  His blood pressure at that time was 220/110.  He was also bradycardic with heart rate in 40s.  He was sent over to the ER.      EKG demonstrated sinus rhythm with normal ST segment with normal axis.    Most recent HS troponins:  No results for input(s): \"TROPHS\", \"CKMB\" in the last 72 hours.     Assessment and Plan     Elevated troponin T: Presentation troponin was 140 and thereafter it has been trending down.  Likely related to end-stage renal disease, severe hypertension.  Not true non-ST elevation MI.  EKG does not demonstrate ST changes.  Continue medical management of residual CAD.  Chest pain: He has known history of CAD with PCI to LAD mid stent and POBA to distal LAD 7/10/24. Continue aspirin, Plavix, losartan, metoprolol, amlodipine and atorvastatin.  EKG does not demonstrate any ischemia and echo does not demonstrate any wall motion abnormalities. Isosorbide mononitrate 30 mg p.o. daily was added on 25 and uptitrated to 60mg  but patient still having anterior chest wall mild to moderate pain. Will proceed with Stress Nuclear imaging tomorrow am. NPO past MN tonight.   Hypertension: Uncontrolled hypertension.  Continue home medications and titrate as needed.  Increase isosorbide mononitrate to 60 mg p.o. daily.       ____________________________________________________________    Cardiac testing  25    ECHO (TTE) COMPLETE (PRN CONTRAST/BUBBLE/STRAIN/3D) 2025 12:31 PM  MD Chin at [facility] ENDOSCOPY    COLONOSCOPY N/A 11/15/2022    COLONOSCOPY/EGD performed by Chin Felix MD at [facility] ENDOSCOPY    COLONOSCOPY N/A 01/02/2024    COLONOSCOPY DIAGNOSTIC performed by Chin Felix MD at [facility] ENDOSCOPY    COLONOSCOPY N/A 04/16/2025    COLONOSCOPY performed by Julien Nieto MD at [facility] ENDOSCOPY    COLONOSCOPY N/A 4/23/2025    COLONOSCOPY DIAGNOSTIC performed by Eder Kincaid MD at [facility] ENDOSCOPY    INVASIVE VASCULAR N/A 07/02/2024    Ultrasound guided vascular access performed by Raman Fink MD at [facility] CARDIAC CATH LAB    RECTAL EXAM N/A 09/26/2023    ANAL EXAM UNDER ANESTHESIA performed by Natalia Davila MD at [facility] MAIN OR    UPPER GASTROINTESTINAL ENDOSCOPY N/A 01/02/2024    EGD DIAGNOSTIC ONLY performed by Chin Felix MD at [facility] ENDOSCOPY    UPPER GASTROINTESTINAL ENDOSCOPY N/A 01/02/2024    EGD BIOPSY performed by Chin Felix MD at [facility] ENDOSCOPY    UPPER GASTROINTESTINAL ENDOSCOPY N/A 03/12/2025    ESOPHAGOGASTRODUODENOSCOPY performed by Corrie Zhang MD at [facility] ENDOSCOPY    UPPER GASTROINTESTINAL ENDOSCOPY N/A 03/12/2025    ESOPHAGOGASTRODUODENOSCOPY BIOPSY performed by Corrie Zhang MD at [facility] ENDOSCOPY    VASCULAR SURGERY Left 2012    AV FISTULA    VASCULAR SURGERY  2011    DIALYSIS CATHETER    VASCULAR SURGERY  2012    DIALYSIS CATHETER REMOVED     Social Hx:  reports that he has never smoked. He has never been exposed to tobacco smoke. He has never used smokeless tobacco. He reports current alcohol use. He reports that he does not use drugs.  Family Hx: family history includes Cancer in his brother; Heart Disease in his brother, brother, mother, and nephew; No Known Problems in his brother and father.  Allergies   Allergen Reactions    Penicillin G Itching     Patient screened for any delayed non-IgE-mediated reaction to PCN.        Patient notes the following:    No delayed non-IgE-mediated reaction to PCN             Percocet [Oxycodone-Acetaminophen]

## 2025-07-31 NOTE — PROGRESS NOTES
Hospitalist Progress Note      NAME:  Woodrow Madison   :  1965  MRM:  [id number]    Date/Time: 2025  8:35 AM           Assessment / Plan:     Woodrow Madison is a very pleasant 59 y.o. male with a past med history of ESRD on hemodialysis, CAD, NSTEMI () GERD, HTN, HLD, multiple blood transfusions, HIV, who was admitted for chest pain and hypertensive emergency.     Hypertensive emergency, POA  Poorly controlled chronic hypertension  Blood pressures in the 200-220 systolic range on presentation.  Patient admits he did not take his blood pressure meds on the day of admission.  Symptoms include chest pain and fatigue.  Signs of endorgan damage with elevated troponin.  - cardene drip had to be restarted, titrate per protocol  - Continue home meds of amlodipine, hydralazine, losartan, metoprolol, hydralazine increased to 100 mg 3 times daily by nephrology  - Continue Imdur, will increase to 60mg qd  - Spironolactone 50 mg daily added by nephrology     Chest pain with elevated troponin concerning for possible NSTEMI, POA - NSTEMI ruled out per cardiology, states this is not an ACS elevated troponin.  Troponin down trended.  EKG does not show ST changes.  Troponin can also be falsely elevated in the setting of ESRD.  CAD, status post PCI x 2  PCI to LAD mid stent and POBA to distal LAD 7/10/24.   - Continue amlodipine, hydralazine, losartan, metoprolol, Imdur, aspirin, Plavix, Lipitor  - Heparin drip off given cardiology input that this is not consistent with ACS, subcutaneous heparin ordered  - Cardiology following, recommend medical management of CAD and addition of Imdur  - Continuous telemetry  - Routine labs  - IV Dilaudid and nitroglycerin as needed for pain  - Have asked cardiology for their opinion again today as his chest pain has not improved at all    HFpEF  POA, chronic.  Echo shows EF 64%, LV dilation, diastolic dysfunction.  -Continue amlodipine, hydralazine, losartan, metoprolol,  mg SubLINGual Q5 Min PRN    aspirin chewable tablet 81 mg  81 mg Oral Daily    atorvastatin (LIPITOR) tablet 40 mg  40 mg Oral Nightly            Lab Review:     Recent Labs     07/29/25  0143 07/30/25  0127 07/31/25  0320   WBC 5.3 5.0 4.5   HGB 8.3* 8.6* 8.2*   HCT 25.5* 27.1* 25.7*   * 146* 139*     Recent Labs     07/28/25  0916 07/28/25  1233 07/29/25  0143 07/30/25 0127 07/31/25  0320     --  137 137 139   K 4.5  --  4.2 5.0 4.4   CL 94*  --  99 96* 100   CO2 29  --  31 26 29   BUN 32*  --  40* 47* 24*   MG 2.1  --   --   --   --    PHOS  --   --  7.0* 8.6* 5.7*   ALT <5*  --   --   --   --    INR  --  1.1  --   --   --      No components found for: \"GLPOC\"

## 2025-07-31 NOTE — PLAN OF CARE
Problem: Chronic Conditions and Co-morbidities  Goal: Patient's chronic conditions and co-morbidity symptoms are monitored and maintained or improved  Outcome: Progressing  Flowsheets (Taken 7/31/2025 0740)  Care Plan - Patient's Chronic Conditions and Co-Morbidity Symptoms are Monitored and Maintained or Improved: Monitor and assess patient's chronic conditions and comorbid symptoms for stability, deterioration, or improvement     Problem: Discharge Planning  Goal: Discharge to home or other facility with appropriate resources  Outcome: Progressing  Flowsheets (Taken 7/31/2025 0740)  Discharge to home or other facility with appropriate resources:   Identify barriers to discharge with patient and caregiver   Arrange for needed discharge resources and transportation as appropriate     Problem: Pain  Goal: Verbalizes/displays adequate comfort level or baseline comfort level  Outcome: Progressing  Flowsheets  Taken 7/31/2025 1715  Verbalizes/displays adequate comfort level or baseline comfort level: Assess pain using appropriate pain scale  Taken 7/31/2025 1230  Verbalizes/displays adequate comfort level or baseline comfort level: Assess pain using appropriate pain scale  Taken 7/31/2025 1015  Verbalizes/displays adequate comfort level or baseline comfort level: Assess pain using appropriate pain scale  Taken 7/31/2025 0800  Verbalizes/displays adequate comfort level or baseline comfort level: Assess pain using appropriate pain scale     Problem: Safety - Adult  Goal: Free from fall injury  Outcome: Progressing     Problem: ABCDS Injury Assessment  Goal: Absence of physical injury  Outcome: Progressing

## 2025-07-31 NOTE — PROGRESS NOTES
Rapid Called at 1807  Responded to RRT at 1807 for Chest Pain  Provider at bedside: Yes Dr. Smiley  Interventions ordered: Labs, EKG, and Other (Comment) STAT Chest CTA   Sepsis Suspected: No  Transfer to Higher Level of Care: No      /95  O2 98% on RA  RR 24    RRT RN responded to RRT w/ CP. Patient complaining of mid sternal chest pain/ pressure 10/10.  EKG completed. Dr. Smiley at bedside. Trop sent for processing. Dilaudid and Nitro SL given, see MAR. Plan to start Nitro gtt and titrate Cardene off. Nitro started at 1834. Cardene titrated down to 3.5, /79. Cardene stopped. Patient transported to CT and back to room 320. Nitro gtt titrated, see MAR. Patient given Maalox, see MAR. No other RRT interventions needed at this time.     Rapid Ended at 1925  RRT RN assisted with transport to accepting unit No.

## 2025-07-31 NOTE — PROGRESS NOTES
Spoke with pt's primary nurse.  Plan for [facility] first thing Friday am.  Pt needs to be caffeine free for 12 hours prior to testing.  Nuclear Med will request transport to department tomorrow at 07:30.

## 2025-08-01 ENCOUNTER — APPOINTMENT (OUTPATIENT)
Facility: HOSPITAL | Age: 60
DRG: 286 | End: 2025-08-01
Payer: MEDICARE

## 2025-08-01 LAB
ECHO BSA: 2.2 M2
ECHO BSA: 2.2 M2
EKG ATRIAL RATE: 62 BPM
EKG ATRIAL RATE: 63 BPM
EKG DIAGNOSIS: NORMAL
EKG DIAGNOSIS: NORMAL
EKG P AXIS: 13 DEGREES
EKG P AXIS: 40 DEGREES
EKG P-R INTERVAL: 158 MS
EKG P-R INTERVAL: 172 MS
EKG Q-T INTERVAL: 422 MS
EKG Q-T INTERVAL: 426 MS
EKG QRS DURATION: 88 MS
EKG QRS DURATION: 96 MS
EKG QTC CALCULATION (BAZETT): 431 MS
EKG QTC CALCULATION (BAZETT): 432 MS
EKG R AXIS: -11 DEGREES
EKG R AXIS: -14 DEGREES
EKG T AXIS: 16 DEGREES
EKG T AXIS: 19 DEGREES
EKG VENTRICULAR RATE: 62 BPM
EKG VENTRICULAR RATE: 63 BPM
NUC STRESS EJECTION FRACTION: 59 %
STRESS BASELINE ST DEPRESSION: 0 MM
STRESS TARGET HR: 163 BPM
UFH PPP CHRO-ACNC: 0.93 IU/ML
UFH PPP CHRO-ACNC: 1.25 IU/ML

## 2025-08-01 PROCEDURE — 93018 CV STRESS TEST I&R ONLY: CPT | Performed by: INTERNAL MEDICINE

## 2025-08-01 PROCEDURE — C1894 INTRO/SHEATH, NON-LASER: HCPCS | Performed by: STUDENT IN AN ORGANIZED HEALTH CARE EDUCATION/TRAINING PROGRAM

## 2025-08-01 PROCEDURE — 6360000002 HC RX W HCPCS: Performed by: INTERNAL MEDICINE

## 2025-08-01 PROCEDURE — 6370000000 HC RX 637 (ALT 250 FOR IP): Performed by: INTERNAL MEDICINE

## 2025-08-01 PROCEDURE — 6360000002 HC RX W HCPCS: Performed by: NURSE PRACTITIONER

## 2025-08-01 PROCEDURE — 78452 HT MUSCLE IMAGE SPECT MULT: CPT | Performed by: INTERNAL MEDICINE

## 2025-08-01 PROCEDURE — A9500 TC99M SESTAMIBI: HCPCS | Performed by: INTERNAL MEDICINE

## 2025-08-01 PROCEDURE — 2500000003 HC RX 250 WO HCPCS: Performed by: INTERNAL MEDICINE

## 2025-08-01 PROCEDURE — 93005 ELECTROCARDIOGRAM TRACING: CPT | Performed by: STUDENT IN AN ORGANIZED HEALTH CARE EDUCATION/TRAINING PROGRAM

## 2025-08-01 PROCEDURE — 85520 HEPARIN ASSAY: CPT

## 2025-08-01 PROCEDURE — 76937 US GUIDE VASCULAR ACCESS: CPT | Performed by: STUDENT IN AN ORGANIZED HEALTH CARE EDUCATION/TRAINING PROGRAM

## 2025-08-01 PROCEDURE — 6360000002 HC RX W HCPCS: Performed by: STUDENT IN AN ORGANIZED HEALTH CARE EDUCATION/TRAINING PROGRAM

## 2025-08-01 PROCEDURE — B2111ZZ FLUOROSCOPY OF MULTIPLE CORONARY ARTERIES USING LOW OSMOLAR CONTRAST: ICD-10-PCS | Performed by: STUDENT IN AN ORGANIZED HEALTH CARE EDUCATION/TRAINING PROGRAM

## 2025-08-01 PROCEDURE — 6360000004 HC RX CONTRAST MEDICATION: Performed by: STUDENT IN AN ORGANIZED HEALTH CARE EDUCATION/TRAINING PROGRAM

## 2025-08-01 PROCEDURE — 93458 L HRT ARTERY/VENTRICLE ANGIO: CPT | Performed by: STUDENT IN AN ORGANIZED HEALTH CARE EDUCATION/TRAINING PROGRAM

## 2025-08-01 PROCEDURE — 2709999900 HC NON-CHARGEABLE SUPPLY: Performed by: STUDENT IN AN ORGANIZED HEALTH CARE EDUCATION/TRAINING PROGRAM

## 2025-08-01 PROCEDURE — 4A023N7 MEASUREMENT OF CARDIAC SAMPLING AND PRESSURE, LEFT HEART, PERCUTANEOUS APPROACH: ICD-10-PCS | Performed by: STUDENT IN AN ORGANIZED HEALTH CARE EDUCATION/TRAINING PROGRAM

## 2025-08-01 PROCEDURE — 93010 ELECTROCARDIOGRAM REPORT: CPT | Performed by: SPECIALIST

## 2025-08-01 PROCEDURE — 99233 SBSQ HOSP IP/OBS HIGH 50: CPT | Performed by: INTERNAL MEDICINE

## 2025-08-01 PROCEDURE — 2500000003 HC RX 250 WO HCPCS: Performed by: STUDENT IN AN ORGANIZED HEALTH CARE EDUCATION/TRAINING PROGRAM

## 2025-08-01 PROCEDURE — 99152 MOD SED SAME PHYS/QHP 5/>YRS: CPT | Performed by: STUDENT IN AN ORGANIZED HEALTH CARE EDUCATION/TRAINING PROGRAM

## 2025-08-01 PROCEDURE — 6370000000 HC RX 637 (ALT 250 FOR IP)

## 2025-08-01 PROCEDURE — 3430000000 HC RX DIAGNOSTIC RADIOPHARMACEUTICAL: Performed by: INTERNAL MEDICINE

## 2025-08-01 PROCEDURE — 2060000000 HC ICU INTERMEDIATE R&B

## 2025-08-01 PROCEDURE — APPSS30 APP SPLIT SHARED TIME 16-30 MINUTES: Performed by: NURSE PRACTITIONER

## 2025-08-01 PROCEDURE — C1769 GUIDE WIRE: HCPCS | Performed by: STUDENT IN AN ORGANIZED HEALTH CARE EDUCATION/TRAINING PROGRAM

## 2025-08-01 PROCEDURE — 93017 CV STRESS TEST TRACING ONLY: CPT

## 2025-08-01 PROCEDURE — 93454 CORONARY ARTERY ANGIO S&I: CPT | Performed by: STUDENT IN AN ORGANIZED HEALTH CARE EDUCATION/TRAINING PROGRAM

## 2025-08-01 PROCEDURE — 2700000000 HC OXYGEN THERAPY PER DAY

## 2025-08-01 PROCEDURE — 6360000002 HC RX W HCPCS

## 2025-08-01 PROCEDURE — 76942 ECHO GUIDE FOR BIOPSY: CPT | Performed by: STUDENT IN AN ORGANIZED HEALTH CARE EDUCATION/TRAINING PROGRAM

## 2025-08-01 PROCEDURE — 99153 MOD SED SAME PHYS/QHP EA: CPT | Performed by: STUDENT IN AN ORGANIZED HEALTH CARE EDUCATION/TRAINING PROGRAM

## 2025-08-01 PROCEDURE — 6370000000 HC RX 637 (ALT 250 FOR IP): Performed by: NURSE PRACTITIONER

## 2025-08-01 PROCEDURE — 93016 CV STRESS TEST SUPVJ ONLY: CPT | Performed by: INTERNAL MEDICINE

## 2025-08-01 PROCEDURE — 94761 N-INVAS EAR/PLS OXIMETRY MLT: CPT

## 2025-08-01 PROCEDURE — 78452 HT MUSCLE IMAGE SPECT MULT: CPT

## 2025-08-01 PROCEDURE — C1887 CATHETER, GUIDING: HCPCS | Performed by: STUDENT IN AN ORGANIZED HEALTH CARE EDUCATION/TRAINING PROGRAM

## 2025-08-01 RX ORDER — ACETAMINOPHEN 325 MG/1
650 TABLET ORAL EVERY 4 HOURS PRN
Status: DISCONTINUED | OUTPATIENT
Start: 2025-08-01 | End: 2025-08-02 | Stop reason: HOSPADM

## 2025-08-01 RX ORDER — CAFFEINE CITRATE 20 MG/ML
60 SOLUTION INTRAVENOUS ONCE
Status: COMPLETED | OUTPATIENT
Start: 2025-08-01 | End: 2025-08-01

## 2025-08-01 RX ORDER — RANOLAZINE 500 MG/1
500 TABLET, EXTENDED RELEASE ORAL 2 TIMES DAILY
Status: DISCONTINUED | OUTPATIENT
Start: 2025-08-01 | End: 2025-08-01

## 2025-08-01 RX ORDER — FENTANYL CITRATE 50 UG/ML
INJECTION, SOLUTION INTRAMUSCULAR; INTRAVENOUS PRN
Status: DISCONTINUED | OUTPATIENT
Start: 2025-08-01 | End: 2025-08-01 | Stop reason: HOSPADM

## 2025-08-01 RX ORDER — HEPARIN SODIUM 200 [USP'U]/100ML
INJECTION, SOLUTION INTRAVENOUS PRN
Status: DISCONTINUED | OUTPATIENT
Start: 2025-08-01 | End: 2025-08-01 | Stop reason: HOSPADM

## 2025-08-01 RX ORDER — LIDOCAINE HYDROCHLORIDE 10 MG/ML
INJECTION, SOLUTION INFILTRATION; PERINEURAL PRN
Status: DISCONTINUED | OUTPATIENT
Start: 2025-08-01 | End: 2025-08-01 | Stop reason: HOSPADM

## 2025-08-01 RX ORDER — REGADENOSON 0.08 MG/ML
0.4 INJECTION, SOLUTION INTRAVENOUS
Status: COMPLETED | OUTPATIENT
Start: 2025-08-01 | End: 2025-08-01

## 2025-08-01 RX ORDER — HYDRALAZINE HYDROCHLORIDE 20 MG/ML
10 INJECTION INTRAMUSCULAR; INTRAVENOUS ONCE
Status: COMPLETED | OUTPATIENT
Start: 2025-08-01 | End: 2025-08-01

## 2025-08-01 RX ORDER — RANOLAZINE 500 MG/1
500 TABLET, EXTENDED RELEASE ORAL NIGHTLY
Status: DISCONTINUED | OUTPATIENT
Start: 2025-08-01 | End: 2025-08-01

## 2025-08-01 RX ORDER — TETRAKIS(2-METHOXYISOBUTYLISOCYANIDE)COPPER(I) TETRAFLUOROBORATE 1 MG/ML
33 INJECTION, POWDER, LYOPHILIZED, FOR SOLUTION INTRAVENOUS ONCE
Status: COMPLETED | OUTPATIENT
Start: 2025-08-01 | End: 2025-08-01

## 2025-08-01 RX ORDER — RANOLAZINE 500 MG/1
500 TABLET, EXTENDED RELEASE ORAL NIGHTLY
Status: DISCONTINUED | OUTPATIENT
Start: 2025-08-01 | End: 2025-08-02 | Stop reason: HOSPADM

## 2025-08-01 RX ORDER — TETRAKIS(2-METHOXYISOBUTYLISOCYANIDE)COPPER(I) TETRAFLUOROBORATE 1 MG/ML
10 INJECTION, POWDER, LYOPHILIZED, FOR SOLUTION INTRAVENOUS ONCE
Status: COMPLETED | OUTPATIENT
Start: 2025-08-01 | End: 2025-08-01

## 2025-08-01 RX ORDER — HYDROMORPHONE HYDROCHLORIDE 1 MG/ML
1 INJECTION, SOLUTION INTRAMUSCULAR; INTRAVENOUS; SUBCUTANEOUS ONCE
Status: COMPLETED | OUTPATIENT
Start: 2025-08-01 | End: 2025-08-01

## 2025-08-01 RX ORDER — IOPAMIDOL 755 MG/ML
INJECTION, SOLUTION INTRAVASCULAR PRN
Status: DISCONTINUED | OUTPATIENT
Start: 2025-08-01 | End: 2025-08-01 | Stop reason: HOSPADM

## 2025-08-01 RX ORDER — HEPARIN SODIUM 1000 [USP'U]/ML
INJECTION, SOLUTION INTRAVENOUS; SUBCUTANEOUS PRN
Status: DISCONTINUED | OUTPATIENT
Start: 2025-08-01 | End: 2025-08-01 | Stop reason: HOSPADM

## 2025-08-01 RX ORDER — MIDAZOLAM HYDROCHLORIDE 1 MG/ML
INJECTION INTRAMUSCULAR; INTRAVENOUS PRN
Status: DISCONTINUED | OUTPATIENT
Start: 2025-08-01 | End: 2025-08-01 | Stop reason: HOSPADM

## 2025-08-01 RX ORDER — VERAPAMIL HYDROCHLORIDE 2.5 MG/ML
INJECTION INTRAVENOUS PRN
Status: DISCONTINUED | OUTPATIENT
Start: 2025-08-01 | End: 2025-08-01 | Stop reason: HOSPADM

## 2025-08-01 RX ADMIN — NITROGLYCERIN 75 MCG/MIN: 20 INJECTION INTRAVENOUS at 22:19

## 2025-08-01 RX ADMIN — SODIUM CHLORIDE, PRESERVATIVE FREE 10 ML: 5 INJECTION INTRAVENOUS at 20:36

## 2025-08-01 RX ADMIN — REGADENOSON 0.4 MG: 0.08 INJECTION, SOLUTION INTRAVENOUS at 09:26

## 2025-08-01 RX ADMIN — ATORVASTATIN CALCIUM 40 MG: 20 TABLET, FILM COATED ORAL at 20:36

## 2025-08-01 RX ADMIN — METOPROLOL 50 MG: 50 TABLET ORAL at 20:36

## 2025-08-01 RX ADMIN — RANOLAZINE 500 MG: 500 TABLET, FILM COATED, EXTENDED RELEASE ORAL at 20:36

## 2025-08-01 RX ADMIN — CAFFEINE CITRATE 60 MG: 20 INJECTION, SOLUTION INTRAVENOUS at 09:46

## 2025-08-01 RX ADMIN — HYDRALAZINE HYDROCHLORIDE 100 MG: 25 TABLET ORAL at 12:50

## 2025-08-01 RX ADMIN — HYDROMORPHONE HYDROCHLORIDE 1 MG: 1 INJECTION, SOLUTION INTRAMUSCULAR; INTRAVENOUS; SUBCUTANEOUS at 16:53

## 2025-08-01 RX ADMIN — HYDROMORPHONE HYDROCHLORIDE 1 MG: 1 INJECTION, SOLUTION INTRAMUSCULAR; INTRAVENOUS; SUBCUTANEOUS at 12:50

## 2025-08-01 RX ADMIN — LEVETIRACETAM 500 MG: 500 TABLET, FILM COATED ORAL at 20:36

## 2025-08-01 RX ADMIN — HYDROMORPHONE HYDROCHLORIDE 1 MG: 1 INJECTION, SOLUTION INTRAMUSCULAR; INTRAVENOUS; SUBCUTANEOUS at 10:29

## 2025-08-01 RX ADMIN — HYDROMORPHONE HYDROCHLORIDE 0.5 MG: 1 INJECTION, SOLUTION INTRAMUSCULAR; INTRAVENOUS; SUBCUTANEOUS at 20:36

## 2025-08-01 RX ADMIN — HYDRALAZINE HYDROCHLORIDE 10 MG: 20 INJECTION, SOLUTION INTRAMUSCULAR; INTRAVENOUS at 04:43

## 2025-08-01 RX ADMIN — AMLODIPINE BESYLATE 10 MG: 5 TABLET ORAL at 08:45

## 2025-08-01 RX ADMIN — TETRAKIS(2-METHOXYISOBUTYLISOCYANIDE)COPPER(I) TETRAFLUOROBORATE 33 MILLICURIE: 1 INJECTION, POWDER, LYOPHILIZED, FOR SOLUTION INTRAVENOUS at 09:36

## 2025-08-01 RX ADMIN — TETRAKIS(2-METHOXYISOBUTYLISOCYANIDE)COPPER(I) TETRAFLUOROBORATE 10 MILLICURIE: 1 INJECTION, POWDER, LYOPHILIZED, FOR SOLUTION INTRAVENOUS at 08:03

## 2025-08-01 RX ADMIN — HYDRALAZINE HYDROCHLORIDE 100 MG: 25 TABLET ORAL at 20:36

## 2025-08-01 RX ADMIN — HYDROMORPHONE HYDROCHLORIDE 0.5 MG: 1 INJECTION, SOLUTION INTRAMUSCULAR; INTRAVENOUS; SUBCUTANEOUS at 00:27

## 2025-08-01 RX ADMIN — ONDANSETRON 4 MG: 2 INJECTION, SOLUTION INTRAMUSCULAR; INTRAVENOUS at 09:45

## 2025-08-01 RX ADMIN — CLOPIDOGREL BISULFATE 75 MG: 75 TABLET, FILM COATED ORAL at 08:45

## 2025-08-01 RX ADMIN — EPOETIN ALFA-EPBX 10000 UNITS: 10000 INJECTION, SOLUTION INTRAVENOUS; SUBCUTANEOUS at 20:57

## 2025-08-01 RX ADMIN — FAMOTIDINE 20 MG: 20 TABLET, FILM COATED ORAL at 20:36

## 2025-08-01 ASSESSMENT — PAIN DESCRIPTION - DESCRIPTORS
DESCRIPTORS: DISCOMFORT
DESCRIPTORS: CRUSHING
DESCRIPTORS: ACHING

## 2025-08-01 ASSESSMENT — PAIN SCALES - GENERAL
PAINLEVEL_OUTOF10: 7
PAINLEVEL_OUTOF10: 10
PAINLEVEL_OUTOF10: 9
PAINLEVEL_OUTOF10: 2
PAINLEVEL_OUTOF10: 9
PAINLEVEL_OUTOF10: 5
PAINLEVEL_OUTOF10: 10
PAINLEVEL_OUTOF10: 5
PAINLEVEL_OUTOF10: 2
PAINLEVEL_OUTOF10: 8

## 2025-08-01 ASSESSMENT — PAIN DESCRIPTION - ORIENTATION
ORIENTATION: LEFT
ORIENTATION: MID

## 2025-08-01 ASSESSMENT — PAIN DESCRIPTION - LOCATION
LOCATION: CHEST

## 2025-08-01 ASSESSMENT — PAIN DESCRIPTION - PAIN TYPE
TYPE: ACUTE PAIN
TYPE: ACUTE PAIN

## 2025-08-01 ASSESSMENT — PAIN DESCRIPTION - FREQUENCY: FREQUENCY: CONTINUOUS

## 2025-08-01 NOTE — DIALYSIS
Hepatitis B Surface Ag   Date/Time Value Ref Range Status   01/08/2025 12:28 PM <0.10 Index Final     Hep B S Ag Interp   Date/Time Value Ref Range Status   01/08/2025 12:28 PM Negative NEG   Final     HEP B SURF AB   Date/Time Value Ref Range Status   08/15/2022 05:28 AM 61.11 mIU/mL Final     Hep B S Ab   Date/Time Value Ref Range Status   01/08/2025 12:28 .33 mIU/mL Final     Hep B S Ab Interp   Date/Time Value Ref Range Status   01/08/2025 12:28 PM REACTIVE (A) NR   Final     Hep B results, dates, and Primary Source: 01/08/25 Negative/Immune/Epic  Hep B dual verification performed by (RN): [facility]  Machine disinfect process:heat

## 2025-08-01 NOTE — PROCEDURES
PROCEDURE NOTE  Date: 8/1/2025   Name: Woodrow Madison  YOB: 1965    Procedures        BRIEF PROCEDURE NOTE    Date of Procedure: 8/1/2025   Preoperative Diagnosis: flate elevation in troponin with chest pain  Postoperative Diagnosis: CAD    Procedure: coronary angiography, moderate sedation  Interventional Cardiologist: Wes Gloria DO  Assistant: None  Anesthesia: local + IV moderate sedation   I administered moderate sedation throughout this procedure. An independent trained observer pushed medications at my direction, and monitored the patient’s level of consciousness and physiological status throughout.  Estimated Blood Loss: Minimal    Access: right radial artery, 6F.  75cm destination sheath due to tortuousity  Catheters:  Left coronary:JL 4, 6F  Right coronary: JR 4, 6f required guide wit 0.035 guide wire to assist with torquing    Findings:   L Main:large caliber, calcified, no critical stenosis  LAD:large caliber vessel, densely calcified, proximal eccentric clacified 80-90% stenosis with sequential mid-vessel 90% instent restenosis of proximal aspect of stent, mid LAD stent with subtotal occlusion with severe diffuse of distal and apical lad  LCx:large caliber co-dominant vessel, moderate OM with large distal l-pl branch  RCA: proximal vessel  with co-dominant pda filling via collaterals from LAD    LVEDP:  Unable to reach LV due to subclavian tortuosity     Specimens Removed: None    Implants: n/a    Closure Device: radial TR band    See full cath note.    Complications: none      Findings:  1. Severe diffuse chronic CAD without any culprit for acute coronary syndrome in the setting of mild flat elevation in troponin T      Plan:  Recommend to optimize blood pressure and antianginal therapy.      [place]  [address] [facility], [address]  [place] [address]                                              Office [phone],Fax [phone]

## 2025-08-01 NOTE — PROGRESS NOTES
TR band clean dry and intact, no bleeding noted around site. +2 radial pulse noted.  1702 /75  pulse 79, 2 mL of air removed from TR band. No bleeding/hematoma noted from site. + 2 radial pulse noted.  1707 /76  pulse 76, 2 mL of air removed from TR band. No bleeding/hematoma noted from site. + 2 radial pulse noted.  1712 /74 pulse 75, 2 mL of air removed from TR band. No bleeding/hematoma noted from site. + 2 radial pulse noted.  1717 /73  pulse 74, 2 mL of air removed from TR band. No bleeding/hematoma noted from site. + 2 radial pulse noted.  1721 /81  pulse 75, 2 mL of air removed from TR band. No bleeding/hematoma noted from site. + 2 radial pulse noted.  1726 /72 pulse 70. TR band removed, no bruising and hematoma noted to site. No active bleeding noted, +2 radial pulse. Quick clot and Tegaderm applied.

## 2025-08-01 NOTE — PROGRESS NOTES
Notified by RN of critical lab value.   Lab: Troponin,101     Upon chart review, lab is down trending compared to previous lab value 118       Vital signs stable. Patient asymptomatic. No further interventions needed at this time.

## 2025-08-01 NOTE — PROGRESS NOTES
Hospitalist Progress Note      NAME:  Woodrow Madison   :  1965  MRM:  [id number]    Date/Time: 2025  11:41 AM           Assessment / Plan:     Woodrow Madison is a very pleasant 59 y.o. male with a past med history of ESRD on hemodialysis, CAD, NSTEMI () GERD, HTN, HLD, multiple blood transfusions, HIV, who was admitted for chest pain and hypertensive emergency.    Hypertensive emergency: Hx of poorly controlled chronic HTN.  Blood pressures in the 200-220 systolic range on presentation.  Patient admits he did not take his blood pressure meds on the day of admission.  Symptoms include chest pain and fatigue.  Signs of endorgan damage with elevated troponin.  -Was initially started on Cardene drip, which was transitioned to nitro drip due to ongoing chest pain.  Continue home meds, amlodipine, hydralazine, losartan, metoprolol.  Hydralazine was increased to 100 mg 3 times daily.  Imdur increased to 120 mg daily.  Nephro added Aldactone 50 mg daily.  -Continue IV hydralazine as needed  - Continue to monitor vitals closely.     Chest pain : Known Hx of CAD with PCI to LAD, POBA to distal LAD 2024.  Continue to have chest pain.  Troponin elevated which could be due to ESRD.  Patient was started on nitro drip.  He has another episode of chest pain this morning.  EKG repeated, was no significant change compared to prior.  Echo showed preserved EF.  CTA chest negative for PE, stop heparin drip.  Underwent cardiac stress test this morning, results pending.  Continue aspirin, Plavix, Imdur, statin.    HFpEF  POA, chronic.  Echo shows EF 64%, LV dilation, diastolic dysfunction.  -Continue amlodipine, hydralazine, losartan, metoprolol, Imdur, aspirin, Plavix, Lipitor, spironolactone     Hyperlipidemia  POA, chronic.  LDL at goal.  -Continue Lipitor     Chronic anemia  POA, anemia of chronic disease in setting of ESRD.  Has required multiple blood transfusions in the past.  Hemoglobin slightly below

## 2025-08-01 NOTE — PROGRESS NOTES
[facility] CARDIOLOGY                    Cardiology Care Note     []Initial Encounter     [x]Follow-up    Patient Name: Woodrow Madison - :1965 - MRN:[id number]  Primary Cardiologist: None  Consulting Cardiologist: Gee Richards MD     Reason for encounter: Chest pain    HPI:   Woodrow Madison is a 59 y.o. male with PMH significant for CAD, PCI to LAD stent, hypertension, end-stage renal disease, HIV, dyslipidemia who is admitted with symptoms of chest pain while getting his dialysis.  His blood pressure at that time was 220/110.  He was also bradycardic with heart rate in 40s.  He was sent over to the ER.      EKG demonstrated sinus rhythm with normal ST segment with normal axis.       Assessment and Plan     Elevated troponin T: Presentation troponin was 140 and thereafter it has been trending down.  Likely related to end-stage renal disease, severe hypertension.  Not true non-ST elevation MI.  EKG does not demonstrate ST changes.  Continue medical management of residual CAD.  Chest pain: He has known history of CAD with PCI to LAD mid stent and POBA to distal LAD 7/10/24. Continue aspirin, Plavix, losartan, metoprolol, amlodipine and atorvastatin.  EKG does not demonstrate any ischemia and echo does not demonstrate any wall motion abnormalities. Isosorbide mononitrate 30 mg p.o. daily was added on 25 and uptitrated to 60mg  but patient still having anterior chest wall mild to moderate pain. Nuclear stress this with noted ischemia. Will proceed with cardiac cath today. D/W pt.   Hypertension: Uncontrolled hypertension.  Continue home medications and titrate as needed.  Increased isosorbide mononitrate to 60 mg p.o. daily.       ____________________________________________________________    Cardiac testing  25    ECHO (TTE) COMPLETE (PRN CONTRAST/BUBBLE/STRAIN/3D) 2025 12:31 PM (Final)    Interpretation Summary    Left Ventricle: Normal left ventricular systolic function.  [phone] (f) [phone]      CC:Maggy Daniel, JENELLE - NP

## 2025-08-01 NOTE — PROGRESS NOTES
0745 Made Dialysis nurse aware that pt has been transferred to Nuclear Stress test area. Dialysis nurse stated that she will do pt dialysis possibly at noon at bedside.   0854 Spoke with another dialysis nurse, made nurse aware that I spoke with someone at the beginning of shift regarding pt. Gave a SBAR report.  1034 Pt returned back to unit from stress test, pt c/o 10/10 chest pain and nausea. Zofran was given before arrival to unit. Pt stated that chest pain was not a new onset, gave PRN Dilaudid. Made MD aware, asked if Nitro could be titrated from 20 mcg/min to 30 mcg/min, MD stated to increase and to do a stat EKG. MD came to beside.  1148 Cards made aware of pain continue with chest pain pt elevated SBP.  1236 Made Diaylsis aware that pt will have a left heart cath done at 2pm today.  1430 Titrated pt off O2, on RA now, also noted a small nose bleed. Educated pt to lean forward and gave pt ice pack to apply to area. Made MD aware.  1650 Made diaylsis aware that pt is back from cath lab.  1970 Spoke with Dialysis nurse, stated that pt may received dialysis late tonight or early in the morning. Made pt aware.

## 2025-08-01 NOTE — PLAN OF CARE
Problem: Chronic Conditions and Co-morbidities  Goal: Patient's chronic conditions and co-morbidity symptoms are monitored and maintained or improved  8/1/2025 0820 by Estefania Farley RN  Outcome: Progressing  7/31/2025 2029 by Maggy Guerrier RN  Outcome: Progressing  Flowsheets (Taken 7/31/2025 2000)  Care Plan - Patient's Chronic Conditions and Co-Morbidity Symptoms are Monitored and Maintained or Improved: Monitor and assess patient's chronic conditions and comorbid symptoms for stability, deterioration, or improvement     Problem: Discharge Planning  Goal: Discharge to home or other facility with appropriate resources  8/1/2025 0820 by Estefania Farley RN  Outcome: Progressing  7/31/2025 2029 by Maggy Guerrier RN  Outcome: Progressing  Flowsheets (Taken 7/31/2025 2000)  Discharge to home or other facility with appropriate resources: Identify barriers to discharge with patient and caregiver     Problem: Pain  Goal: Verbalizes/displays adequate comfort level or baseline comfort level  8/1/2025 0820 by Estefania Farley RN  Outcome: Progressing  Flowsheets  Taken 8/1/2025 0330 by Maggy Guerrier RN  Verbalizes/displays adequate comfort level or baseline comfort level: Encourage patient to monitor pain and request assistance  Taken 7/31/2025 2330 by Maggy Guerrier RN  Verbalizes/displays adequate comfort level or baseline comfort level: Encourage patient to monitor pain and request assistance  7/31/2025 2029 by Maggy Guerrier RN  Outcome: Progressing  Flowsheets (Taken 7/31/2025 2000)  Verbalizes/displays adequate comfort level or baseline comfort level: Encourage patient to monitor pain and request assistance     Problem: Safety - Adult  Goal: Free from fall injury  8/1/2025 0820 by Estefania Farley RN  Outcome: Progressing  7/31/2025 2029 by Maggy Guerrier RN  Outcome: Progressing     Problem: ABCDS Injury Assessment  Goal: Absence of physical injury  8/1/2025 0820 by Estefania Farley RN  Outcome:

## 2025-08-01 NOTE — PROGRESS NOTES
1550  TRANSFER - IN REPORT:    Verbal report received from Fernando on Woodrow Madison  being received from cath lab for routine post-op      Report consisted of patient's Situation, Background, Assessment and   Recommendations(SBAR).     Information from the following report(s) Nurse Handoff Report was reviewed with the receiving nurse.    Opportunity for questions and clarification was provided.      Assessment completed upon patient's arrival to unit and care assumed.      Pt received from cath lab s/p [facility]. Access site R radial. Site CDI, no hematoma noted, pulses 2+.  Pt denies pain, n/v. Pt provided site care during bedrest and transfer plan,Pt verbalized understanding. Pt resting comfortably, NAD, side rails up, no family in the waiting room.    TRANSFER - OUT REPORT:    Verbal report given to ANN-MARIE Mac on Woodrow Madison  being transferred to [facility] for routine progression of patient care       Report consisted of patient's Situation, Background, Assessment and   Recommendations(SBAR).     Information from the following report(s) Nurse Handoff Report was reviewed with the receiving nurse.     1640    Lines:   Peripheral IV 07/28/25 Distal;Right Cephalic (Active)   Site Assessment Clean, dry & intact 08/01/25 1200   Line Status Capped;Normal saline locked 08/01/25 1200   Line Care Connections checked and tightened 08/01/25 1200   Phlebitis Assessment No symptoms 08/01/25 1200   Infiltration Assessment 0 08/01/25 1200   Alcohol Cap Used Yes 08/01/25 1200   Dressing Status Clean, dry & intact 08/01/25 1200   Dressing Type Transparent 08/01/25 1200       Peripheral IV 07/28/25 Right Forearm (Active)   Site Assessment Clean, dry & intact 08/01/25 1200   Line Status Infusing 08/01/25 1200   Line Care Connections checked and tightened 08/01/25 1200   Phlebitis Assessment No symptoms 08/01/25 1200   Infiltration Assessment 0 08/01/25 1200   Alcohol Cap Used Yes 08/01/25 1200   Dressing Status Clean, dry & intact

## 2025-08-01 NOTE — PLAN OF CARE
Problem: Chronic Conditions and Co-morbidities  Goal: Patient's chronic conditions and co-morbidity symptoms are monitored and maintained or improved  7/31/2025 2029 by Maggy Guerrier RN  Outcome: Progressing  Flowsheets (Taken 7/31/2025 2000)  Care Plan - Patient's Chronic Conditions and Co-Morbidity Symptoms are Monitored and Maintained or Improved: Monitor and assess patient's chronic conditions and comorbid symptoms for stability, deterioration, or improvement  7/31/2025 1737 by Monica Polk RN  Outcome: Progressing  Flowsheets (Taken 7/31/2025 0740)  Care Plan - Patient's Chronic Conditions and Co-Morbidity Symptoms are Monitored and Maintained or Improved: Monitor and assess patient's chronic conditions and comorbid symptoms for stability, deterioration, or improvement     Problem: Discharge Planning  Goal: Discharge to home or other facility with appropriate resources  7/31/2025 2029 by Maggy Guerrier, RN  Outcome: Progressing  Flowsheets (Taken 7/31/2025 2000)  Discharge to home or other facility with appropriate resources: Identify barriers to discharge with patient and caregiver  7/31/2025 1737 by Monica Polk RN  Outcome: Progressing  Flowsheets (Taken 7/31/2025 0740)  Discharge to home or other facility with appropriate resources:   Identify barriers to discharge with patient and caregiver   Arrange for needed discharge resources and transportation as appropriate     Problem: Pain  Goal: Verbalizes/displays adequate comfort level or baseline comfort level  7/31/2025 2029 by Maggy Guerrier, RN  Outcome: Progressing  Flowsheets (Taken 7/31/2025 2000)  Verbalizes/displays adequate comfort level or baseline comfort level: Encourage patient to monitor pain and request assistance  7/31/2025 1737 by Monica Polk RN  Outcome: Progressing  Flowsheets  Taken 7/31/2025 1715  Verbalizes/displays adequate comfort level or baseline comfort level: Assess pain using appropriate pain scale  Taken 7/31/2025

## 2025-08-02 VITALS
SYSTOLIC BLOOD PRESSURE: 178 MMHG | HEIGHT: 72 IN | HEART RATE: 81 BPM | BODY MASS INDEX: 28.58 KG/M2 | DIASTOLIC BLOOD PRESSURE: 97 MMHG | OXYGEN SATURATION: 95 % | TEMPERATURE: 98.1 F | RESPIRATION RATE: 18 BRPM | WEIGHT: 211 LBS

## 2025-08-02 PROBLEM — I25.112 CORONARY ARTERY DISEASE INVOLVING NATIVE CORONARY ARTERY OF NATIVE HEART WITH REFRACTORY ANGINA PECTORIS: Status: ACTIVE | Noted: 2023-06-15

## 2025-08-02 LAB
ANION GAP SERPL CALC-SCNC: 14 MMOL/L (ref 2–14)
BASOPHILS # BLD: 0.03 K/UL (ref 0–0.1)
BASOPHILS NFR BLD: 0.5 % (ref 0–1)
BUN SERPL-MCNC: 40 MG/DL (ref 6–20)
BUN/CREAT SERPL: 3 (ref 12–20)
CALCIUM SERPL-MCNC: 9.9 MG/DL (ref 8.6–10)
CHLORIDE SERPL-SCNC: 93 MMOL/L (ref 98–107)
CO2 SERPL-SCNC: 25 MMOL/L (ref 20–29)
CREAT SERPL-MCNC: 13.2 MG/DL (ref 0.7–1.2)
DIFFERENTIAL METHOD BLD: ABNORMAL
EKG ATRIAL RATE: 64 BPM
EKG DIAGNOSIS: NORMAL
EKG P AXIS: 42 DEGREES
EKG P-R INTERVAL: 178 MS
EKG Q-T INTERVAL: 446 MS
EKG QRS DURATION: 92 MS
EKG QTC CALCULATION (BAZETT): 460 MS
EKG R AXIS: -6 DEGREES
EKG T AXIS: 18 DEGREES
EKG VENTRICULAR RATE: 64 BPM
EOSINOPHIL # BLD: 0.12 K/UL (ref 0–0.4)
EOSINOPHIL NFR BLD: 2.2 % (ref 0–7)
ERYTHROCYTE [DISTWIDTH] IN BLOOD BY AUTOMATED COUNT: 15.1 % (ref 11.5–14.5)
GLUCOSE SERPL-MCNC: 79 MG/DL (ref 65–100)
HCT VFR BLD AUTO: 24.4 % (ref 36.6–50.3)
HGB BLD-MCNC: 7.8 G/DL (ref 12.1–17)
IMM GRANULOCYTES # BLD AUTO: 0.03 K/UL (ref 0–0.04)
IMM GRANULOCYTES NFR BLD AUTO: 0.5 % (ref 0–0.5)
LYMPHOCYTES # BLD: 0.75 K/UL (ref 0.8–3.5)
LYMPHOCYTES NFR BLD: 13.4 % (ref 12–49)
MCH RBC QN AUTO: 28.2 PG (ref 26–34)
MCHC RBC AUTO-ENTMCNC: 32 G/DL (ref 30–36.5)
MCV RBC AUTO: 88.1 FL (ref 80–99)
MONOCYTES # BLD: 0.47 K/UL (ref 0–1)
MONOCYTES NFR BLD: 8.4 % (ref 5–13)
NEUTS SEG # BLD: 4.18 K/UL (ref 1.8–8)
NEUTS SEG NFR BLD: 75 % (ref 32–75)
NRBC # BLD: 0 K/UL (ref 0–0.01)
NRBC BLD-RTO: 0 PER 100 WBC
PLATELET # BLD AUTO: 136 K/UL (ref 150–400)
PMV BLD AUTO: 9.4 FL (ref 8.9–12.9)
POTASSIUM SERPL-SCNC: 5 MMOL/L (ref 3.5–5.1)
RBC # BLD AUTO: 2.77 M/UL (ref 4.1–5.7)
SODIUM SERPL-SCNC: 132 MMOL/L (ref 136–145)
WBC # BLD AUTO: 5.6 K/UL (ref 4.1–11.1)

## 2025-08-02 PROCEDURE — 99232 SBSQ HOSP IP/OBS MODERATE 35: CPT | Performed by: INTERNAL MEDICINE

## 2025-08-02 PROCEDURE — 36415 COLL VENOUS BLD VENIPUNCTURE: CPT

## 2025-08-02 PROCEDURE — 6360000002 HC RX W HCPCS

## 2025-08-02 PROCEDURE — 6370000000 HC RX 637 (ALT 250 FOR IP): Performed by: INTERNAL MEDICINE

## 2025-08-02 PROCEDURE — 80048 BASIC METABOLIC PNL TOTAL CA: CPT

## 2025-08-02 PROCEDURE — 6370000000 HC RX 637 (ALT 250 FOR IP)

## 2025-08-02 PROCEDURE — 93010 ELECTROCARDIOGRAM REPORT: CPT | Performed by: INTERNAL MEDICINE

## 2025-08-02 PROCEDURE — 85025 COMPLETE CBC W/AUTO DIFF WBC: CPT

## 2025-08-02 PROCEDURE — 94761 N-INVAS EAR/PLS OXIMETRY MLT: CPT

## 2025-08-02 PROCEDURE — 90935 HEMODIALYSIS ONE EVALUATION: CPT

## 2025-08-02 PROCEDURE — 2500000003 HC RX 250 WO HCPCS: Performed by: INTERNAL MEDICINE

## 2025-08-02 RX ORDER — HYDRALAZINE HYDROCHLORIDE 100 MG/1
100 TABLET, FILM COATED ORAL 3 TIMES DAILY
Qty: 90 TABLET | Refills: 1 | Status: SHIPPED | OUTPATIENT
Start: 2025-08-02

## 2025-08-02 RX ORDER — SPIRONOLACTONE 50 MG/1
50 TABLET, FILM COATED ORAL DAILY
Qty: 30 TABLET | Refills: 1 | Status: SHIPPED | OUTPATIENT
Start: 2025-08-02

## 2025-08-02 RX ORDER — ALBUMIN (HUMAN) 12.5 G/50ML
25 SOLUTION INTRAVENOUS PRN
Status: DISCONTINUED | OUTPATIENT
Start: 2025-08-02 | End: 2025-08-02 | Stop reason: HOSPADM

## 2025-08-02 RX ORDER — ISOSORBIDE MONONITRATE 120 MG/1
120 TABLET, EXTENDED RELEASE ORAL DAILY
Qty: 30 TABLET | Refills: 1 | Status: SHIPPED | OUTPATIENT
Start: 2025-08-02

## 2025-08-02 RX ORDER — RANOLAZINE 500 MG/1
500 TABLET, EXTENDED RELEASE ORAL NIGHTLY
Qty: 60 TABLET | Refills: 1 | Status: SHIPPED | OUTPATIENT
Start: 2025-08-02

## 2025-08-02 RX ADMIN — SPIRONOLACTONE 50 MG: 25 TABLET ORAL at 11:00

## 2025-08-02 RX ADMIN — LOSARTAN POTASSIUM 100 MG: 50 TABLET, FILM COATED ORAL at 10:59

## 2025-08-02 RX ADMIN — HYDRALAZINE HYDROCHLORIDE 100 MG: 25 TABLET ORAL at 10:58

## 2025-08-02 RX ADMIN — ASPIRIN 81 MG: 81 TABLET, CHEWABLE ORAL at 10:57

## 2025-08-02 RX ADMIN — LANTHANUM CARBONATE 1000 MG: 500 TABLET, CHEWABLE ORAL at 11:05

## 2025-08-02 RX ADMIN — ISOSORBIDE MONONITRATE 120 MG: 60 TABLET, EXTENDED RELEASE ORAL at 10:58

## 2025-08-02 RX ADMIN — AMLODIPINE BESYLATE 10 MG: 5 TABLET ORAL at 10:57

## 2025-08-02 RX ADMIN — CLOPIDOGREL BISULFATE 75 MG: 75 TABLET, FILM COATED ORAL at 10:57

## 2025-08-02 RX ADMIN — LEVETIRACETAM 500 MG: 500 TABLET, FILM COATED ORAL at 10:58

## 2025-08-02 RX ADMIN — HYDROMORPHONE HYDROCHLORIDE 0.5 MG: 1 INJECTION, SOLUTION INTRAMUSCULAR; INTRAVENOUS; SUBCUTANEOUS at 11:05

## 2025-08-02 RX ADMIN — METOPROLOL 50 MG: 50 TABLET ORAL at 10:59

## 2025-08-02 RX ADMIN — HYDROMORPHONE HYDROCHLORIDE 1 MG: 1 INJECTION, SOLUTION INTRAMUSCULAR; INTRAVENOUS; SUBCUTANEOUS at 07:26

## 2025-08-02 RX ADMIN — SODIUM CHLORIDE, PRESERVATIVE FREE 10 ML: 5 INJECTION INTRAVENOUS at 11:00

## 2025-08-02 RX ADMIN — HYDROMORPHONE HYDROCHLORIDE 0.5 MG: 1 INJECTION, SOLUTION INTRAMUSCULAR; INTRAVENOUS; SUBCUTANEOUS at 01:25

## 2025-08-02 ASSESSMENT — PAIN DESCRIPTION - LOCATION
LOCATION: CHEST

## 2025-08-02 ASSESSMENT — PAIN DESCRIPTION - DESCRIPTORS
DESCRIPTORS: ACHING
DESCRIPTORS: DISCOMFORT

## 2025-08-02 ASSESSMENT — PAIN DESCRIPTION - ORIENTATION
ORIENTATION: MID

## 2025-08-02 ASSESSMENT — PAIN DESCRIPTION - ONSET
ONSET: ON-GOING
ONSET: ON-GOING

## 2025-08-02 ASSESSMENT — PAIN SCALES - GENERAL
PAINLEVEL_OUTOF10: 5
PAINLEVEL_OUTOF10: 3
PAINLEVEL_OUTOF10: 4
PAINLEVEL_OUTOF10: 7

## 2025-08-02 NOTE — PROGRESS NOTES
Nurse handed patient a copy of discharge instructions which have been read and explained to patient. New medications were read and explained to patient, patient verbalized understanding. Patient aware that prescriptions have been electronically sent to their pharmacy. Opportunity for questions and clarification offered. Removed patient's IV access with no complications. Vital signs stable. Patient sent with all belongings. Patient handed doctors note for work.

## 2025-08-02 NOTE — FLOWSHEET NOTE
PRE TREATMENT     08/02/25 0650   Observations & Evaluations   Level of Consciousness 0   Oriented X 4   Heart Rhythm Regular   Respiratory Quality/Effort Unlabored   O2 Device None (Room air)   Bilateral Breath Sounds   (denied sob)   Skin Condition/Temp Dry;Warm   Abdomen Inspection Soft   Last BM (including prior to admit) 08/01/25   Edema None   Vital Signs   Temp 98.8 °F (37.1 °C)   Respirations 16   SpO2 95 %   Pain Assessment   Pain Assessment 0-10   Pain Level 4   Pain Location Chest   Pain Descriptors Discomfort   Pain Onset On-going   During Hemodialysis Assessment   Comments /69 HR 65 Temp 98.8 R 16 SP02 95% RA   Hemodialysis Fistula/Graft Superior Arm   No placement date or time found.   Orientation: Superior  Access Location: Arm   Thrill Present   Bruit Present   Status Accessed   Venous Needle Size 15 G   Arterial Needle Size 15 G   Accessed By: Andra Ayon RN   Access Attempts  1   Access Interventions Chlorhexidine;Aseptic Technique;Needles taped to patient      08/02/25 0705   Technical Checks   Dialysis Machine No. [id number]   RO Machine Number [id number]   Dialyzer Lot No. [id number]   Tubing Lot Number [id number]   All Connections Secure Yes   NS Bag Yes   Saline Line Double Clamped Yes   Dialyzer Nipro ELISIO   Prime Volume (mL) 200 mL   ICEBOAT I;C;E;B;O;A;T   RO Machine Log Sheet Completed Yes   Machine Alarm Self Test Completed;Passed   Air Foam Detector Tested;Proper Function;pH Reading   Extracorporeal Circuit Tested for Integrity Yes   Machine Conductivity 13.9   Manual Ph 7.4   Bleach Test (Neg) Yes   Bath Temperature 98.6 °F (37 °C)   Dialysis Bath   K+ (Potassium) 3   Ca+ (Calcium) 2.5   Na+ (Sodium) 138   HCO3 (Bicarb) 35   Bicarbonate Concentrate Lot No. [id number]   Acid Concentrate Lot No. [id number]   Primary RN SBAR: Jamaal Garcia RN  Incapacitated Nurse [facility]. provided: yes  Patient Education provided: hemodialysis procedural  Preferred Education method and Primary language:  Verbal english  Dialysis consent: chronic patient  [facility] General Consent Verified: yes  Hospital associated wait time; reason: none  Hepatitis B Surface Ag   Date/Time Value Ref Range Status   01/08/2025 12:28 PM <0.10 Index Final     Hep B S Ag Interp   Date/Time Value Ref Range Status   01/08/2025 12:28 PM Negative NEG   Final     HEP B SURF AB   Date/Time Value Ref Range Status   08/15/2022 05:28 AM 61.11 mIU/mL Final     Hep B S Ab   Date/Time Value Ref Range Status   01/08/2025 12:28 .33 mIU/mL Final     Hep B S Ab Interp   Date/Time Value Ref Range Status   01/08/2025 12:28 PM REACTIVE (A) NR   Final     Hep B results, dates, and Primary Source: Negative/Immune 1/8/25 Epic  Hep B dual verification performed by (RN): Ashleigh Gaitan RN  Machine disinfect process:heat    HEMODIALYSIS INITIATION   08/02/25 0715   Treatment   Time On 0715   Treatment Goal 2 Liters   Treatment Initiation   Dialyze Hours 3.25   Treatment  Initiation Universal Precautions maintained;Lines secured to patient;Connections secured;Prime given;Venous Parameters set;Arterial Parameters set;Air foam detector engaged;Saline line double clamped;Dialyzer   During Hemodialysis Assessment   Blood Flow Rate (ml/min) 450 ml/min   Arterial Pressure (mmHg) -170 mmHg   Venous Pressure (mmHg) 220   TMP 80      Comments Treatment started; /70 HR 64   Access Visible Yes   Ultrafiltration Rate (ml/hr) 770 ml/hr   Ultrafiltration Removed (ml) 0 ml     POST TREATMENT   08/02/25 1030   Treatment   Time Off 1030   Observations & Evaluations   Level of Consciousness 0   Oriented X 4   Heart Rhythm Regular   Respiratory Quality/Effort Unlabored   O2 Device None (Room air)   Bilateral Breath Sounds   (denied sob)   Skin Condition/Temp Dry;Warm   Abdomen Inspection Soft   Last BM (including prior to admit) 08/01/25   Edema None   Vital Signs   Temp 98.4 °F (36.9 °C)   Respirations 18   SpO2 97 %   Pain Assessment   Pain Assessment 0-10

## 2025-08-02 NOTE — PROGRESS NOTES
[facility] CARDIOLOGY                    Cardiology Care Note     []Initial Encounter     [x]Follow-up    Patient Name: Woodrow Madison - :1965 - MRN:[id number]  Primary Cardiologist: None  Consulting Cardiologist: Gee Richards MD     Reason for encounter: Chest pain    HPI:   Woodrow Madison is a 59 y.o. male with PMH significant for CAD, PCI to LAD stent, hypertension, end-stage renal disease, HIV, dyslipidemia who is admitted with symptoms of chest pain while getting his dialysis.  His blood pressure at that time was 220/110.  He was also bradycardic with heart rate in 40s.  He was sent over to the ER.      EKG demonstrated sinus rhythm with normal ST segment with normal axis.       Assessment and Plan     Elevated troponin T: Presentation troponin was 140 and thereafter it has been trending down.  Likely related to end-stage renal disease, severe hypertension.  Not true non-ST elevation MI.  EKG does not demonstrate ST changes.    - Stress Nuc abnormal with apical and inferoapical ischemia.   - LHC show severe diffuse disease-  of prox RCA, subtotal mid LAD, severe distal LAD and apical LAD dz. Likely related to background of HIV and other risk factors. Discussed with Dr. Gloria/Walter and they do not feel like there is a durable PCI option. Medical management at this time.   - add Ranexa 500mg PO BID.   - He can be dced home from cardiac standpoint. He will followup with Dr. Glover and Dr. Fink and they can decide if they should do an PCI/POBA of the LAD.   - Continue ASA, Plavix.   - Discussed at length with patient. He voiced understanding.   -cardiology will sign off    Chest pain: He has known history of CAD with PCI to LAD mid stent and POBA to distal LAD 7/10/24. Continue aspirin, Plavix, losartan, metoprolol, amlodipine and atorvastatin.  EKG does not demonstrate any ischemia and echo does not demonstrate any wall motion abnormalities. Isosorbide mononitrate 30 mg p.o. daily was

## 2025-08-02 NOTE — DISCHARGE SUMMARY
Hospitalist Discharge Summary     Patient ID:  Woodrow Madison  [id number]  59 y.o.  1965    Admit date: 7/28/2025    Discharge date and time: 8/2/2025    Admission Diagnoses: Elevated troponin [R79.89]  Dialysis patient [Z99.2]  Hypertensive emergency [I16.1]  Chest pain, unspecified type [R07.9]    Discharge Diagnoses:    Principal Problem:    Hypertensive emergency  Resolved Problems:    * No resolved hospital problems. *         Hospital Course:         Woodrow Madison is a very pleasant 59 y.o. male with a past med history of ESRD on hemodialysis, CAD, NSTEMI (2023) GERD, HTN, HLD, multiple blood transfusions, HIV, who was admitted for chest pain and hypertensive emergency.      Known Hx of CAD with PCI to LAD, POBA to distal LAD 7/2024: Presented with left-sided chest pain.  Troponin elevated, peaked at 140.  EKG showed no acute ischemic changes.  TTE 7/28, showed normal EF.  Cardiology was consulted.  Due to ongoing chest pain, underwent nuclear stress test that showed apical and inferior apical ischemia.  Subsequently underwent left heart cath 8/1/2025 that showed severe diffuse disease -  of prox RCA, subtotal mid LAD, severe distal LAD and apical LAD dz. Likely related to background of HIV and other risk factors.  Per Dr. Richards, discussed with Dr. Gloria/Walter and they do not feel like there is a durable PCI option. Medical management at this time.  Added Ranexa 500 mg p.o. twice daily.  He can follow-up with Dr. Glover and Dr. Fink and they can decide if they should do an PCI/POBA of the LAD.  Continue aspirin, Plavix, metoprolol.  Patient was also started on Imdur which was increased to 120 mg daily.  Continue atorvastatin 40 mg daily.  Outpatient follow-up with PCP/cardiology.    Hypertensive emergency:  Hx of poorly controlled chronic HTN.  Blood pressures in the 200-220 systolic range on presentation.  Patient admits he did not take his blood pressure meds on the day of admission.   known as: NORVASC     aspirin 81 MG chewable tablet     atazanavir 300 MG capsule  Commonly known as: REYATAZ  Take 1 capsule by mouth daily (with breakfast)     atorvastatin 40 MG tablet  Commonly known as: LIPITOR  Take 1 tablet by mouth daily     calcium carbonate 1250 (500 Ca) MG chewable tablet  Commonly known as: OS-MOON     clopidogrel 75 MG tablet  Commonly known as: PLAVIX     dolutegravir sodium 50 MG tablet  Commonly known as: Tivicay  Take 1 tablet by mouth daily     famotidine 20 MG tablet  Commonly known as: PEPCID  Take 1 tablet by mouth at bedtime     lanthanum 1000 MG chewable tablet  Commonly known as: FOSRENOL     levETIRAcetam 500 MG tablet  Commonly known as: KEPPRA  take 1 tablet by mouth twice a day     lidocaine-prilocaine 2.5-2.5 % cream  Commonly known as: EMLA     losartan 100 MG tablet  Commonly known as: COZAAR  Take 1 tablet by mouth daily     metoprolol tartrate 50 MG tablet  Commonly known as: LOPRESSOR  take 1 tablet by mouth twice a day     nitroGLYCERIN 0.4 MG SL tablet  Commonly known as: NITROSTAT     Vemlidy 25 MG Tabs  Generic drug: Tenofovir Alafenamide Fumarate  TAKE 1 TABLET BY MOUTH ONCE DAILY     Xphozah 20 MG Tabs  Generic drug: Tenapanor HCl (CKD)               Where to Get Your Medications        These medications were sent to [id number] [place], [place] - [address] - P [phone] - F [phone] [address] [address]      Phone: [phone]   hydrALAZINE 100 MG tablet  isosorbide mononitrate 120 MG extended release tablet  ranolazine 500 MG extended release tablet  spironolactone 50 MG tablet       Current Discharge Medication List        CONTINUE these medications which have NOT CHANGED    Details   Tenapanor HCl, CKD, (XPHOZAH) 20 MG TABS Take by mouth      atorvastatin (LIPITOR) 40 MG tablet Take 1 tablet by mouth daily  Qty: 90 tablet, Refills: 1    Associated Diagnoses: Mixed hyperlipidemia      losartan (COZAAR)

## 2025-08-02 NOTE — DIALYSIS
Hep B verification performed for (RN): TraceRN  Hep B results, dates, and Primary Source: Negative/Immune on 1/8/2025 via epic  Hepatitis B Surface Ag   Date/Time Value Ref Range Status   01/08/2025 12:28 PM <0.10 Index Final     Hep B S Ag Interp   Date/Time Value Ref Range Status   01/08/2025 12:28 PM Negative NEG   Final     HEP B SURF AB   Date/Time Value Ref Range Status   08/15/2022 05:28 AM 61.11 mIU/mL Final     Hep B S Ab   Date/Time Value Ref Range Status   01/08/2025 12:28 .33 mIU/mL Final     Hep B S Ab Interp   Date/Time Value Ref Range Status   01/08/2025 12:28 PM REACTIVE (A) NR   Final     Machine disinfect process:standard acid/heat disinfect

## 2025-08-02 NOTE — PROGRESS NOTES
Spiritual Health History and Assessment/Progress Note  [facility]    Initial Encounter,  ,  ,      Name: Woodrow Madison MRN: [id number]    Age: 59 y.o.     Sex: male   Language: English   Sabianist: Restorationism   Hypertensive emergency     Date: 8/2/2025            Total Time Calculated: 20 min              Spiritual Assessment began in [facility] B3 INTERMEDIATE CARE UNIT        Referral/Consult From: Rounding   Encounter Overview/Reason: Initial Encounter  Service Provided For: Patient    Halle, Belief, Meaning:   Patient identifies as spiritual, is connected with a halle tradition or spiritual practice, and has beliefs or practices that help with coping during difficult times  Family/Friends No family/friends present      Importance and Influence:  Patient has spiritual/personal beliefs that influence decisions regarding their health  Family/Friends No family/friends present    Community:  Patient is connected with a spiritual community and feels well-supported. Support system includes: Halle Community, Friends, and Extended family  Family/Friends No family/friends present    Assessment and Plan of Care:     Patient Interventions include: Facilitated expression of thoughts and feelings and Affirmed coping skills/support systems  Family/Friends Interventions include: No family/friends present    Patient Plan of Care: Spiritual Care available upon further referral  Family/Friends Plan of Care: Spiritual Care available upon further referral    Electronically signed by Anton Gonzalezlain Intern on 8/2/2025 at 11:19 AM

## 2025-08-02 NOTE — PROGRESS NOTES
Salvatore Reich [facility]  [address] [place]  [address]  [phone]          August 2, 2025       RE: Woodrow Madison      To Whom It May Concern:    This is to certify that Woodrow Madison was hospitalized at [facility] from 7/28/2025 to 8/2/2025 and may return to work on 8/3/2025 or as cleared by his primary care provider.    Please feel free to contact my office at .    Thank you for your assistance in this matter.      Sincerely,      Semaj Adams MD

## 2025-08-04 LAB
EKG ATRIAL RATE: 58 BPM
EKG ATRIAL RATE: 99 BPM
EKG DIAGNOSIS: NORMAL
EKG DIAGNOSIS: NORMAL
EKG P AXIS: 22 DEGREES
EKG P-R INTERVAL: 168 MS
EKG P-R INTERVAL: 170 MS
EKG Q-T INTERVAL: 352 MS
EKG Q-T INTERVAL: 438 MS
EKG QRS DURATION: 90 MS
EKG QRS DURATION: 98 MS
EKG QTC CALCULATION (BAZETT): 429 MS
EKG QTC CALCULATION (BAZETT): 451 MS
EKG R AXIS: -26 DEGREES
EKG R AXIS: -5 DEGREES
EKG T AXIS: 151 DEGREES
EKG T AXIS: 23 DEGREES
EKG VENTRICULAR RATE: 58 BPM
EKG VENTRICULAR RATE: 99 BPM

## 2025-08-04 PROCEDURE — 93010 ELECTROCARDIOGRAM REPORT: CPT | Performed by: INTERNAL MEDICINE

## 2025-08-06 LAB — ECHO BSA: 2.2 M2

## (undated) DEVICE — MOUTHPIECE ENDOSCP 20X27MM --

## (undated) DEVICE — GARMENT,MEDLINE,DVT,INT,CALF,MED, GEN2: Brand: MEDLINE

## (undated) DEVICE — CATH BLLN ANGIO 2.50X12MM SC EUPHORA RX

## (undated) DEVICE — SPLINT WR VELC FOAM NEUT POS DISP FOR RAD ART ACC SFT STRP

## (undated) DEVICE — GOWN,NON-REINFORCED,XXL: Brand: MEDLINE

## (undated) DEVICE — GUIDEWIRE VASC L260CM 0.035IN J TIP L3MM PTFE FIX COR NAMIC

## (undated) DEVICE — MASK ANES INF SZ 2 PREM TAIL VLV INFL PRT UNSCENTED SGL PT

## (undated) DEVICE — HEAD FRAME WITH SOFT LAYER FOAM POSITIONER: Brand: CARDINAL HEALTH

## (undated) DEVICE — SYR 5ML 1/5 GRAD LL NSAF LF --

## (undated) DEVICE — RUNTHROUGH NS EXTRA FLOPPY PTCA GUIDEWIRE: Brand: RUNTHROUGH

## (undated) DEVICE — SYRINGE MED 10ML LUERLOCK TIP W/O SFTY DISP

## (undated) DEVICE — SUTURE VCRL SZ 3-0 L27IN ABSRB UD L26MM SH 1/2 CIR J416H

## (undated) DEVICE — FORCEPS BX L240CM JAW DIA2.2MM RAD JAW 4 HOT DISP

## (undated) DEVICE — ELECTRODE PT RET AD L9FT HI MOIST COND ADH HYDRGEL CORDED

## (undated) DEVICE — STRAP,POSITIONING,KNEE/BODY,FOAM,4X60": Brand: MEDLINE

## (undated) DEVICE — 3M™ TEGADERM™ TRANSPARENT FILM DRESSING FRAME STYLE, 1626W, 4 IN X 4-3/4 IN (10 CM X 12 CM), 50/CT 4CT/CASE: Brand: 3M™ TEGADERM™

## (undated) DEVICE — PACK,LAPAROTOMY,2 REINFORCED GOWNS: Brand: MEDLINE

## (undated) DEVICE — GLIDESHEATH SLENDER ACCESS KIT: Brand: GLIDESHEATH SLENDER

## (undated) DEVICE — SPONGE GZ W4XL4IN COT RADPQ HIGHLY ABSRB

## (undated) DEVICE — SUTURE GORTX SZ 4-0 L24IN NONABSORBABLE L13MM PT-13 3/8 CIR 5K08A

## (undated) DEVICE — ELECTRODE,RADIOTRANSLUCENT,FOAM,3PK: Brand: MEDLINE

## (undated) DEVICE — STERILE POLYISOPRENE POWDER-FREE SURGICAL GLOVES: Brand: PROTEXIS

## (undated) DEVICE — Device

## (undated) DEVICE — SPONGE GZ W4XL4IN COT 12 PLY TYP VII WVN C FLD DSGN

## (undated) DEVICE — GUIDE 6FR JR4 MEDTRONIC 100CM

## (undated) DEVICE — TAPE,CLOTH/SILK,CURAD,3"X10YD,LF,40/CS: Brand: CURAD

## (undated) DEVICE — CORONARY IMAGING CATHETER: Brand: OPTICROSS 6

## (undated) DEVICE — NEPTUNE E-SEP SMOKE EVACUATION PENCIL, COATED, 70MM BLADE, PUSH BUTTON SWITCH: Brand: NEPTUNE E-SEP

## (undated) DEVICE — TR BAND RADIAL ARTERY COMPRESSION DEVICE: Brand: TR BAND

## (undated) DEVICE — CATHETER DIAG 5FR L100CM LUMN ID0.047IN JR4 CRV 0 SIDE H

## (undated) DEVICE — PRESSURE TUBING: Brand: TRUWAVE

## (undated) DEVICE — HI-TORQUE VERSACORE FLOPPY GUIDE WIRE SYSTEM 145 CM: Brand: HI-TORQUE VERSACORE

## (undated) DEVICE — GLIDESHEATH SLENDER STAINLESS STEEL KIT: Brand: GLIDESHEATH SLENDER

## (undated) DEVICE — STERILE POLYISOPRENE POWDER-FREE SURGICAL GLOVES WITH EMOLLIENT COATING: Brand: PROTEXIS

## (undated) DEVICE — MEDI-TRACE CADENCE ADULT, DEFIBRILLATION ELECTRODE -RTS  (10 PR/PK) - PHYSIO-CONTROL: Brand: MEDI-TRACE CADENCE

## (undated) DEVICE — WASTEBAG DRIP/ADAPTER: Brand: MEDLINE INDUSTRIES, INC.

## (undated) DEVICE — WET SKIN PREP TRAY: Brand: MEDLINE INDUSTRIES, INC.

## (undated) DEVICE — PINNACLE INTRODUCER SHEATH: Brand: PINNACLE

## (undated) DEVICE — 1200 GUARD II KIT W/5MM TUBE W/O VAC TUBE: Brand: GUARDIAN

## (undated) DEVICE — HYPODERMIC SAFETY NEEDLE: Brand: MONOJECT

## (undated) DEVICE — CATH BLLN ANGIO 3.50X12MM NC EUPHORIA RX

## (undated) DEVICE — KIT HND CTRL 3 W STPCOCK ROT END 54IN PREM HI PRSS TBNG AT

## (undated) DEVICE — SYRINGE IRRIG 60ML SFT PLIABLE BLB EZ TO GRP 1 HND USE W/

## (undated) DEVICE — TUBING PRSS MON L6IN PVC M FEM CONN

## (undated) DEVICE — SYRINGE ANGIO 10 CC BRL STD PRNT POLYCARB LT BLU MEDALLION

## (undated) DEVICE — HEART CATH-MRMC: Brand: MEDLINE INDUSTRIES, INC.

## (undated) DEVICE — Device: Brand: JELCO

## (undated) DEVICE — CATHETER GUID 7FR L100CM ID0.081IN EBU3.75 RADPQ MRK BND

## (undated) DEVICE — CATH BLLN ANGIO 4.50X8MM NC EUPHORIA RX

## (undated) DEVICE — REM POLYHESIVE ADULT PATIENT RETURN ELECTRODE: Brand: VALLEYLAB

## (undated) DEVICE — GUIDEWIRE VASC L260CM DIA0.035IN TIP L3MM STD EXCHG PTFE J

## (undated) DEVICE — Device: Brand: EAGLE EYE PLATINUM RX DIGITAL IVUS CATHETER

## (undated) DEVICE — WIRE LEAD 72IN RADIOLUCENT DISPOSABLE

## (undated) DEVICE — CATH BLLN ANGIO 2.75X15MM NC EUPHORIA RX

## (undated) DEVICE — COPILOT BLEEDBACK CONTROL VALVE: Brand: COPILOT

## (undated) DEVICE — SOLUTION IV 1000ML 0.9% SOD CHL

## (undated) DEVICE — CATH BLLN ANGIO 2.50X15MM SC EUPHORA RX

## (undated) DEVICE — LAMINECTOMY ARM CRADLE FOAM POSITIONER: Brand: CARDINAL HEALTH

## (undated) DEVICE — CUSTOM KT PTCA INFL DEV K05 00053H (ORDER MUTLIPLES OF 5 EACH)

## (undated) DEVICE — BASIN ST MAJOR-NO CAUTERY: Brand: MEDLINE INDUSTRIES, INC.

## (undated) DEVICE — WASTE KIT - ST MARY: Brand: MEDLINE INDUSTRIES, INC.

## (undated) DEVICE — PACK PROCEDURE SURG HRT CATH

## (undated) DEVICE — HANDLE LT SNAP ON ULT DURABLE LENS FOR TRUMPF ALC DISPOSABLE

## (undated) DEVICE — PRE-CONNECTED EXCHANGEABLE BURR CATHETER AND BURR ADVANCING DEVICE: Brand: ROTAPRO™

## (undated) DEVICE — NEEDLE HYPO 25GA L1.5IN BVL ORIENTED ECLIPSE

## (undated) DEVICE — INTENDED FOR TISSUE SEPARATION, AND OTHER PROCEDURES THAT REQUIRE A SHARP SURGICAL BLADE TO PUNCTURE OR CUT.: Brand: BARD-PARKER ® CARBON RIB-BACK BLADES

## (undated) DEVICE — SUTURE MCRYL SZ 4-0 L27IN ABSRB UD L19MM PS-2 1/2 CIR PRIM Y426H

## (undated) DEVICE — Device: Brand: ASAHI SION BLUE

## (undated) DEVICE — SOLUTION IRRIG 500ML 0.9% SOD CHLO USP POUR PLAS BTL

## (undated) DEVICE — CATHETER DIAG 6FR L100CM LUMN ID0.056IN JL4 CRV 0 SIDE H

## (undated) DEVICE — GUIDEWIRE VASC L150CM DIA0.035IN TIP L3MM PTFE J CRV FIX

## (undated) DEVICE — GLOVE SURG SZ 8 L12IN FNGR THK79MIL GRN LTX FREE

## (undated) DEVICE — SURGICAL PROCEDURE TRAY CRD CATH 3 PRT

## (undated) DEVICE — BOWL MED M 16OZ PLAS CAP GRAD

## (undated) DEVICE — INFECTION CONTROL KIT SYS

## (undated) DEVICE — CATHETER DIAG 5FR L100CM LUMN ID0.047IN JL3.5 CRV 0 SIDE H

## (undated) DEVICE — PADPRO DEFIBRILLATION/PACING/CARDIOVERSION/MONITORING ELECTRODES, ADULT/CHILD GREATER THAN 10 KG RADIOTRANSPARENT ELECTRODE, PHYSIO-CONTROL QUIK-COMBO (M) 60" (152 CM): Brand: PADPRO

## (undated) DEVICE — IV STRT KT 3282] LSL INDUSTRIES INC]

## (undated) DEVICE — JELLY LUBRICATING 2 OZ SCREW-CAP MTL TUBE STRL SURGLUB

## (undated) DEVICE — DRESSING,GAUZE,XEROFORM,CURAD,5"X9",ST: Brand: CURAD

## (undated) DEVICE — CATHETER DIAG 6FR L100CM LUMN ID0.056IN JR4 CRV 0 SIDE H

## (undated) DEVICE — KIT MFLD ISOLATN NACL CNTRST PRT TBNG SPIK W/ PRSS TRNSDUC

## (undated) DEVICE — PTCA DILATATION CATHETER: Brand: NC EMERGE®

## (undated) DEVICE — SOUTHSIDE TURNOVER: Brand: MEDLINE INDUSTRIES, INC.

## (undated) DEVICE — MEDTRONIC NC EUPHORA 2.5X20MM BALLOON

## (undated) DEVICE — DEVICE TORQ FLRESCNT PNK FOR HEMSTAS VLV

## (undated) DEVICE — KIT COLON W/ 1.1OZ LUB AND 2 END

## (undated) DEVICE — ANGIOGRAPHY KIT

## (undated) DEVICE — (D)PREP SKN CHLRAPRP APPL 26ML -- CONVERT TO ITEM 371833

## (undated) DEVICE — THE ENDO CARRY-ON PROCEDURE KIT CONTAINS ALL OF THE SUPPLIES AND INFECTION PREVENTION PRODUCTS NEEDED FOR ENDOSCOPIC PROCEDURES: Brand: ENDO CARRY-ON PROCEDURE KIT

## (undated) DEVICE — CANNULA CUSH AD W/ 14FT TBG

## (undated) DEVICE — CATHETER COR DIAG 4.0 5FR 100CM 0 SIDE H

## (undated) DEVICE — SOL IRRIGATION INJ NACL 0.9% 500ML BTL

## (undated) DEVICE — SC 3W MP RA OFF PB - PG: Brand: NAMIC

## (undated) DEVICE — CATH BLLN ANGIO 3.50X15MM SC EUPHORA RX

## (undated) DEVICE — DEVICE DRIVE ROTAWIRE FLOPPY

## (undated) DEVICE — SOLUTION IRRIG 1000ML 0.9% SOD CHL USP POUR PLAS BTL

## (undated) DEVICE — TUBING PRSS MON L12IN PVC RIG NONEXPANDING M TO FEM CONN

## (undated) DEVICE — KIT AT-X65 ANGIOTOUCH HAND CONTROLLER

## (undated) DEVICE — SUPPORT WRST AD W3.5XL9IN DIA14.5IN ART SFT ADJ HK AND LOOP

## (undated) DEVICE — PAD,ABDOMINAL,5"X9",ST,LF,25/BX: Brand: MEDLINE INDUSTRIES, INC.

## (undated) DEVICE — GUIDEWIRE VASC L180CM DIA0.035IN TIP L7CM PTFE S STL STR

## (undated) DEVICE — CATH BLLN ANGIO 3X15MM NC EUPHORIA RX

## (undated) DEVICE — CATHETER IV 22GA L1IN OD0.8382-0.9144MM ID0.6096-0.6858MM 382523

## (undated) DEVICE — SPONGE LAPAROTOMY W18XL18IN WHITE STRUNG RADIOPAQUE STERILE

## (undated) DEVICE — SET ADMIN 16ML TBNG L100IN 2 Y INJ SITE IV PIGGY BK DISP (ORDER IN MULIPLES OF 48)

## (undated) DEVICE — SKIN PREP TRAY 4 COMPARTM TRAY: Brand: MEDLINE INDUSTRIES, INC.

## (undated) DEVICE — CANNULA NSL O2 AD 7 FT END-TIDAL CARBON DIOX VENTFLO

## (undated) DEVICE — GLOVE SURG SZ 75 L1212IN FNGR THK138MIL BRN LTX FREE

## (undated) DEVICE — SUPPLEMENT DIGESTIVE H2O SOL GI-EASE

## (undated) DEVICE — GUIDEWIRE VASC L180CM 0035IN 15MM J ROSEN TIP PTFE FIX COR

## (undated) DEVICE — MEDTRONIC EUPHORA 2.0X20MM BALLOON

## (undated) DEVICE — Device: Brand: PROWATER

## (undated) DEVICE — SPECIAL PROCEDURE DRAPE 32" X 34": Brand: SPECIAL PROCEDURE DRAPE

## (undated) DEVICE — X-RAY DETECTABLE SPONGES,16 PLY: Brand: VISTEC

## (undated) DEVICE — KIT ENDOSCOPIC  PROC VIA

## (undated) DEVICE — CATHETER GUID 7FR L150CM RAP EXCHG L25CM TIP 5.7FR PUSHROD

## (undated) DEVICE — COVER LT HNDL PLAS RIG 1 PER PK

## (undated) DEVICE — SYRINGE MEDICAL 3ML CLEAR PLASTIC STANDARD NON CONTROL LUERLOCK TIP DISPOSABLE

## (undated) DEVICE — SUTURE VICRYL + SZ 3-0 L27IN ABSRB UD L26MM SH 1/2 CIR VCP416H

## (undated) DEVICE — SOLUTION IRRIG 500ML 0.9% SOD CHL USP POUR PLAS BTL

## (undated) DEVICE — SINGLE-USE BIOPSY FORCEPS: Brand: RADIAL JAW 4

## (undated) DEVICE — DRAPE,REIN 53X77,STERILE: Brand: MEDLINE

## (undated) DEVICE — TOWEL SURG W17XL27IN STD BLU COT NONFENESTRATED PREWASHED

## (undated) DEVICE — DEVICE INFL W/ HEM VLV TORQ

## (undated) DEVICE — DRAPE KIT RAMAPR RADIATION SHIELD

## (undated) DEVICE — KIT INTRO 6FR L10CM MINI WIRE L45CM DIA0.021IN NDL 21GA ANT

## (undated) DEVICE — PROVE COVER: Brand: UNBRANDED

## (undated) DEVICE — SYRINGE MED 5ML STD CLR PLAS LUERLOCK TIP N CTRL DISP

## (undated) DEVICE — CATH BLLN ANGIO 3.50X15MM NC EUPHORA RX

## (undated) DEVICE — SUTURE PERMAHAND SZ 0 L30IN NONABSORBABLE BLK SILK BRAID A306H

## (undated) DEVICE — SUT PROL 6-0 24IN BV1 DA BLU --

## (undated) DEVICE — 1000 SES SMOKE EVACUATION SYSTEM, HAND HELD TUBING SET: Brand: 1000 SES

## (undated) DEVICE — FORCEPS BX L240CM JAW DIA2.8MM L CAP W/ NDL MIC MESH TOOTH

## (undated) DEVICE — EXCHANGEABLE BURR CATHETER: Brand: ROTALINK™ BURR

## (undated) DEVICE — HI-TORQUE BALANCE MIDDLEWEIGHT UNIVERSAL II GUIDE WIRE STRAIGHT TIP PAK  300 CM: Brand: HI-TORQUE BALANCE MIDDLEWEIGHT UNIVERSAL II

## (undated) DEVICE — KIT MED IMAG CNTRST AGNT W/ IOPAMIDOL REUSE

## (undated) DEVICE — CATH BLLN ANGIO 4X6MM NC EUPHORIA RX

## (undated) DEVICE — BITEBLOCK ENDOSCP 60FR MAXI WHT POLYETH STURDY W/ VELC WVN

## (undated) DEVICE — CUSTOM KT PTCA INFL DEV K05 00052M

## (undated) DEVICE — POSITIONER HD REST FOAM CMFRT TCH

## (undated) DEVICE — VALVE ANGIO ID0.11IN HEMSTAT MTL GUID WIRE INSRT TOOL AND

## (undated) DEVICE — ANGIO-SEAL VIP VASCULAR CLOSURE DEVICE: Brand: ANGIO-SEAL

## (undated) DEVICE — CATH BLLN ANGIO 4.50X15MM NC EUPHORIA RX

## (undated) DEVICE — SUT ETHLN 3-0 18IN PS2 BLK --

## (undated) DEVICE — 48" PROBE COVER W/GEL, ULTRASOUND, STERILE: Brand: SITE-RITE

## (undated) DEVICE — Device: Brand: FIELDER XT

## (undated) DEVICE — FCPS RAD JAW 4LC 240CM W/NDL -- BX/40

## (undated) DEVICE — CATHETER GUID 6FR DIA0.071IN SHFT NYL STD L JR 4 CRV ENH

## (undated) DEVICE — SOLIDIFIER FLD 2OZ 1500CC N DISINF IN BTL DISP SAFESORB

## (undated) DEVICE — CATHETER GUID 5 FRX135 CM MULTLYR TURNPIKE

## (undated) DEVICE — GUIDE EXTENSION CATHETER: Brand: GUIDEZILLA™ II

## (undated) DEVICE — PENCIL SMK EVAC ALL IN 1 DSGN ENH VISIBILITY IMPROVED AIR

## (undated) DEVICE — TOOL INSRT 0022IN S STL GWIRE

## (undated) DEVICE — MOUTHPIECE ENDOSCP L CTRL OPN AND SIDE PORTS DISP

## (undated) DEVICE — TOWEL,OR,DSP,ST,BLUE,STD,4/PK,20PK/CS: Brand: MEDLINE

## (undated) DEVICE — LINE SAMPLING AD NSL O2 TBNG MICROSTREAM ADV FILTERLINE MVAO

## (undated) DEVICE — RADIFOCUS GLIDECATH: Brand: GLIDECATH

## (undated) DEVICE — GUIDE 6FR XB3.5 CORDIS 100CM

## (undated) DEVICE — DEVICE COMPR LNG 27 CM VASC BND

## (undated) DEVICE — MASK O2 MED AD 7 FT 3 IN 1 W/ STD CONN LTX

## (undated) DEVICE — MINOR GENERAL PACK: Brand: MEDLINE INDUSTRIES, INC.

## (undated) DEVICE — CATHETER GUID 6FR L150CM RAP EXCHG L25CM TIP 5.1FR PUSHROD

## (undated) DEVICE — APPLICATOR BNDG 1MM ADH PREMIERPRO EXOFIN

## (undated) DEVICE — SUTURE PERMAHAND SZ 4-0 L12X30IN NONABSORBABLE BLK SILK A303H

## (undated) DEVICE — SOLUTION IRRIG 1000ML H2O STRL BLT

## (undated) DEVICE — CATHETER ETER VASC OD6FR CARD 145DEG PGTL BRAID JL40 JR40 MP

## (undated) DEVICE — BLUNTFILL: Brand: MONOJECT

## (undated) DEVICE — DEVICE COMPR L24CM REG RAD W/ INFL TR BND

## (undated) DEVICE — CATH BLLN ANGIO 4X8MM NC EUPHORIA RX

## (undated) DEVICE — CATHETER 6FR GUIDELINER V3

## (undated) DEVICE — TOWEL,OR,DSP,ST,BLUE,STD,2/PK,40PK/CS: Brand: MEDLINE

## (undated) DEVICE — NEEDLE HYPO 22GA L1.5IN BLK S STL HUB POLYPR SHLD REG BVL

## (undated) DEVICE — SYRINGE 10 ML POLYCARBINATE PUR

## (undated) DEVICE — HI-TORQUE WHISPER MS GUIDE WIRE .014 J TIP 3.0 CM X 190 CM: Brand: HI-TORQUE WHISPER

## (undated) DEVICE — BLUNTFILL WITH FILTER: Brand: MONOJECT

## (undated) DEVICE — 3M™ STERI-DRAPE™ SMALL DRAPE WITH ADHESIVE APERTURE 1092 25/BX,4/CS&#X20;: Brand: STERI-DRAPE™

## (undated) DEVICE — SUTURE PERMAHAND SZ 0 L30IN NONABSORBABLE BLK L26MM SH 1/2 K834H